# Patient Record
Sex: MALE | Race: ASIAN | NOT HISPANIC OR LATINO | Employment: FULL TIME | URBAN - METROPOLITAN AREA
[De-identification: names, ages, dates, MRNs, and addresses within clinical notes are randomized per-mention and may not be internally consistent; named-entity substitution may affect disease eponyms.]

---

## 2017-06-29 ENCOUNTER — OFFICE VISIT (OUTPATIENT)
Dept: INTERNAL MEDICINE | Facility: CLINIC | Age: 57
End: 2017-06-29
Payer: COMMERCIAL

## 2017-06-29 VITALS
HEART RATE: 89 BPM | OXYGEN SATURATION: 96 % | TEMPERATURE: 98.3 F | WEIGHT: 196 LBS | SYSTOLIC BLOOD PRESSURE: 116 MMHG | BODY MASS INDEX: 29.7 KG/M2 | HEIGHT: 68 IN | DIASTOLIC BLOOD PRESSURE: 68 MMHG

## 2017-06-29 DIAGNOSIS — Z12.5 SPECIAL SCREENING FOR MALIGNANT NEOPLASM OF PROSTATE: ICD-10-CM

## 2017-06-29 DIAGNOSIS — E11.9 TYPE 2 DIABETES MELLITUS WITHOUT COMPLICATION, WITHOUT LONG-TERM CURRENT USE OF INSULIN (H): ICD-10-CM

## 2017-06-29 DIAGNOSIS — I10 ESSENTIAL HYPERTENSION, BENIGN: ICD-10-CM

## 2017-06-29 DIAGNOSIS — Z11.59 NEED FOR HEPATITIS C SCREENING TEST: ICD-10-CM

## 2017-06-29 DIAGNOSIS — Z12.11 COLON CANCER SCREENING: ICD-10-CM

## 2017-06-29 DIAGNOSIS — E78.5 HYPERLIPIDEMIA LDL GOAL <100: ICD-10-CM

## 2017-06-29 DIAGNOSIS — Z00.00 ENCOUNTER FOR ROUTINE ADULT HEALTH EXAMINATION WITHOUT ABNORMAL FINDINGS: Primary | ICD-10-CM

## 2017-06-29 LAB
HBA1C MFR BLD: 8.7 % (ref 4.3–6)
HGB BLD-MCNC: 13.5 G/DL (ref 13.3–17.7)

## 2017-06-29 PROCEDURE — 83036 HEMOGLOBIN GLYCOSYLATED A1C: CPT | Performed by: INTERNAL MEDICINE

## 2017-06-29 PROCEDURE — 80053 COMPREHEN METABOLIC PANEL: CPT | Performed by: INTERNAL MEDICINE

## 2017-06-29 PROCEDURE — 84439 ASSAY OF FREE THYROXINE: CPT | Performed by: INTERNAL MEDICINE

## 2017-06-29 PROCEDURE — 36415 COLL VENOUS BLD VENIPUNCTURE: CPT | Performed by: INTERNAL MEDICINE

## 2017-06-29 PROCEDURE — 82043 UR ALBUMIN QUANTITATIVE: CPT | Performed by: INTERNAL MEDICINE

## 2017-06-29 PROCEDURE — 80061 LIPID PANEL: CPT | Performed by: INTERNAL MEDICINE

## 2017-06-29 PROCEDURE — 99396 PREV VISIT EST AGE 40-64: CPT | Performed by: INTERNAL MEDICINE

## 2017-06-29 PROCEDURE — 84443 ASSAY THYROID STIM HORMONE: CPT | Performed by: INTERNAL MEDICINE

## 2017-06-29 PROCEDURE — 85018 HEMOGLOBIN: CPT | Performed by: INTERNAL MEDICINE

## 2017-06-29 PROCEDURE — G0103 PSA SCREENING: HCPCS | Performed by: INTERNAL MEDICINE

## 2017-06-29 PROCEDURE — 86803 HEPATITIS C AB TEST: CPT | Performed by: INTERNAL MEDICINE

## 2017-06-29 RX ORDER — LISINOPRIL 20 MG/1
20 TABLET ORAL DAILY
Qty: 90 TABLET | Refills: 3 | Status: SHIPPED | OUTPATIENT
Start: 2017-06-29 | End: 2018-10-30

## 2017-06-29 RX ORDER — GLIMEPIRIDE 2 MG/1
2 TABLET ORAL
Qty: 90 TABLET | Refills: 3 | Status: SHIPPED | OUTPATIENT
Start: 2017-06-29 | End: 2018-10-30

## 2017-06-29 RX ORDER — ATORVASTATIN CALCIUM 20 MG/1
20 TABLET, FILM COATED ORAL DAILY
Qty: 90 TABLET | Refills: 3 | Status: SHIPPED | OUTPATIENT
Start: 2017-06-29 | End: 2018-10-30

## 2017-06-29 NOTE — LETTER
[facility]  [address]  [address]      June 30, 2017      Jessica Gomez  [address]  [facility] [address]          Dear Jessica,      I have enclosed a copy of your most recent labs done here at the clinic and if available some of your prior labs for comparison.     I am pleased to inform you that your routine blood work including your hemoglobin, sodium, potassium, calcium, kidney and liver function tests are all normal.    Your Hemoglobin A1C is abnormal and indicates your blood sugars could be better controlled.  Please follow-up in the clinic to discuss some changes that need to be done.    Your thyroid function tests indicate you need some medication adjustment so I would call the clinic and make a follow-up appointment to discuss these changes.    Your cholesterol looks good and I would not change anything at this point but would repeat your labs in 12 months.    In addition, your PSA or prostate screening antigen is normal and should be repeated annually.    Please call me if you have further questions.        Michael Méndez MD

## 2017-06-29 NOTE — PROGRESS NOTES
SUBJECTIVE:   CC: Jessica Gomez is an 57 year old male who presents for preventative health visit.     Healthy Habits:    Do you get at least three servings of calcium containing foods daily (dairy, green leafy vegetables, etc.)? yes    Amount of exercise or daily activities, outside of work: none    Problems taking medications regularly No    Medication side effects: No    Have you had an eye exam in the past two years? yes    Do you see a dentist twice per year? yes  Do you have sleep apnea, excessive snoring or daytime drowsiness?no      Today's PHQ-2 Score:   PHQ-2 ( 1999 [employer]) 5/18/2016 4/8/2015   Q1: Little interest or pleasure in doing things 0 0   Q2: Feeling down, depressed or hopeless 0 0   PHQ-2 Score 0 0       Abuse: Current or Past(Physical, Sexual or Emotional)- No  Do you feel safe in your environment - Yes    Social History   Substance Use Topics     Smoking status: Never Smoker     Smokeless tobacco: Never Used     Alcohol use No     The patient does not drink >3 drinks per day nor >7 drinks per week.    Last PSA:   PSA   Date Value Ref Range Status   05/18/2016 0.69 0 - 4 ug/L Final       Reviewed orders with patient. Reviewed health maintenance and updated orders accordingly - Yes  Labs reviewed in EPIC  BP Readings from Last 3 Encounters:   05/18/16 118/80   04/08/15 100/62   03/16/15 100/62    Wt Readings from Last 3 Encounters:   05/18/16 194 lb 9.6 oz (88.3 kg)   04/08/15 182 lb 9.6 oz (82.8 kg)   03/16/15 184 lb 8 oz (83.7 kg)                 Current Outpatient Prescriptions   Medication Sig Dispense Refill     glimepiride (AMARYL) 2 MG tablet Take 1 tablet (2 mg) by mouth every morning (before breakfast) 90 tablet 3     atorvastatin (LIPITOR) 20 MG tablet Take 1 tablet (20 mg) by mouth daily 90 tablet 3     metFORMIN (GLUCOPHAGE) 1000 MG tablet Take 1 tablet (1,000 mg) by mouth 2 times daily (with meals) 180 tablet 3     lisinopril (PRINIVIL,ZESTRIL) 20 MG tablet Take 1 tablet  "(20 mg) by mouth daily 90 tablet 3     ibuprofen (ADVIL) 200 MG capsule Take 200 mg by mouth every 4 hours as needed for fever       aspirin 81 MG EC tablet Take 1 tablet (81 mg) by mouth daily 90 tablet 3     No Known Allergies  Recent Labs   Lab Test  05/18/16   0904  03/16/15   0919   A1C  9.1*  10.2*   LDL  34  101   HDL  34*  37*   TRIG  124  167*   ALT  24  26   CR  0.99  0.88   GFRESTIMATED  78  >90  Non  GFR Calc     GFRESTBLACK  >90   GFR Calc    >90   GFR Calc     POTASSIUM  4.1  4.2   TSH  6.12*  3.84        Reviewed and updated as needed this visit by clinical staff         Reviewed and updated as needed this visit by Provider          ROS:  C: NEGATIVE for fever, chills, change in weight  ENT: NEGATIVE for ear, mouth and throat problems  R: NEGATIVE for significant cough or SOB  CV: NEGATIVE for chest pain, palpitations or peripheral edema  GI: NEGATIVE for nausea, abdominal pain, heartburn, or change in bowel habits   male: negative for dysuria, hematuria, decreased urinary stream, erectile dysfunction, urethral discharge  M: NEGATIVE for significant arthralgias or myalgia  N: NEGATIVE for weakness, dizziness or paresthesias  P: NEGATIVE for changes in mood or affect    OBJECTIVE:   /68  Pulse 89  Temp 98.3  F (36.8  C) (Oral)  Ht 5' 8\" (1.727 m)  Wt 196 lb (88.9 kg)  SpO2 96%  BMI 29.8 kg/m2  EXAM:  GENERAL: healthy, alert and no distress  EYES: Eyes grossly normal to inspection, PERRL and conjunctivae and sclerae normal  HENT: ear canals and TM's normal, nose and mouth without ulcers or lesions  NECK: no adenopathy, no asymmetry, masses, or scars and thyroid normal to palpation  RESP: lungs clear to auscultation - no rales, rhonchi or wheezes  CV: regular rate and rhythm, normal S1 S2, no S3 or S4, no murmur, click or rub, no peripheral edema and peripheral pulses strong  ABDOMEN: soft, nontender, no hepatosplenomegaly, no masses and bowel " sounds normal  RECTAL: normal sphincter tone, no rectal masses, prostate normal size, smooth, nontender without nodules or masses  MS: no gross musculoskeletal defects noted, no edema  NEURO: Normal strength and tone, mentation intact and speech normal  PSYCH: mentation appears normal, affect normal/bright    ASSESSMENT/PLAN:   1. Encounter for routine adult health examination without abnormal findings  As ordered  - Hemoglobin  - Comprehensive metabolic pane-Discussed immunization updated, he will check with insurance    2. Type 2 diabetes mellitus without complication, without long-term current use of insulin (H)  Stable, labs pending.  - Comprehensive metabolic panel  - Hemoglobin A1c  - Albumin Random Urine Quantitative  - TSH with free T4 reflex  - glimepiride (AMARYL) 2 MG tablet; Take 1 tablet (2 mg) by mouth every morning (before breakfast)  Dispense: 90 tablet; Refill: 3  - metFORMIN (GLUCOPHAGE) 1000 MG tablet; Take 1 tablet (1,000 mg) by mouth 2 times daily (with meals)  Dispense: 180 tablet; Refill: 3  - lisinopril (PRINIVIL/ZESTRIL) 20 MG tablet; Take 1 tablet (20 mg) by mouth daily  Dispense: 90 tablet; Refill: 3  Lab Results   Component Value Date    A1C 9.1 05/18/2016    A1C 10.2 03/16/2015       3. Hyperlipidemia LDL goal <100  Labs advised fasting  - Comprehensive metabolic panel  - Lipid Profile  - atorvastatin (LIPITOR) 20 MG tablet; Take 1 tablet (20 mg) by mouth daily  Dispense: 90 tablet; Refill: 3    4. Essential hypertension, benign  Stable at goal on therapy  - Comprehensive metabolic panel  - lisinopril (PRINIVIL/ZESTRIL) 20 MG tablet; Take 1 tablet (20 mg) by mouth daily  Dispense: 90 tablet; Refill: 3    5. Special screening for malignant neoplasm of prostate  As screening  - PSA, screen    6. Colon cancer screening  Colonoscopy UTD  - Fecal colorectal cancer screen (FIT); Future    7. Need for hepatitis C screening test  As screening recommendations   - Hepatitis C  "antibody    COUNSELING:  Reviewed preventive health counseling, as reflected in patient instructions       Regular exercise       Healthy diet/nutrition     reports that he has never smoked. He has never used smokeless tobacco.    Estimated body mass index is 29.59 kg/(m^2) as calculated from the following:    Height as of 5/18/16: 5' 8\" (1.727 m).    Weight as of 5/18/16: 194 lb 9.6 oz (88.3 kg).       Counseling Resources:  ATP IV Guidelines  Pooled Cohorts Equation Calculator  FRAX Risk Assessment  ICSI Preventive Guidelines  Dietary Guidelines for Americans, 2010  USDA's MyPlate  ASA Prophylaxis  Lung CA Screening    Michael Méndez MD  [facility]    THE MEDICATION LIST HAS BEEN FULLY RECONCILED BY THE M.KO. AND THE NURSING STAFF.    "

## 2017-06-29 NOTE — MR AVS SNAPSHOT
After Visit Summary   6/29/2017    Jessica Gomez    MRN: [id number]           Patient Information     Date Of Birth          1960        Visit Information        Provider Department      6/29/2017 6:00 PM Michael Méndez MD [facility]        Today's Diagnoses     Encounter for routine adult health examination without abnormal findings    -  1    Type 2 diabetes mellitus without complication, without long-term current use of insulin (H)        Hyperlipidemia LDL goal <100        Essential hypertension, benign        Special screening for malignant neoplasm of prostate        Colon cancer screening        Need for hepatitis C screening test          Care Instructions      Preventive Health Recommendations  Male Ages 50 - 64    Yearly exam:             See your health care provider every year in order to  o   Review health changes.   o   Discuss preventive care.    o   Review your medicines if your doctor has prescribed any.     Have a cholesterol test every 5 years, or more frequently if you are at risk for high cholesterol/heart disease.     Have a diabetes test (fasting glucose) every three years. If you are at risk for diabetes, you should have this test more often.     Have a colonoscopy at age 50, or have a yearly FIT test (stool test). These exams will check for colon cancer.      Talk with your health care provider about whether or not a prostate cancer screening test (PSA) is right for you.    You should be tested each year for STDs (sexually transmitted diseases), if you re at risk.     Shots: Get a flu shot each year. Get a tetanus shot every 10 years.     Nutrition:    Eat at least 5 servings of fruits and vegetables daily.     Eat whole-grain bread, whole-wheat pasta and brown rice instead of white grains and rice.     Talk to your provider about Calcium and Vitamin D.     Lifestyle    Exercise for at least 150 minutes a week (30 minutes a day, 5 days a  "week). This will help you control your weight and prevent disease.     Limit alcohol to one drink per day.     No smoking.     Wear sunscreen to prevent skin cancer.     See your dentist every six months for an exam and cleaning.     See your eye doctor every 1 to 2 years.            Follow-ups after your visit        Follow-up notes from your care team     Return if symptoms worsen or fail to improve.      Future tests that were ordered for you today     Open Future Orders        Priority Expected Expires Ordered    Fecal colorectal cancer screen (FIT) Routine 7/20/2017 9/21/2017 6/29/2017            Who to contact     If you have questions or need follow up information about today's clinic visit or your schedule please contact [facility] directly at [phone].  Normal or non-critical lab and imaging results will be communicated to you by [employer], letter or phone within 4 business days after the clinic has received the results. If you do not hear from us within 7 days, please contact the clinic through [employer] or phone. If you have a critical or abnormal lab result, we will notify you by phone as soon as possible.  Submit refill requests through [employer] or call your pharmacy and they will forward the refill request to us. Please allow 3 business days for your refill to be completed.          Additional Information About Your Visit        [employer] Information     [employer] lets you send messages to your doctor, view your test results, renew your prescriptions, schedule appointments and more. To sign up, go to [email or web address] . Click on \"Log in\" on the left side of the screen, which will take you to the Welcome page. Then click on \"Sign up Now\" on the right side of the page.     You will be asked to enter the access code listed below, as well as some personal information. Please follow the directions to create your username and password.     Your access code is: [email or web address]  Expires: " "2017  6:19 PM     Your access code will  in 90 days. If you need help or a new code, please call your [place] clinic or [phone].        Care EveryWhere ID     This is your Care EveryWhere ID. This could be used by other organizations to access your [place] medical records  [id number]        Your Vitals Were     Pulse Temperature Height Pulse Oximetry BMI (Body Mass Index)       89 98.3  F (36.8  C) (Oral) 5' 8\" (1.727 m) 96% 29.8 kg/m2        Blood Pressure from Last 3 Encounters:   17 116/68   16 118/80   04/08/15 100/62    Weight from Last 3 Encounters:   17 196 lb (88.9 kg)   16 194 lb 9.6 oz (88.3 kg)   04/08/15 182 lb 9.6 oz (82.8 kg)              We Performed the Following     Albumin Random Urine Quantitative     Comprehensive metabolic panel     Hemoglobin A1c     Hemoglobin     Hepatitis C antibody     Lipid Profile     PSA, screen     TSH with free T4 reflex          Where to get your medicines      These medications were sent to [facility][id number] - [facility] [address]   [address] [facility] [address]     Phone:  [phone]     atorvastatin 20 MG tablet    glimepiride 2 MG tablet    lisinopril 20 MG tablet    metFORMIN 1000 MG tablet          Primary Care Provider    None Specified       No primary provider on file.        Equal Access to Services     VERA SANTORO : Hadzonia Alejandro, waaxda luraphael, qaybta kaalmada rich obrien . So [facility] [phone].    ATENCIÓN: Si habla español, tiene a gomez disposición servicios gratuitos de asistencia lingüística. John al [phone].    We comply with applicable federal civil rights laws and [place] laws. We do not discriminate on the basis of race, color, national origin, age, disability sex, sexual orientation or gender identity.            Thank you!     Thank you for choosing [facility]  for your care. Our goal is always " to provide you with excellent care. Hearing back from our patients is one way we can continue to improve our services. Please take a few minutes to complete the written survey that you may receive in the mail after your visit with us. Thank you!             Your Updated Medication List - Protect others around you: Learn how to safely use, store and throw away your medicines at www.disposemymeds.org.          This list is accurate as of: 6/29/17  6:19 PM.  Always use your most recent med list.                   Brand Name Dispense Instructions for use Diagnosis    ADVIL 200 MG capsule   Generic drug:  ibuprofen      Take 200 mg by mouth every 4 hours as needed for fever    HTN (hypertension), Hyperlipidemia LDL goal <100, Type 2 diabetes, HbA1C goal < 8% (H), Colon cancer screening, Need for Tdap vaccination       aspirin 81 MG EC tablet     90 tablet    Take 1 tablet (81 mg) by mouth daily    Type 2 diabetes, HbA1C goal < 8% (H), HTN (hypertension), Hyperlipidemia LDL goal <100, Colon cancer screening, Need for Tdap vaccination       atorvastatin 20 MG tablet    LIPITOR    90 tablet    Take 1 tablet (20 mg) by mouth daily    Hyperlipidemia LDL goal <100       glimepiride 2 MG tablet    AMARYL    90 tablet    Take 1 tablet (2 mg) by mouth every morning (before breakfast)    Type 2 diabetes mellitus without complication, without long-term current use of insulin (H)       lisinopril 20 MG tablet    PRINIVIL/ZESTRIL    90 tablet    Take 1 tablet (20 mg) by mouth daily    Type 2 diabetes mellitus without complication, without long-term current use of insulin (H), Essential hypertension, benign       metFORMIN 1000 MG tablet    GLUCOPHAGE    180 tablet    Take 1 tablet (1,000 mg) by mouth 2 times daily (with meals)    Type 2 diabetes mellitus without complication, without long-term current use of insulin (H)

## 2017-06-29 NOTE — NURSING NOTE
"Chief Complaint   Patient presents with     Physical       Initial /68  Pulse 89  Temp 98.3  F (36.8  C) (Oral)  Ht 5' 8\" (1.727 m)  Wt 196 lb (88.9 kg)  SpO2 96%  BMI 29.8 kg/m2 Estimated body mass index is 29.8 kg/(m^2) as calculated from the following:    Height as of this encounter: 5' 8\" (1.727 m).    Weight as of this encounter: 196 lb (88.9 kg).  Medication Reconciliation: complete  "

## 2017-06-30 LAB
ALBUMIN SERPL-MCNC: 3.9 G/DL (ref 3.4–5)
ALP SERPL-CCNC: 64 U/L (ref 40–150)
ALT SERPL W P-5'-P-CCNC: 30 U/L (ref 0–70)
ANION GAP SERPL CALCULATED.3IONS-SCNC: 7 MMOL/L (ref 3–14)
AST SERPL W P-5'-P-CCNC: 24 U/L (ref 0–45)
BILIRUB SERPL-MCNC: 0.5 MG/DL (ref 0.2–1.3)
BUN SERPL-MCNC: 15 MG/DL (ref 7–30)
CALCIUM SERPL-MCNC: 9.1 MG/DL (ref 8.5–10.1)
CHLORIDE SERPL-SCNC: 106 MMOL/L (ref 94–109)
CHOLEST SERPL-MCNC: 115 MG/DL
CO2 SERPL-SCNC: 29 MMOL/L (ref 20–32)
CREAT SERPL-MCNC: 1.08 MG/DL (ref 0.66–1.25)
CREAT UR-MCNC: 126 MG/DL
GFR SERPL CREATININE-BSD FRML MDRD: 70 ML/MIN/1.7M2
GLUCOSE SERPL-MCNC: 93 MG/DL (ref 70–99)
HCV AB SERPL QL IA: ABNORMAL
HDLC SERPL-MCNC: 32 MG/DL
LDLC SERPL CALC-MCNC: 57 MG/DL
MICROALBUMIN UR-MCNC: 32 MG/L
MICROALBUMIN/CREAT UR: 25.56 MG/G CR (ref 0–17)
NONHDLC SERPL-MCNC: 83 MG/DL
POTASSIUM SERPL-SCNC: 4.5 MMOL/L (ref 3.4–5.3)
PROT SERPL-MCNC: 7.6 G/DL (ref 6.8–8.8)
PSA SERPL-ACNC: 0.47 UG/L (ref 0–4)
SODIUM SERPL-SCNC: 142 MMOL/L (ref 133–144)
T4 FREE SERPL-MCNC: 1.3 NG/DL (ref 0.76–1.46)
TRIGL SERPL-MCNC: 130 MG/DL
TSH SERPL DL<=0.005 MIU/L-ACNC: 21.02 MU/L (ref 0.4–4)

## 2017-07-06 ENCOUNTER — OFFICE VISIT (OUTPATIENT)
Dept: INTERNAL MEDICINE | Facility: CLINIC | Age: 57
End: 2017-07-06
Payer: COMMERCIAL

## 2017-07-06 VITALS
BODY MASS INDEX: 30.71 KG/M2 | WEIGHT: 202 LBS | DIASTOLIC BLOOD PRESSURE: 70 MMHG | OXYGEN SATURATION: 96 % | HEART RATE: 95 BPM | TEMPERATURE: 98.1 F | SYSTOLIC BLOOD PRESSURE: 118 MMHG

## 2017-07-06 DIAGNOSIS — E11.9 TYPE 2 DIABETES MELLITUS WITHOUT COMPLICATION, WITHOUT LONG-TERM CURRENT USE OF INSULIN (H): ICD-10-CM

## 2017-07-06 DIAGNOSIS — B19.20 HEPATITIS C VIRUS INFECTION WITHOUT HEPATIC COMA, UNSPECIFIED CHRONICITY: Primary | ICD-10-CM

## 2017-07-06 DIAGNOSIS — I10 ESSENTIAL HYPERTENSION, BENIGN: ICD-10-CM

## 2017-07-06 PROCEDURE — 99214 OFFICE O/P EST MOD 30 MIN: CPT | Performed by: INTERNAL MEDICINE

## 2017-07-06 PROCEDURE — 36415 COLL VENOUS BLD VENIPUNCTURE: CPT | Performed by: INTERNAL MEDICINE

## 2017-07-06 PROCEDURE — 86803 HEPATITIS C AB TEST: CPT | Performed by: INTERNAL MEDICINE

## 2017-07-06 NOTE — NURSING NOTE
"Chief Complaint   Patient presents with     Results     Hep C screening        Initial /70  Pulse 95  Temp 98.1  F (36.7  C) (Oral)  Wt 202 lb (91.6 kg)  SpO2 96%  BMI 30.71 kg/m2 Estimated body mass index is 30.71 kg/(m^2) as calculated from the following:    Height as of 6/29/17: 5' 8\" (1.727 m).    Weight as of this encounter: 202 lb (91.6 kg).  Medication Reconciliation: complete   Clara Perkins CMA      "

## 2017-07-06 NOTE — PROGRESS NOTES
SUBJECTIVE:                                                    Jessica Gomez is a 57 year old male who presents to clinic today for the following health issues:    Follow up on 6/29/17 lab result for positive Hep C antibody.  Gil denies prior exposures or risk factors.        Problem list and histories reviewed & adjusted, as indicated.  Additional history: as documented    Patient Active Problem List   Diagnosis     Hyperlipidemia LDL goal <100     Essential hypertension, benign     History reviewed. No pertinent surgical history.    Social History   Substance Use Topics     Smoking status: Never Smoker     Smokeless tobacco: Never Used     Alcohol use No     Family History   Problem Relation Age of Onset     DIABETES Paternal Grandmother      DIABETES Nephew          Current Outpatient Prescriptions   Medication Sig Dispense Refill     glimepiride (AMARYL) 2 MG tablet Take 1 tablet (2 mg) by mouth every morning (before breakfast) 90 tablet 3     atorvastatin (LIPITOR) 20 MG tablet Take 1 tablet (20 mg) by mouth daily 90 tablet 3     metFORMIN (GLUCOPHAGE) 1000 MG tablet Take 1 tablet (1,000 mg) by mouth 2 times daily (with meals) 180 tablet 3     lisinopril (PRINIVIL/ZESTRIL) 20 MG tablet Take 1 tablet (20 mg) by mouth daily 90 tablet 3     ibuprofen (ADVIL) 200 MG capsule Take 200 mg by mouth every 4 hours as needed for fever       aspirin 81 MG EC tablet Take 1 tablet (81 mg) by mouth daily 90 tablet 3     No Known Allergies  BP Readings from Last 3 Encounters:   07/06/17 118/70   06/29/17 116/68   05/18/16 118/80    Wt Readings from Last 3 Encounters:   07/06/17 202 lb (91.6 kg)   06/29/17 196 lb (88.9 kg)   05/18/16 194 lb 9.6 oz (88.3 kg)                    Reviewed and updated as needed this visit by clinical staff       Reviewed and updated as needed this visit by Provider         ROS:  C: NEGATIVE for fever, chills, change in weight  E/M: NEGATIVE for ear, mouth and throat problems  R:  NEGATIVE for significant cough or SOB  CV: NEGATIVE for chest pain, palpitations or peripheral edema  GI: NEGATIVE for nausea, abdominal pain, heartburn, or change in bowel habits  : NEGATIVE for frequency, dysuria, or hematuria  M: NEGATIVE for significant arthralgias or myalgia  H: NEGATIVE for bleeding problems  P: NEGATIVE for changes in mood or affect    OBJECTIVE:                                                    /70  Pulse 95  Temp 98.1  F (36.7  C) (Oral)  Wt 202 lb (91.6 kg)  SpO2 96%  BMI 30.71 kg/m2  Body mass index is 30.71 kg/(m^2).  GENERAL: alert and no distress  EYES: Eyes grossly normal to inspection, extraocular movements - intact, and PERRL  HENT: ear canals- normal; TMs- normal; Nose- normal; Mouth- no ulcers, no lesions  NECK: no tenderness, no adenopathy, no asymmetry, no masses, no stiffness; thyroid- normal to palpation  RESP: lungs clear to auscultation - no rales, no rhonchi, no wheezes  CV: regular rates and rhythm, normal S1 S2, no S3 or S4 and no murmur, no click or rub -  MS: extremities- no gross deformities noted  PSYCH: Alert and oriented times 3; speech- coherent , normal rate and volume; able to articulate logical thoughts, able to abstract reason, no tangential thoughts, no hallucinations or delusions, affect- normal       Component      Latest Ref Rng & Units 6/29/2017   Sodium      133 - 144 mmol/L 142   Potassium      3.4 - 5.3 mmol/L 4.5   Chloride      94 - 109 mmol/L 106   Carbon Dioxide      20 - 32 mmol/L 29   Anion Gap      3 - 14 mmol/L 7   Glucose      70 - 99 mg/dL 93   Urea Nitrogen      7 - 30 mg/dL 15   Creatinine      0.66 - 1.25 mg/dL 1.08   GFR Estimate      >60 mL/min/1.7m2 70   GFR Estimate If Black      >60 mL/min/1.7m2 85   Calcium      8.5 - 10.1 mg/dL 9.1   Bilirubin Total      0.2 - 1.3 mg/dL 0.5   Albumin      3.4 - 5.0 g/dL 3.9   Protein Total      6.8 - 8.8 g/dL 7.6   Alkaline Phosphatase      40 - 150 U/L 64   ALT      0 - 70 U/L 30    AST      0 - 45 U/L 24   Cholesterol      <200 mg/dL 115   Triglycerides      <150 mg/dL 130   HDL Cholesterol      >39 mg/dL 32 (L)   LDL Cholesterol Calculated      <100 mg/dL 57   Non HDL Cholesterol      <130 mg/dL 83   Hemoglobin      13.3 - 17.7 g/dL 13.5   Hemoglobin A1C      4.3 - 6.0 % 8.7 (H)   TSH      0.40 - 4.00 mU/L 21.02 (H)   Hepatitis C Antibody      NR Reactive (A) . . .   PSA      0 - 4 ug/L 0.47   T4 Free      0.76 - 1.46 ng/dL 1.30     ASSESSMENT/PLAN:                                                    (B19.20) Hepatitis C virus infection without hepatic coma, unspecified chronicity  (primary encounter diagnosis)  Comment: will repeat and if + the check RNA  Plan: GASTROENTEROLOGY ADULT REF CONSULT ONLY,         **Hepatitis C Screen Reflex to RNA FUTURE         anytime        Referral pending results may be needed although ALT normal.    Lab Results   Component Value Date    ALT 30 06/29/2017       (E11.9) Type 2 diabetes mellitus without complication, without long-term current use of insulin (H)  Comment: discussed with patient A1c and he reports he has been non-compliant with therapy due to his work schedule  Plan: advised importance of therapy needed wit repeat A1c needed again 3 months, orders placed    (I10) Essential hypertension, benign  Comment: stable as noted on therapy  Plan: no changes      See Patient Instructions    Michael Méndez MD  [facility]    25 minutes spent with this patient, face to face, discussing treatment options for listed problems above as well as side effects of appropriate medications.  Counseling time extended beyond 50% of the clinic visit.  Medication dosing, treatment plan and follow-up were discussed. Also reviewed need for primary care testing for patient.

## 2017-07-06 NOTE — LETTER
[facility]  [address] [address]  [phone]        November 6, 2017    Jessica Gomez  [address]  [facility] [address]              Dear Jessica Gomez    This is to remind you that your labs are  due.    You may call our office at [phone] to schedule an appointment.    Please disregard this notice if you have already had your labs drawn or made an appointment.        Sincerely,        Michael Méndez MD

## 2017-07-06 NOTE — LETTER
[address]  [address]      July 31, 2017      Jessica Gomez  [address]  [facility] [address]          Dear Jessica,      We have tried to reach you several times and would ask that you call to make a follow-up appointment to discuss your Hepatitis studies.    Please call me if you have further questions.        Michael Méndez MD

## 2017-07-06 NOTE — MR AVS SNAPSHOT
After Visit Summary   7/6/2017    Jessica Gomez    MRN: [id number]           Patient Information     Date Of Birth          1960        Visit Information        Provider Department      7/6/2017 11:20 AM Michael Méndez MD [facility]        Today's Diagnoses     Hepatitis C virus infection without hepatic coma, unspecified chronicity    -  1       Follow-ups after your visit        Additional Services     GASTROENTEROLOGY ADULT REF CONSULT ONLY       Preferred Location: [facility] [phone]      Please be aware that coverage of these services is subject to the terms and limitations of your health insurance plan.  Call member services at your health plan with any benefit or coverage questions.  Any procedures must be performed at a [place] facility OR coordinated by your clinic's referral office.    Please bring the following with you to your appointment:    (1) Any X-Rays, CTs or MRIs which have been performed.  Contact the facility where they were done to arrange for  prior to your scheduled appointment.    (2) List of current medications   (3) This referral request   (4) Any documents/labs given to you for this referral                  Your next 10 appointments already scheduled     Jul 06, 2017 11:20 AM CDT   [facility] with [facility]dez [facility]   [facility]    [address] [address] [phone]           Bring a current list of meds and any records pertaining to this visit.  For Physicals, please bring immunization records and any forms needing to be filled out.  Please arrive 10 minutes early to complete paperwork.              Future tests that were ordered for you today     Open Future Orders        Priority Expected Expires Ordered    **Hepatitis C Screen Reflex to RNA FUTURE anytime Routine 7/6/2017 7/5/2018 7/6/2017            Who to contact     If you  "have questions or need follow up information about today's clinic visit or your schedule please contact [facility] directly at [phone].  Normal or non-critical lab and imaging results will be communicated to you by MyChart, letter or phone within 4 business days after the clinic has received the results. If you do not hear from us within 7 days, please contact the clinic through Smashrunhart or phone. If you have a critical or abnormal lab result, we will notify you by phone as soon as possible.  Submit refill requests through [employer] or call your pharmacy and they will forward the refill request to us. Please allow 3 business days for your refill to be completed.          Additional Information About Your Visit        Smashrunhar3dplusme Information     Quantason lets you send messages to your doctor, view your test results, renew your prescriptions, schedule appointments and more. To sign up, go to [email or web address] . Click on \"Log in\" on the left side of the screen, which will take you to the Welcome page. Then click on \"Sign up Now\" on the right side of the page.     You will be asked to enter the access code listed below, as well as some personal information. Please follow the directions to create your username and password.     Your access code is: [email or web address]  Expires: 2017  6:19 PM     Your access code will  in 90 days. If you need help or a new code, please call your [facility] clinic or [phone].        Care EveryWhere ID     This is your Care EveryWhere ID. This could be used by other organizations to access your [facility] medical records  [id number]        Your Vitals Were     Pulse Temperature Pulse Oximetry BMI (Body Mass Index)          95 98.1  F (36.7  C) (Oral) 96% 30.71 kg/m2         Blood Pressure from Last 3 Encounters:   17 118/70   17 116/68   16 118/80    Weight from Last 3 Encounters:   17 202 lb (91.6 kg)   17 196 lb (88.9 kg) "   05/18/16 194 lb 9.6 oz (88.3 kg)              We Performed the Following     GASTROENTEROLOGY ADULT REF CONSULT ONLY        Primary Care Provider    None Specified       No primary provider on file.        Equal Access to Services     VERA SANTORO : Hadii aad ku hadmichela Pruittali, kevinda amitapatricia, qastevanta kabriandada micah, rich harvey luigijono iyer lacarriekirk rolle. So [facility] [phone].    ATENCIÓN: Si habla español, tiene a gomez disposición servicios gratuitos de asistencia lingüística. Llame al [phone].    We comply with applicable federal civil rights laws and [place] laws. We do not discriminate on the basis of race, color, national origin, age, disability sex, sexual orientation or gender identity.            Thank you!     Thank you for choosing [facility]  for your care. Our goal is always to provide you with excellent care. Hearing back from our patients is one way we can continue to improve our services. Please take a few minutes to complete the written survey that you may receive in the mail after your visit with us. Thank you!             Your Updated Medication List - Protect others around you: Learn how to safely use, store and throw away your medicines at www.disposemymeds.org.          This list is accurate as of: 7/6/17 11:17 AM.  Always use your most recent med list.                   Brand Name Dispense Instructions for use Diagnosis    ADVIL 200 MG capsule   Generic drug:  ibuprofen      Take 200 mg by mouth every 4 hours as needed for fever    HTN (hypertension), Hyperlipidemia LDL goal <100, Type 2 diabetes, HbA1C goal < 8% (H), Colon cancer screening, Need for Tdap vaccination       aspirin 81 MG EC tablet     90 tablet    Take 1 tablet (81 mg) by mouth daily    Type 2 diabetes, HbA1C goal < 8% (H), HTN (hypertension), Hyperlipidemia LDL goal <100, Colon cancer screening, Need for Tdap vaccination       atorvastatin 20 MG tablet    LIPITOR    90 tablet    Take 1  tablet (20 mg) by mouth daily    Hyperlipidemia LDL goal <100       glimepiride 2 MG tablet    AMARYL    90 tablet    Take 1 tablet (2 mg) by mouth every morning (before breakfast)    Type 2 diabetes mellitus without complication, without long-term current use of insulin (H)       lisinopril 20 MG tablet    PRINIVIL/ZESTRIL    90 tablet    Take 1 tablet (20 mg) by mouth daily    Type 2 diabetes mellitus without complication, without long-term current use of insulin (H), Essential hypertension, benign       metFORMIN 1000 MG tablet    GLUCOPHAGE    180 tablet    Take 1 tablet (1,000 mg) by mouth 2 times daily (with meals)    Type 2 diabetes mellitus without complication, without long-term current use of insulin (H)

## 2017-07-07 LAB — HCV AB SERPL QL IA: ABNORMAL

## 2017-07-29 DIAGNOSIS — B19.20 HEPATITIS C VIRUS INFECTION WITHOUT HEPATIC COMA, UNSPECIFIED CHRONICITY: ICD-10-CM

## 2017-07-29 PROCEDURE — 36415 COLL VENOUS BLD VENIPUNCTURE: CPT | Performed by: INTERNAL MEDICINE

## 2017-07-29 PROCEDURE — 87522 HEPATITIS C REVRS TRNSCRPJ: CPT | Performed by: INTERNAL MEDICINE

## 2017-07-31 ENCOUNTER — TELEPHONE (OUTPATIENT)
Dept: INTERNAL MEDICINE | Facility: CLINIC | Age: 57
End: 2017-07-31

## 2017-07-31 LAB
HCV RNA SERPL NAA+PROBE-ACNC: NORMAL [IU]/ML
HCV RNA SERPL NAA+PROBE-LOG IU: NORMAL LOG IU/ML

## 2017-12-01 ENCOUNTER — TRANSFERRED RECORDS (OUTPATIENT)
Dept: HEALTH INFORMATION MANAGEMENT | Facility: CLINIC | Age: 57
End: 2017-12-01

## 2017-12-12 ENCOUNTER — TELEPHONE (OUTPATIENT)
Dept: LAB | Facility: CLINIC | Age: 57
End: 2017-12-12

## 2018-10-30 NOTE — PROGRESS NOTES
SUBJECTIVE:   CC: Jessica Gomez is an 58 year old male who presents for preventative health visit.     Healthy Habits:    Do you get at least three servings of calcium containing foods daily (dairy, green leafy vegetables, etc.)? yes    Amount of exercise or daily activities, outside of work: none    Problems taking medications regularly No    Medication side effects: No    Have you had an eye exam in the past two years? yes    Do you see a dentist twice per year? yes    Do you have sleep apnea, excessive snoring or daytime drowsiness?no     Today's PHQ-2 Score:   PHQ-2 ( 1999 Pfizer) 7/6/2017 7/6/2017   Q1: Little interest or pleasure in doing things 0 0   Q2: Feeling down, depressed or hopeless 0 0   PHQ-2 Score 0 0       Abuse: Current or Past(Physical, Sexual or Emotional)- No  Do you feel safe in your environment - Yes    Social History   Substance Use Topics     Smoking status: Never Smoker     Smokeless tobacco: Never Used     Alcohol use No      If you drink alcohol do you typically have >3 drinks per day or >7 drinks per week? No                      Last PSA:   PSA   Date Value Ref Range Status   06/29/2017 0.47 0 - 4 ug/L Final     Comment:     Assay Method:  Chemiluminescence using Siemens Vista analyzer     Reviewed orders with patient. Reviewed health maintenance and updated orders accordingly - Yes      Reviewed and updated as needed this visit by clinical staff  Tobacco  Allergies  Meds  Problems  Med Hx  Surg Hx  Fam Hx  Soc Hx        Reviewed and updated as needed this visit by Provider          ROS:  CONSTITUTIONAL: NEGATIVE for fever, chills, change in weight  INTEGUMENTARY/SKIN: NEGATIVE for worrisome rashes, moles or lesions  EYES: NEGATIVE for vision changes or irritation  ENT: NEGATIVE for ear, mouth and throat problems  RESP: NEGATIVE for significant cough or SOB  CV: NEGATIVE for chest pain, palpitations or peripheral edema  GI: NEGATIVE for nausea, abdominal pain,  "heartburn, or change in bowel habits   male: negative for dysuria, hematuria, decreased urinary stream, erectile dysfunction, urethral discharge  MUSCULOSKELETAL: NEGATIVE for significant arthralgias or myalgia  NEURO: NEGATIVE for weakness, dizziness or paresthesias  PSYCHIATRIC: NEGATIVE for changes in mood or affect    OBJECTIVE:   /68  Pulse 83  Temp 98.2  F (36.8  C) (Oral)  Resp 14  Ht 5' 8\" (1.727 m)  Wt 186 lb 4.8 oz (84.5 kg)  SpO2 96%  BMI 28.33 kg/m2  EXAM:  GENERAL: healthy, alert and no distress  EYES: Eyes grossly normal to inspection, PERRL and conjunctivae and sclerae normal  HENT: ear canals and TM's normal, nose and mouth without ulcers or lesions  NECK: no adenopathy, no asymmetry, masses, or scars and thyroid normal to palpation  RESP: lungs clear to auscultation - no rales, rhonchi or wheezes  CV: regular rate and rhythm, normal S1 S2, no S3 or S4, no murmur, click or rub, no peripheral edema and peripheral pulses strong  ABDOMEN: soft, nontender, no hepatosplenomegaly, no masses and bowel sounds normal  RECTAL: normal sphincter tone, no rectal masses, prostate normal size, smooth, nontender without nodules or masses  MS: no gross musculoskeletal defects noted, no edema  NEURO: Normal strength and tone, mentation intact and speech normal  PSYCH: mentation appears normal, affect normal/bright    ASSESSMENT/PLAN:   1. Encounter for routine adult health examination without abnormal findings  Advised vaccination update  - Hemoglobin  - Comprehensive metabolic panel  - TSH with free T4 reflex    2. Type 2 diabetes mellitus without complication, without long-term current use of insulin (H)  Labs as fasting  - HEMOGLOBIN A1C  - Lipid panel reflex to direct LDL Fasting  - Albumin Random Urine Quantitative with Creat Ratio  - metFORMIN (GLUCOPHAGE) 1000 MG tablet; Take 1 tablet (1,000 mg) by mouth 2 times daily (with meals)  Dispense: 180 tablet; Refill: 3  - lisinopril (PRINIVIL/ZESTRIL) " "20 MG tablet; Take 1 tablet (20 mg) by mouth daily  Dispense: 90 tablet; Refill: 3  - glimepiride (AMARYL) 2 MG tablet; Take 1 tablet (2 mg) by mouth every morning (before breakfast)  Dispense: 90 tablet; Refill: 3  - Comprehensive metabolic panel    3. Essential hypertension, benign  Stable on therapy  - lisinopril (PRINIVIL/ZESTRIL) 20 MG tablet; Take 1 tablet (20 mg) by mouth daily  Dispense: 90 tablet; Refill: 3  - Comprehensive metabolic panel    4. Hyperlipidemia LDL goal <100  Labs as fasting  - Lipid panel reflex to direct LDL Fasting  - atorvastatin (LIPITOR) 20 MG tablet; Take 1 tablet (20 mg) by mouth daily  Dispense: 90 tablet; Refill: 3    5. Screening for HIV (human immunodeficiency virus)  Declined per patient    6. Screening for diabetic peripheral neuropathy  No acute changes  - FOOT EXAM  NO CHARGE [62457.114]    7. Need for prophylactic vaccination and inoculation against influenza  updated    8. Screening PSA (prostate specific antigen)  Routine scrteening  - PSA, screen    9. Colon cancer screening  As ordered, screening  - Fecal colorectal cancer screen (FIT); Future    COUNSELING:  Reviewed preventive health counseling, as reflected in patient instructions       Regular exercise       Healthy diet/nutrition    BP Readings from Last 1 Encounters:   07/06/17 118/70     Estimated body mass index is 30.71 kg/(m^2) as calculated from the following:    Height as of 6/29/17: 5' 8\" (1.727 m).    Weight as of 7/6/17: 202 lb (91.6 kg).       reports that he has never smoked. He has never used smokeless tobacco.      Counseling Resources:  ATP IV Guidelines  Pooled Cohorts Equation Calculator  FRAX Risk Assessment  ICSI Preventive Guidelines  Dietary Guidelines for Americans, 2010  valuescope's MyPlate  ASA Prophylaxis  Lung CA Screening    Michael Méndez MD  [facility]  "

## 2018-10-31 ENCOUNTER — OFFICE VISIT (OUTPATIENT)
Dept: INTERNAL MEDICINE | Facility: CLINIC | Age: 58
End: 2018-10-31
Payer: COMMERCIAL

## 2018-10-31 VITALS
WEIGHT: 186.3 LBS | OXYGEN SATURATION: 96 % | RESPIRATION RATE: 14 BRPM | SYSTOLIC BLOOD PRESSURE: 112 MMHG | HEART RATE: 83 BPM | TEMPERATURE: 98.2 F | HEIGHT: 68 IN | DIASTOLIC BLOOD PRESSURE: 68 MMHG | BODY MASS INDEX: 28.23 KG/M2

## 2018-10-31 DIAGNOSIS — I10 ESSENTIAL HYPERTENSION, BENIGN: ICD-10-CM

## 2018-10-31 DIAGNOSIS — Z23 NEED FOR PROPHYLACTIC VACCINATION AND INOCULATION AGAINST INFLUENZA: ICD-10-CM

## 2018-10-31 DIAGNOSIS — E11.9 TYPE 2 DIABETES MELLITUS WITHOUT COMPLICATION, WITHOUT LONG-TERM CURRENT USE OF INSULIN (H): ICD-10-CM

## 2018-10-31 DIAGNOSIS — Z00.00 ENCOUNTER FOR ROUTINE ADULT HEALTH EXAMINATION WITHOUT ABNORMAL FINDINGS: Primary | ICD-10-CM

## 2018-10-31 DIAGNOSIS — Z12.11 COLON CANCER SCREENING: ICD-10-CM

## 2018-10-31 DIAGNOSIS — Z11.4 SCREENING FOR HIV (HUMAN IMMUNODEFICIENCY VIRUS): ICD-10-CM

## 2018-10-31 DIAGNOSIS — E78.5 HYPERLIPIDEMIA LDL GOAL <100: ICD-10-CM

## 2018-10-31 DIAGNOSIS — Z12.5 SCREENING PSA (PROSTATE SPECIFIC ANTIGEN): ICD-10-CM

## 2018-10-31 DIAGNOSIS — Z13.89 SCREENING FOR DIABETIC PERIPHERAL NEUROPATHY: ICD-10-CM

## 2018-10-31 LAB
ALBUMIN SERPL-MCNC: 3.9 G/DL (ref 3.4–5)
ALP SERPL-CCNC: 63 U/L (ref 40–150)
ALT SERPL W P-5'-P-CCNC: 29 U/L (ref 0–70)
ANION GAP SERPL CALCULATED.3IONS-SCNC: 11 MMOL/L (ref 3–14)
AST SERPL W P-5'-P-CCNC: 18 U/L (ref 0–45)
BILIRUB SERPL-MCNC: 0.7 MG/DL (ref 0.2–1.3)
BUN SERPL-MCNC: 15 MG/DL (ref 7–30)
CALCIUM SERPL-MCNC: 9.2 MG/DL (ref 8.5–10.1)
CHLORIDE SERPL-SCNC: 104 MMOL/L (ref 94–109)
CHOLEST SERPL-MCNC: 95 MG/DL
CO2 SERPL-SCNC: 26 MMOL/L (ref 20–32)
CREAT SERPL-MCNC: 1.15 MG/DL (ref 0.66–1.25)
CREAT UR-MCNC: 320 MG/DL
GFR SERPL CREATININE-BSD FRML MDRD: 65 ML/MIN/1.7M2
GLUCOSE SERPL-MCNC: 132 MG/DL (ref 70–99)
HBA1C MFR BLD: 7.4 % (ref 0–5.6)
HDLC SERPL-MCNC: 37 MG/DL
HGB BLD-MCNC: 14 G/DL (ref 13.3–17.7)
LDLC SERPL CALC-MCNC: 38 MG/DL
MICROALBUMIN UR-MCNC: 321 MG/L
MICROALBUMIN/CREAT UR: 100.31 MG/G CR (ref 0–17)
NONHDLC SERPL-MCNC: 58 MG/DL
POTASSIUM SERPL-SCNC: 4.6 MMOL/L (ref 3.4–5.3)
PROT SERPL-MCNC: 8.1 G/DL (ref 6.8–8.8)
PSA SERPL-ACNC: 0.66 UG/L (ref 0–4)
SODIUM SERPL-SCNC: 141 MMOL/L (ref 133–144)
T4 FREE SERPL-MCNC: 1.3 NG/DL (ref 0.76–1.46)
TRIGL SERPL-MCNC: 101 MG/DL
TSH SERPL DL<=0.005 MIU/L-ACNC: 4.83 MU/L (ref 0.4–4)

## 2018-10-31 PROCEDURE — 90682 RIV4 VACC RECOMBINANT DNA IM: CPT | Performed by: INTERNAL MEDICINE

## 2018-10-31 PROCEDURE — 85018 HEMOGLOBIN: CPT | Performed by: INTERNAL MEDICINE

## 2018-10-31 PROCEDURE — 82043 UR ALBUMIN QUANTITATIVE: CPT | Performed by: INTERNAL MEDICINE

## 2018-10-31 PROCEDURE — 99396 PREV VISIT EST AGE 40-64: CPT | Mod: 25 | Performed by: INTERNAL MEDICINE

## 2018-10-31 PROCEDURE — 80061 LIPID PANEL: CPT | Performed by: INTERNAL MEDICINE

## 2018-10-31 PROCEDURE — 84439 ASSAY OF FREE THYROXINE: CPT | Performed by: INTERNAL MEDICINE

## 2018-10-31 PROCEDURE — 90471 IMMUNIZATION ADMIN: CPT | Performed by: INTERNAL MEDICINE

## 2018-10-31 PROCEDURE — 80053 COMPREHEN METABOLIC PANEL: CPT | Performed by: INTERNAL MEDICINE

## 2018-10-31 PROCEDURE — 84443 ASSAY THYROID STIM HORMONE: CPT | Performed by: INTERNAL MEDICINE

## 2018-10-31 PROCEDURE — 99207 C FOOT EXAM  NO CHARGE: CPT | Mod: 25 | Performed by: INTERNAL MEDICINE

## 2018-10-31 PROCEDURE — 36415 COLL VENOUS BLD VENIPUNCTURE: CPT | Performed by: INTERNAL MEDICINE

## 2018-10-31 PROCEDURE — G0103 PSA SCREENING: HCPCS | Performed by: INTERNAL MEDICINE

## 2018-10-31 PROCEDURE — 83036 HEMOGLOBIN GLYCOSYLATED A1C: CPT | Performed by: INTERNAL MEDICINE

## 2018-10-31 RX ORDER — GLIMEPIRIDE 2 MG/1
2 TABLET ORAL
Qty: 90 TABLET | Refills: 3 | Status: SHIPPED | OUTPATIENT
Start: 2018-10-31 | End: 2019-12-24

## 2018-10-31 RX ORDER — LISINOPRIL 20 MG/1
20 TABLET ORAL DAILY
Qty: 90 TABLET | Refills: 3 | Status: SHIPPED | OUTPATIENT
Start: 2018-10-31 | End: 2019-12-24

## 2018-10-31 RX ORDER — ATORVASTATIN CALCIUM 20 MG/1
20 TABLET, FILM COATED ORAL DAILY
Qty: 90 TABLET | Refills: 3 | Status: SHIPPED | OUTPATIENT
Start: 2018-10-31 | End: 2019-05-22

## 2018-10-31 NOTE — PROGRESS NOTES

## 2018-10-31 NOTE — MR AVS SNAPSHOT
After Visit Summary   10/31/2018    Jessica Gomez    MRN: [id number]           Patient Information     Date Of Birth          1960        Visit Information        Provider Department      10/31/2018 8:20 AM Michael Méndez MD [facility]        Today's Diagnoses     Encounter for routine adult health examination without abnormal findings    -  1    Type 2 diabetes mellitus without complication, without long-term current use of insulin (H)        Essential hypertension, benign        Hyperlipidemia LDL goal <100        Screening for HIV (human immunodeficiency virus)        Screening for diabetic peripheral neuropathy        Need for prophylactic vaccination and inoculation against influenza        Screening PSA (prostate specific antigen)        Colon cancer screening          Care Instructions      Preventive Health Recommendations  Male Ages 50 - 64    Yearly exam:             See your health care provider every year in order to  o   Review health changes.   o   Discuss preventive care.    o   Review your medicines if your doctor has prescribed any.     Have a cholesterol test every 5 years, or more frequently if you are at risk for high cholesterol/heart disease.     Have a diabetes test (fasting glucose) every three years. If you are at risk for diabetes, you should have this test more often.     Have a colonoscopy at age 50, or have a yearly FIT test (stool test). These exams will check for colon cancer.      Talk with your health care provider about whether or not a prostate cancer screening test (PSA) is right for you.    You should be tested each year for STDs (sexually transmitted diseases), if you re at risk.     Shots: Get a flu shot each year. Get a tetanus shot every 10 years.     Nutrition:    Eat at least 5 servings of fruits and vegetables daily.     Eat whole-grain bread, whole-wheat pasta and brown rice instead of white grains and rice.     Get  "adequate Calcium and Vitamin D.     Lifestyle    Exercise for at least 150 minutes a week (30 minutes a day, 5 days a week). This will help you control your weight and prevent disease.     Limit alcohol to one drink per day.     No smoking.     Wear sunscreen to prevent skin cancer.     See your dentist every six months for an exam and cleaning.     See your eye doctor every 1 to 2 years.            Follow-ups after your visit        Follow-up notes from your care team     Return in about 1 year (around 10/31/2019), or if symptoms worsen or fail to improve, for diabetes check 6 months, Physical Exam.      Future tests that were ordered for you today     Open Future Orders        Priority Expected Expires Ordered    Fecal colorectal cancer screen (FIT) Routine 11/21/2018 1/23/2019 10/31/2018            Who to contact     If you have questions or need follow up information about today's clinic visit or your schedule please contact [facility] directly at [phone].  Normal or non-critical lab and imaging results will be communicated to you by Minushart, letter or phone within 4 business days after the clinic has received the results. If you do not hear from us within 7 days, please contact the clinic through Duelt or phone. If you have a critical or abnormal lab result, we will notify you by phone as soon as possible.  Submit refill requests through [employer] or call your pharmacy and they will forward the refill request to us. Please allow 3 business days for your refill to be completed.          Additional Information About Your Visit        [employer] Information     [employer] lets you send messages to your doctor, view your test results, renew your prescriptions, schedule appointments and more. To sign up, go to [email or web address] . Click on \"Log in\" on the left side of the screen, which will take you to the Welcome page. Then click on \"Sign up Now\" on the right side of the page.     You " "will be asked to enter the access code listed below, as well as some personal information. Please follow the directions to create your username and password.     Your access code is: [email or web address]  Expires: 2019  8:37 AM     Your access code will  in 90 days. If you need help or a new code, please call your [place] clinic or [phone].        Care EveryWhere ID     This is your Care EveryWhere ID. This could be used by other organizations to access your [place] medical records  [id number]        Your Vitals Were     Pulse Temperature Respirations Height Pulse Oximetry BMI (Body Mass Index)    83 98.2  F (36.8  C) (Oral) 14 5' 8\" (1.727 m) 96% 28.33 kg/m2       Blood Pressure from Last 3 Encounters:   10/31/18 112/68   17 118/70   17 116/68    Weight from Last 3 Encounters:   10/31/18 186 lb 4.8 oz (84.5 kg)   17 202 lb (91.6 kg)   17 196 lb (88.9 kg)              We Performed the Following     Albumin Random Urine Quantitative with Creat Ratio     Comprehensive metabolic panel     FLU VACCINE, (RIV4) RECOMBINANT HA  , IM (FluBlok, egg free) [39370]- >18 YRS ([facility] recommended  50-64 YRS)     FOOT EXAM  NO CHARGE [07872.114]     HEMOGLOBIN A1C     Hemoglobin     Lipid panel reflex to direct LDL Fasting     PSA, screen     TSH with free T4 reflex     Vaccine Administration, Initial [89465]          Where to get your medicines      These medications were sent to [id number] - [place], [place] - [address]  [address] [address]     Phone:  [phone]     atorvastatin 20 MG tablet    glimepiride 2 MG tablet    lisinopril 20 MG tablet    metFORMIN 1000 MG tablet          Primary Care Provider Office Phone # Fax #    Michael Méndez [phone]       [address] [facility] [address]        Equal Access to Services     [facility] YVAN AH: Gregory Alejandro, waaxda luqadaangelika welsh waxay idiin " candice luigijono doroteokarishma manrique ah. So [facility] [phone].    ATENCIÓN: Si habla aviva, tiene a gomez disposición servicios gratuitos de asistencia lingüística. John morales [phone].    We comply with applicable federal civil rights laws and [place] laws. We do not discriminate on the basis of race, color, national origin, age, disability, sex, sexual orientation, or gender identity.            Thank you!     Thank you for choosing [facility]  for your care. Our goal is always to provide you with excellent care. Hearing back from our patients is one way we can continue to improve our services. Please take a few minutes to complete the written survey that you may receive in the mail after your visit with us. Thank you!             Your Updated Medication List - Protect others around you: Learn how to safely use, store and throw away your medicines at www.disposemymeds.org.          This list is accurate as of 10/31/18  8:37 AM.  Always use your most recent med list.                   Brand Name Dispense Instructions for use Diagnosis    ADVIL 200 MG capsule   Generic drug:  ibuprofen      Take 200 mg by mouth every 4 hours as needed for fever    HTN (hypertension), Hyperlipidemia LDL goal <100, Type 2 diabetes, HbA1C goal < 8% (H), Colon cancer screening, Need for Tdap vaccination       aspirin 81 MG EC tablet     90 tablet    Take 1 tablet (81 mg) by mouth daily    Type 2 diabetes, HbA1C goal < 8% (H), HTN (hypertension), Hyperlipidemia LDL goal <100, Colon cancer screening, Need for Tdap vaccination       atorvastatin 20 MG tablet    LIPITOR    90 tablet    Take 1 tablet (20 mg) by mouth daily    Hyperlipidemia LDL goal <100       glimepiride 2 MG tablet    AMARYL    90 tablet    Take 1 tablet (2 mg) by mouth every morning (before breakfast)    Type 2 diabetes mellitus without complication, without long-term current use of insulin (H)       lisinopril 20 MG tablet    PRINIVIL/ZESTRIL    90  tablet    Take 1 tablet (20 mg) by mouth daily    Type 2 diabetes mellitus without complication, without long-term current use of insulin (H), Essential hypertension, benign       metFORMIN 1000 MG tablet    GLUCOPHAGE    180 tablet    Take 1 tablet (1,000 mg) by mouth 2 times daily (with meals)    Type 2 diabetes mellitus without complication, without long-term current use of insulin (H)

## 2018-10-31 NOTE — LETTER
[facility]  [address] [address]  [phone]      10/31/2018       Jessica Gomez  [address]  [facility] [address]        Dear Jessica,    I am pleased to inform you that your routine blood work including your hemoglobin, sodium, potassium, calcium, kidney and liver function tests are all normal.    Your cholesterol looks good and I would not change anything at this point but would repeat your labs in 12 months.    Your Hemoglobin A1C and blood sugar tests look good and I would continue with your medication without change.  These tests should be repeated in 6 months.    Your thyroid function tests is just slightly abnormal but stable and should be rechecked here in the clinic in 3 months with a follow-up visit with me.  I will look forward to seeing you at that time and please call to make an appointment.    In addition, your PSA or prostate screening antigen is normal and should be repeated annually.    Sincerely,      Michael Méndez MD  Internal Medicine

## 2018-11-01 NOTE — PROGRESS NOTES
Order(s) created erroneously. Erroneous order ID: [id number]   Order canceled by: SEGUNDO CHINO   Order cancel date/time: 11/01/2018 7:29 AM

## 2019-04-30 ENCOUNTER — OFFICE VISIT (OUTPATIENT)
Dept: INTERNAL MEDICINE | Facility: CLINIC | Age: 59
End: 2019-04-30
Payer: COMMERCIAL

## 2019-04-30 VITALS
TEMPERATURE: 97.9 F | OXYGEN SATURATION: 97 % | BODY MASS INDEX: 29.38 KG/M2 | RESPIRATION RATE: 16 BRPM | SYSTOLIC BLOOD PRESSURE: 120 MMHG | HEART RATE: 92 BPM | DIASTOLIC BLOOD PRESSURE: 60 MMHG | WEIGHT: 193.2 LBS

## 2019-04-30 DIAGNOSIS — E11.9 TYPE 2 DIABETES MELLITUS WITHOUT COMPLICATION, WITHOUT LONG-TERM CURRENT USE OF INSULIN (H): ICD-10-CM

## 2019-04-30 DIAGNOSIS — I10 ESSENTIAL HYPERTENSION, BENIGN: ICD-10-CM

## 2019-04-30 DIAGNOSIS — R07.89 ATYPICAL CHEST PAIN: Primary | ICD-10-CM

## 2019-04-30 LAB
ALBUMIN SERPL-MCNC: 3.6 G/DL (ref 3.4–5)
ALP SERPL-CCNC: 58 U/L (ref 40–150)
ALT SERPL W P-5'-P-CCNC: 25 U/L (ref 0–70)
ANION GAP SERPL CALCULATED.3IONS-SCNC: 5 MMOL/L (ref 3–14)
AST SERPL W P-5'-P-CCNC: 22 U/L (ref 0–45)
BASOPHILS # BLD AUTO: 0 10E9/L (ref 0–0.2)
BASOPHILS NFR BLD AUTO: 0.2 %
BILIRUB SERPL-MCNC: 0.4 MG/DL (ref 0.2–1.3)
BUN SERPL-MCNC: 14 MG/DL (ref 7–30)
CALCIUM SERPL-MCNC: 9.1 MG/DL (ref 8.5–10.1)
CHLORIDE SERPL-SCNC: 107 MMOL/L (ref 94–109)
CO2 SERPL-SCNC: 29 MMOL/L (ref 20–32)
CREAT SERPL-MCNC: 1.08 MG/DL (ref 0.66–1.25)
DIFFERENTIAL METHOD BLD: ABNORMAL
EOSINOPHIL # BLD AUTO: 0.3 10E9/L (ref 0–0.7)
EOSINOPHIL NFR BLD AUTO: 2.9 %
ERYTHROCYTE [DISTWIDTH] IN BLOOD BY AUTOMATED COUNT: 12.9 % (ref 10–15)
GFR SERPL CREATININE-BSD FRML MDRD: 75 ML/MIN/{1.73_M2}
GLUCOSE SERPL-MCNC: 230 MG/DL (ref 70–99)
HCT VFR BLD AUTO: 37.8 % (ref 40–53)
HGB BLD-MCNC: 12.9 G/DL (ref 13.3–17.7)
LYMPHOCYTES # BLD AUTO: 2.7 10E9/L (ref 0.8–5.3)
LYMPHOCYTES NFR BLD AUTO: 31.6 %
MCH RBC QN AUTO: 27 PG (ref 26.5–33)
MCHC RBC AUTO-ENTMCNC: 34.1 G/DL (ref 31.5–36.5)
MCV RBC AUTO: 79 FL (ref 78–100)
MONOCYTES # BLD AUTO: 0.8 10E9/L (ref 0–1.3)
MONOCYTES NFR BLD AUTO: 8.9 %
NEUTROPHILS # BLD AUTO: 4.8 10E9/L (ref 1.6–8.3)
NEUTROPHILS NFR BLD AUTO: 56.4 %
PLATELET # BLD AUTO: 284 10E9/L (ref 150–450)
POTASSIUM SERPL-SCNC: 4.6 MMOL/L (ref 3.4–5.3)
PROT SERPL-MCNC: 7.5 G/DL (ref 6.8–8.8)
RBC # BLD AUTO: 4.78 10E12/L (ref 4.4–5.9)
SODIUM SERPL-SCNC: 141 MMOL/L (ref 133–144)
WBC # BLD AUTO: 8.5 10E9/L (ref 4–11)

## 2019-04-30 PROCEDURE — 36415 COLL VENOUS BLD VENIPUNCTURE: CPT | Performed by: PHYSICIAN ASSISTANT

## 2019-04-30 PROCEDURE — 99214 OFFICE O/P EST MOD 30 MIN: CPT | Performed by: PHYSICIAN ASSISTANT

## 2019-04-30 PROCEDURE — 85025 COMPLETE CBC W/AUTO DIFF WBC: CPT | Performed by: PHYSICIAN ASSISTANT

## 2019-04-30 PROCEDURE — 80053 COMPREHEN METABOLIC PANEL: CPT | Performed by: PHYSICIAN ASSISTANT

## 2019-04-30 NOTE — PROGRESS NOTES
SUBJECTIVE:   Jessica Gomez is a 59 year old male who presents to clinic today for the following   health issues:      GERD/Heartburn      Duration: x3 weeks    Description (location/character/radiation): feels pain in chest for about 2 minutes    Last night- working overnight at an episode of mid chest pain    No N/V with it no radiation of the pain.   At times with going up stairs too- possible  SOB     Feeling that heart is working more too then    Pain more burning after eating- deep breathing will improve the pain.    Not every but several times a week.     Intermittent pain, only happening for a few minutes.     Intensity:  No pain right now     Accompanying signs and symptoms:  food getting stuck: no   nausea/vomiting/blood: no   abdominal pain: no   black/tarry or bloody stools: no :    History (similar episodes/previous evaluation): None    Precipitating or alleviating factors:  worse with regular foods does not matter what he eats.  current NSAID/Aspirin use: YES- aspirin    Therapies tried and outcome: none    PMH significant for DM type 2 and hypertension/hyperlipidemia         -------------------------------------    Additional history: as documented    Reviewed  and updated as needed this visit by clinical staff         Reviewed and updated as needed this visit by Provider  Allergies  Meds         Labs reviewed in EPIC    ROS:  Constitutional, HEENT, cardiovascular, pulmonary, gi and gu systems are negative, except as otherwise noted.    OBJECTIVE:     /60 (BP Location: Left arm, Patient Position: Chair, Cuff Size: Adult Regular)   Pulse 92   Temp 97.9  F (36.6  C) (Oral)   Resp 16   Wt 87.6 kg (193 lb 3.2 oz)   SpO2 97%   BMI 29.38 kg/m    Body mass index is 29.38 kg/m .  GENERAL: healthy, alert and no distress  NECK: no adenopathy, no asymmetry, masses, or scars and thyroid normal to palpation  RESP: lungs clear to auscultation - no rales, rhonchi or wheezes  CV: regular  rates and rhythm and normal S1 S2, no S3 or S4  ABDOMEN: soft, nontender, no hepatosplenomegaly, no masses and bowel sounds normal  MS: no gross musculoskeletal defects noted, no edema  SKIN: no suspicious lesions or rashes    Diagnostic Test Results:  Pending     ASSESSMENT/PLAN:             1. Atypical chest pain    - CBC with platelets differential  - Comprehensive metabolic panel  - Echocardiogram Exercise Stress; Future    2. Essential hypertension, benign    - CBC with platelets differential  - Comprehensive metabolic panel  - Echocardiogram Exercise Stress; Future    3. Type 2 diabetes mellitus without complication, without long-term current use of insulin (H)    - CBC with platelets differential  - Comprehensive metabolic panel  - Echocardiogram Exercise Stress; Future    Patient Instructions   If pain is worsening, not improving in a few minutes then to ER for evaluation.    Labs today-   [facility] in [place] will call you to schedule exercise stress test of the heart.        Sally Dee PA-C  [facility]

## 2019-04-30 NOTE — PATIENT INSTRUCTIONS
If pain is worsening, not improving in a few minutes then to ER for evaluation.    Labs today-   [facility] in [place] will call you to schedule exercise stress test of the heart.

## 2019-05-09 ENCOUNTER — OFFICE VISIT (OUTPATIENT)
Dept: CARDIOLOGY | Facility: CLINIC | Age: 59
End: 2019-05-09
Payer: COMMERCIAL

## 2019-05-09 ENCOUNTER — HOSPITAL ENCOUNTER (OUTPATIENT)
Facility: CLINIC | Age: 59
Discharge: HOME | End: 2019-05-09
Admitting: INTERNAL MEDICINE
Payer: COMMERCIAL

## 2019-05-09 ENCOUNTER — HOSPITAL ENCOUNTER (OUTPATIENT)
Dept: CARDIOLOGY | Facility: CLINIC | Age: 59
End: 2019-05-09
Attending: PHYSICIAN ASSISTANT
Payer: COMMERCIAL

## 2019-05-09 VITALS
BODY MASS INDEX: 29.35 KG/M2 | HEART RATE: 86 BPM | SYSTOLIC BLOOD PRESSURE: 128 MMHG | DIASTOLIC BLOOD PRESSURE: 74 MMHG | WEIGHT: 193 LBS

## 2019-05-09 VITALS
HEART RATE: 59 BPM | TEMPERATURE: 98.4 F | OXYGEN SATURATION: 100 % | SYSTOLIC BLOOD PRESSURE: 145 MMHG | WEIGHT: 191.7 LBS | HEIGHT: 71 IN | BODY MASS INDEX: 26.84 KG/M2 | DIASTOLIC BLOOD PRESSURE: 77 MMHG | RESPIRATION RATE: 16 BRPM

## 2019-05-09 DIAGNOSIS — I20.0 UNSTABLE ANGINA (H): ICD-10-CM

## 2019-05-09 DIAGNOSIS — E11.9 TYPE 2 DIABETES MELLITUS WITHOUT COMPLICATION, WITHOUT LONG-TERM CURRENT USE OF INSULIN (H): ICD-10-CM

## 2019-05-09 DIAGNOSIS — I20.0 UNSTABLE ANGINA (H): Primary | ICD-10-CM

## 2019-05-09 DIAGNOSIS — I10 ESSENTIAL HYPERTENSION, BENIGN: ICD-10-CM

## 2019-05-09 DIAGNOSIS — E78.5 HYPERLIPIDEMIA LDL GOAL <100: ICD-10-CM

## 2019-05-09 DIAGNOSIS — R07.89 ATYPICAL CHEST PAIN: ICD-10-CM

## 2019-05-09 DIAGNOSIS — R94.39 ABNORMAL CARDIOVASCULAR STRESS TEST: ICD-10-CM

## 2019-05-09 DIAGNOSIS — I21.4 NSTEMI (NON-ST ELEVATED MYOCARDIAL INFARCTION) (H): ICD-10-CM

## 2019-05-09 LAB
ANION GAP SERPL CALCULATED.3IONS-SCNC: 3 MMOL/L (ref 3–14)
APTT PPP: 33 SEC (ref 22–37)
BUN SERPL-MCNC: 11 MG/DL (ref 7–30)
CALCIUM SERPL-MCNC: 8.6 MG/DL (ref 8.5–10.1)
CHLORIDE SERPL-SCNC: 109 MMOL/L (ref 94–109)
CHOLEST SERPL-MCNC: 117 MG/DL
CO2 SERPL-SCNC: 31 MMOL/L (ref 20–32)
CREAT SERPL-MCNC: 1 MG/DL (ref 0.66–1.25)
ERYTHROCYTE [DISTWIDTH] IN BLOOD BY AUTOMATED COUNT: 12.8 % (ref 10–15)
GFR SERPL CREATININE-BSD FRML MDRD: 82 ML/MIN/{1.73_M2}
GLUCOSE SERPL-MCNC: 129 MG/DL (ref 70–99)
HCT VFR BLD AUTO: 40 % (ref 40–53)
HDLC SERPL-MCNC: 34 MG/DL
HGB BLD-MCNC: 13.6 G/DL (ref 13.3–17.7)
INR PPP: 1.01 (ref 0.86–1.14)
KCT BLD-ACNC: 318 SEC (ref 75–150)
LDLC SERPL CALC-MCNC: 57 MG/DL
MCH RBC QN AUTO: 26.9 PG (ref 26.5–33)
MCHC RBC AUTO-ENTMCNC: 34 G/DL (ref 31.5–36.5)
MCV RBC AUTO: 79 FL (ref 78–100)
NONHDLC SERPL-MCNC: 83 MG/DL
PLATELET # BLD AUTO: 257 10E9/L (ref 150–450)
POTASSIUM SERPL-SCNC: 4.1 MMOL/L (ref 3.4–5.3)
RBC # BLD AUTO: 5.06 10E12/L (ref 4.4–5.9)
SODIUM SERPL-SCNC: 143 MMOL/L (ref 133–144)
TRIGL SERPL-MCNC: 129 MG/DL
WBC # BLD AUTO: 7.9 10E9/L (ref 4–11)

## 2019-05-09 PROCEDURE — 92921 ZZHC PRQ TRLUML CORONARY ANGIOPLASTY ADDL BRANCH: CPT | Mod: LD

## 2019-05-09 PROCEDURE — 99153 MOD SED SAME PHYS/QHP EA: CPT | Performed by: INTERNAL MEDICINE

## 2019-05-09 PROCEDURE — 25800030 ZZH RX IP 258 OP 636: Performed by: INTERNAL MEDICINE

## 2019-05-09 PROCEDURE — 27210794 ZZH OR GENERAL SUPPLY STERILE: Performed by: INTERNAL MEDICINE

## 2019-05-09 PROCEDURE — C1769 GUIDE WIRE: HCPCS | Performed by: INTERNAL MEDICINE

## 2019-05-09 PROCEDURE — 80061 LIPID PANEL: CPT | Performed by: INTERNAL MEDICINE

## 2019-05-09 PROCEDURE — 25000128 H RX IP 250 OP 636: Performed by: INTERNAL MEDICINE

## 2019-05-09 PROCEDURE — 93454 CORONARY ARTERY ANGIO S&I: CPT | Mod: XU | Performed by: INTERNAL MEDICINE

## 2019-05-09 PROCEDURE — 40000852 ZZH STATISTIC HEART CATH LAB OR EP LAB

## 2019-05-09 PROCEDURE — 027034Z DILATION OF CORONARY ARTERY, ONE ARTERY WITH DRUG-ELUTING INTRALUMINAL DEVICE, PERCUTANEOUS APPROACH: ICD-10-PCS | Performed by: INTERNAL MEDICINE

## 2019-05-09 PROCEDURE — 93350 STRESS TTE ONLY: CPT | Mod: TC

## 2019-05-09 PROCEDURE — 80061 LIPID PANEL: CPT

## 2019-05-09 PROCEDURE — 85730 THROMBOPLASTIN TIME PARTIAL: CPT

## 2019-05-09 PROCEDURE — 25000125 ZZHC RX 250: Performed by: INTERNAL MEDICINE

## 2019-05-09 PROCEDURE — 93321 DOPPLER ECHO F-UP/LMTD STD: CPT | Mod: 26 | Performed by: INTERNAL MEDICINE

## 2019-05-09 PROCEDURE — B2111ZZ FLUOROSCOPY OF MULTIPLE CORONARY ARTERIES USING LOW OSMOLAR CONTRAST: ICD-10-PCS | Performed by: INTERNAL MEDICINE

## 2019-05-09 PROCEDURE — C1887 CATHETER, GUIDING: HCPCS | Performed by: INTERNAL MEDICINE

## 2019-05-09 PROCEDURE — 93350 STRESS TTE ONLY: CPT | Mod: 26 | Performed by: INTERNAL MEDICINE

## 2019-05-09 PROCEDURE — 93005 ELECTROCARDIOGRAM TRACING: CPT

## 2019-05-09 PROCEDURE — 93010 ELECTROCARDIOGRAM REPORT: CPT | Performed by: INTERNAL MEDICINE

## 2019-05-09 PROCEDURE — 02703ZZ DILATION OF CORONARY ARTERY, ONE ARTERY, PERCUTANEOUS APPROACH: ICD-10-PCS | Performed by: INTERNAL MEDICINE

## 2019-05-09 PROCEDURE — C9600 PERC DRUG-EL COR STENT SING: HCPCS | Performed by: INTERNAL MEDICINE

## 2019-05-09 PROCEDURE — 40000065 ZZH STATISTIC EKG NON-CHARGEABLE

## 2019-05-09 PROCEDURE — C1874 STENT, COATED/COV W/DEL SYS: HCPCS | Performed by: INTERNAL MEDICINE

## 2019-05-09 PROCEDURE — 40000235 ZZH STATISTIC TELEMETRY

## 2019-05-09 PROCEDURE — C1760 CLOSURE DEV, VASC: HCPCS | Performed by: INTERNAL MEDICINE

## 2019-05-09 PROCEDURE — 85347 COAGULATION TIME ACTIVATED: CPT

## 2019-05-09 PROCEDURE — 36591 DRAW BLOOD OFF VENOUS DEVICE: CPT

## 2019-05-09 PROCEDURE — 25000132 ZZH RX MED GY IP 250 OP 250 PS 637: Performed by: INTERNAL MEDICINE

## 2019-05-09 PROCEDURE — 85027 COMPLETE CBC AUTOMATED: CPT

## 2019-05-09 PROCEDURE — 93018 CV STRESS TEST I&R ONLY: CPT | Performed by: INTERNAL MEDICINE

## 2019-05-09 PROCEDURE — 99152 MOD SED SAME PHYS/QHP 5/>YRS: CPT | Performed by: INTERNAL MEDICINE

## 2019-05-09 PROCEDURE — 80048 BASIC METABOLIC PNL TOTAL CA: CPT

## 2019-05-09 PROCEDURE — C1725 CATH, TRANSLUMIN NON-LASER: HCPCS | Performed by: INTERNAL MEDICINE

## 2019-05-09 PROCEDURE — 85610 PROTHROMBIN TIME: CPT

## 2019-05-09 PROCEDURE — 99204 OFFICE O/P NEW MOD 45 MIN: CPT | Mod: 25 | Performed by: INTERNAL MEDICINE

## 2019-05-09 PROCEDURE — 93325 DOPPLER ECHO COLOR FLOW MAPG: CPT | Mod: 26 | Performed by: INTERNAL MEDICINE

## 2019-05-09 PROCEDURE — 93016 CV STRESS TEST SUPVJ ONLY: CPT | Performed by: INTERNAL MEDICINE

## 2019-05-09 DEVICE — STENT RESOLUTE ONYX DE 2.7FR 2.50X18MM RONYX DE25018UX: Type: IMPLANTABLE DEVICE | Status: FUNCTIONAL

## 2019-05-09 RX ORDER — NALOXONE HYDROCHLORIDE 0.4 MG/ML
.2-.4 INJECTION, SOLUTION INTRAMUSCULAR; INTRAVENOUS; SUBCUTANEOUS
Status: CANCELLED | OUTPATIENT
Start: 2019-05-09 | End: 2019-05-10

## 2019-05-09 RX ORDER — IOPAMIDOL 755 MG/ML
INJECTION, SOLUTION INTRAVASCULAR
Status: DISCONTINUED | OUTPATIENT
Start: 2019-05-09 | End: 2019-05-09 | Stop reason: HOSPADM

## 2019-05-09 RX ORDER — NALOXONE HYDROCHLORIDE 0.4 MG/ML
.1-.4 INJECTION, SOLUTION INTRAMUSCULAR; INTRAVENOUS; SUBCUTANEOUS
Status: DISCONTINUED | OUTPATIENT
Start: 2019-05-09 | End: 2019-05-09

## 2019-05-09 RX ORDER — ATROPINE SULFATE 0.1 MG/ML
0.5 INJECTION INTRAVENOUS EVERY 5 MIN PRN
Status: DISCONTINUED | OUTPATIENT
Start: 2019-05-09 | End: 2019-05-09

## 2019-05-09 RX ORDER — SODIUM CHLORIDE 9 MG/ML
INJECTION, SOLUTION INTRAVENOUS CONTINUOUS
Status: CANCELLED | OUTPATIENT
Start: 2019-05-09 | End: 2019-05-09

## 2019-05-09 RX ORDER — NITROGLYCERIN 0.4 MG/1
0.4 TABLET SUBLINGUAL EVERY 5 MIN PRN
Status: CANCELLED | OUTPATIENT
Start: 2019-05-09

## 2019-05-09 RX ORDER — ACETAMINOPHEN 325 MG/1
650 TABLET ORAL EVERY 4 HOURS PRN
Status: DISCONTINUED | OUTPATIENT
Start: 2019-05-09 | End: 2019-05-09 | Stop reason: HOSPADM

## 2019-05-09 RX ORDER — SODIUM CHLORIDE 9 MG/ML
INJECTION, SOLUTION INTRAVENOUS CONTINUOUS
Status: DISCONTINUED | OUTPATIENT
Start: 2019-05-09 | End: 2019-05-09 | Stop reason: HOSPADM

## 2019-05-09 RX ORDER — HYDROCODONE BITARTRATE AND ACETAMINOPHEN 5; 325 MG/1; MG/1
1-2 TABLET ORAL EVERY 4 HOURS PRN
Status: CANCELLED | OUTPATIENT
Start: 2019-05-09

## 2019-05-09 RX ORDER — ASPIRIN 81 MG/1
81 TABLET ORAL DAILY
Status: DISCONTINUED | OUTPATIENT
Start: 2019-05-09 | End: 2019-05-09 | Stop reason: HOSPADM

## 2019-05-09 RX ORDER — LIDOCAINE 40 MG/G
CREAM TOPICAL
Status: CANCELLED | OUTPATIENT
Start: 2019-05-09

## 2019-05-09 RX ORDER — NALOXONE HYDROCHLORIDE 0.4 MG/ML
.2-.4 INJECTION, SOLUTION INTRAMUSCULAR; INTRAVENOUS; SUBCUTANEOUS
Status: DISCONTINUED | OUTPATIENT
Start: 2019-05-09 | End: 2019-05-09

## 2019-05-09 RX ORDER — FENTANYL CITRATE 50 UG/ML
25-50 INJECTION, SOLUTION INTRAMUSCULAR; INTRAVENOUS
Status: CANCELLED | OUTPATIENT
Start: 2019-05-09 | End: 2019-05-10

## 2019-05-09 RX ORDER — NITROGLYCERIN 0.4 MG/1
0.4 TABLET SUBLINGUAL EVERY 5 MIN PRN
Status: DISCONTINUED | OUTPATIENT
Start: 2019-05-09 | End: 2019-05-09 | Stop reason: HOSPADM

## 2019-05-09 RX ORDER — HEPARIN SODIUM 1000 [USP'U]/ML
INJECTION, SOLUTION INTRAVENOUS; SUBCUTANEOUS
Status: DISCONTINUED | OUTPATIENT
Start: 2019-05-09 | End: 2019-05-09 | Stop reason: HOSPADM

## 2019-05-09 RX ORDER — ASPIRIN 81 MG/1
81 TABLET ORAL DAILY
Status: CANCELLED | OUTPATIENT
Start: 2019-05-09

## 2019-05-09 RX ORDER — ATROPINE SULFATE 0.1 MG/ML
0.5 INJECTION INTRAVENOUS EVERY 5 MIN PRN
Status: CANCELLED | OUTPATIENT
Start: 2019-05-09 | End: 2019-05-10

## 2019-05-09 RX ORDER — NITROGLYCERIN 5 MG/ML
VIAL (ML) INTRAVENOUS
Status: DISCONTINUED | OUTPATIENT
Start: 2019-05-09 | End: 2019-05-09 | Stop reason: HOSPADM

## 2019-05-09 RX ORDER — FENTANYL CITRATE 50 UG/ML
INJECTION, SOLUTION INTRAMUSCULAR; INTRAVENOUS
Status: DISCONTINUED | OUTPATIENT
Start: 2019-05-09 | End: 2019-05-09 | Stop reason: HOSPADM

## 2019-05-09 RX ORDER — ACETAMINOPHEN 325 MG/1
650 TABLET ORAL EVERY 4 HOURS PRN
Status: CANCELLED | OUTPATIENT
Start: 2019-05-09

## 2019-05-09 RX ORDER — FENTANYL CITRATE 50 UG/ML
25-50 INJECTION, SOLUTION INTRAMUSCULAR; INTRAVENOUS
Status: DISCONTINUED | OUTPATIENT
Start: 2019-05-09 | End: 2019-05-09

## 2019-05-09 RX ORDER — SODIUM CHLORIDE 9 MG/ML
INJECTION, SOLUTION INTRAVENOUS CONTINUOUS
Status: CANCELLED | OUTPATIENT
Start: 2019-05-09

## 2019-05-09 RX ORDER — LIDOCAINE 40 MG/G
CREAM TOPICAL
Status: DISCONTINUED | OUTPATIENT
Start: 2019-05-09 | End: 2019-05-09 | Stop reason: HOSPADM

## 2019-05-09 RX ORDER — FLUMAZENIL 0.1 MG/ML
0.2 INJECTION, SOLUTION INTRAVENOUS
Status: DISCONTINUED | OUTPATIENT
Start: 2019-05-09 | End: 2019-05-09

## 2019-05-09 RX ORDER — FLUMAZENIL 0.1 MG/ML
0.2 INJECTION, SOLUTION INTRAVENOUS
Status: CANCELLED | OUTPATIENT
Start: 2019-05-09 | End: 2019-05-10

## 2019-05-09 RX ORDER — HYDROCODONE BITARTRATE AND ACETAMINOPHEN 5; 325 MG/1; MG/1
1-2 TABLET ORAL EVERY 4 HOURS PRN
Status: DISCONTINUED | OUTPATIENT
Start: 2019-05-09 | End: 2019-05-09 | Stop reason: HOSPADM

## 2019-05-09 RX ORDER — NALOXONE HYDROCHLORIDE 0.4 MG/ML
.1-.4 INJECTION, SOLUTION INTRAMUSCULAR; INTRAVENOUS; SUBCUTANEOUS
Status: CANCELLED | OUTPATIENT
Start: 2019-05-09

## 2019-05-09 RX ADMIN — SODIUM CHLORIDE: 9 INJECTION, SOLUTION INTRAVENOUS at 12:48

## 2019-05-09 RX ADMIN — ASPIRIN 325 MG: 325 TABLET, DELAYED RELEASE ORAL at 13:30

## 2019-05-09 ASSESSMENT — MIFFLIN-ST. JEOR: SCORE: 1706.68

## 2019-05-09 ASSESSMENT — ACTIVITIES OF DAILY LIVING (ADL): ADLS_ACUITY_SCORE: 17

## 2019-05-09 NOTE — Clinical Note
The first balloon was inserted into the left anterior descending and left anterior descending diagonal.Max pressure = 7 su. Total duration = 30 seconds.

## 2019-05-09 NOTE — PROGRESS NOTES
HPI and Plan:     I had the opportunity to see Mr. Jessica Gomez at [facility] in [place] for urgent consultation following an abnormal stress echocardiogram.  He was referred for stress testing after seeing his primary care doctor and reporting symptoms of chest discomfort.    He has had recurring chest discomfort on a predictable basis for the last 3 weeks.  He describes it as a nonradiating central chest burning discomfort without associated symptoms of lightheadedness, diaphoresis, shortness of breath, nausea or vomiting.  He has had daily episodes of this discomfort occur at rest as well, particularly after meals and while lying down.  Most commonly, these episodes have occurred when he has been walking briskly or moving boxes at work.  The episodes of lasted a few minutes and was resolved with rest each time.  He has not had prolonged episodes of discomfort.    He completed 4 minutes and 45 seconds of exercise according to the Arthur protocol on the treadmill and stopped due to severe chest burning discomfort and shortness of breath, rated 9/10 in severity.  During exercise, he had multiple brief episodes of 3 beats of nonsustained ventricular tachycardia and 1 mm of ST segment elevation isolated in V1. inferior and lateral ST segment depression did not meet criteria for ischemia.  The resting cardiac echo images were normal showing ejection fraction of 60% with no regional wall motion abnormalities.  Following exercise, the anteroseptal wall and apex became severely hypokinetic, suggesting ischemia within this territory.  The chest discomfort resolved entirely in less than 5 minutes after stopping to rest and he has no residual chest discomfort at the time of my discussion with him.    His cardiac risk factors include type 2 diabetes treated with metformin and glimepiride.  He has dyslipidemia treated with atorvastatin.  He denies a history of hypertension but takes lisinopril,  possibly for prevention of diabetic complications.    Orders Placed This Encounter   Procedures     Comprehensive metabolic panel     CBC with platelets     INR     Partial thromboplastin time       No orders of the defined types were placed in this encounter.      There are no discontinued medications.      Encounter Diagnoses   Name Primary?     Essential hypertension, benign      Type 2 diabetes mellitus without complication, without long-term current use of insulin (H)      Hyperlipidemia LDL goal <100      Unstable angina (H) Yes     Abnormal cardiovascular stress test        CURRENT MEDICATIONS:  Current Outpatient Medications   Medication Sig Dispense Refill     aspirin 81 MG EC tablet Take 1 tablet (81 mg) by mouth daily 90 tablet 3     atorvastatin (LIPITOR) 20 MG tablet Take 1 tablet (20 mg) by mouth daily 90 tablet 3     glimepiride (AMARYL) 2 MG tablet Take 1 tablet (2 mg) by mouth every morning (before breakfast) 90 tablet 3     ibuprofen (ADVIL) 200 MG capsule Take 200 mg by mouth every 4 hours as needed for fever       lisinopril (PRINIVIL/ZESTRIL) 20 MG tablet Take 1 tablet (20 mg) by mouth daily 90 tablet 3     metFORMIN (GLUCOPHAGE) 1000 MG tablet Take 1 tablet (1,000 mg) by mouth 2 times daily (with meals) 180 tablet 3       ALLERGIES   No Known Allergies    PAST MEDICAL HISTORY:  Past Medical History:   Diagnosis Date     Diabetes (H)        PAST SURGICAL HISTORY:  No past surgical history on file.    FAMILY HISTORY:  Family History   Problem Relation Age of Onset     Diabetes Paternal Grandmother      Diabetes Nephew        SOCIAL HISTORY:  Social History     Socioeconomic History     Marital status:      Spouse name: Not on file     Number of children: Not on file     Years of education: Not on file     Highest education level: Not on file   Occupational History     Not on file   Social Needs     Financial resource strain: Not on file     Food insecurity:     Worry: Not on file      Inability: Not on file     Transportation needs:     Medical: Not on file     Non-medical: Not on file   Tobacco Use     Smoking status: Never Smoker     Smokeless tobacco: Never Used   Substance and Sexual Activity     Alcohol use: No     Drug use: No     Sexual activity: Yes     Partners: Female   Lifestyle     Physical activity:     Days per week: Not on file     Minutes per session: Not on file     Stress: Not on file   Relationships     Social connections:     Talks on phone: Not on file     Gets together: Not on file     Attends Protestant service: Not on file     Active member of club or organization: Not on file     Attends meetings of clubs or organizations: Not on file     Relationship status: Not on file     Intimate partner violence:     Fear of current or ex partner: Not on file     Emotionally abused: Not on file     Physically abused: Not on file     Forced sexual activity: Not on file   Other Topics Concern     Parent/sibling w/ CABG, MI or angioplasty before 65F 55M? Not Asked   Social History Narrative     Not on file       Review of Systems: Complete review of systems was performed including all the systems described below and was negative except what is noted above in history of present illness  Skin:          Eyes:         ENT:         Respiratory:          Cardiovascular:         Gastroenterology:        Genitourinary:         Musculoskeletal:         Neurologic:         Psychiatric:         Heme/Lymph/Imm:         Endocrine:           Physical Exam:  Vitals: /74   Pulse 86   Wt 87.5 kg (193 lb)   BMI 29.35 kg/m      Constitutional:  cooperative, alert and oriented, well developed, well nourished, in no acute distress        Skin:  warm and dry to the touch, no apparent skin lesions or masses noted          Head:  normocephalic, no masses or lesions        Eyes:  pupils equal and round, conjunctivae and lids unremarkable, sclera white, no xanthalasma, EOMS intact, no nystagmus         Lymph:No Cervical lymphadenopathy present     ENT:  no pallor or cyanosis, dentition good        Neck:  carotid pulses are full and equal bilaterally, JVP normal, no carotid bruit        Respiratory:  normal breath sounds, clear to auscultation, normal A-P diameter, normal symmetry, normal respiratory excursion, no use of accessory muscles         Cardiac: regular rhythm, normal S1/S2, no S3 or S4, apical impulse not displaced, no murmurs, gallops or rubs                                                         GI:  abdomen soft;BS normoactive        Extremities and Muscular Skeletal:  no deformities, clubbing, cyanosis, erythema observed;no edema              Neurological:  no gross motor deficits;affect appropriate        Psych:  oriented to time, person and place        Impression:     Mr. Jessica Gomez is a 59-year-old gentleman from [facility] who has had 3 weeks of predominantly exertional central chest burning discomfort consistent with angina, but also has had near daily episodes of chest discomfort at rest, particularly after meals.  These findings are most consistent with unstable angina.    His stress echocardiogram was abnormal today showing evidence of anteroseptal and apical ischemia with severe, exercise-limiting chest discomfort and worrisome ECG changes.    He has significant cardiac risk factors including type 2 diabetes, dyslipidemia, and hypertension.  He is already on medical treatment for those.    I have recommended.he be evaluated with coronary angiography today at [facility] on an outpatient, but urgent basis.  I described the procedure to him in detail. Risks and benefits of left heart catheterization and coronary angiogram were discussed with the patient in detail. Risk estimated at 0.1-0.3% for diagnostic angio and 1-2% for PCI, including risk of stroke, MI, death, emergent bypass, contrast induced allergic reaction, renal dysfunction, and vascular  complications (including bleeding and transfusion) were discussed. Patient understands and wishes to proceed.    I have arranged transfer from the stress echo unit to the care suites where the patient will undergo preparation for coronary angiography and have the procedure done today.  All questions were answered.    His son has a graduation tomorrow in [place] and he is hoping to be able to be able to make it to [place] for that event.    Mark Oneil MD [facility] heart care      Michael Méndez MD

## 2019-05-09 NOTE — PROGRESS NOTES
Care Suites Arrival    Reason for Visit: Heart Cath    A/O. Pt denies need for . Denies pain at this time. IV flds infusing. Contrast D/C instructions reviewed with pt with verbal understanding received. Copy given to pt. Procedure explained. All questions & concerns addressed. No family present at this time. Pt's friend & daughter will come to hospital asap. Daughter will take pt home - wife at home this evening. Pt resting in bed, denies additional needs at this time, call light within reach.     1317 Report given to Cari Tripathi RN.

## 2019-05-09 NOTE — Clinical Note
The first balloon was inserted into the left anterior descending and proximal left anterior descending.Max pressure = 8 su. Total duration = 28 seconds.      Balloon reinflated a second time:

## 2019-05-09 NOTE — Clinical Note
Stent deployed in the proximal left anterior descending. Max pressure = 14 su. Total duration = 30 seconds.

## 2019-05-09 NOTE — DISCHARGE INSTRUCTIONS
Cardiac Angioplasty/Stent Discharge Instructions - Femoral    After you go home:      Have an adult stay with you until tomorrow.    Drink extra fluids for 2 days.    You may resume your normal diet.    No smoking       For 24 hours - due to the sedation you received:    Relax and take it easy.    Do NOT make any important or legal decisions.    Do NOT drive or operate machines at home or at work.    Do NOT drink alcohol.    Care of Groin Puncture Site:      For the first 24 hrs - check the puncture site every 1-2 hours while awake.    For 2 days, when you cough, sneeze, laugh or move your bowels, hold your hand over the puncture site and press firmly.    Remove the bandaid after 24 hours. If there is minor oozing, apply another bandaid and remove it after 12 hours.    It is normal to have a small bruise or pea size lump at the site.    You may shower tomorrow. Do NOT take a bath, or use a hot tub or pool for at least 3 days. Do NOT scrub the site. Do not use lotion or powder near the puncture site.     Activity:            For 2 days:    No stooping or squatting    Do NOT do any heavy activity such as exercise, lifting, or straining.     No housework, yard work or any activity that make you sweat    Do NOT lift more than 10 pounds    Bleeding:      If you start bleeding from the site in your groin, lie down flat and press firmly on/above the site for 10 minutes.     Once bleeding stops, lay flat for 2 hours.     Call [facility] as soon as you can.       Call 911 right away if you have heavy bleeding or bleeding that does not stop.      Medicines:      You are now taking an antiplatelet medication - Brilinta - do not stop taking it until you talk to your cardiologist.        If you are on Metformin (Glucophage), do not restart it until you have blood tests (within 2 to 3 days after discharge).  After you have your blood drawn, you may restart the Metformin.     Take your medications, including blood thinners, unless  your provider tells you not to.     If you have stopped any medicines, check with your provider about when to restart them.    Follow Up Appointments:      Follow up with [facility] Nurse Practitioner at [facility] of patient preference in 7-10 days.    Cardiac Rehab will contact you for follow up care.    Call the clinic if:      You have increased pain or a large or growing hard lump around the site.    The site is red, swollen, hot or tender.    Blood or fluid is draining from the site.    You have chills or a fever greater than 101 F (38 C).    Your leg feels numb, cool or changes color.    You have hives, a rash or unusual itching.    New pain in the back or belly that you cannot control with Tylenol.    Any questions or concerns.      Other Instructions:      If you received a stent - carry your stent card with you at all times.      [facility] [place]:    [phone] [facility] (7 days a week)

## 2019-05-09 NOTE — PROGRESS NOTES
1525 Report received from Cari Tripathi, LILLIAM.  1530 Pt A/O. Gauze drsg CDI to right groin puncture site. No oozing or hematoma noted. Area soft & flat. Pt denies pain. Pt instructed on activity restrictions while on bedrest. Verbal understanding received from pt. No family present at this time. Pt refuses food at this time.  1600 Dr Jaden devine for Brilinta RX.  1630 Pt's daughter at bedside. Detailed update given.   1710 OOB - steady on feet. Ambulated in halls to bathroom with good williams. No change in puncture site assessment with activity.  1725 Discharge teaching & instructions given to both pt & daughter w/ verbal understanding received. All questions & concerns addressed.  1745 Brilinta RX given to pt.   1755 Pt discharged per w/c to private vehicle. All personal belongings taken w/ pt.

## 2019-05-09 NOTE — LETTER
5/9/2019    Michael Méndez MD  [address] [facility] [id number]    RE: Jessica Gomez       Dear Colleague,    I had the pleasure of seeing Jessica oGmez in the [facility].    HPI and Plan:     I had the opportunity to see Mr. Jessica Gomez at [facility] in [place] for urgent consultation following an abnormal stress echocardiogram.  He was referred for stress testing after seeing his primary care doctor and reporting symptoms of chest discomfort.    He has had recurring chest discomfort on a predictable basis for the last 3 weeks.  He describes it as a nonradiating central chest burning discomfort without associated symptoms of lightheadedness, diaphoresis, shortness of breath, nausea or vomiting.  He has had daily episodes of this discomfort occur at rest as well, particularly after meals and while lying down.  Most commonly, these episodes have occurred when he has been walking briskly or moving boxes at work.  The episodes of lasted a few minutes and was resolved with rest each time.  He has not had prolonged episodes of discomfort.    He completed 4 minutes and 45 seconds of exercise according to the Arthur protocol on the treadmill and stopped due to severe chest burning discomfort and shortness of breath, rated 9/10 in severity.  During exercise, he had multiple brief episodes of 3 beats of nonsustained ventricular tachycardia and 1 mm of ST segment elevation isolated in V1. inferior and lateral ST segment depression did not meet criteria for ischemia.  The resting cardiac echo images were normal showing ejection fraction of 60% with no regional wall motion abnormalities.  Following exercise, the anteroseptal wall and apex became severely hypokinetic, suggesting ischemia within this territory.  The chest discomfort resolved entirely in less than 5 minutes after stopping to rest and he has no residual chest discomfort at  the time of my discussion with him.    His cardiac risk factors include type 2 diabetes treated with metformin and glimepiride.  He has dyslipidemia treated with atorvastatin.  He denies a history of hypertension but takes lisinopril, possibly for prevention of diabetic complications.    Orders Placed This Encounter   Procedures     Comprehensive metabolic panel     CBC with platelets     INR     Partial thromboplastin time       No orders of the defined types were placed in this encounter.      There are no discontinued medications.      Encounter Diagnoses   Name Primary?     Essential hypertension, benign      Type 2 diabetes mellitus without complication, without long-term current use of insulin (H)      Hyperlipidemia LDL goal <100      Unstable angina (H) Yes     Abnormal cardiovascular stress test        CURRENT MEDICATIONS:  Current Outpatient Medications   Medication Sig Dispense Refill     aspirin 81 MG EC tablet Take 1 tablet (81 mg) by mouth daily 90 tablet 3     atorvastatin (LIPITOR) 20 MG tablet Take 1 tablet (20 mg) by mouth daily 90 tablet 3     glimepiride (AMARYL) 2 MG tablet Take 1 tablet (2 mg) by mouth every morning (before breakfast) 90 tablet 3     ibuprofen (ADVIL) 200 MG capsule Take 200 mg by mouth every 4 hours as needed for fever       lisinopril (PRINIVIL/ZESTRIL) 20 MG tablet Take 1 tablet (20 mg) by mouth daily 90 tablet 3     metFORMIN (GLUCOPHAGE) 1000 MG tablet Take 1 tablet (1,000 mg) by mouth 2 times daily (with meals) 180 tablet 3       ALLERGIES   No Known Allergies    PAST MEDICAL HISTORY:  Past Medical History:   Diagnosis Date     Diabetes (H)        PAST SURGICAL HISTORY:  No past surgical history on file.    FAMILY HISTORY:  Family History   Problem Relation Age of Onset     Diabetes Paternal Grandmother      Diabetes Nephew        SOCIAL HISTORY:  Social History     Socioeconomic History     Marital status:      Spouse name: Not on file     Number of children: Not  on file     Years of education: Not on file     Highest education level: Not on file   Occupational History     Not on file   Social Needs     Financial resource strain: Not on file     Food insecurity:     Worry: Not on file     Inability: Not on file     Transportation needs:     Medical: Not on file     Non-medical: Not on file   Tobacco Use     Smoking status: Never Smoker     Smokeless tobacco: Never Used   Substance and Sexual Activity     Alcohol use: No     Drug use: No     Sexual activity: Yes     Partners: Female   Lifestyle     Physical activity:     Days per week: Not on file     Minutes per session: Not on file     Stress: Not on file   Relationships     Social connections:     Talks on phone: Not on file     Gets together: Not on file     Attends Hoahaoism service: Not on file     Active member of club or organization: Not on file     Attends meetings of clubs or organizations: Not on file     Relationship status: Not on file     Intimate partner violence:     Fear of current or ex partner: Not on file     Emotionally abused: Not on file     Physically abused: Not on file     Forced sexual activity: Not on file   Other Topics Concern     Parent/sibling w/ CABG, MI or angioplasty before 65F 55M? Not Asked   Social History Narrative     Not on file       Review of Systems: Complete review of systems was performed including all the systems described below and was negative except what is noted above in history of present illness  Skin:          Eyes:         ENT:         Respiratory:          Cardiovascular:         Gastroenterology:        Genitourinary:         Musculoskeletal:         Neurologic:         Psychiatric:         Heme/Lymph/Imm:         Endocrine:           Physical Exam:  Vitals: /74   Pulse 86   Wt 87.5 kg (193 lb)   BMI 29.35 kg/m       Constitutional:  cooperative, alert and oriented, well developed, well nourished, in no acute distress        Skin:  warm and dry to the touch,  no apparent skin lesions or masses noted          Head:  normocephalic, no masses or lesions        Eyes:  pupils equal and round, conjunctivae and lids unremarkable, sclera white, no xanthalasma, EOMS intact, no nystagmus        Lymph:No Cervical lymphadenopathy present     ENT:  no pallor or cyanosis, dentition good        Neck:  carotid pulses are full and equal bilaterally, JVP normal, no carotid bruit        Respiratory:  normal breath sounds, clear to auscultation, normal A-P diameter, normal symmetry, normal respiratory excursion, no use of accessory muscles         Cardiac: regular rhythm, normal S1/S2, no S3 or S4, apical impulse not displaced, no murmurs, gallops or rubs                                                         GI:  abdomen soft;BS normoactive        Extremities and Muscular Skeletal:  no deformities, clubbing, cyanosis, erythema observed;no edema              Neurological:  no gross motor deficits;affect appropriate        Psych:  oriented to time, person and place        Impression:     Mr. Jessica Gomez is a 59-year-old gentleman from [facility] who has had 3 weeks of predominantly exertional central chest burning discomfort consistent with angina, but also has had near daily episodes of chest discomfort at rest, particularly after meals.  These findings are most consistent with unstable angina.    His stress echocardiogram was abnormal today showing evidence of anteroseptal and apical ischemia with severe, exercise-limiting chest discomfort and worrisome ECG changes.    He has significant cardiac risk factors including type 2 diabetes, dyslipidemia, and hypertension.  He is already on medical treatment for those.    I have recommended.he be evaluated with coronary angiography today at [facility] on an outpatient, but urgent basis.  I described the procedure to him in detail. Risks and benefits of left heart catheterization and coronary angiogram were discussed  with the patient in detail. Risk estimated at 0.1-0.3% for diagnostic angio and 1-2% for PCI, including risk of stroke, MI, death, emergent bypass, contrast induced allergic reaction, renal dysfunction, and vascular complications (including bleeding and transfusion) were discussed. Patient understands and wishes to proceed.    I have arranged transfer from the stress echo unit to the care suites where the patient will undergo preparation for coronary angiography and have the procedure done today.  All questions were answered.    His son has a graduation tomorrow in [place] and he is hoping to be able to be able to make it to [place] for that event.    Mark Oneil MD [facility]  Michael Méndez MD                Thank you for allowing me to participate in the care of your patient.      Sincerely,     MARK ONEIL MD     [facility]

## 2019-05-10 ENCOUNTER — TELEPHONE (OUTPATIENT)
Dept: CARDIOLOGY | Facility: CLINIC | Age: 59
End: 2019-05-10

## 2019-05-10 ENCOUNTER — TELEPHONE (OUTPATIENT)
Dept: INTERNAL MEDICINE | Facility: CLINIC | Age: 59
End: 2019-05-10

## 2019-05-10 ENCOUNTER — TELEPHONE (OUTPATIENT)
Dept: CARDIOLOGY | Facility: CLINIC | Age: 59
End: 2019-05-10
Payer: COMMERCIAL

## 2019-05-10 ENCOUNTER — CARE COORDINATION (OUTPATIENT)
Dept: CARDIOLOGY | Facility: CLINIC | Age: 59
End: 2019-05-10

## 2019-05-10 DIAGNOSIS — Z53.9 ERRONEOUS ENCOUNTER--DISREGARD: Primary | ICD-10-CM

## 2019-05-10 LAB — INTERPRETATION ECG - MUSE: NORMAL

## 2019-05-10 NOTE — TELEPHONE ENCOUNTER
Called patient post-discharge from care suites after an angioplasty and stenting to the prox LAD as well as angioplasty to the ostial D1 via the R femoral artery on 5/9. He currently has no f/u arranged in clinic. Patient did not answer the phone and there is no VM box set up. Will route message to discharge RN.

## 2019-05-10 NOTE — TELEPHONE ENCOUNTER
Will post pone as Cardiology Care Coordination attempted outreach today. No answer.    Lynsey SPEAR RN, BSN, PHN

## 2019-05-10 NOTE — PROGRESS NOTES
Arranged post cath MORGAN OV for  5/16. Also, pt on Metformin. CMP arranged for Mon 5/13.     I called pt to review that he needs to continue to hold his Metformin until we call him with his lab results on Monday. Explained why he needs to hold Metformin and to monitor his CHO count more closely while off Metformin. Pt verbalized understanding. Also, pt already spoke with our PA today to f/u on his cath, pt denies questions or concerns. Kaitlin Cortez RN on 5/10/2019 at 10:24 AM

## 2019-05-13 DIAGNOSIS — I20.0 UNSTABLE ANGINA (H): ICD-10-CM

## 2019-05-13 LAB
ALBUMIN SERPL-MCNC: 3.4 G/DL (ref 3.4–5)
ALP SERPL-CCNC: 56 U/L (ref 40–150)
ALT SERPL W P-5'-P-CCNC: 40 U/L (ref 0–70)
ANION GAP SERPL CALCULATED.3IONS-SCNC: 5 MMOL/L (ref 3–14)
AST SERPL W P-5'-P-CCNC: 25 U/L (ref 0–45)
BILIRUB SERPL-MCNC: 0.4 MG/DL (ref 0.2–1.3)
BUN SERPL-MCNC: 10 MG/DL (ref 7–30)
CALCIUM SERPL-MCNC: 8.8 MG/DL (ref 8.5–10.1)
CHLORIDE SERPL-SCNC: 105 MMOL/L (ref 94–109)
CO2 SERPL-SCNC: 29 MMOL/L (ref 20–32)
CREAT SERPL-MCNC: 1.05 MG/DL (ref 0.66–1.25)
ERYTHROCYTE [DISTWIDTH] IN BLOOD BY AUTOMATED COUNT: 12.7 % (ref 10–15)
GFR SERPL CREATININE-BSD FRML MDRD: 77 ML/MIN/{1.73_M2}
GLUCOSE SERPL-MCNC: 340 MG/DL (ref 70–99)
HCT VFR BLD AUTO: 38.6 % (ref 40–53)
HGB BLD-MCNC: 13.4 G/DL (ref 13.3–17.7)
MCH RBC QN AUTO: 27.3 PG (ref 26.5–33)
MCHC RBC AUTO-ENTMCNC: 34.7 G/DL (ref 31.5–36.5)
MCV RBC AUTO: 79 FL (ref 78–100)
PLATELET # BLD AUTO: 245 10E9/L (ref 150–450)
POTASSIUM SERPL-SCNC: 4.6 MMOL/L (ref 3.4–5.3)
PROT SERPL-MCNC: 7.5 G/DL (ref 6.8–8.8)
RBC # BLD AUTO: 4.91 10E12/L (ref 4.4–5.9)
SODIUM SERPL-SCNC: 139 MMOL/L (ref 133–144)
WBC # BLD AUTO: 7.2 10E9/L (ref 4–11)

## 2019-05-13 PROCEDURE — 85027 COMPLETE CBC AUTOMATED: CPT | Performed by: INTERNAL MEDICINE

## 2019-05-13 PROCEDURE — 36415 COLL VENOUS BLD VENIPUNCTURE: CPT | Performed by: INTERNAL MEDICINE

## 2019-05-13 PROCEDURE — 80053 COMPREHEN METABOLIC PANEL: CPT | Performed by: INTERNAL MEDICINE

## 2019-05-13 NOTE — PROGRESS NOTES
Call placed to pt to review his post cath BMP that was done today. Pt holding his Metformin. Results show creatinine/GFR at pt's baseline. No answer, left VM requesting call back. Kaitlin Cortez RN on 5/13/2019 at 11:52 AM

## 2019-05-13 NOTE — PROGRESS NOTES
Pt and pt's son returned the call. Reviewed BMP lab results show is renal function is WNL, and that pt can resume his Metformin. Advised pt that his BS is 340, so will need to really watch his sugar/carbs/CHO intake. Pt/son verbalized understanding. Also reviewed that he has picked up his Brilinta Rx and has been taking ASA and Brilinta as prescribed, uninterrupted. Pt verbalized understanding. He inquired if he could return to work. Pt stocks groceries. I advised pt can work but that he needs to follow the restrictions on his discharge paperwork. Pt reports his discharge paperwork states < 10 lb is his lifting restriction. I advised pt does not lift anything until he is seen at return OV this week with MORGAN. Pt denies any pain, tenderness or abnormal bruising to access site or any symptoms of chest pain or SOB.  Kaitlin Cortez RN on 5/13/2019 at 2:31 PM

## 2019-05-14 LAB — INTERPRETATION ECG - MUSE: NORMAL

## 2019-05-16 NOTE — UTILIZATION REVIEW
Admission Status; Secondary Review Determination       As part of the [place] Utilization review plan, a self-audit is done on Medicare inpatient admission with less than 2 midnights stay. The 2014 IPPS Final Rule allows outpatient billing in the event that a hospital determines that an inpatient admission was not medically necessary under utilization review process.      (x) Outpatient status would be Appropriate- Short Stay- Post discharge review.     RATIONALE FOR DETERMINATION 59-year-old male with history of diabetes presents with accelerated angina requiring urgent coronary angiography after having significantly abnormal stress test.  Patient was admitted and discharge with < 1 night stay. Record was sent by  for a PA review. Based on the  severity of illness, intensity of service provided, expected LOS and risk for adverse outcome make the care appropriate for further outpatient/observation; however, doesn't meet criteria for hospital inpatient admission.         The information on this document is developed by the utilization review team in order for the business office to ensure compliance.  This only denotes the appropriateness of proper admission status and does not reflect the quality of care rendered.         The definitions of Inpatient Status and Observation Status used in making the determination above are those provided in the CMS Coverage Manual, Chapter 1 and Chapter 6, section 70.4.      Sincerely,   Walter Gillette MD  Physician Advisor  [facility]

## 2019-05-22 ENCOUNTER — DOCUMENTATION ONLY (OUTPATIENT)
Dept: CARDIOLOGY | Facility: CLINIC | Age: 59
End: 2019-05-22

## 2019-05-22 ENCOUNTER — OFFICE VISIT (OUTPATIENT)
Dept: CARDIOLOGY | Facility: CLINIC | Age: 59
End: 2019-05-22
Payer: COMMERCIAL

## 2019-05-22 VITALS
BODY MASS INDEX: 28.95 KG/M2 | SYSTOLIC BLOOD PRESSURE: 114 MMHG | HEART RATE: 76 BPM | DIASTOLIC BLOOD PRESSURE: 76 MMHG | WEIGHT: 191 LBS | HEIGHT: 68 IN

## 2019-05-22 DIAGNOSIS — I21.4 NSTEMI (NON-ST ELEVATED MYOCARDIAL INFARCTION) (H): ICD-10-CM

## 2019-05-22 DIAGNOSIS — I25.10 CORONARY ARTERY DISEASE INVOLVING NATIVE CORONARY ARTERY OF NATIVE HEART WITHOUT ANGINA PECTORIS: Primary | ICD-10-CM

## 2019-05-22 DIAGNOSIS — E78.5 HYPERLIPIDEMIA LDL GOAL <100: ICD-10-CM

## 2019-05-22 PROCEDURE — 99214 OFFICE O/P EST MOD 30 MIN: CPT | Performed by: PHYSICIAN ASSISTANT

## 2019-05-22 RX ORDER — NITROGLYCERIN 0.4 MG/1
TABLET SUBLINGUAL
Qty: 30 TABLET | Refills: 0 | Status: SHIPPED | OUTPATIENT
Start: 2019-05-22 | End: 2021-07-15

## 2019-05-22 RX ORDER — METOPROLOL TARTRATE 25 MG/1
25 TABLET, FILM COATED ORAL 2 TIMES DAILY
Qty: 60 TABLET | Refills: 5 | Status: SHIPPED | OUTPATIENT
Start: 2019-05-22 | End: 2019-10-18

## 2019-05-22 RX ORDER — ATORVASTATIN CALCIUM 20 MG/1
20 TABLET, FILM COATED ORAL DAILY
Qty: 90 TABLET | Refills: 3 | COMMUNITY
Start: 2019-05-22 | End: 2020-03-03

## 2019-05-22 ASSESSMENT — MIFFLIN-ST. JEOR: SCORE: 1655.87

## 2019-05-22 NOTE — PROGRESS NOTES
Cardiology Clinic Progress Note    Jessica Gomez MRN# [id number]   YOB: 1960 Age: 59 year old   Primary cardiologist: Dr. Oneil         Assessment and Plan:     In summary, Jessica Gomez presents today for a post-angiogram follow-up visit. He's on appropriate medical therapy including Brilinta, aspirin, statin, ACEi. He feels well and has no complaints at present. We discussed the results of his procedure, what his new diagnosis means and the importance of adherence to medications and lifestyle modification going forward.     Plan:  - Discussed the importance of absolute compliance with DAPT for > 1 year  - Will increase Lipitor from 20 to 40 mg daily (high intensity)  - Start Lopressor 25 mg BID (may be able to stop this in the future)  - Cardiac rehab starts tomorrow - will continue to monitor him closely this way  - Will send RX for SL NTG to have on hand - educated on proper use.   - Advised mediterranean-style diet and routine cardiovascular exercise regimen for heart health and weight loss  - Follow up with primary cardiologist in 2 months; repeat lipids and will also obtain a baseline complete ECHO prior  - Encouraged to f/u with PCP in the near future for uncontrolled diabetes management - he has an appointment scheduled  - May RTW without restriction. Will provide letter.         History of Presenting Illness:      Jessica Gomez is a pleasant 59 year old patient who presents today for a post-angiogram follow-up visit.    The patient has a history of the following -   # CAD, unstable angina s/p PCI to proximal LAD and balloon angioplasty to D1 lesion, with residual 70% ramus lesion on 5/9/19  # DMII, on metformin - a1c 7.4% in October 2018, with subsequent glucose levels in the 200 - 300's  # HLP, on Lipitor (LDL 57)  # HTN, on lisinopril    Presleya was sent to the cath lab after a very abnormal stress ECHO, ordered by his PCP for symptoms c/w unstable angina. He  "completed 4 minutes and 45 seconds of exercise according to the Arthur protocol on the treadmill and stopped due to severe chest burning discomfort and shortness of breath, rated 9/10 in severity.  During exercise, he had multiple brief episodes of 3 beats of nonsustained ventricular tachycardia and 1 mm of ST segment elevation isolated in V1. inferior and lateral ST segment depression did not meet criteria for ischemia.  The resting cardiac echo images were normal showing ejection fraction of 60% with no regional wall motion abnormalities.  Following exercise, the anteroseptal wall and apex became severely hypokinetic, suggesting ischemia within this territory. He was seen urgently by Dr. Oneil who recommended urgent [facility]/coronary angiogram that same day. This revealed a 99% prox LAD culprit lesion, with a 70% ostial D1. A 70% ramus was also noted. The LAD was stented successfully with a [id number] wyatt MICHELLE, and balloon angioplasty was performed on the prox D1. Post-procedure BMP was stable. I note that his fasting glucose was elevated at 340 prior to metformin and glimepiride re-start.     Today, he presents with his son to clinic. He tells me he's feeling well overall. He denies chest pain, shortness of breath, PND, orthopnea, edema, claudication, palpitations, near syncope or syncope. He's had no problems with the R femoral artery catheter insertion site. He has many appropriate questions about his new diagnosis and what this means for him going forward. He works as a  and is requesting a return to work letter. He starts cardiac rehab tomorrow. He has no FH of CAD and does not use tobacco.          Review of Systems:     12-pt ROS is negative except for as noted in the HPI.          Physical Exam:     Vitals: /76   Pulse 76   Ht 1.727 m (5' 8\")   Wt 86.6 kg (191 lb)   BMI 29.04 kg/m    Wt Readings from Last 10 Encounters:   05/22/19 86.6 kg (191 lb)   05/09/19 87 kg (191 lb 11.2 oz) "   05/09/19 87.5 kg (193 lb)   04/30/19 87.6 kg (193 lb 3.2 oz)   10/31/18 84.5 kg (186 lb 4.8 oz)   07/06/17 91.6 kg (202 lb)   06/29/17 88.9 kg (196 lb)   05/18/16 88.3 kg (194 lb 9.6 oz)   04/08/15 82.8 kg (182 lb 9.6 oz)   03/16/15 83.7 kg (184 lb 8 oz)       Constitutional:  Patient is pleasant, alert, cooperative, and in NAD.  HEENT:  NCAT. PERRLA. EOM's intact.   Neck:  CVP appears normal. No carotid bruits.   Pulmonary: Normal respiratory effort. CTAB.   Cardiac: RRR, normal S1/S2, no S3/S4, no murmur or rub.   Abdomen:  Non-tender abdomen, no hepatosplenomegaly appreciated.   Vascular: Pulses in the upper and lower extremities are 2+ and equal bilaterally. R femoral artery catheter insertion site is C/D/I, without evidence of hematoma or pseudoaneurysm.   Extremities: No edema, erythema, cyanosis or tenderness appreciated.  Skin:  No rashes or lesions appreciated.   Neurological:  No gross motor or sensory deficits.   Psych: Appropriate affect.        Data:   Labs reviewed:  Recent Labs   Lab Test 05/09/19  1240 10/31/18  0825 06/29/17  1817 05/18/16  0904   LDL 57 38 57 34   HDL 34* 37* 32* 34*   NHDL 83 58 83 59   CHOL 117 95 115 93   TRIG 129 101 130 124   TSH  --  4.83* 21.02* 6.12*       Lab Results   Component Value Date    WBC 7.2 05/13/2019    RBC 4.91 05/13/2019    HGB 13.4 05/13/2019    HCT 38.6 (L) 05/13/2019    MCV 79 05/13/2019    MCH 27.3 05/13/2019    MCHC 34.7 05/13/2019    RDW 12.7 05/13/2019     05/13/2019       Lab Results   Component Value Date     05/13/2019    POTASSIUM 4.6 05/13/2019    CHLORIDE 105 05/13/2019    CO2 29 05/13/2019    ANIONGAP 5 05/13/2019     (H) 05/13/2019    BUN 10 05/13/2019    CR 1.05 05/13/2019    GFRESTIMATED 77 05/13/2019    GFRESTBLACK 90 05/13/2019    KRIS 8.8 05/13/2019      Lab Results   Component Value Date    AST 25 05/13/2019    ALT 40 05/13/2019       Lab Results   Component Value Date    A1C 7.4 (H) 10/31/2018       Lab Results    Component Value Date    INR 1.01 05/09/2019           Problem List:     Patient Active Problem List   Diagnosis     Hyperlipidemia LDL goal <100     Essential hypertension, benign     Type 2 diabetes mellitus without complication, without long-term current use of insulin (H)     Unstable angina (H)     NSTEMI (non-ST elevated myocardial infarction) (H)           Medications:     Current Outpatient Medications   Medication Sig Dispense Refill     aspirin 81 MG EC tablet Take 1 tablet (81 mg) by mouth daily 90 tablet 3     atorvastatin (LIPITOR) 20 MG tablet Take 1 tablet (20 mg) by mouth daily 90 tablet 3     glimepiride (AMARYL) 2 MG tablet Take 1 tablet (2 mg) by mouth every morning (before breakfast) 90 tablet 3     ibuprofen (ADVIL) 200 MG capsule Take 200 mg by mouth every 4 hours as needed for fever       lisinopril (PRINIVIL/ZESTRIL) 20 MG tablet Take 1 tablet (20 mg) by mouth daily 90 tablet 3     metFORMIN (GLUCOPHAGE) 1000 MG tablet Take 1 tablet (1,000 mg) by mouth 2 times daily (with meals) 180 tablet 3     ticagrelor (BRILINTA) 90 MG tablet Take 1 tablet (90 mg) by mouth 2 times daily Start tomorrow morning. 180 tablet 3     ticagrelor (BRILINTA) 90 MG tablet Take 1 tablet (90 mg) by mouth every 12 hours 90 tablet 3           Past Medical History:     Past Medical History:   Diagnosis Date     Diabetes (H)      Past Surgical History:   Procedure Laterality Date     CV HEART CATHETERIZATION WITH POSSIBLE INTERVENTION N/A 5/9/2019    Procedure: Coronary Angiogram;  Surgeon: Jose Enrique Stanley MD;  Location:  HEART CARDIAC CATH LAB     CV PCI ANGIOPLASTY N/A 5/9/2019    Procedure: PCI Angioplasty;  Surgeon: Jose Enrique Stanley MD;  Location:  HEART CARDIAC CATH LAB     Family History   Problem Relation Age of Onset     Diabetes Paternal Grandmother      Diabetes Nephew      Social History     Socioeconomic History     Marital status:      Spouse name: Not on file     Number of children:  Not on file     Years of education: Not on file     Highest education level: Not on file   Occupational History     Not on file   Social Needs     Financial resource strain: Not on file     Food insecurity:     Worry: Not on file     Inability: Not on file     Transportation needs:     Medical: Not on file     Non-medical: Not on file   Tobacco Use     Smoking status: Never Smoker     Smokeless tobacco: Never Used   Substance and Sexual Activity     Alcohol use: No     Drug use: No     Sexual activity: Yes     Partners: Female   Lifestyle     Physical activity:     Days per week: Not on file     Minutes per session: Not on file     Stress: Not on file   Relationships     Social connections:     Talks on phone: Not on file     Gets together: Not on file     Attends Latter day service: Not on file     Active member of club or organization: Not on file     Attends meetings of clubs or organizations: Not on file     Relationship status: Not on file     Intimate partner violence:     Fear of current or ex partner: Not on file     Emotionally abused: Not on file     Physically abused: Not on file     Forced sexual activity: Not on file   Other Topics Concern     Parent/sibling w/ CABG, MI or angioplasty before 65F 55M? Not Asked   Social History Narrative     Not on file           Allergies:   Patient has no known allergies.      Leila Curran PA-C  [facility]  Pager: [phone]

## 2019-05-22 NOTE — PROGRESS NOTES
Per ARMOND Bradley's request, wrote 'return to work' letter for pt that states he can return to work now without restrictions.  ARMOND Bradley signed letter.  Put letter at  for pt to , as requested by ARMOND Bradley.    LILLIAM Ramirez 10:01 AM 5/22/2019

## 2019-05-22 NOTE — PATIENT INSTRUCTIONS
Today's Plan:   - Please increase the Lipitor from 20 to 40 mg daily  - Please start a new medication called metoprolol, which you will take twice daily, to reduce the heart rate and work of the heart  - Start cardiac rehab, as scheduled - we'll continue to monitor you closely that way  - Remember that the most important medications for keeping your stent open is Brilinta and aspirin - do not miss a dose of these!  - Mediterranean diet is best for heart health.   - We'll plan to see you back in 2 - 3 months with Dr. Oneil. We'll check your cholesterol and a complete echocardiogram prior to that visit.   - Finally, please see your PCP as scheduled for ongoing management of diabetes, as your blood sugar levels have been elevated.   - I will have a letter for you to return to work at the  tomorrow.     If you have questions or concerns please call my nurse team at [phone].  Scheduling phone number: [phone]  Reminder: Please bring in all current medications, over the counter supplements and vitamin bottles to your next appointment.    It was a pleasure seeing you today!     Leila Curran PA-C

## 2019-05-22 NOTE — LETTER
5/22/2019    Michael Méndez MD  [address] [facility] [address]    RE: Jessica Gomez       Dear Colleague,    I had the pleasure of seeing Jessica Gomez in the [facility].    Cardiology Clinic Progress Note    Jessica Gomez MRN# [id number]   YOB: 1960 Age: 59 year old   Primary cardiologist: Dr. Oneil         Assessment and Plan:     In summary, Jessica Gomez presents today for a post-angiogram follow-up visit. He's on appropriate medical therapy including Brilinta, aspirin, statin, ACEi. He feels well and has no complaints at present. We discussed the results of his procedure, what his new diagnosis means and the importance of adherence to medications and lifestyle modification going forward.     Plan:  - Discussed the importance of absolute compliance with DAPT for > 1 year  - Will increase Lipitor from 20 to 40 mg daily (high intensity)  - Start Lopressor 25 mg BID (may be able to stop this in the future)  - Cardiac rehab starts tomorrow - will continue to monitor him closely this way  - Will send RX for SL NTG to have on hand - educated on proper use.   - Advised mediterranean-style diet and routine cardiovascular exercise regimen for heart health and weight loss  - Follow up with primary cardiologist in 2 months; repeat lipids and will also obtain a baseline complete ECHO prior  - Encouraged to f/u with PCP in the near future for uncontrolled diabetes management - he has an appointment scheduled  - May RTW without restriction. Will provide letter.         History of Presenting Illness:      Jessica Gomez is a pleasant 59 year old patient who presents today for a post-angiogram follow-up visit.    The patient has a history of the following -   # CAD, unstable angina s/p PCI to proximal LAD and balloon angioplasty to D1 lesion, with residual 70% ramus lesion on 5/9/19  # DMII, on metformin - a1c 7.4% in October  2018, with subsequent glucose levels in the 200 - 300's  # HLP, on Lipitor (LDL 57)  # HTN, on lisinopril    Ellie was sent to the cath lab after a very abnormal stress ECHO, ordered by his PCP for symptoms c/w unstable angina. He completed 4 minutes and 45 seconds of exercise according to the Arthur protocol on the treadmill and stopped due to severe chest burning discomfort and shortness of breath, rated 9/10 in severity.  During exercise, he had multiple brief episodes of 3 beats of nonsustained ventricular tachycardia and 1 mm of ST segment elevation isolated in V1. inferior and lateral ST segment depression did not meet criteria for ischemia.  The resting cardiac echo images were normal showing ejection fraction of 60% with no regional wall motion abnormalities.  Following exercise, the anteroseptal wall and apex became severely hypokinetic, suggesting ischemia within this territory. He was seen urgently by Dr. Oneil who recommended urgent [facility]/coronary angiogram that same day. This revealed a 99% prox LAD culprit lesion, with a 70% ostial D1. A 70% ramus was also noted. The LAD was stented successfully with a [id number] wyatt MICHELLE, and balloon angioplasty was performed on the prox D1. Post-procedure BMP was stable. I note that his fasting glucose was elevated at 340 prior to metformin and glimepiride re-start.     Today, he presents with his son to clinic. He tells me he's feeling well overall. He denies chest pain, shortness of breath, PND, orthopnea, edema, claudication, palpitations, near syncope or syncope. He's had no problems with the R femoral artery catheter insertion site. He has many appropriate questions about his new diagnosis and what this means for him going forward. He works as a  and is requesting a return to work letter. He starts cardiac rehab tomorrow. He has no FH of CAD and does not use tobacco.          Review of Systems:     12-pt ROS is negative except for as noted in the  "HPI.          Physical Exam:     Vitals: /76   Pulse 76   Ht 1.727 m (5' 8\")   Wt 86.6 kg (191 lb)   BMI 29.04 kg/m     Wt Readings from Last 10 Encounters:   05/22/19 86.6 kg (191 lb)   05/09/19 87 kg (191 lb 11.2 oz)   05/09/19 87.5 kg (193 lb)   04/30/19 87.6 kg (193 lb 3.2 oz)   10/31/18 84.5 kg (186 lb 4.8 oz)   07/06/17 91.6 kg (202 lb)   06/29/17 88.9 kg (196 lb)   05/18/16 88.3 kg (194 lb 9.6 oz)   04/08/15 82.8 kg (182 lb 9.6 oz)   03/16/15 83.7 kg (184 lb 8 oz)       Constitutional:  Patient is pleasant, alert, cooperative, and in NAD.  HEENT:  NCAT. PERRLA. EOM's intact.   Neck:  CVP appears normal. No carotid bruits.   Pulmonary: Normal respiratory effort. CTAB.   Cardiac: RRR, normal S1/S2, no S3/S4, no murmur or rub.   Abdomen:  Non-tender abdomen, no hepatosplenomegaly appreciated.   Vascular: Pulses in the upper and lower extremities are 2+ and equal bilaterally. R femoral artery catheter insertion site is C/D/I, without evidence of hematoma or pseudoaneurysm.   Extremities: No edema, erythema, cyanosis or tenderness appreciated.  Skin:  No rashes or lesions appreciated.   Neurological:  No gross motor or sensory deficits.   Psych: Appropriate affect.        Data:   Labs reviewed:  Recent Labs   Lab Test 05/09/19  1240 10/31/18  0825 06/29/17  1817 05/18/16  0904   LDL 57 38 57 34   HDL 34* 37* 32* 34*   NHDL 83 58 83 59   CHOL 117 95 115 93   TRIG 129 101 130 124   TSH  --  4.83* 21.02* 6.12*       Lab Results   Component Value Date    WBC 7.2 05/13/2019    RBC 4.91 05/13/2019    HGB 13.4 05/13/2019    HCT 38.6 (L) 05/13/2019    MCV 79 05/13/2019    MCH 27.3 05/13/2019    MCHC 34.7 05/13/2019    RDW 12.7 05/13/2019     05/13/2019       Lab Results   Component Value Date     05/13/2019    POTASSIUM 4.6 05/13/2019    CHLORIDE 105 05/13/2019    CO2 29 05/13/2019    ANIONGAP 5 05/13/2019     (H) 05/13/2019    BUN 10 05/13/2019    CR 1.05 05/13/2019    GFRESTIMATED 77 " 05/13/2019    GFRESTBLACK 90 05/13/2019    KRIS 8.8 05/13/2019      Lab Results   Component Value Date    AST 25 05/13/2019    ALT 40 05/13/2019       Lab Results   Component Value Date    A1C 7.4 (H) 10/31/2018       Lab Results   Component Value Date    INR 1.01 05/09/2019           Problem List:     Patient Active Problem List   Diagnosis     Hyperlipidemia LDL goal <100     Essential hypertension, benign     Type 2 diabetes mellitus without complication, without long-term current use of insulin (H)     Unstable angina (H)     NSTEMI (non-ST elevated myocardial infarction) (H)           Medications:     Current Outpatient Medications   Medication Sig Dispense Refill     aspirin 81 MG EC tablet Take 1 tablet (81 mg) by mouth daily 90 tablet 3     atorvastatin (LIPITOR) 20 MG tablet Take 1 tablet (20 mg) by mouth daily 90 tablet 3     glimepiride (AMARYL) 2 MG tablet Take 1 tablet (2 mg) by mouth every morning (before breakfast) 90 tablet 3     ibuprofen (ADVIL) 200 MG capsule Take 200 mg by mouth every 4 hours as needed for fever       lisinopril (PRINIVIL/ZESTRIL) 20 MG tablet Take 1 tablet (20 mg) by mouth daily 90 tablet 3     metFORMIN (GLUCOPHAGE) 1000 MG tablet Take 1 tablet (1,000 mg) by mouth 2 times daily (with meals) 180 tablet 3     ticagrelor (BRILINTA) 90 MG tablet Take 1 tablet (90 mg) by mouth 2 times daily Start tomorrow morning. 180 tablet 3     ticagrelor (BRILINTA) 90 MG tablet Take 1 tablet (90 mg) by mouth every 12 hours 90 tablet 3           Past Medical History:     Past Medical History:   Diagnosis Date     Diabetes (H)      Past Surgical History:   Procedure Laterality Date     CV HEART CATHETERIZATION WITH POSSIBLE INTERVENTION N/A 5/9/2019    Procedure: Coronary Angiogram;  Surgeon: Jose Enrique Stanley MD;  Location:  HEART CARDIAC CATH LAB     CV PCI ANGIOPLASTY N/A 5/9/2019    Procedure: PCI Angioplasty;  Surgeon: Jose Enrique Stanley MD;  Location: [facility] CARDIAC CATH LAB      Family History   Problem Relation Age of Onset     Diabetes Paternal Grandmother      Diabetes Nephew      Social History     Socioeconomic History     Marital status:      Spouse name: Not on file     Number of children: Not on file     Years of education: Not on file     Highest education level: Not on file   Occupational History     Not on file   Social Needs     Financial resource strain: Not on file     Food insecurity:     Worry: Not on file     Inability: Not on file     Transportation needs:     Medical: Not on file     Non-medical: Not on file   Tobacco Use     Smoking status: Never Smoker     Smokeless tobacco: Never Used   Substance and Sexual Activity     Alcohol use: No     Drug use: No     Sexual activity: Yes     Partners: Female   Lifestyle     Physical activity:     Days per week: Not on file     Minutes per session: Not on file     Stress: Not on file   Relationships     Social connections:     Talks on phone: Not on file     Gets together: Not on file     Attends Taoism service: Not on file     Active member of club or organization: Not on file     Attends meetings of clubs or organizations: Not on file     Relationship status: Not on file     Intimate partner violence:     Fear of current or ex partner: Not on file     Emotionally abused: Not on file     Physically abused: Not on file     Forced sexual activity: Not on file   Other Topics Concern     Parent/sibling w/ CABG, MI or angioplasty before 65F 55M? Not Asked   Social History Narrative     Not on file           Allergies:   Patient has no known allergies.      Leila Curran PA-C  [facility]  Pager: [phone]      Thank you for allowing me to participate in the care of your patient.      Sincerely,     Leila Curran PA-C     [facility]

## 2019-05-23 ENCOUNTER — HOSPITAL ENCOUNTER (OUTPATIENT)
Dept: CARDIAC REHAB | Facility: CLINIC | Age: 59
End: 2019-05-23
Attending: INTERNAL MEDICINE
Payer: COMMERCIAL

## 2019-05-23 DIAGNOSIS — I20.0 UNSTABLE ANGINA (H): ICD-10-CM

## 2019-05-23 DIAGNOSIS — I21.4 NSTEMI (NON-ST ELEVATED MYOCARDIAL INFARCTION) (H): ICD-10-CM

## 2019-05-23 LAB — GLUCOSE BLDC GLUCOMTR-MCNC: 209 MG/DL (ref 70–99)

## 2019-05-23 PROCEDURE — 93798 PHYS/QHP OP CAR RHAB W/ECG: CPT | Performed by: OCCUPATIONAL THERAPIST

## 2019-05-23 PROCEDURE — 93797 PHYS/QHP OP CAR RHAB WO ECG: CPT | Performed by: OCCUPATIONAL THERAPIST

## 2019-05-23 PROCEDURE — 00000146 ZZHCL STATISTIC GLUCOSE BY METER IP

## 2019-05-23 PROCEDURE — 40000575 ZZH STATISTIC OP CARDIAC VISIT #2: Performed by: OCCUPATIONAL THERAPIST

## 2019-05-23 PROCEDURE — 40000116 ZZH STATISTIC OP CR VISIT: Performed by: OCCUPATIONAL THERAPIST

## 2019-05-29 ENCOUNTER — HOSPITAL ENCOUNTER (OUTPATIENT)
Dept: CARDIAC REHAB | Facility: CLINIC | Age: 59
End: 2019-05-29
Attending: INTERNAL MEDICINE
Payer: COMMERCIAL

## 2019-05-29 LAB
GLUCOSE BLDC GLUCOMTR-MCNC: 154 MG/DL (ref 70–99)
GLUCOSE BLDC GLUCOMTR-MCNC: 225 MG/DL (ref 70–99)

## 2019-05-29 PROCEDURE — 40000116 ZZH STATISTIC OP CR VISIT: Performed by: OCCUPATIONAL THERAPIST

## 2019-05-29 PROCEDURE — 00000146 ZZHCL STATISTIC GLUCOSE BY METER IP

## 2019-05-29 PROCEDURE — 93798 PHYS/QHP OP CAR RHAB W/ECG: CPT | Performed by: OCCUPATIONAL THERAPIST

## 2019-06-03 ENCOUNTER — HOSPITAL ENCOUNTER (OUTPATIENT)
Dept: CARDIAC REHAB | Facility: CLINIC | Age: 59
End: 2019-06-03
Attending: INTERNAL MEDICINE
Payer: COMMERCIAL

## 2019-06-03 LAB
GLUCOSE BLDC GLUCOMTR-MCNC: 105 MG/DL (ref 70–99)
GLUCOSE BLDC GLUCOMTR-MCNC: 83 MG/DL (ref 70–99)

## 2019-06-03 PROCEDURE — 00000146 ZZHCL STATISTIC GLUCOSE BY METER IP

## 2019-06-03 PROCEDURE — 93798 PHYS/QHP OP CAR RHAB W/ECG: CPT | Performed by: OCCUPATIONAL THERAPIST

## 2019-06-03 PROCEDURE — 40000116 ZZH STATISTIC OP CR VISIT: Performed by: OCCUPATIONAL THERAPIST

## 2019-06-05 ENCOUNTER — HOSPITAL ENCOUNTER (OUTPATIENT)
Dept: CARDIAC REHAB | Facility: CLINIC | Age: 59
End: 2019-06-05
Attending: INTERNAL MEDICINE
Payer: COMMERCIAL

## 2019-06-05 LAB
GLUCOSE BLDC GLUCOMTR-MCNC: 166 MG/DL (ref 70–99)
GLUCOSE BLDC GLUCOMTR-MCNC: 175 MG/DL (ref 70–99)

## 2019-06-05 PROCEDURE — 93798 PHYS/QHP OP CAR RHAB W/ECG: CPT | Performed by: REHABILITATION PRACTITIONER

## 2019-06-05 PROCEDURE — 93797 PHYS/QHP OP CAR RHAB WO ECG: CPT | Performed by: REHABILITATION PRACTITIONER

## 2019-06-05 PROCEDURE — 40000116 ZZH STATISTIC OP CR VISIT: Performed by: REHABILITATION PRACTITIONER

## 2019-06-05 PROCEDURE — 40000575 ZZH STATISTIC OP CARDIAC VISIT #2: Performed by: REHABILITATION PRACTITIONER

## 2019-06-05 PROCEDURE — 00000146 ZZHCL STATISTIC GLUCOSE BY METER IP

## 2019-06-10 ENCOUNTER — HOSPITAL ENCOUNTER (OUTPATIENT)
Dept: CARDIAC REHAB | Facility: CLINIC | Age: 59
End: 2019-06-10
Attending: INTERNAL MEDICINE
Payer: COMMERCIAL

## 2019-06-10 LAB
GLUCOSE BLDC GLUCOMTR-MCNC: 107 MG/DL (ref 70–99)
GLUCOSE BLDC GLUCOMTR-MCNC: 150 MG/DL (ref 70–99)
GLUCOSE BLDC GLUCOMTR-MCNC: 77 MG/DL (ref 70–99)

## 2019-06-10 PROCEDURE — 93798 PHYS/QHP OP CAR RHAB W/ECG: CPT

## 2019-06-10 PROCEDURE — 40000116 ZZH STATISTIC OP CR VISIT

## 2019-06-10 PROCEDURE — 00000146 ZZHCL STATISTIC GLUCOSE BY METER IP

## 2019-06-17 ENCOUNTER — HOSPITAL ENCOUNTER (OUTPATIENT)
Dept: CARDIAC REHAB | Facility: CLINIC | Age: 59
End: 2019-06-17
Attending: INTERNAL MEDICINE
Payer: COMMERCIAL

## 2019-06-17 LAB
GLUCOSE BLDC GLUCOMTR-MCNC: 138 MG/DL (ref 70–99)
GLUCOSE BLDC GLUCOMTR-MCNC: 87 MG/DL (ref 70–99)

## 2019-06-17 PROCEDURE — 93798 PHYS/QHP OP CAR RHAB W/ECG: CPT

## 2019-06-17 PROCEDURE — 00000146 ZZHCL STATISTIC GLUCOSE BY METER IP

## 2019-06-17 PROCEDURE — 40000116 ZZH STATISTIC OP CR VISIT

## 2019-06-19 ENCOUNTER — HOSPITAL ENCOUNTER (OUTPATIENT)
Dept: CARDIAC REHAB | Facility: CLINIC | Age: 59
End: 2019-06-19
Attending: INTERNAL MEDICINE
Payer: COMMERCIAL

## 2019-06-19 LAB
GLUCOSE BLDC GLUCOMTR-MCNC: 262 MG/DL (ref 70–99)
GLUCOSE BLDC GLUCOMTR-MCNC: 303 MG/DL (ref 70–99)

## 2019-06-19 PROCEDURE — 40000116 ZZH STATISTIC OP CR VISIT: Performed by: CLINICAL EXERCISE PHYSIOLOGIST

## 2019-06-19 PROCEDURE — 00000146 ZZHCL STATISTIC GLUCOSE BY METER IP

## 2019-06-19 PROCEDURE — 93798 PHYS/QHP OP CAR RHAB W/ECG: CPT | Performed by: CLINICAL EXERCISE PHYSIOLOGIST

## 2019-06-24 ENCOUNTER — HOSPITAL ENCOUNTER (OUTPATIENT)
Dept: CARDIAC REHAB | Facility: CLINIC | Age: 59
End: 2019-06-24
Attending: INTERNAL MEDICINE
Payer: COMMERCIAL

## 2019-06-24 LAB
GLUCOSE BLDC GLUCOMTR-MCNC: 125 MG/DL (ref 70–99)
GLUCOSE BLDC GLUCOMTR-MCNC: 89 MG/DL (ref 70–99)

## 2019-06-24 PROCEDURE — 40000116 ZZH STATISTIC OP CR VISIT: Performed by: OCCUPATIONAL THERAPIST

## 2019-06-24 PROCEDURE — 93798 PHYS/QHP OP CAR RHAB W/ECG: CPT | Performed by: OCCUPATIONAL THERAPIST

## 2019-06-24 PROCEDURE — 00000146 ZZHCL STATISTIC GLUCOSE BY METER IP

## 2019-06-26 ENCOUNTER — HOSPITAL ENCOUNTER (OUTPATIENT)
Dept: CARDIAC REHAB | Facility: CLINIC | Age: 59
End: 2019-06-26
Attending: INTERNAL MEDICINE
Payer: COMMERCIAL

## 2019-06-26 LAB
GLUCOSE BLDC GLUCOMTR-MCNC: 160 MG/DL (ref 70–99)
GLUCOSE BLDC GLUCOMTR-MCNC: 269 MG/DL (ref 70–99)

## 2019-06-26 PROCEDURE — 93798 PHYS/QHP OP CAR RHAB W/ECG: CPT | Performed by: OCCUPATIONAL THERAPIST

## 2019-06-26 PROCEDURE — 00000146 ZZHCL STATISTIC GLUCOSE BY METER IP

## 2019-06-26 PROCEDURE — 40000116 ZZH STATISTIC OP CR VISIT: Performed by: OCCUPATIONAL THERAPIST

## 2019-07-01 ENCOUNTER — HOSPITAL ENCOUNTER (OUTPATIENT)
Dept: CARDIAC REHAB | Facility: CLINIC | Age: 59
End: 2019-07-01
Attending: INTERNAL MEDICINE
Payer: COMMERCIAL

## 2019-07-01 LAB
GLUCOSE BLDC GLUCOMTR-MCNC: 155 MG/DL (ref 70–99)
GLUCOSE BLDC GLUCOMTR-MCNC: 77 MG/DL (ref 70–99)

## 2019-07-01 PROCEDURE — 40000116 ZZH STATISTIC OP CR VISIT: Performed by: OCCUPATIONAL THERAPIST

## 2019-07-01 PROCEDURE — 00000146 ZZHCL STATISTIC GLUCOSE BY METER IP

## 2019-07-01 PROCEDURE — 93798 PHYS/QHP OP CAR RHAB W/ECG: CPT | Performed by: OCCUPATIONAL THERAPIST

## 2019-07-03 ENCOUNTER — HOSPITAL ENCOUNTER (OUTPATIENT)
Dept: CARDIAC REHAB | Facility: CLINIC | Age: 59
End: 2019-07-03
Attending: INTERNAL MEDICINE
Payer: COMMERCIAL

## 2019-07-03 LAB
GLUCOSE BLDC GLUCOMTR-MCNC: 131 MG/DL (ref 70–99)
GLUCOSE BLDC GLUCOMTR-MCNC: 145 MG/DL (ref 70–99)

## 2019-07-03 PROCEDURE — 00000146 ZZHCL STATISTIC GLUCOSE BY METER IP

## 2019-07-03 PROCEDURE — 40000116 ZZH STATISTIC OP CR VISIT: Performed by: REHABILITATION PRACTITIONER

## 2019-07-03 PROCEDURE — 93798 PHYS/QHP OP CAR RHAB W/ECG: CPT | Performed by: REHABILITATION PRACTITIONER

## 2019-07-10 ENCOUNTER — HOSPITAL ENCOUNTER (OUTPATIENT)
Dept: CARDIAC REHAB | Facility: CLINIC | Age: 59
End: 2019-07-10
Attending: INTERNAL MEDICINE
Payer: COMMERCIAL

## 2019-07-10 LAB
GLUCOSE BLDC GLUCOMTR-MCNC: 160 MG/DL (ref 70–99)
GLUCOSE BLDC GLUCOMTR-MCNC: 175 MG/DL (ref 70–99)

## 2019-07-10 PROCEDURE — 40000116 ZZH STATISTIC OP CR VISIT: Performed by: REHABILITATION PRACTITIONER

## 2019-07-10 PROCEDURE — 93798 PHYS/QHP OP CAR RHAB W/ECG: CPT | Performed by: REHABILITATION PRACTITIONER

## 2019-07-10 PROCEDURE — 00000146 ZZHCL STATISTIC GLUCOSE BY METER IP

## 2019-07-15 ENCOUNTER — HOSPITAL ENCOUNTER (OUTPATIENT)
Dept: CARDIAC REHAB | Facility: CLINIC | Age: 59
End: 2019-07-15
Attending: INTERNAL MEDICINE
Payer: COMMERCIAL

## 2019-07-15 LAB
GLUCOSE BLDC GLUCOMTR-MCNC: 170 MG/DL (ref 70–99)
GLUCOSE BLDC GLUCOMTR-MCNC: 98 MG/DL (ref 70–99)

## 2019-07-15 PROCEDURE — 93798 PHYS/QHP OP CAR RHAB W/ECG: CPT | Performed by: OCCUPATIONAL THERAPIST

## 2019-07-15 PROCEDURE — 40000116 ZZH STATISTIC OP CR VISIT: Performed by: OCCUPATIONAL THERAPIST

## 2019-07-15 PROCEDURE — 00000146 ZZHCL STATISTIC GLUCOSE BY METER IP

## 2019-07-17 ENCOUNTER — HOSPITAL ENCOUNTER (OUTPATIENT)
Dept: CARDIAC REHAB | Facility: CLINIC | Age: 59
End: 2019-07-17
Attending: INTERNAL MEDICINE
Payer: COMMERCIAL

## 2019-07-17 LAB
GLUCOSE BLDC GLUCOMTR-MCNC: 153 MG/DL (ref 70–99)
GLUCOSE BLDC GLUCOMTR-MCNC: 214 MG/DL (ref 70–99)

## 2019-07-17 PROCEDURE — 93798 PHYS/QHP OP CAR RHAB W/ECG: CPT | Performed by: OCCUPATIONAL THERAPIST

## 2019-07-17 PROCEDURE — 00000146 ZZHCL STATISTIC GLUCOSE BY METER IP

## 2019-07-17 PROCEDURE — 40000116 ZZH STATISTIC OP CR VISIT: Performed by: OCCUPATIONAL THERAPIST

## 2019-07-22 ENCOUNTER — HOSPITAL ENCOUNTER (OUTPATIENT)
Dept: CARDIAC REHAB | Facility: CLINIC | Age: 59
End: 2019-07-22
Attending: INTERNAL MEDICINE
Payer: COMMERCIAL

## 2019-07-22 LAB
GLUCOSE BLDC GLUCOMTR-MCNC: 101 MG/DL (ref 70–99)
GLUCOSE BLDC GLUCOMTR-MCNC: 98 MG/DL (ref 70–99)

## 2019-07-22 PROCEDURE — 00000146 ZZHCL STATISTIC GLUCOSE BY METER IP

## 2019-07-22 PROCEDURE — 40000116 ZZH STATISTIC OP CR VISIT

## 2019-07-22 PROCEDURE — 93798 PHYS/QHP OP CAR RHAB W/ECG: CPT

## 2019-07-24 ENCOUNTER — HOSPITAL ENCOUNTER (OUTPATIENT)
Dept: CARDIAC REHAB | Facility: CLINIC | Age: 59
End: 2019-07-24
Attending: INTERNAL MEDICINE
Payer: COMMERCIAL

## 2019-07-24 LAB
GLUCOSE BLDC GLUCOMTR-MCNC: 141 MG/DL (ref 70–99)
GLUCOSE BLDC GLUCOMTR-MCNC: 146 MG/DL (ref 70–99)

## 2019-07-24 PROCEDURE — 00000146 ZZHCL STATISTIC GLUCOSE BY METER IP

## 2019-07-24 PROCEDURE — 40000116 ZZH STATISTIC OP CR VISIT: Performed by: REHABILITATION PRACTITIONER

## 2019-07-24 PROCEDURE — 93798 PHYS/QHP OP CAR RHAB W/ECG: CPT | Performed by: REHABILITATION PRACTITIONER

## 2019-07-26 ENCOUNTER — OFFICE VISIT (OUTPATIENT)
Dept: CARDIOLOGY | Facility: CLINIC | Age: 59
End: 2019-07-26
Payer: COMMERCIAL

## 2019-07-26 VITALS
WEIGHT: 193 LBS | HEART RATE: 64 BPM | DIASTOLIC BLOOD PRESSURE: 74 MMHG | HEIGHT: 68 IN | SYSTOLIC BLOOD PRESSURE: 124 MMHG | BODY MASS INDEX: 29.25 KG/M2

## 2019-07-26 DIAGNOSIS — I25.10 CORONARY ARTERY DISEASE INVOLVING NATIVE CORONARY ARTERY OF NATIVE HEART WITHOUT ANGINA PECTORIS: Primary | ICD-10-CM

## 2019-07-26 DIAGNOSIS — I25.10 CORONARY ARTERY DISEASE INVOLVING NATIVE CORONARY ARTERY OF NATIVE HEART WITHOUT ANGINA PECTORIS: ICD-10-CM

## 2019-07-26 LAB
ALT SERPL W P-5'-P-CCNC: 17 U/L (ref 5–30)
CHOLEST SERPL-MCNC: 117 MG/DL
HDLC SERPL-MCNC: 31 MG/DL
LDLC SERPL CALC-MCNC: 55 MG/DL
NONHDLC SERPL-MCNC: 86 MG/DL
TRIGL SERPL-MCNC: 153 MG/DL

## 2019-07-26 PROCEDURE — 80061 LIPID PANEL: CPT | Performed by: PHYSICIAN ASSISTANT

## 2019-07-26 PROCEDURE — 84460 ALANINE AMINO (ALT) (SGPT): CPT | Performed by: PHYSICIAN ASSISTANT

## 2019-07-26 PROCEDURE — 36415 COLL VENOUS BLD VENIPUNCTURE: CPT | Performed by: PHYSICIAN ASSISTANT

## 2019-07-26 PROCEDURE — 99214 OFFICE O/P EST MOD 30 MIN: CPT | Performed by: PHYSICIAN ASSISTANT

## 2019-07-26 ASSESSMENT — MIFFLIN-ST. JEOR: SCORE: 1664.81

## 2019-07-26 NOTE — PATIENT INSTRUCTIONS
Today's Plan:   - No changes today. Keep up the good work. I congratulate you for planning on purchasing a treadmill once rehab is complete. Continue to focus on a mediterranean-style diet for heart health.  - Come back to see Dr. Oneil in October, please have an echocardiogram of your heart done the week prior to that visit.     If you have questions or concerns please call my nurse team at [phone].  Scheduling phone number: [phone]  Reminder: Please bring in all current medications, over the counter supplements and vitamin bottles to your next appointment.    It was a pleasure seeing you today!     Leila Curran PA-C

## 2019-07-26 NOTE — LETTER
7/26/2019    Michael Méndez MD  [address] [facility] [address]    RE: Jessica Gomez       Dear Colleague,    I had the pleasure of seeing Jessica Gomez in the [facility].    Cardiology Clinic Progress Note    Jessica Gomez MRN# [id number]   YOB: 1960 Age: 59 year old   Primary cardiologist: Dr. Oneil         Assessment and Plan:     In summary, Jessica Gomez presents today for f/u CAD. He's on appropriate medical therapy including Brilinta, aspirin, statin, ACEi. He is doing well with cardiac rehab and has no symptoms consistent with angina. He is making appropriate lifestyle modifications. Fasting lipid panel today is adequate with an LDL of 55. BP is well-controlled. He has follow up arranged with his PCP next month for diabetes management.     Plan:  - No changes today. Continue good medical management for CAD.   - Advised mediterranean-style diet and routine cardiovascular exercise regimen for heart health and weight loss.  - Is scheduled to see his primary cardiologist Dr. Oneil in October, will obtain ECHO prior.         History of Presenting Illness:      Jessica Gomez is a pleasant 59 year old patient who presents today for a post-angiogram follow-up visit.    The patient has a history of the following -   # CAD, unstable angina s/p PCI to proximal LAD and balloon angioplasty to D1 lesion on 5/9/19, with residual 70% ramus lesion - on DAPT, statin, BB, ACEi.   # DMII, on metformin - a1c 7.4% in October 2018  # HLP, on Lipitor  # HTN  # Social - no hx of tobacco use. Works as . Son and daughter are in the area.     Ellie was sent to the cath lab after a very abnormal stress ECHO, ordered by his PCP for symptoms c/w unstable angina. He completed 4 minutes and 45 seconds of exercise according to the Arthur protocol on the treadmill and stopped due to severe chest burning discomfort and  "shortness of breath, rated 9/10 in severity.  During exercise, he had multiple brief episodes of 3 beats of nonsustained ventricular tachycardia and 1 mm of ST segment elevation isolated in V1. inferior and lateral ST segment depression did not meet criteria for ischemia.  The resting cardiac echo images were normal showing ejection fraction of 60% with no regional wall motion abnormalities.  Following exercise, the anteroseptal wall and apex became severely hypokinetic, suggesting ischemia within this territory. He was seen urgently by Dr. Oneil who recommended urgent [facility]/coronary angiogram that same day. This revealed a 99% prox LAD culprit lesion, with a 70% ostial D1. A 70% ramus was also noted. The LAD was stented successfully with a [id number] wyatt MICHELLE, and balloon angioplasty was performed on the prox D1. Post-procedure BMP was stable. I note that his fasting glucose was elevated at 340 prior to metformin and glimepiride re-start.     Today, he presents with his daughter to clinic. He tells me he's feeling very well overall. 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 He denies chest pain, shortness of breath, PND, orthopnea, edema, claudication, palpitations, near syncope or syncope. He has completed 16 sessions of cardiac rehab and is up to 4.4 METs with appropriate HR and BP response. He plans to purchase a treadmill once he is done with rehab. He tells me he can get a little \"shaky\" if his blood glucose is low, but he will eat something and that will resolve. I asked him to see his PCP for glucose management last visit, but he hasn't yet done so. He tells me he has an appt scheduled next month, though.  He's eating a low fat diet and rarely eats meat - when he does, it is typically fish. He is compliant on his medications including DAPT. His lipid profile today looks OK with an LDL of 55, HDL 31, TG's 153, .          Review of Systems: " "    12-pt ROS is negative except for as noted in the HPI.          Physical Exam:     Vitals: /74   Pulse 64   Ht 1.727 m (5' 7.99\")   Wt 87.5 kg (193 lb)   BMI 29.35 kg/m     Wt Readings from Last 10 Encounters:   07/26/19 87.5 kg (193 lb)   05/22/19 86.6 kg (191 lb)   05/09/19 87 kg (191 lb 11.2 oz)   05/09/19 87.5 kg (193 lb)   04/30/19 87.6 kg (193 lb 3.2 oz)   10/31/18 84.5 kg (186 lb 4.8 oz)   07/06/17 91.6 kg (202 lb)   06/29/17 88.9 kg (196 lb)   05/18/16 88.3 kg (194 lb 9.6 oz)   04/08/15 82.8 kg (182 lb 9.6 oz)       Constitutional:  Patient is pleasant, alert, cooperative, and in NAD.  HEENT:  NCAT. PERRLA. EOM's intact.   Neck:  CVP appears normal. No carotid bruits.   Pulmonary: Normal respiratory effort. CTAB.   Cardiac: RRR, normal S1/S2, no S3/S4, no murmur or rub.   Abdomen:  Non-tender abdomen, no hepatosplenomegaly appreciated.   Vascular: Pulses in the upper and lower extremities are 2+ and equal bilaterally.   Extremities: No edema, erythema, cyanosis or tenderness appreciated.  Skin:  No rashes or lesions appreciated.   Neurological:  No gross motor or sensory deficits.   Psych: Appropriate affect.        Data:   Labs reviewed:  Recent Labs   Lab Test 07/26/19  0851 05/09/19  1240 10/31/18  0825 06/29/17  1817 05/18/16  0904   LDL 55 57 38 57 34   HDL 31* 34* 37* 32* 34*   NHDL 86 83 58 83 59   CHOL 117 117 95 115 93   TRIG 153* 129 101 130 124   TSH  --   --  4.83* 21.02* 6.12*       Lab Results   Component Value Date    WBC 7.2 05/13/2019    RBC 4.91 05/13/2019    HGB 13.4 05/13/2019    HCT 38.6 (L) 05/13/2019    MCV 79 05/13/2019    MCH 27.3 05/13/2019    MCHC 34.7 05/13/2019    RDW 12.7 05/13/2019     05/13/2019       Lab Results   Component Value Date     05/13/2019    POTASSIUM 4.6 05/13/2019    CHLORIDE 105 05/13/2019    CO2 29 05/13/2019    ANIONGAP 5 05/13/2019     (H) 05/13/2019    BUN 10 05/13/2019    CR 1.05 05/13/2019    GFRESTIMATED 77 05/13/2019    " GFRESTBLACK 90 05/13/2019    KRIS 8.8 05/13/2019      Lab Results   Component Value Date    AST 25 05/13/2019    ALT 17 07/26/2019       Lab Results   Component Value Date    A1C 7.4 (H) 10/31/2018       Lab Results   Component Value Date    INR 1.01 05/09/2019           Problem List:     Patient Active Problem List   Diagnosis     Hyperlipidemia LDL goal <100     Essential hypertension, benign     Type 2 diabetes mellitus without complication, without long-term current use of insulin (H)     Unstable angina (H)     NSTEMI (non-ST elevated myocardial infarction) (H)           Medications:     Current Outpatient Medications   Medication Sig Dispense Refill     aspirin 81 MG EC tablet Take 1 tablet (81 mg) by mouth daily 90 tablet 3     atorvastatin (LIPITOR) 20 MG tablet Take 20 mg by mouth daily  90 tablet 3     glimepiride (AMARYL) 2 MG tablet Take 1 tablet (2 mg) by mouth every morning (before breakfast) 90 tablet 3     lisinopril (PRINIVIL/ZESTRIL) 20 MG tablet Take 1 tablet (20 mg) by mouth daily 90 tablet 3     metFORMIN (GLUCOPHAGE) 1000 MG tablet Take 1 tablet (1,000 mg) by mouth 2 times daily (with meals) 180 tablet 3     metoprolol tartrate (LOPRESSOR) 25 MG tablet Take 1 tablet (25 mg) by mouth 2 times daily 60 tablet 5     ticagrelor (BRILINTA) 90 MG tablet Take 1 tablet (90 mg) by mouth 2 times daily 180 tablet 3     ibuprofen (ADVIL) 200 MG capsule Take 200 mg by mouth every 4 hours as needed for fever       nitroGLYcerin (NITROSTAT) 0.4 MG sublingual tablet For chest pain place 1 tablet under the tongue every 5 minutes for up to 3 doses. If symptoms persist 5 minutes after 2nd dose call 911. 30 tablet 0           Past Medical History:     Past Medical History:   Diagnosis Date     Diabetes (H)      Past Surgical History:   Procedure Laterality Date     CV HEART CATHETERIZATION WITH POSSIBLE INTERVENTION N/A 5/9/2019    Procedure: Coronary Angiogram;  Surgeon: Jose Enrique Stanley MD;  Location:   HEART CARDIAC CATH LAB     CV PCI ANGIOPLASTY N/A 5/9/2019    Procedure: PCI Angioplasty;  Surgeon: Jose Enrique Stanley MD;  Location:  HEART CARDIAC CATH LAB     Family History   Problem Relation Age of Onset     Diabetes Paternal Grandmother      Diabetes Nephew      Social History     Socioeconomic History     Marital status:      Spouse name: Not on file     Number of children: Not on file     Years of education: Not on file     Highest education level: Not on file   Occupational History     Not on file   Social Needs     Financial resource strain: Not on file     Food insecurity:     Worry: Not on file     Inability: Not on file     Transportation needs:     Medical: Not on file     Non-medical: Not on file   Tobacco Use     Smoking status: Never Smoker     Smokeless tobacco: Never Used   Substance and Sexual Activity     Alcohol use: No     Drug use: No     Sexual activity: Yes     Partners: Female   Lifestyle     Physical activity:     Days per week: Not on file     Minutes per session: Not on file     Stress: Not on file   Relationships     Social connections:     Talks on phone: Not on file     Gets together: Not on file     Attends Anglican service: Not on file     Active member of club or organization: Not on file     Attends meetings of clubs or organizations: Not on file     Relationship status: Not on file     Intimate partner violence:     Fear of current or ex partner: Not on file     Emotionally abused: Not on file     Physically abused: Not on file     Forced sexual activity: Not on file   Other Topics Concern     Parent/sibling w/ CABG, MI or angioplasty before 65F 55M? Not Asked   Social History Narrative     Not on file           Allergies:   Patient has no known allergies.      Leila Curran PA-C  [facility]  Pager: [phone]      Thank you for allowing me to participate in the care of your patient.      Sincerely,     Leila Curran PA-C     [facility]  [facility]

## 2019-07-26 NOTE — PROGRESS NOTES
Cardiology Clinic Progress Note    Jessica Gomez MRN# [id number]   YOB: 1960 Age: 59 year old   Primary cardiologist: Dr. Oneil         Assessment and Plan:     In summary, Jessica Gomez presents today for f/u CAD. He's on appropriate medical therapy including Brilinta, aspirin, statin, ACEi. He is doing well with cardiac rehab and has no symptoms consistent with angina. He is making appropriate lifestyle modifications. Fasting lipid panel today is adequate with an LDL of 55. BP is well-controlled. He has follow up arranged with his PCP next month for diabetes management.     Plan:  - No changes today. Continue good medical management for CAD.   - Advised mediterranean-style diet and routine cardiovascular exercise regimen for heart health and weight loss.  - Is scheduled to see his primary cardiologist Dr. Oneil in October, will obtain ECHO prior.         History of Presenting Illness:      Jessica Gomez is a pleasant 59 year old patient who presents today for a post-angiogram follow-up visit.    The patient has a history of the following -   # CAD, unstable angina s/p PCI to proximal LAD and balloon angioplasty to D1 lesion on 5/9/19, with residual 70% ramus lesion - on DAPT, statin, BB, ACEi.   # DMII, on metformin - a1c 7.4% in October 2018  # HLP, on Lipitor  # HTN  # Social - no hx of tobacco use. Works as . Son and daughter are in the area.     Ellie was sent to the cath lab after a very abnormal stress ECHO, ordered by his PCP for symptoms c/w unstable angina. He completed 4 minutes and 45 seconds of exercise according to the Arthur protocol on the treadmill and stopped due to severe chest burning discomfort and shortness of breath, rated 9/10 in severity.  During exercise, he had multiple brief episodes of 3 beats of nonsustained ventricular tachycardia and 1 mm of ST segment elevation isolated in V1. inferior and lateral ST segment depression did  "not meet criteria for ischemia.  The resting cardiac echo images were normal showing ejection fraction of 60% with no regional wall motion abnormalities.  Following exercise, the anteroseptal wall and apex became severely hypokinetic, suggesting ischemia within this territory. He was seen urgently by Dr. Oneil who recommended urgent [facility]/coronary angiogram that same day. This revealed a 99% prox LAD culprit lesion, with a 70% ostial D1. A 70% ramus was also noted. The LAD was stented successfully with a [id number] wyatt MICHELLE, and balloon angioplasty was performed on the prox D1. Post-procedure BMP was stable. I note that his fasting glucose was elevated at 340 prior to metformin and glimepiride re-start.     Today, he presents with his daughter to clinic. He tells me he's feeling very well overall. 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 He denies chest pain, shortness of breath, PND, orthopnea, edema, claudication, palpitations, near syncope or syncope. He has completed 16 sessions of cardiac rehab and is up to 4.4 METs with appropriate HR and BP response. He plans to purchase a treadmill once he is done with rehab. He tells me he can get a little \"shaky\" if his blood glucose is low, but he will eat something and that will resolve. I asked him to see his PCP for glucose management last visit, but he hasn't yet done so. He tells me he has an appt scheduled next month, though.  He's eating a low fat diet and rarely eats meat - when he does, it is typically fish. He is compliant on his medications including DAPT. His lipid profile today looks OK with an LDL of 55, HDL 31, TG's 153, .          Review of Systems:     12-pt ROS is negative except for as noted in the HPI.          Physical Exam:     Vitals: /74   Pulse 64   Ht 1.727 m (5' 7.99\")   Wt 87.5 kg (193 lb)   BMI 29.35 kg/m    Wt Readings from Last 10 Encounters:   07/26/19 87.5 kg (193 " lb)   05/22/19 86.6 kg (191 lb)   05/09/19 87 kg (191 lb 11.2 oz)   05/09/19 87.5 kg (193 lb)   04/30/19 87.6 kg (193 lb 3.2 oz)   10/31/18 84.5 kg (186 lb 4.8 oz)   07/06/17 91.6 kg (202 lb)   06/29/17 88.9 kg (196 lb)   05/18/16 88.3 kg (194 lb 9.6 oz)   04/08/15 82.8 kg (182 lb 9.6 oz)       Constitutional:  Patient is pleasant, alert, cooperative, and in NAD.  HEENT:  NCAT. PERRLA. EOM's intact.   Neck:  CVP appears normal. No carotid bruits.   Pulmonary: Normal respiratory effort. CTAB.   Cardiac: RRR, normal S1/S2, no S3/S4, no murmur or rub.   Abdomen:  Non-tender abdomen, no hepatosplenomegaly appreciated.   Vascular: Pulses in the upper and lower extremities are 2+ and equal bilaterally.   Extremities: No edema, erythema, cyanosis or tenderness appreciated.  Skin:  No rashes or lesions appreciated.   Neurological:  No gross motor or sensory deficits.   Psych: Appropriate affect.        Data:   Labs reviewed:  Recent Labs   Lab Test 07/26/19  0851 05/09/19  1240 10/31/18  0825 06/29/17  1817 05/18/16  0904   LDL 55 57 38 57 34   HDL 31* 34* 37* 32* 34*   NHDL 86 83 58 83 59   CHOL 117 117 95 115 93   TRIG 153* 129 101 130 124   TSH  --   --  4.83* 21.02* 6.12*       Lab Results   Component Value Date    WBC 7.2 05/13/2019    RBC 4.91 05/13/2019    HGB 13.4 05/13/2019    HCT 38.6 (L) 05/13/2019    MCV 79 05/13/2019    MCH 27.3 05/13/2019    MCHC 34.7 05/13/2019    RDW 12.7 05/13/2019     05/13/2019       Lab Results   Component Value Date     05/13/2019    POTASSIUM 4.6 05/13/2019    CHLORIDE 105 05/13/2019    CO2 29 05/13/2019    ANIONGAP 5 05/13/2019     (H) 05/13/2019    BUN 10 05/13/2019    CR 1.05 05/13/2019    GFRESTIMATED 77 05/13/2019    GFRESTBLACK 90 05/13/2019    KRIS 8.8 05/13/2019      Lab Results   Component Value Date    AST 25 05/13/2019    ALT 17 07/26/2019       Lab Results   Component Value Date    A1C 7.4 (H) 10/31/2018       Lab Results   Component Value Date    INR  1.01 05/09/2019           Problem List:     Patient Active Problem List   Diagnosis     Hyperlipidemia LDL goal <100     Essential hypertension, benign     Type 2 diabetes mellitus without complication, without long-term current use of insulin (H)     Unstable angina (H)     NSTEMI (non-ST elevated myocardial infarction) (H)           Medications:     Current Outpatient Medications   Medication Sig Dispense Refill     aspirin 81 MG EC tablet Take 1 tablet (81 mg) by mouth daily 90 tablet 3     atorvastatin (LIPITOR) 20 MG tablet Take 20 mg by mouth daily  90 tablet 3     glimepiride (AMARYL) 2 MG tablet Take 1 tablet (2 mg) by mouth every morning (before breakfast) 90 tablet 3     lisinopril (PRINIVIL/ZESTRIL) 20 MG tablet Take 1 tablet (20 mg) by mouth daily 90 tablet 3     metFORMIN (GLUCOPHAGE) 1000 MG tablet Take 1 tablet (1,000 mg) by mouth 2 times daily (with meals) 180 tablet 3     metoprolol tartrate (LOPRESSOR) 25 MG tablet Take 1 tablet (25 mg) by mouth 2 times daily 60 tablet 5     ticagrelor (BRILINTA) 90 MG tablet Take 1 tablet (90 mg) by mouth 2 times daily 180 tablet 3     ibuprofen (ADVIL) 200 MG capsule Take 200 mg by mouth every 4 hours as needed for fever       nitroGLYcerin (NITROSTAT) 0.4 MG sublingual tablet For chest pain place 1 tablet under the tongue every 5 minutes for up to 3 doses. If symptoms persist 5 minutes after 2nd dose call 911. 30 tablet 0           Past Medical History:     Past Medical History:   Diagnosis Date     Diabetes (H)      Past Surgical History:   Procedure Laterality Date     CV HEART CATHETERIZATION WITH POSSIBLE INTERVENTION N/A 5/9/2019    Procedure: Coronary Angiogram;  Surgeon: Jose Enrique Stanley MD;  Location: [facility] CARDIAC CATH LAB     CV PCI ANGIOPLASTY N/A 5/9/2019    Procedure: PCI Angioplasty;  Surgeon: Jose Enrique Stanley MD;  Location:  HEART CARDIAC CATH LAB     Family History   Problem Relation Age of Onset     Diabetes Paternal Grandmother       Diabetes Nephew      Social History     Socioeconomic History     Marital status:      Spouse name: Not on file     Number of children: Not on file     Years of education: Not on file     Highest education level: Not on file   Occupational History     Not on file   Social Needs     Financial resource strain: Not on file     Food insecurity:     Worry: Not on file     Inability: Not on file     Transportation needs:     Medical: Not on file     Non-medical: Not on file   Tobacco Use     Smoking status: Never Smoker     Smokeless tobacco: Never Used   Substance and Sexual Activity     Alcohol use: No     Drug use: No     Sexual activity: Yes     Partners: Female   Lifestyle     Physical activity:     Days per week: Not on file     Minutes per session: Not on file     Stress: Not on file   Relationships     Social connections:     Talks on phone: Not on file     Gets together: Not on file     Attends [employer] service: Not on file     Active member of club or organization: Not on file     Attends meetings of clubs or organizations: Not on file     Relationship status: Not on file     Intimate partner violence:     Fear of current or ex partner: Not on file     Emotionally abused: Not on file     Physically abused: Not on file     Forced sexual activity: Not on file   Other Topics Concern     Parent/sibling w/ CABG, MI or angioplasty before 65F 55M? Not Asked   Social History Narrative     Not on file           Allergies:   Patient has no known allergies.      Leila Curran PA-C  [facility]  Pager: [phone]

## 2019-07-31 ENCOUNTER — HOSPITAL ENCOUNTER (OUTPATIENT)
Dept: CARDIAC REHAB | Facility: CLINIC | Age: 59
End: 2019-07-31
Attending: INTERNAL MEDICINE
Payer: COMMERCIAL

## 2019-07-31 LAB
GLUCOSE BLDC GLUCOMTR-MCNC: 94 MG/DL (ref 70–99)
GLUCOSE BLDC GLUCOMTR-MCNC: 98 MG/DL (ref 70–99)

## 2019-07-31 PROCEDURE — 40000116 ZZH STATISTIC OP CR VISIT: Performed by: REHABILITATION PRACTITIONER

## 2019-07-31 PROCEDURE — 93798 PHYS/QHP OP CAR RHAB W/ECG: CPT | Performed by: REHABILITATION PRACTITIONER

## 2019-07-31 PROCEDURE — 00000146 ZZHCL STATISTIC GLUCOSE BY METER IP

## 2019-08-05 ENCOUNTER — HOSPITAL ENCOUNTER (OUTPATIENT)
Dept: CARDIAC REHAB | Facility: CLINIC | Age: 59
End: 2019-08-05
Attending: INTERNAL MEDICINE
Payer: COMMERCIAL

## 2019-08-05 LAB
GLUCOSE BLDC GLUCOMTR-MCNC: 114 MG/DL (ref 70–99)
GLUCOSE BLDC GLUCOMTR-MCNC: 92 MG/DL (ref 70–99)

## 2019-08-05 PROCEDURE — 00000146 ZZHCL STATISTIC GLUCOSE BY METER IP

## 2019-08-05 PROCEDURE — 40000116 ZZH STATISTIC OP CR VISIT: Performed by: OCCUPATIONAL THERAPIST

## 2019-08-05 PROCEDURE — 93798 PHYS/QHP OP CAR RHAB W/ECG: CPT | Performed by: OCCUPATIONAL THERAPIST

## 2019-08-06 ENCOUNTER — HOSPITAL ENCOUNTER (OUTPATIENT)
Dept: CARDIOLOGY | Facility: CLINIC | Age: 59
Discharge: HOME | End: 2019-08-06
Attending: PHYSICIAN ASSISTANT | Admitting: PHYSICIAN ASSISTANT
Payer: COMMERCIAL

## 2019-08-06 DIAGNOSIS — I25.10 CORONARY ARTERY DISEASE INVOLVING NATIVE CORONARY ARTERY OF NATIVE HEART WITHOUT ANGINA PECTORIS: ICD-10-CM

## 2019-08-06 PROCEDURE — 93306 TTE W/DOPPLER COMPLETE: CPT | Mod: 26 | Performed by: INTERNAL MEDICINE

## 2019-08-06 PROCEDURE — 93306 TTE W/DOPPLER COMPLETE: CPT

## 2019-08-12 ENCOUNTER — HOSPITAL ENCOUNTER (OUTPATIENT)
Dept: CARDIAC REHAB | Facility: CLINIC | Age: 59
End: 2019-08-12
Attending: INTERNAL MEDICINE
Payer: COMMERCIAL

## 2019-08-12 LAB
GLUCOSE BLDC GLUCOMTR-MCNC: 108 MG/DL (ref 70–99)
GLUCOSE BLDC GLUCOMTR-MCNC: 115 MG/DL (ref 70–99)

## 2019-08-12 PROCEDURE — 00000146 ZZHCL STATISTIC GLUCOSE BY METER IP

## 2019-08-12 PROCEDURE — 40000116 ZZH STATISTIC OP CR VISIT: Performed by: OCCUPATIONAL THERAPIST

## 2019-08-12 PROCEDURE — 93798 PHYS/QHP OP CAR RHAB W/ECG: CPT | Performed by: OCCUPATIONAL THERAPIST

## 2019-08-14 ENCOUNTER — HOSPITAL ENCOUNTER (OUTPATIENT)
Dept: CARDIAC REHAB | Facility: CLINIC | Age: 59
End: 2019-08-14
Attending: INTERNAL MEDICINE
Payer: COMMERCIAL

## 2019-08-14 LAB
GLUCOSE BLDC GLUCOMTR-MCNC: 174 MG/DL (ref 70–99)
GLUCOSE BLDC GLUCOMTR-MCNC: 184 MG/DL (ref 70–99)

## 2019-08-14 PROCEDURE — 00000146 ZZHCL STATISTIC GLUCOSE BY METER IP

## 2019-08-14 PROCEDURE — 40000116 ZZH STATISTIC OP CR VISIT: Performed by: OCCUPATIONAL THERAPIST

## 2019-08-14 PROCEDURE — 93798 PHYS/QHP OP CAR RHAB W/ECG: CPT | Performed by: OCCUPATIONAL THERAPIST

## 2019-08-19 ENCOUNTER — HOSPITAL ENCOUNTER (OUTPATIENT)
Dept: CARDIAC REHAB | Facility: CLINIC | Age: 59
End: 2019-08-19
Attending: INTERNAL MEDICINE
Payer: COMMERCIAL

## 2019-08-19 LAB
GLUCOSE BLDC GLUCOMTR-MCNC: 111 MG/DL (ref 70–99)
GLUCOSE BLDC GLUCOMTR-MCNC: 111 MG/DL (ref 70–99)

## 2019-08-19 PROCEDURE — 93798 PHYS/QHP OP CAR RHAB W/ECG: CPT

## 2019-08-19 PROCEDURE — 00000146 ZZHCL STATISTIC GLUCOSE BY METER IP

## 2019-08-19 PROCEDURE — 40000116 ZZH STATISTIC OP CR VISIT

## 2019-08-21 ENCOUNTER — HOSPITAL ENCOUNTER (OUTPATIENT)
Dept: CARDIAC REHAB | Facility: CLINIC | Age: 59
End: 2019-08-21
Attending: INTERNAL MEDICINE
Payer: COMMERCIAL

## 2019-08-21 LAB
GLUCOSE BLDC GLUCOMTR-MCNC: 137 MG/DL (ref 70–99)
GLUCOSE BLDC GLUCOMTR-MCNC: 169 MG/DL (ref 70–99)

## 2019-08-21 PROCEDURE — 00000146 ZZHCL STATISTIC GLUCOSE BY METER IP

## 2019-08-21 PROCEDURE — 40000116 ZZH STATISTIC OP CR VISIT: Performed by: CLINICAL EXERCISE PHYSIOLOGIST

## 2019-08-21 PROCEDURE — 93798 PHYS/QHP OP CAR RHAB W/ECG: CPT | Performed by: CLINICAL EXERCISE PHYSIOLOGIST

## 2019-08-26 ENCOUNTER — HOSPITAL ENCOUNTER (OUTPATIENT)
Dept: CARDIAC REHAB | Facility: CLINIC | Age: 59
End: 2019-08-26
Attending: INTERNAL MEDICINE
Payer: COMMERCIAL

## 2019-08-26 LAB
GLUCOSE BLDC GLUCOMTR-MCNC: 114 MG/DL (ref 70–99)
GLUCOSE BLDC GLUCOMTR-MCNC: 153 MG/DL (ref 70–99)

## 2019-08-26 PROCEDURE — 40000116 ZZH STATISTIC OP CR VISIT

## 2019-08-26 PROCEDURE — 00000146 ZZHCL STATISTIC GLUCOSE BY METER IP

## 2019-08-26 PROCEDURE — 93798 PHYS/QHP OP CAR RHAB W/ECG: CPT

## 2019-08-28 ENCOUNTER — HOSPITAL ENCOUNTER (OUTPATIENT)
Dept: CARDIAC REHAB | Facility: CLINIC | Age: 59
End: 2019-08-28
Attending: INTERNAL MEDICINE
Payer: COMMERCIAL

## 2019-08-28 LAB
GLUCOSE BLDC GLUCOMTR-MCNC: 142 MG/DL (ref 70–99)
GLUCOSE BLDC GLUCOMTR-MCNC: 175 MG/DL (ref 70–99)

## 2019-08-28 PROCEDURE — 00000146 ZZHCL STATISTIC GLUCOSE BY METER IP

## 2019-08-28 PROCEDURE — 93798 PHYS/QHP OP CAR RHAB W/ECG: CPT | Performed by: OCCUPATIONAL THERAPIST

## 2019-08-28 PROCEDURE — 93798 PHYS/QHP OP CAR RHAB W/ECG: CPT

## 2019-08-28 PROCEDURE — 40000116 ZZH STATISTIC OP CR VISIT

## 2019-08-28 PROCEDURE — 40000116 ZZH STATISTIC OP CR VISIT: Performed by: OCCUPATIONAL THERAPIST

## 2019-08-29 ENCOUNTER — HOSPITAL ENCOUNTER (OUTPATIENT)
Dept: CARDIAC REHAB | Facility: CLINIC | Age: 59
End: 2019-08-29
Attending: INTERNAL MEDICINE
Payer: COMMERCIAL

## 2019-08-29 LAB — GLUCOSE BLDC GLUCOMTR-MCNC: 176 MG/DL (ref 70–99)

## 2019-08-29 PROCEDURE — 40000575 ZZH STATISTIC OP CARDIAC VISIT #2: Performed by: OCCUPATIONAL THERAPIST

## 2019-08-29 PROCEDURE — 93797 PHYS/QHP OP CAR RHAB WO ECG: CPT | Performed by: OCCUPATIONAL THERAPIST

## 2019-08-29 PROCEDURE — 93798 PHYS/QHP OP CAR RHAB W/ECG: CPT | Performed by: OCCUPATIONAL THERAPIST

## 2019-08-29 PROCEDURE — 40000116 ZZH STATISTIC OP CR VISIT: Performed by: OCCUPATIONAL THERAPIST

## 2019-08-29 PROCEDURE — 00000146 ZZHCL STATISTIC GLUCOSE BY METER IP

## 2019-10-18 ENCOUNTER — OFFICE VISIT (OUTPATIENT)
Dept: CARDIOLOGY | Facility: CLINIC | Age: 59
End: 2019-10-18
Attending: PHYSICIAN ASSISTANT
Payer: COMMERCIAL

## 2019-10-18 VITALS
DIASTOLIC BLOOD PRESSURE: 74 MMHG | HEART RATE: 74 BPM | SYSTOLIC BLOOD PRESSURE: 129 MMHG | HEIGHT: 68 IN | WEIGHT: 195.7 LBS | BODY MASS INDEX: 29.66 KG/M2

## 2019-10-18 DIAGNOSIS — E11.9 TYPE 2 DIABETES MELLITUS WITHOUT COMPLICATION, WITHOUT LONG-TERM CURRENT USE OF INSULIN (H): ICD-10-CM

## 2019-10-18 DIAGNOSIS — I10 ESSENTIAL HYPERTENSION, BENIGN: ICD-10-CM

## 2019-10-18 DIAGNOSIS — E78.5 HYPERLIPIDEMIA LDL GOAL <100: ICD-10-CM

## 2019-10-18 DIAGNOSIS — I20.89 STABLE ANGINA PECTORIS (H): ICD-10-CM

## 2019-10-18 DIAGNOSIS — I25.10 CORONARY ARTERY DISEASE INVOLVING NATIVE CORONARY ARTERY OF NATIVE HEART WITHOUT ANGINA PECTORIS: Primary | ICD-10-CM

## 2019-10-18 PROCEDURE — 99214 OFFICE O/P EST MOD 30 MIN: CPT | Performed by: INTERNAL MEDICINE

## 2019-10-18 RX ORDER — METOPROLOL TARTRATE 25 MG/1
25 TABLET, FILM COATED ORAL 2 TIMES DAILY
Qty: 180 TABLET | Refills: 3 | Status: SHIPPED | OUTPATIENT
Start: 2019-10-18 | End: 2020-10-26

## 2019-10-18 ASSESSMENT — MIFFLIN-ST. JEOR: SCORE: 1677.19

## 2019-10-18 NOTE — PROGRESS NOTES
HPI and Plan:   See dictation    Orders Placed This Encounter   Procedures     NM Exercise stress test (nuc card)     Basic metabolic panel     Lipid Profile     ALT     Follow-Up with Cardiologist     Follow-Up with Cardiac Advanced Practice Provider       Orders Placed This Encounter   Medications     metoprolol tartrate (LOPRESSOR) 25 MG tablet     Sig: Take 1 tablet (25 mg) by mouth 2 times daily     Dispense:  180 tablet     Refill:  3       Medications Discontinued During This Encounter   Medication Reason     metoprolol tartrate (LOPRESSOR) 25 MG tablet Reorder         Encounter Diagnoses   Name Primary?     Coronary artery disease involving native coronary artery of native heart without angina pectoris Yes     Essential hypertension, benign      Stable angina pectoris (H)      Hyperlipidemia LDL goal <100      Type 2 diabetes mellitus without complication, without long-term current use of insulin (H)        CURRENT MEDICATIONS:  Current Outpatient Medications   Medication Sig Dispense Refill     aspirin 81 MG EC tablet Take 1 tablet (81 mg) by mouth daily 90 tablet 3     atorvastatin (LIPITOR) 20 MG tablet Take 20 mg by mouth daily  90 tablet 3     glimepiride (AMARYL) 2 MG tablet Take 1 tablet (2 mg) by mouth every morning (before breakfast) 90 tablet 3     ibuprofen (ADVIL) 200 MG capsule Take 200 mg by mouth every 4 hours as needed for fever       lisinopril (PRINIVIL/ZESTRIL) 20 MG tablet Take 1 tablet (20 mg) by mouth daily 90 tablet 3     metFORMIN (GLUCOPHAGE) 1000 MG tablet Take 1 tablet (1,000 mg) by mouth 2 times daily (with meals) 180 tablet 3     metoprolol tartrate (LOPRESSOR) 25 MG tablet Take 1 tablet (25 mg) by mouth 2 times daily 180 tablet 3     nitroGLYcerin (NITROSTAT) 0.4 MG sublingual tablet For chest pain place 1 tablet under the tongue every 5 minutes for up to 3 doses. If symptoms persist 5 minutes after 2nd dose call 911. 30 tablet 0     ticagrelor (BRILINTA) 90 MG tablet Take 1  tablet (90 mg) by mouth 2 times daily 180 tablet 3       ALLERGIES   No Known Allergies    PAST MEDICAL HISTORY:  Past Medical History:   Diagnosis Date     Diabetes (H)        PAST SURGICAL HISTORY:  Past Surgical History:   Procedure Laterality Date     CV HEART CATHETERIZATION WITH POSSIBLE INTERVENTION N/A 5/9/2019    Procedure: Coronary Angiogram;  Surgeon: Jose Enrique Stanley MD;  Location:  HEART CARDIAC CATH LAB     CV PCI ANGIOPLASTY N/A 5/9/2019    Procedure: PCI Angioplasty;  Surgeon: Jose Enrique Stanley MD;  Location:  HEART CARDIAC CATH LAB       FAMILY HISTORY:  Family History   Problem Relation Age of Onset     Diabetes Paternal Grandmother      Diabetes Nephew        SOCIAL HISTORY:  Social History     Socioeconomic History     Marital status:      Spouse name: None     Number of children: None     Years of education: None     Highest education level: None   Occupational History     None   Social Needs     Financial resource strain: None     Food insecurity:     Worry: None     Inability: None     Transportation needs:     Medical: None     Non-medical: None   Tobacco Use     Smoking status: Never Smoker     Smokeless tobacco: Never Used   Substance and Sexual Activity     Alcohol use: No     Drug use: No     Sexual activity: Yes     Partners: Female   Lifestyle     Physical activity:     Days per week: None     Minutes per session: None     Stress: None   Relationships     Social connections:     Talks on phone: None     Gets together: None     Attends Buddhist service: None     Active member of club or organization: None     Attends meetings of clubs or organizations: None     Relationship status: None     Intimate partner violence:     Fear of current or ex partner: None     Emotionally abused: None     Physically abused: None     Forced sexual activity: None   Other Topics Concern     Parent/sibling w/ CABG, MI or angioplasty before 65F 55M? Not Asked   Social History Narrative  "    None       Review of Systems:  Skin:  Negative       Eyes:  Positive for glasses    ENT:  Negative      Respiratory:  Positive for dyspnea on exertion     Cardiovascular:    Positive for;chest pain    Gastroenterology: Negative      Genitourinary:  Negative      Musculoskeletal:  Negative      Neurologic:  Negative      Psychiatric:  Negative      Heme/Lymph/Imm:  Negative      Endocrine:  Positive for diabetes      Physical Exam:  Vitals: /74   Pulse 74   Ht 1.727 m (5' 8\")   Wt 88.8 kg (195 lb 11.2 oz)   BMI 29.76 kg/m      Constitutional:  cooperative, alert and oriented, well developed, well nourished, in no acute distress        Skin:  warm and dry to the touch, no apparent skin lesions or masses noted          Head:  normocephalic, no masses or lesions        Eyes:  pupils equal and round, conjunctivae and lids unremarkable, sclera white, no xanthalasma, EOMS intact, no nystagmus        Lymph:No Cervical lymphadenopathy present     ENT:  no pallor or cyanosis, dentition good        Neck:  carotid pulses are full and equal bilaterally, JVP normal, no carotid bruit        Respiratory:  normal breath sounds, clear to auscultation, normal A-P diameter, normal symmetry, normal respiratory excursion, no use of accessory muscles         Cardiac: regular rhythm, normal S1/S2, no S3 or S4, apical impulse not displaced, no murmurs, gallops or rubs                                                         GI:  abdomen soft;BS normoactive        Extremities and Muscular Skeletal:  no deformities, clubbing, cyanosis, erythema observed;no edema              Neurological:  no gross motor deficits;affect appropriate        Psych:  oriented to time, person and place        CC  Michael Méndez MD  [address] [place] [phone]              "

## 2019-10-18 NOTE — PROGRESS NOTES
Service Date: 10/18/2019      HISTORY OF PRESENT ILLNESS:  I had the opportunity to see Mr. Jessica Gomez  in Cardiology Clinic today at the [facility] in [place] for reevaluation of coronary artery disease.        When I saw him initially on 05/09/2019, he had completed a stress test which was significantly abnormal showing anterior ischemia.  He describes progressive symptoms of typical angina, sometimes occurring at rest, especially after meals and at night.  I referred him for coronary angiography and he was found to have a long 99% proximal LAD stenosis with CHARLETTE 2 flow down that vessel.  He also had 70% stenosis within the proximal first diagonal and ramus vessel.  The right coronary artery and circumflex looked good.  I reviewed the coronary angiogram films with him today.  The LAD was treated well with stenting and had no residual disease.  Angioplasty was also performed of the first diagonal stenosis and the result was difficult to evaluate, but looked good initially prior to stenting of the LAD.  On subsequent images, the proximal portions of the diagonal and ramus vessels were difficult to see because of overlapping vessels.  There did appear to be at least moderate residual stenosis of the ramus vessel.      Today, he reports continued symptoms of chest discomfort.  Because of a language barrier, I have difficulty understanding whether the chest discomfort is similar in quality to what he had originally, but he indicates that the pain is different now.  However, it occurs with exertion and resolves with rest but lasts only a couple of minutes.  He indicates that he develops this discomfort when he is lifting heavy things and he has episodes once or twice a week.  He has no postprandial or nocturnal symptoms.  He has no shortness of breath, lightheadedness, palpitations or syncope.      His cholesterol numbers are excellent with an LDL of 55, although his HDL remains low at  31.  His basic metabolic panel is normal and his diabetes has been well controlled.      PHYSICAL EXAMINATION:  His blood pressure is 129/74, heart rate 74 and weight 195 pounds.  His lungs are clear.  His heart rhythm is regular.  He has no cardiac murmurs or carotid bruits.      IMPRESSIONS:  Mr. Jessica Gomez is a 59-year-old gentleman who had unstable angina symptoms before he underwent stenting of his proximal LAD and angioplasty of the first diagonal arteries in 2019.  He had a residual moderate stenosis, perhaps 70% of the ramus vessel that was difficult to visualize at the end of that study.  At this point, his risk factors are very well controlled and he is on excellent medical therapy.  He continues to take his aspirin and Brilinta as directed.  However, he is having recurrent episodes of chest discomfort, which sound anginal in nature, occurring during times of heavy exertion, and resolving with rest.        Because he is diabetic, I am concerned about the possibility of restenosis, especially of the LAD, but also of the diagonal vessel.  However, I am not sure whether we are dealing with anginal pain at this point.  I suggested that we start with an exercise nuclear imaging stress test to identify whether we are dealing with angina and ischemia and to determine the size and extent of that issue.  We can then make some decisions about considering repeat angiography.        I will have him follow up with us after that testing is completed to review those results.        cc:      Michael Méndez MD     [facility]   [address] [place] [address]         GREER SKINNER MD, FACC             D: 10/18/2019   T: 10/18/2019   MT: MAGGIE      Name:     JESSICA GOMEZ   MRN:      [id number]        Account:      [id number]   :      1960           Service Date: 10/18/2019      Document: [id number]

## 2019-10-18 NOTE — LETTER
10/18/2019    Michael Méndez MD  [address] [facility] [address]    RE: Jessica Gomez       Dear Colleague,    I had the pleasure of seeing Jessica Gomez in the [facility].    HPI and Plan:   See dictation    Orders Placed This Encounter   Procedures     NM Exercise stress test (nuc card)     Basic metabolic panel     Lipid Profile     ALT     Follow-Up with Cardiologist     Follow-Up with Cardiac Advanced Practice Provider       Orders Placed This Encounter   Medications     metoprolol tartrate (LOPRESSOR) 25 MG tablet     Sig: Take 1 tablet (25 mg) by mouth 2 times daily     Dispense:  180 tablet     Refill:  3       Medications Discontinued During This Encounter   Medication Reason     metoprolol tartrate (LOPRESSOR) 25 MG tablet Reorder         Encounter Diagnoses   Name Primary?     Coronary artery disease involving native coronary artery of native heart without angina pectoris Yes     Essential hypertension, benign      Stable angina pectoris (H)      Hyperlipidemia LDL goal <100      Type 2 diabetes mellitus without complication, without long-term current use of insulin (H)        CURRENT MEDICATIONS:  Current Outpatient Medications   Medication Sig Dispense Refill     aspirin 81 MG EC tablet Take 1 tablet (81 mg) by mouth daily 90 tablet 3     atorvastatin (LIPITOR) 20 MG tablet Take 20 mg by mouth daily  90 tablet 3     glimepiride (AMARYL) 2 MG tablet Take 1 tablet (2 mg) by mouth every morning (before breakfast) 90 tablet 3     ibuprofen (ADVIL) 200 MG capsule Take 200 mg by mouth every 4 hours as needed for fever       lisinopril (PRINIVIL/ZESTRIL) 20 MG tablet Take 1 tablet (20 mg) by mouth daily 90 tablet 3     metFORMIN (GLUCOPHAGE) 1000 MG tablet Take 1 tablet (1,000 mg) by mouth 2 times daily (with meals) 180 tablet 3     metoprolol tartrate (LOPRESSOR) 25 MG tablet Take 1 tablet (25 mg) by mouth 2 times daily 180 tablet 3     nitroGLYcerin  (NITROSTAT) 0.4 MG sublingual tablet For chest pain place 1 tablet under the tongue every 5 minutes for up to 3 doses. If symptoms persist 5 minutes after 2nd dose call 911. 30 tablet 0     ticagrelor (BRILINTA) 90 MG tablet Take 1 tablet (90 mg) by mouth 2 times daily 180 tablet 3       ALLERGIES   No Known Allergies    PAST MEDICAL HISTORY:  Past Medical History:   Diagnosis Date     Diabetes (H)        PAST SURGICAL HISTORY:  Past Surgical History:   Procedure Laterality Date     CV HEART CATHETERIZATION WITH POSSIBLE INTERVENTION N/A 5/9/2019    Procedure: Coronary Angiogram;  Surgeon: Jose Enrique Stanley MD;  Location:  HEART CARDIAC CATH LAB     CV PCI ANGIOPLASTY N/A 5/9/2019    Procedure: PCI Angioplasty;  Surgeon: Jose Enrique Stanley MD;  Location:  HEART CARDIAC CATH LAB       FAMILY HISTORY:  Family History   Problem Relation Age of Onset     Diabetes Paternal Grandmother      Diabetes Nephew        SOCIAL HISTORY:  Social History     Socioeconomic History     Marital status:      Spouse name: None     Number of children: None     Years of education: None     Highest education level: None   Occupational History     None   Social Needs     Financial resource strain: None     Food insecurity:     Worry: None     Inability: None     Transportation needs:     Medical: None     Non-medical: None   Tobacco Use     Smoking status: Never Smoker     Smokeless tobacco: Never Used   Substance and Sexual Activity     Alcohol use: No     Drug use: No     Sexual activity: Yes     Partners: Female   Lifestyle     Physical activity:     Days per week: None     Minutes per session: None     Stress: None   Relationships     Social connections:     Talks on phone: None     Gets together: None     Attends [employer] service: None     Active member of club or organization: None     Attends meetings of clubs or organizations: None     Relationship status: None     Intimate partner violence:     Fear of current  "or ex partner: None     Emotionally abused: None     Physically abused: None     Forced sexual activity: None   Other Topics Concern     Parent/sibling w/ CABG, MI or angioplasty before 65F 55M? Not Asked   Social History Narrative     None       Review of Systems:  Skin:  Negative       Eyes:  Positive for glasses    ENT:  Negative      Respiratory:  Positive for dyspnea on exertion     Cardiovascular:    Positive for;chest pain    Gastroenterology: Negative      Genitourinary:  Negative      Musculoskeletal:  Negative      Neurologic:  Negative      Psychiatric:  Negative      Heme/Lymph/Imm:  Negative      Endocrine:  Positive for diabetes      Physical Exam:  Vitals: /74   Pulse 74   Ht 1.727 m (5' 8\")   Wt 88.8 kg (195 lb 11.2 oz)   BMI 29.76 kg/m       Constitutional:  cooperative, alert and oriented, well developed, well nourished, in no acute distress        Skin:  warm and dry to the touch, no apparent skin lesions or masses noted          Head:  normocephalic, no masses or lesions        Eyes:  pupils equal and round, conjunctivae and lids unremarkable, sclera white, no xanthalasma, EOMS intact, no nystagmus        Lymph:No Cervical lymphadenopathy present     ENT:  no pallor or cyanosis, dentition good        Neck:  carotid pulses are full and equal bilaterally, JVP normal, no carotid bruit        Respiratory:  normal breath sounds, clear to auscultation, normal A-P diameter, normal symmetry, normal respiratory excursion, no use of accessory muscles         Cardiac: regular rhythm, normal S1/S2, no S3 or S4, apical impulse not displaced, no murmurs, gallops or rubs                                                         GI:  abdomen soft;BS normoactive        Extremities and Muscular Skeletal:  no deformities, clubbing, cyanosis, erythema observed;no edema              Neurological:  no gross motor deficits;affect appropriate        Psych:  oriented to time, person and place        CC  Michael R " MD Dev  [address] [place] [id number]                Thank you for allowing me to participate in the care of your patient.      Sincerely,     GREER SKINNER MD     [facility]    cc:   Michael Méndez MD  [address] [place] [id number]

## 2019-10-18 NOTE — LETTER
10/18/2019      Michael Méndez MD  [address] [facility] [address]      RE: Jessica Gomez       Dear Colleague,    I had the pleasure of seeing Jessica Gomez in the [facility].    Service Date: 10/18/2019      HISTORY OF PRESENT ILLNESS:  I had the opportunity to see Mr. Jessica Gomez  in Cardiology Clinic today at the [facility] in [place] for reevaluation of coronary artery disease.        When I saw him initially on 05/09/2019, he had completed a stress test which was significantly abnormal showing anterior ischemia.  He describes progressive symptoms of typical angina, sometimes occurring at rest, especially after meals and at night.  I referred him for coronary angiography and he was found to have a long 99% proximal LAD stenosis with CHARLETTE 2 flow down that vessel.  He also had 70% stenosis within the proximal first diagonal and ramus vessel.  The right coronary artery and circumflex looked good.  I reviewed the coronary angiogram films with him today.  The LAD was treated well with stenting and had no residual disease.  Angioplasty was also performed of the first diagonal stenosis and the result was difficult to evaluate, but looked good initially prior to stenting of the LAD.  On subsequent images, the proximal portions of the diagonal and ramus vessels were difficult to see because of overlapping vessels.  There did appear to be at least moderate residual stenosis of the ramus vessel.      Today, he reports continued symptoms of chest discomfort.  Because of a language barrier, I have difficulty understanding whether the chest discomfort is similar in quality to what he had originally, but he indicates that the pain is different now.  However, it occurs with exertion and resolves with rest but lasts only a couple of minutes.  He indicates that he develops this discomfort when he is lifting heavy things and he has  episodes once or twice a week.  He has no postprandial or nocturnal symptoms.  He has no shortness of breath, lightheadedness, palpitations or syncope.      His cholesterol numbers are excellent with an LDL of 55, although his HDL remains low at 31.  His basic metabolic panel is normal and his diabetes has been well controlled.      PHYSICAL EXAMINATION:  His blood pressure is 129/74, heart rate 74 and weight 195 pounds.  His lungs are clear.  His heart rhythm is regular.  He has no cardiac murmurs or carotid bruits.      IMPRESSIONS:  Mr. Jessica Gomez is a 59-year-old gentleman who had unstable angina symptoms before he underwent stenting of his proximal LAD and angioplasty of the first diagonal arteries in 05/2019.  He had a residual moderate stenosis, perhaps 70% of the ramus vessel that was difficult to visualize at the end of that study.  At this point, his risk factors are very well controlled and he is on excellent medical therapy.  He continues to take his aspirin and Brilinta as directed.  However, he is having recurrent episodes of chest discomfort, which sound anginal in nature, occurring during times of heavy exertion, and resolving with rest.        Because he is diabetic, I am concerned about the possibility of restenosis, especially of the LAD, but also of the diagonal vessel.  However, I am not sure whether we are dealing with anginal pain at this point.  I suggested that we start with an exercise nuclear imaging stress test to identify whether we are dealing with angina and ischemia and to determine the size and extent of that issue.  We can then make some decisions about considering repeat angiography.        I will have him follow up with us after that testing is completed to review those results.        cc:      Michael Méndez MD     [facility]   [address] [place] [address]         GREER SKINNER MD, FACC             D: 10/18/2019   T: 10/18/2019   MT: MAGGIE      Name:      EDWARD VIDAL   MRN:      [id number]        Account:      [id number]   :      1960           Service Date: 10/18/2019      Document: [id number]         Outpatient Encounter Medications as of 10/18/2019   Medication Sig Dispense Refill     aspirin 81 MG EC tablet Take 1 tablet (81 mg) by mouth daily 90 tablet 3     atorvastatin (LIPITOR) 20 MG tablet Take 20 mg by mouth daily  90 tablet 3     glimepiride (AMARYL) 2 MG tablet Take 1 tablet (2 mg) by mouth every morning (before breakfast) 90 tablet 3     ibuprofen (ADVIL) 200 MG capsule Take 200 mg by mouth every 4 hours as needed for fever       lisinopril (PRINIVIL/ZESTRIL) 20 MG tablet Take 1 tablet (20 mg) by mouth daily 90 tablet 3     metoprolol tartrate (LOPRESSOR) 25 MG tablet Take 1 tablet (25 mg) by mouth 2 times daily 180 tablet 3     nitroGLYcerin (NITROSTAT) 0.4 MG sublingual tablet For chest pain place 1 tablet under the tongue every 5 minutes for up to 3 doses. If symptoms persist 5 minutes after 2nd dose call 911. 30 tablet 0     ticagrelor (BRILINTA) 90 MG tablet Take 1 tablet (90 mg) by mouth 2 times daily 180 tablet 3     [DISCONTINUED] metFORMIN (GLUCOPHAGE) 1000 MG tablet Take 1 tablet (1,000 mg) by mouth 2 times daily (with meals) 180 tablet 3     [DISCONTINUED] metoprolol tartrate (LOPRESSOR) 25 MG tablet Take 1 tablet (25 mg) by mouth 2 times daily 60 tablet 5     No facility-administered encounter medications on file as of 10/18/2019.        Again, thank you for allowing me to participate in the care of your patient.      Sincerely,    GREER SKINNER MD     [facility]

## 2019-10-20 DIAGNOSIS — E11.9 TYPE 2 DIABETES MELLITUS WITHOUT COMPLICATION, WITHOUT LONG-TERM CURRENT USE OF INSULIN (H): ICD-10-CM

## 2019-10-20 NOTE — TELEPHONE ENCOUNTER
Requested Prescriptions   Pending Prescriptions Disp Refills     metFORMIN (GLUCOPHAGE) 1000 MG tablet [Pharmacy Med Name: metFORMIN HCl Oral Tablet 1000 MG] 180 tablet 2     Sig: Take 1 tablet (1,000 mg) by mouth 2 times daily (with meals)   Last Written Prescription Date:  10/31/2018  Last Fill Quantity: 180,  # refills: 3   Last Office Visit: 4/30/2019   Future Office Visit:    Next 5 appointments (look out 90 days)    Oct 29, 2019  8:10 AM CDT  Return Visit with Leila Curran PA-C  [facility] [place] ([facility]) [address] [place], [place] [id number]  [facility] [address]  [phone] OPT 2             Biguanide Agents Failed - 10/20/2019  7:02 AM        Failed - Patient has documented A1c within the specified period of time.     If HgbA1C is 8 or greater, it needs to be on file within the past 3 months.  If less than 8, must be on file within the past 6 months.     Recent Labs   Lab Test 10/31/18  0825   A1C 7.4*             Passed - Blood pressure less than 140/90 in past 6 months     BP Readings from Last 3 Encounters:   10/18/19 129/74   07/26/19 124/74   05/22/19 114/76                 Passed - Patient has documented LDL within the past 12 mos.     Recent Labs   Lab Test 07/26/19  0851   LDL 55             Passed - Patient has had a Microalbumin in the past 15 mos.     Recent Labs   Lab Test 10/31/18  0825   MICROL 321   UMALCR 100.31*             Passed - Patient is age 10 or older        Passed - Patient's CR is NOT>1.4 OR Patient's EGFR is NOT<45 within past 12 mos.     Recent Labs   Lab Test 05/13/19  0800   GFRESTIMATED 77   GFRESTBLACK 90       Recent Labs   Lab Test 05/13/19  0800   CR 1.05             Passed - Patient does NOT have a diagnosis of CHF.        Passed - Medication is active on med list        Passed - Recent (6 mo) or future (30 days) visit within the authorizing provider's specialty     Patient had office visit in the last 6 months or has a  "visit in the next 30 days with authorizing provider or within the authorizing provider's specialty.  See \"Patient Info\" tab in inbasket, or \"Choose Columns\" in Meds & Orders section of the refill encounter.              "

## 2019-10-20 NOTE — LETTER
[facility]  [address] [address]  [phone]            Jessica Gomez  [address]  [facility] [address]        October 22, 2019    Dear Jessica,    While refilling your prescription today, we noticed that you are due for an appointment with your provider.  We will refill your prescription for 30 days, but a follow-up appointment must be made before any additional refills can be approved.     Taking care of your health is important to us and we look forward to seeing you in the near future.  Please call us at [phone] or [phone] (or use [phone]) to schedule an appointment.     Please disregard this notice if you have already made an appointment.    Sincerely,        [facility]   CHRIS transesophageal echocardiogram

## 2019-10-22 ENCOUNTER — CARE COORDINATION (OUTPATIENT)
Dept: CARDIOLOGY | Facility: CLINIC | Age: 59
End: 2019-10-22

## 2019-10-22 ENCOUNTER — HOSPITAL ENCOUNTER (OUTPATIENT)
Dept: CARDIOLOGY | Facility: CLINIC | Age: 59
Discharge: HOME | End: 2019-10-22
Attending: INTERNAL MEDICINE | Admitting: INTERNAL MEDICINE
Payer: COMMERCIAL

## 2019-10-22 DIAGNOSIS — I25.10 CORONARY ARTERY DISEASE INVOLVING NATIVE CORONARY ARTERY OF NATIVE HEART WITHOUT ANGINA PECTORIS: ICD-10-CM

## 2019-10-22 DIAGNOSIS — I20.89 STABLE ANGINA PECTORIS (H): ICD-10-CM

## 2019-10-22 PROCEDURE — 34300033 ZZH RX 343: Performed by: INTERNAL MEDICINE

## 2019-10-22 PROCEDURE — 93018 CV STRESS TEST I&R ONLY: CPT | Performed by: INTERNAL MEDICINE

## 2019-10-22 PROCEDURE — A9502 TC99M TETROFOSMIN: HCPCS | Performed by: INTERNAL MEDICINE

## 2019-10-22 PROCEDURE — 93017 CV STRESS TEST TRACING ONLY: CPT

## 2019-10-22 PROCEDURE — 93016 CV STRESS TEST SUPVJ ONLY: CPT | Performed by: INTERNAL MEDICINE

## 2019-10-22 PROCEDURE — 78452 HT MUSCLE IMAGE SPECT MULT: CPT | Mod: 26 | Performed by: INTERNAL MEDICINE

## 2019-10-22 RX ADMIN — TETROFOSMIN 3.6 MCI.: 1.38 INJECTION, POWDER, LYOPHILIZED, FOR SOLUTION INTRAVENOUS at 08:23

## 2019-10-22 RX ADMIN — TETROFOSMIN 9.75 MCI.: 1.38 INJECTION, POWDER, LYOPHILIZED, FOR SOLUTION INTRAVENOUS at 09:43

## 2019-10-22 NOTE — PROGRESS NOTES
"Nuclear stress test results from 10/22/19 noted. Per 's note from 10/18/19, \" However, he is having recurrent episodes of chest discomfort, which sound anginal in nature, occurring during times of heavy exertion, and resolving with rest.        Because he is diabetic, I am concerned about the possibility of restenosis, especially of the LAD, but also of the diagonal vessel.  However, I am not sure whether we are dealing with anginal pain at this point.  I suggested that we start with an exercise nuclear imaging stress test to identify whether we are dealing with angina and ischemia and to determine the size and extent of that issue.  We can then make some decisions about considering repeat angiography. \"    Pt has 10/29 OV with Leila Curran. Will message  with results.     Impression  1.  Myocardial perfusion imaging using single isotope technique  demonstrated probably normal perfusion (technically difficult study  with visceral artifact affecting the inferior wall.).   2. Gated images demonstrated normal LV cavity size and systolic  function.  The left ventricular systolic function is 62%.  3. Compared to the prior study from no prior study .  "

## 2019-10-22 NOTE — TELEPHONE ENCOUNTER
Nuc scan looks good to me. I doubt ischemia. LAD and diagonal territories look fine. Follow up with SD and decide based on symptom pattern whether he needs to have another angiogram. EE

## 2019-10-23 NOTE — PROGRESS NOTES
I left message for pt to call back to review stress test results. Nataliia VYAS October 23, 2019, 9:23 AM

## 2019-10-24 NOTE — PROGRESS NOTES
Pt called back and I let him know that stress test looked stable per , but pt should still keep OV with Leila Curran next week to review and discuss symptoms further. Nataliia VYAS October 24, 2019, 11:55 AM

## 2019-10-29 ENCOUNTER — OFFICE VISIT (OUTPATIENT)
Dept: CARDIOLOGY | Facility: CLINIC | Age: 59
End: 2019-10-29
Attending: INTERNAL MEDICINE
Payer: COMMERCIAL

## 2019-10-29 VITALS
HEIGHT: 68 IN | WEIGHT: 195 LBS | DIASTOLIC BLOOD PRESSURE: 72 MMHG | BODY MASS INDEX: 29.55 KG/M2 | SYSTOLIC BLOOD PRESSURE: 134 MMHG | HEART RATE: 81 BPM

## 2019-10-29 DIAGNOSIS — I25.10 CORONARY ARTERY DISEASE INVOLVING NATIVE CORONARY ARTERY OF NATIVE HEART WITHOUT ANGINA PECTORIS: ICD-10-CM

## 2019-10-29 PROCEDURE — 99214 OFFICE O/P EST MOD 30 MIN: CPT | Performed by: PHYSICIAN ASSISTANT

## 2019-10-29 RX ORDER — ISOSORBIDE MONONITRATE 30 MG/1
30 TABLET, EXTENDED RELEASE ORAL DAILY
Qty: 30 TABLET | Refills: 5 | Status: SHIPPED | OUTPATIENT
Start: 2019-10-29 | End: 2020-04-24

## 2019-10-29 ASSESSMENT — MIFFLIN-ST. JEOR: SCORE: 1674.01

## 2019-10-29 NOTE — PATIENT INSTRUCTIONS
Today's Plan:   The stress test was reassuring, however given your history, I would like to start a medication to help dilate the coronary arteries, and want you to keep a close eye on your symptoms. If they get worse, we may need to proceed to invasive testing with another coronary angiogram.   For now, start a new medication called Imdur (isosorbide) once daily. Come back to see me in 1 month to see how you're feeling. Again, please call sooner if you develop worsening chest discomfort.     If you have questions or concerns please call my nurse team at [phone].  Scheduling phone number: [phone]  Reminder: Please bring in all current medications, over the counter supplements and vitamin bottles to your next appointment.    It was a pleasure seeing you today!     Leila Curran PA-C, [facility]

## 2019-10-29 NOTE — LETTER
10/29/2019    Michael Méndez MD  [address] [facility] [address]    RE: Jessica Gomez       Dear Colleague,    I had the pleasure of seeing Jessica Gomez in the [facility].    Cardiology Clinic Progress Note    Jessica Gomez MRN# [id number]   YOB: 1960 Age: 59 year old   Primary cardiologist: Dr. Oneil         Assessment and Plan:     In summary, Jessica Gomez presents today for reassessment after recent stress testing.     1. CAD s/p PCI 5/2019, with symptoms concerning for exertional angina.     - Nuclear stress test negative for ischemia, although difficult study.    - On appropriate medical therapy including Brilinta, aspirin, statin, BB, ACEi.      Plan:  - Will start Imdur 30 mg daily. He will return for reassessment in 1 month. He will monitor his symptoms closely and notify me in the meantime if they worsen. In that case, we would consider referral for invasive work-up with angiogram. Jessica is very comfortable with this plan.         History of Presenting Illness:      Jessica Gomez is a pleasant 59 year old patient who presents today for reassessment after recent testing.    The patient has a history of the following -   # CAD, unstable angina s/p PCI to proximal LAD and balloon angioplasty to D1 lesion on 5/9/19, with residual 70% ramus lesion - on DAPT, statin, BB, ACEi.   # DMII, on metformin - a1c 7.4% in October 2018  # HLP, on Lipitor  # HTN  # Social - no hx of tobacco use. Works as . Son and daughter are in the area.     Ellie underwent stress ECHO in May for symptoms of typical progressive angina. This was markedly abnormal with development of an anterior wall motion abnormality with stress. He was seen urgently by Dr. Oneil who recommended urgent [facility]/coronary angiogram that same day. This revealed a 99% prox LAD culprit lesion, with a 70% ostial D1. A 70% ramus was also  "noted. The LAD was stented successfully with a 2.44I70AE wyatt MICHELLE, and balloon angioplasty was performed on the prox D1. When Jessica saw Dr. Oneil on 10/18, he noted symptoms consistent with recurring exertional angina. A stress nuclear was ordered. This showed no evidence for ischemia, although was a difficult study with visceral artifact affecting the inferior wall. He exercised 9 minutes, to a workload of 10 METs. BP and HR increased appropriately.     Today, he presents alone to clinic. He reports unchanged symptoms - 2-3 minutes of chest discomfort which could occur but not always with heavy exertion at work. He tells me it does not remind him of his prior angina, although I believe that's only because it's less severe. He's had no resting discomfort. He otherwise feels very well. He's eating a low fat diet and rarely eats meat - when he does, it is typically fish. He is compliant on his medications including DAPT. His lipid profile in July showed an LDL of 55, HDL 31, TG's 153, . His BP is adequate.          Review of Systems:     12-pt ROS is negative except for as noted in the HPI.          Physical Exam:     Vitals: /72   Pulse 81   Ht 1.727 m (5' 8\")   Wt 88.5 kg (195 lb)   BMI 29.65 kg/m     Wt Readings from Last 10 Encounters:   10/29/19 88.5 kg (195 lb)   10/18/19 88.8 kg (195 lb 11.2 oz)   07/26/19 87.5 kg (193 lb)   05/22/19 86.6 kg (191 lb)   05/09/19 87 kg (191 lb 11.2 oz)   05/09/19 87.5 kg (193 lb)   04/30/19 87.6 kg (193 lb 3.2 oz)   10/31/18 84.5 kg (186 lb 4.8 oz)   07/06/17 91.6 kg (202 lb)   06/29/17 88.9 kg (196 lb)       Constitutional:  Patient is pleasant, alert, cooperative, and in NAD.  HEENT:  NCAT. PERRLA. EOM's intact.   Neck:  CVP appears normal. No carotid bruits.   Pulmonary: Normal respiratory effort. CTAB.   Cardiac: RRR, normal S1/S2, no S3/S4, no murmur or rub.   Abdomen:  Non-tender abdomen, no hepatosplenomegaly appreciated.   Vascular: Pulses in the " upper and lower extremities are 2+ and equal bilaterally.   Extremities: No edema, erythema, cyanosis or tenderness appreciated.  Skin:  No rashes or lesions appreciated.   Neurological:  No gross motor or sensory deficits.   Psych: Appropriate affect.        Data:   Labs reviewed:  Recent Labs   Lab Test 07/26/19  0851 05/09/19  1240 10/31/18  0825 06/29/17  1817 05/18/16  0904   LDL 55 57 38 57 34   HDL 31* 34* 37* 32* 34*   NHDL 86 83 58 83 59   CHOL 117 117 95 115 93   TRIG 153* 129 101 130 124   TSH  --   --  4.83* 21.02* 6.12*       Lab Results   Component Value Date    WBC 7.2 05/13/2019    RBC 4.91 05/13/2019    HGB 13.4 05/13/2019    HCT 38.6 (L) 05/13/2019    MCV 79 05/13/2019    MCH 27.3 05/13/2019    MCHC 34.7 05/13/2019    RDW 12.7 05/13/2019     05/13/2019       Lab Results   Component Value Date     05/13/2019    POTASSIUM 4.6 05/13/2019    CHLORIDE 105 05/13/2019    CO2 29 05/13/2019    ANIONGAP 5 05/13/2019     (H) 05/13/2019    BUN 10 05/13/2019    CR 1.05 05/13/2019    GFRESTIMATED 77 05/13/2019    GFRESTBLACK 90 05/13/2019    KRIS 8.8 05/13/2019      Lab Results   Component Value Date    AST 25 05/13/2019    ALT 17 07/26/2019       Lab Results   Component Value Date    A1C 7.4 (H) 10/31/2018       Lab Results   Component Value Date    INR 1.01 05/09/2019           Problem List:     Patient Active Problem List   Diagnosis     Hyperlipidemia LDL goal <70     Essential hypertension, benign     Type 2 diabetes mellitus without complication, without long-term current use of insulin (H)     Unstable angina (H)     NSTEMI (non-ST elevated myocardial infarction) (H)           Medications:     Current Outpatient Medications   Medication Sig Dispense Refill     aspirin 81 MG EC tablet Take 1 tablet (81 mg) by mouth daily 90 tablet 3     atorvastatin (LIPITOR) 20 MG tablet Take 20 mg by mouth daily  90 tablet 3     glimepiride (AMARYL) 2 MG tablet Take 1 tablet (2 mg) by mouth every  morning (before breakfast) 90 tablet 3     ibuprofen (ADVIL) 200 MG capsule Take 200 mg by mouth every 4 hours as needed for fever       lisinopril (PRINIVIL/ZESTRIL) 20 MG tablet Take 1 tablet (20 mg) by mouth daily 90 tablet 3     metFORMIN (GLUCOPHAGE) 1000 MG tablet Take 1 tablet (1,000 mg) by mouth 2 times daily (with meals) 60 tablet 0     metoprolol tartrate (LOPRESSOR) 25 MG tablet Take 1 tablet (25 mg) by mouth 2 times daily 180 tablet 3     nitroGLYcerin (NITROSTAT) 0.4 MG sublingual tablet For chest pain place 1 tablet under the tongue every 5 minutes for up to 3 doses. If symptoms persist 5 minutes after 2nd dose call 911. 30 tablet 0     ticagrelor (BRILINTA) 90 MG tablet Take 1 tablet (90 mg) by mouth 2 times daily 180 tablet 3           Past Medical History:     Past Medical History:   Diagnosis Date     Diabetes (H)      Past Surgical History:   Procedure Laterality Date     CV HEART CATHETERIZATION WITH POSSIBLE INTERVENTION N/A 5/9/2019    Procedure: Coronary Angiogram;  Surgeon: Jose Enrique Stanley MD;  Location: [facility] CARDIAC CATH LAB     CV PCI ANGIOPLASTY N/A 5/9/2019    Procedure: PCI Angioplasty;  Surgeon: Jose Enrique Stanley MD;  Location:  HEART CARDIAC CATH LAB     Family History   Problem Relation Age of Onset     Diabetes Paternal Grandmother      Diabetes Nephew      Social History     Socioeconomic History     Marital status:      Spouse name: Not on file     Number of children: Not on file     Years of education: Not on file     Highest education level: Not on file   Occupational History     Not on file   Social Needs     Financial resource strain: Not on file     Food insecurity:     Worry: Not on file     Inability: Not on file     Transportation needs:     Medical: Not on file     Non-medical: Not on file   Tobacco Use     Smoking status: Never Smoker     Smokeless tobacco: Never Used   Substance and Sexual Activity     Alcohol use: No     Drug use: No     Sexual  activity: Yes     Partners: Female   Lifestyle     Physical activity:     Days per week: Not on file     Minutes per session: Not on file     Stress: Not on file   Relationships     Social connections:     Talks on phone: Not on file     Gets together: Not on file     Attends Anabaptism service: Not on file     Active member of club or organization: Not on file     Attends meetings of clubs or organizations: Not on file     Relationship status: Not on file     Intimate partner violence:     Fear of current or ex partner: Not on file     Emotionally abused: Not on file     Physically abused: Not on file     Forced sexual activity: Not on file   Other Topics Concern     Parent/sibling w/ CABG, MI or angioplasty before 65F 55M? Not Asked   Social History Narrative     Not on file           Allergies:   Patient has no known allergies.      Leila Curran PA-C  [facility]  Pager: [phone]      Thank you for allowing me to participate in the care of your patient.    Sincerely,     Leila Curran PA-C     [facility]

## 2019-10-29 NOTE — PROGRESS NOTES
Cardiology Clinic Progress Note    Jessica Gomez MRN# [id number]   YOB: 1960 Age: 59 year old   Primary cardiologist: Dr. Oneil         Assessment and Plan:     In summary, Jessica Gomez presents today for reassessment after recent stress testing.     1. CAD s/p PCI 5/2019, with symptoms concerning for exertional angina.     - Nuclear stress test negative for ischemia, although difficult study.    - On appropriate medical therapy including Brilinta, aspirin, statin, BB, ACEi.      Plan:  - Will start Imdur 30 mg daily. He will return for reassessment in 1 month. He will monitor his symptoms closely and notify me in the meantime if they worsen. In that case, we would consider referral for invasive work-up with angiogram. Danosebastiánblaise is very comfortable with this plan.         History of Presenting Illness:      Jessica Gomez is a pleasant 59 year old patient who presents today for reassessment after recent testing.    The patient has a history of the following -   # CAD, unstable angina s/p PCI to proximal LAD and balloon angioplasty to D1 lesion on 5/9/19, with residual 70% ramus lesion - on DAPT, statin, BB, ACEi.   # DMII, on metformin - a1c 7.4% in October 2018  # HLP, on Lipitor  # HTN  # Social - no hx of tobacco use. Works as . Son and daughter are in the area.     Ellie underwent stress ECHO in May for symptoms of typical progressive angina. This was markedly abnormal with development of an anterior wall motion abnormality with stress. He was seen urgently by Dr. Oneil who recommended urgent [facility]/coronary angiogram that same day. This revealed a 99% prox LAD culprit lesion, with a 70% ostial D1. A 70% ramus was also noted. The LAD was stented successfully with a 2.12O38WL wyatt MICHELLE, and balloon angioplasty was performed on the prox D1. When Jessica saw Dr. Oneil on 10/18, he noted symptoms consistent with recurring exertional angina. A stress  "nuclear was ordered. This showed no evidence for ischemia, although was a difficult study with visceral artifact affecting the inferior wall. He exercised 9 minutes, to a workload of 10 METs. BP and HR increased appropriately.     Today, he presents alone to clinic. He reports unchanged symptoms - 2-3 minutes of chest discomfort which could occur but not always with heavy exertion at work. He tells me it does not remind him of his prior angina, although I believe that's only because it's less severe. He's had no resting discomfort. He otherwise feels very well. He's eating a low fat diet and rarely eats meat - when he does, it is typically fish. He is compliant on his medications including DAPT. His lipid profile in July showed an LDL of 55, HDL 31, TG's 153, . His BP is adequate.          Review of Systems:     12-pt ROS is negative except for as noted in the HPI.          Physical Exam:     Vitals: /72   Pulse 81   Ht 1.727 m (5' 8\")   Wt 88.5 kg (195 lb)   BMI 29.65 kg/m    Wt Readings from Last 10 Encounters:   10/29/19 88.5 kg (195 lb)   10/18/19 88.8 kg (195 lb 11.2 oz)   07/26/19 87.5 kg (193 lb)   05/22/19 86.6 kg (191 lb)   05/09/19 87 kg (191 lb 11.2 oz)   05/09/19 87.5 kg (193 lb)   04/30/19 87.6 kg (193 lb 3.2 oz)   10/31/18 84.5 kg (186 lb 4.8 oz)   07/06/17 91.6 kg (202 lb)   06/29/17 88.9 kg (196 lb)       Constitutional:  Patient is pleasant, alert, cooperative, and in NAD.  HEENT:  NCAT. PERRLA. EOM's intact.   Neck:  CVP appears normal. No carotid bruits.   Pulmonary: Normal respiratory effort. CTAB.   Cardiac: RRR, normal S1/S2, no S3/S4, no murmur or rub.   Abdomen:  Non-tender abdomen, no hepatosplenomegaly appreciated.   Vascular: Pulses in the upper and lower extremities are 2+ and equal bilaterally.   Extremities: No edema, erythema, cyanosis or tenderness appreciated.  Skin:  No rashes or lesions appreciated.   Neurological:  No gross motor or sensory deficits.   Psych: " Appropriate affect.        Data:   Labs reviewed:  Recent Labs   Lab Test 07/26/19  0851 05/09/19  1240 10/31/18  0825 06/29/17  1817 05/18/16  0904   LDL 55 57 38 57 34   HDL 31* 34* 37* 32* 34*   NHDL 86 83 58 83 59   CHOL 117 117 95 115 93   TRIG 153* 129 101 130 124   TSH  --   --  4.83* 21.02* 6.12*       Lab Results   Component Value Date    WBC 7.2 05/13/2019    RBC 4.91 05/13/2019    HGB 13.4 05/13/2019    HCT 38.6 (L) 05/13/2019    MCV 79 05/13/2019    MCH 27.3 05/13/2019    MCHC 34.7 05/13/2019    RDW 12.7 05/13/2019     05/13/2019       Lab Results   Component Value Date     05/13/2019    POTASSIUM 4.6 05/13/2019    CHLORIDE 105 05/13/2019    CO2 29 05/13/2019    ANIONGAP 5 05/13/2019     (H) 05/13/2019    BUN 10 05/13/2019    CR 1.05 05/13/2019    GFRESTIMATED 77 05/13/2019    GFRESTBLACK 90 05/13/2019    KRIS 8.8 05/13/2019      Lab Results   Component Value Date    AST 25 05/13/2019    ALT 17 07/26/2019       Lab Results   Component Value Date    A1C 7.4 (H) 10/31/2018       Lab Results   Component Value Date    INR 1.01 05/09/2019           Problem List:     Patient Active Problem List   Diagnosis     Hyperlipidemia LDL goal <70     Essential hypertension, benign     Type 2 diabetes mellitus without complication, without long-term current use of insulin (H)     Unstable angina (H)     NSTEMI (non-ST elevated myocardial infarction) (H)           Medications:     Current Outpatient Medications   Medication Sig Dispense Refill     aspirin 81 MG EC tablet Take 1 tablet (81 mg) by mouth daily 90 tablet 3     atorvastatin (LIPITOR) 20 MG tablet Take 20 mg by mouth daily  90 tablet 3     glimepiride (AMARYL) 2 MG tablet Take 1 tablet (2 mg) by mouth every morning (before breakfast) 90 tablet 3     ibuprofen (ADVIL) 200 MG capsule Take 200 mg by mouth every 4 hours as needed for fever       lisinopril (PRINIVIL/ZESTRIL) 20 MG tablet Take 1 tablet (20 mg) by mouth daily 90 tablet 3      metFORMIN (GLUCOPHAGE) 1000 MG tablet Take 1 tablet (1,000 mg) by mouth 2 times daily (with meals) 60 tablet 0     metoprolol tartrate (LOPRESSOR) 25 MG tablet Take 1 tablet (25 mg) by mouth 2 times daily 180 tablet 3     nitroGLYcerin (NITROSTAT) 0.4 MG sublingual tablet For chest pain place 1 tablet under the tongue every 5 minutes for up to 3 doses. If symptoms persist 5 minutes after 2nd dose call 911. 30 tablet 0     ticagrelor (BRILINTA) 90 MG tablet Take 1 tablet (90 mg) by mouth 2 times daily 180 tablet 3           Past Medical History:     Past Medical History:   Diagnosis Date     Diabetes (H)      Past Surgical History:   Procedure Laterality Date     CV HEART CATHETERIZATION WITH POSSIBLE INTERVENTION N/A 5/9/2019    Procedure: Coronary Angiogram;  Surgeon: Jose Enrique Stanley MD;  Location:  HEART CARDIAC CATH LAB     CV PCI ANGIOPLASTY N/A 5/9/2019    Procedure: PCI Angioplasty;  Surgeon: Jose Enrique Stanley MD;  Location:  HEART CARDIAC CATH LAB     Family History   Problem Relation Age of Onset     Diabetes Paternal Grandmother      Diabetes Nephew      Social History     Socioeconomic History     Marital status:      Spouse name: Not on file     Number of children: Not on file     Years of education: Not on file     Highest education level: Not on file   Occupational History     Not on file   Social Needs     Financial resource strain: Not on file     Food insecurity:     Worry: Not on file     Inability: Not on file     Transportation needs:     Medical: Not on file     Non-medical: Not on file   Tobacco Use     Smoking status: Never Smoker     Smokeless tobacco: Never Used   Substance and Sexual Activity     Alcohol use: No     Drug use: No     Sexual activity: Yes     Partners: Female   Lifestyle     Physical activity:     Days per week: Not on file     Minutes per session: Not on file     Stress: Not on file   Relationships     Social connections:     Talks on phone: Not on file      Gets together: Not on file     Attends Protestant service: Not on file     Active member of club or organization: Not on file     Attends meetings of clubs or organizations: Not on file     Relationship status: Not on file     Intimate partner violence:     Fear of current or ex partner: Not on file     Emotionally abused: Not on file     Physically abused: Not on file     Forced sexual activity: Not on file   Other Topics Concern     Parent/sibling w/ CABG, MI or angioplasty before 65F 55M? Not Asked   Social History Narrative     Not on file           Allergies:   Patient has no known allergies.      Leila Curran PA-C  [facility]  Pager: [phone]

## 2019-10-29 NOTE — LETTER
10/29/2019    Michael Méndez MD  [address] [facility] [address]    RE: Jessica Gomez       Dear Colleague,    I had the pleasure of seeing Jessica Gomez in the [facility].    Cardiology Clinic Progress Note    Jessica Gomez MRN# [id number]   YOB: 1960 Age: 59 year old   Primary cardiologist: Dr. Oneil         Assessment and Plan:     In summary, Jessica Gomez presents today for reassessment after recent stress testing.     1. CAD s/p PCI 5/2019, with symptoms concerning for exertional angina.     - Nuclear stress test negative for ischemia, although difficult study.    - On appropriate medical therapy including Brilinta, aspirin, statin, BB, ACEi.      Plan:  - Will start Imdur 30 mg daily. He will return for reassessment in 1 month. He will monitor his symptoms closely and notify me in the meantime if they worsen. In that case, we would consider referral for invasive work-up with angiogram. Jessica is very comfortable with this plan.         History of Presenting Illness:      Jessica Gomez is a pleasant 59 year old patient who presents today for reassessment after recent testing.    The patient has a history of the following -   # CAD, unstable angina s/p PCI to proximal LAD and balloon angioplasty to D1 lesion on 5/9/19, with residual 70% ramus lesion - on DAPT, statin, BB, ACEi.   # DMII, on metformin - a1c 7.4% in October 2018  # HLP, on Lipitor  # HTN  # Social - no hx of tobacco use. Works as . Son and daughter are in the area.     Ellie underwent stress ECHO in May for symptoms of typical progressive angina. This was markedly abnormal with development of an anterior wall motion abnormality with stress. He was seen urgently by Dr. Oneil who recommended urgent [facility]/coronary angiogram that same day. This revealed a 99% prox LAD culprit lesion, with a 70% ostial D1. A 70% ramus was also  "noted. The LAD was stented successfully with a [id number] wyatt MICHELLE, and balloon angioplasty was performed on the prox D1. When Jessica saw Dr. Oneil on 10/18, he noted symptoms consistent with recurring exertional angina. A stress nuclear was ordered. This showed no evidence for ischemia, although was a difficult study with visceral artifact affecting the inferior wall. He exercised 9 minutes, to a workload of 10 METs. BP and HR increased appropriately.     Today, he presents alone to clinic. He reports unchanged symptoms - 2-3 minutes of chest discomfort which could occur but not always with heavy exertion at work. He tells me it does not remind him of his prior angina, although I believe that's only because it's less severe. He's had no resting discomfort. He otherwise feels very well. He's eating a low fat diet and rarely eats meat - when he does, it is typically fish. He is compliant on his medications including DAPT. His lipid profile in July showed an LDL of 55, HDL 31, TG's 153, . His BP is adequate.          Review of Systems:     12-pt ROS is negative except for as noted in the HPI.          Physical Exam:     Vitals: /72   Pulse 81   Ht 1.727 m (5' 8\")   Wt 88.5 kg (195 lb)   BMI 29.65 kg/m     Wt Readings from Last 10 Encounters:   10/29/19 88.5 kg (195 lb)   10/18/19 88.8 kg (195 lb 11.2 oz)   07/26/19 87.5 kg (193 lb)   05/22/19 86.6 kg (191 lb)   05/09/19 87 kg (191 lb 11.2 oz)   05/09/19 87.5 kg (193 lb)   04/30/19 87.6 kg (193 lb 3.2 oz)   10/31/18 84.5 kg (186 lb 4.8 oz)   07/06/17 91.6 kg (202 lb)   06/29/17 88.9 kg (196 lb)       Constitutional:  Patient is pleasant, alert, cooperative, and in NAD.  HEENT:  NCAT. PERRLA. EOM's intact.   Neck:  CVP appears normal. No carotid bruits.   Pulmonary: Normal respiratory effort. CTAB.   Cardiac: RRR, normal S1/S2, no S3/S4, no murmur or rub.   Abdomen:  Non-tender abdomen, no hepatosplenomegaly appreciated.   Vascular: Pulses in the " upper and lower extremities are 2+ and equal bilaterally.   Extremities: No edema, erythema, cyanosis or tenderness appreciated.  Skin:  No rashes or lesions appreciated.   Neurological:  No gross motor or sensory deficits.   Psych: Appropriate affect.        Data:   Labs reviewed:  Recent Labs   Lab Test 07/26/19  0851 05/09/19  1240 10/31/18  0825 06/29/17  1817 05/18/16  0904   LDL 55 57 38 57 34   HDL 31* 34* 37* 32* 34*   NHDL 86 83 58 83 59   CHOL 117 117 95 115 93   TRIG 153* 129 101 130 124   TSH  --   --  4.83* 21.02* 6.12*       Lab Results   Component Value Date    WBC 7.2 05/13/2019    RBC 4.91 05/13/2019    HGB 13.4 05/13/2019    HCT 38.6 (L) 05/13/2019    MCV 79 05/13/2019    MCH 27.3 05/13/2019    MCHC 34.7 05/13/2019    RDW 12.7 05/13/2019     05/13/2019       Lab Results   Component Value Date     05/13/2019    POTASSIUM 4.6 05/13/2019    CHLORIDE 105 05/13/2019    CO2 29 05/13/2019    ANIONGAP 5 05/13/2019     (H) 05/13/2019    BUN 10 05/13/2019    CR 1.05 05/13/2019    GFRESTIMATED 77 05/13/2019    GFRESTBLACK 90 05/13/2019    KRIS 8.8 05/13/2019      Lab Results   Component Value Date    AST 25 05/13/2019    ALT 17 07/26/2019       Lab Results   Component Value Date    A1C 7.4 (H) 10/31/2018       Lab Results   Component Value Date    INR 1.01 05/09/2019           Problem List:     Patient Active Problem List   Diagnosis     Hyperlipidemia LDL goal <70     Essential hypertension, benign     Type 2 diabetes mellitus without complication, without long-term current use of insulin (H)     Unstable angina (H)     NSTEMI (non-ST elevated myocardial infarction) (H)           Medications:     Current Outpatient Medications   Medication Sig Dispense Refill     aspirin 81 MG EC tablet Take 1 tablet (81 mg) by mouth daily 90 tablet 3     atorvastatin (LIPITOR) 20 MG tablet Take 20 mg by mouth daily  90 tablet 3     glimepiride (AMARYL) 2 MG tablet Take 1 tablet (2 mg) by mouth every  morning (before breakfast) 90 tablet 3     ibuprofen (ADVIL) 200 MG capsule Take 200 mg by mouth every 4 hours as needed for fever       lisinopril (PRINIVIL/ZESTRIL) 20 MG tablet Take 1 tablet (20 mg) by mouth daily 90 tablet 3     metFORMIN (GLUCOPHAGE) 1000 MG tablet Take 1 tablet (1,000 mg) by mouth 2 times daily (with meals) 60 tablet 0     metoprolol tartrate (LOPRESSOR) 25 MG tablet Take 1 tablet (25 mg) by mouth 2 times daily 180 tablet 3     nitroGLYcerin (NITROSTAT) 0.4 MG sublingual tablet For chest pain place 1 tablet under the tongue every 5 minutes for up to 3 doses. If symptoms persist 5 minutes after 2nd dose call 911. 30 tablet 0     ticagrelor (BRILINTA) 90 MG tablet Take 1 tablet (90 mg) by mouth 2 times daily 180 tablet 3           Past Medical History:     Past Medical History:   Diagnosis Date     Diabetes (H)      Past Surgical History:   Procedure Laterality Date     CV HEART CATHETERIZATION WITH POSSIBLE INTERVENTION N/A 5/9/2019    Procedure: Coronary Angiogram;  Surgeon: Jose Enrique Stanley MD;  Location: [facility] CARDIAC CATH LAB     CV PCI ANGIOPLASTY N/A 5/9/2019    Procedure: PCI Angioplasty;  Surgeon: Jose Enrique Stanley MD;  Location:  HEART CARDIAC CATH LAB     Family History   Problem Relation Age of Onset     Diabetes Paternal Grandmother      Diabetes Nephew      Social History     Socioeconomic History     Marital status:      Spouse name: Not on file     Number of children: Not on file     Years of education: Not on file     Highest education level: Not on file   Occupational History     Not on file   Social Needs     Financial resource strain: Not on file     Food insecurity:     Worry: Not on file     Inability: Not on file     Transportation needs:     Medical: Not on file     Non-medical: Not on file   Tobacco Use     Smoking status: Never Smoker     Smokeless tobacco: Never Used   Substance and Sexual Activity     Alcohol use: No     Drug use: No     Sexual  activity: Yes     Partners: Female   Lifestyle     Physical activity:     Days per week: Not on file     Minutes per session: Not on file     Stress: Not on file   Relationships     Social connections:     Talks on phone: Not on file     Gets together: Not on file     Attends Caoda[employer] service: Not on file     Active member of club or organization: Not on file     Attends meetings of clubs or organizations: Not on file     Relationship status: Not on file     Intimate partner violence:     Fear of current or ex partner: Not on file     Emotionally abused: Not on file     Physically abused: Not on file     Forced sexual activity: Not on file   Other Topics Concern     Parent/sibling w/ CABG, MI or angioplasty before 65F 55M? Not Asked   Social History Narrative     Not on file           Allergies:   Patient has no known allergies.      Leila Curran PA-C  [facility]  Pager: [phone]      Thank you for allowing me to participate in the care of your patient.      Sincerely,     Leila Curran PA-C     [facility]    cc:   Mark Oneil MD  [address]  [place] [address]

## 2019-12-01 ENCOUNTER — TRANSFERRED RECORDS (OUTPATIENT)
Dept: HEALTH INFORMATION MANAGEMENT | Facility: CLINIC | Age: 59
End: 2019-12-01

## 2019-12-01 LAB — RETINOPATHY: NORMAL

## 2019-12-17 ENCOUNTER — OFFICE VISIT (OUTPATIENT)
Dept: CARDIOLOGY | Facility: CLINIC | Age: 59
End: 2019-12-17
Attending: PHYSICIAN ASSISTANT
Payer: COMMERCIAL

## 2019-12-17 VITALS
HEIGHT: 71 IN | BODY MASS INDEX: 26.74 KG/M2 | WEIGHT: 191 LBS | SYSTOLIC BLOOD PRESSURE: 112 MMHG | HEART RATE: 81 BPM | DIASTOLIC BLOOD PRESSURE: 60 MMHG

## 2019-12-17 DIAGNOSIS — I25.10 CORONARY ARTERY DISEASE INVOLVING NATIVE CORONARY ARTERY OF NATIVE HEART WITHOUT ANGINA PECTORIS: ICD-10-CM

## 2019-12-17 PROCEDURE — 99214 OFFICE O/P EST MOD 30 MIN: CPT | Performed by: PHYSICIAN ASSISTANT

## 2019-12-17 ASSESSMENT — MIFFLIN-ST. JEOR: SCORE: 1703.5

## 2019-12-17 NOTE — PATIENT INSTRUCTIONS
Today's Plan:   - Try to increase your exercise routine to include two days of 30 minutes of intense exercise, such as biking or swimming.   - Continue a mediterranean-style diet.   - Please come back to see Dr. Oneil in 6 months with a blood draw for the cholesterol beforehand.  - If you re-develop chest discomfort that occurs with exercise, please let me know.     If you have questions or concerns please call my nurse team at [phone].  Scheduling phone number: [phone]  Reminder: Please bring in all current medications, over the counter supplements and vitamin bottles to your next appointment.    It was a pleasure seeing you today!     Leila Curran PA-C, [facility]

## 2019-12-17 NOTE — LETTER
12/17/2019    Michael Méndez MD  [address] [facility] [address]    RE: Jessica Gomez       Dear Colleague,    I had the pleasure of seeing Jessica Gomez in the [facility].    Cardiology Clinic Progress Note    Jessica Gomez MRN# [id number]   YOB: 1960 Age: 59 year old   Primary cardiologist: Dr. Oneil         Assessment and Plan:     In summary, Jessica Gomez presents today for a routine f/u visit.     1. CAD s/p PCI 5/2019, residual 70% ramus  2. Hx of atypical chest pain, probable GERD    - Nuclear stress test 10/22/19 negative for ischemia.    - On appropriate medical therapy including Brilinta, aspirin, statin, BB, ACEi, Imdur.     - No exertional discomfort since addition of Imdur.   3. HTN, controlled  4. HLP, controlled  5. DMII - overdue for follow-up with PCP, plans to schedule this.     Plan:  - No changes today. Patient is doing well. I did recommend increasing activity level to include two days of more intense exercise such as treadmill or biking to supplement his current routine.     Follow-up:  - Dr. Oneil in 6 months with repeat lipid panel prior.        History of Presenting Illness:      Jessica Gomez is a pleasant 59 year old patient who presents today for a routine f/u visit.     The patient has a history of the following -   # CAD, unstable angina s/p PCI to proximal LAD and balloon angioplasty to D1 lesion on 5/9/19, with residual 70% ramus lesion - on DAPT, statin, BB, ACEi.   # DMII, on metformin - a1c 7.4% in October 2018  # HLP, on Lipitor  # HTN  # Social - no hx of tobacco use. Works as . Son and daughter are in the area.     Ellie underwent stress ECHO in May for symptoms of typical progressive angina. This was markedly abnormal with development of an anterior wall motion abnormality with stress. He was seen urgently by Dr. Oneil who recommended urgent [facility]/coronary  "angiogram that same day. This revealed a 99% prox LAD culprit lesion, with a 70% ostial D1. A 70% ramus was also noted. The LAD was stented successfully with a [id number] wyatt MICHELLE, and balloon angioplasty was performed on the prox D1. When Jessica saw Dr. Oneil on 10/18, he noted symptoms consistent with recurring exertional angina. A stress nuclear was ordered. This showed no evidence for ischemia, although was a difficult study with visceral artifact affecting the inferior wall. He exercised 9 minutes, to a workload of 10 METs. BP and HR increased appropriately. We added Imdur to his regimen.     Today, he presents to clinic with his son who has many appropriate questions. He feels well, overall. He's eating a low fat diet and rarely eats meat - when he does, it is typically fish. He is compliant on his medications including DAPT. His lipid profile in July showed an LDL of 55, HDL 31, TG's 153, . His BP is well-controlled. He feels he's tolerating all of his medications well.  He's walking 1 mile 3x weekly without problem. He can occasionally note some chest burning after eating, but never with exertion. He denies dyspnea, PND, orthopnea, leg swelling, claudication, near-syncope or syncope. He plans to schedule follow up with his PCP in the near future for diabetes management (last a1c was over one year ago).         Review of Systems:     12-pt ROS is negative except for as noted in the HPI.          Physical Exam:     Vitals: /60   Pulse 81   Ht 1.803 m (5' 11\")   Wt 86.6 kg (191 lb)   BMI 26.64 kg/m     Wt Readings from Last 10 Encounters:   12/17/19 86.6 kg (191 lb)   10/29/19 88.5 kg (195 lb)   10/18/19 88.8 kg (195 lb 11.2 oz)   07/26/19 87.5 kg (193 lb)   05/22/19 86.6 kg (191 lb)   05/09/19 87 kg (191 lb 11.2 oz)   05/09/19 87.5 kg (193 lb)   04/30/19 87.6 kg (193 lb 3.2 oz)   10/31/18 84.5 kg (186 lb 4.8 oz)   07/06/17 91.6 kg (202 lb)       Constitutional:  Patient is pleasant, alert, " cooperative, and in NAD.  HEENT:  NCAT. PERRLA. EOM's intact.   Neck:  CVP appears normal. No carotid bruits.   Pulmonary: Normal respiratory effort. CTAB.   Cardiac: RRR, normal S1/S2, no S3/S4, no murmur or rub.   Abdomen:  Non-tender abdomen, no hepatosplenomegaly appreciated.   Vascular: Pulses in the upper and lower extremities are 2+ and equal bilaterally.   Extremities: No edema, erythema, cyanosis or tenderness appreciated.  Skin:  No rashes or lesions appreciated.   Neurological:  No gross motor or sensory deficits.   Psych: Appropriate affect.        Data:   Labs reviewed:  Recent Labs   Lab Test 07/26/19  0851 05/09/19  1240 10/31/18  0825 06/29/17  1817 05/18/16  0904   LDL 55 57 38 57 34   HDL 31* 34* 37* 32* 34*   NHDL 86 83 58 83 59   CHOL 117 117 95 115 93   TRIG 153* 129 101 130 124   TSH  --   --  4.83* 21.02* 6.12*       Lab Results   Component Value Date    WBC 7.2 05/13/2019    RBC 4.91 05/13/2019    HGB 13.4 05/13/2019    HCT 38.6 (L) 05/13/2019    MCV 79 05/13/2019    MCH 27.3 05/13/2019    MCHC 34.7 05/13/2019    RDW 12.7 05/13/2019     05/13/2019       Lab Results   Component Value Date     05/13/2019    POTASSIUM 4.6 05/13/2019    CHLORIDE 105 05/13/2019    CO2 29 05/13/2019    ANIONGAP 5 05/13/2019     (H) 05/13/2019    BUN 10 05/13/2019    CR 1.05 05/13/2019    GFRESTIMATED 77 05/13/2019    GFRESTBLACK 90 05/13/2019    KRIS 8.8 05/13/2019      Lab Results   Component Value Date    AST 25 05/13/2019    ALT 17 07/26/2019       Lab Results   Component Value Date    A1C 7.4 (H) 10/31/2018       Lab Results   Component Value Date    INR 1.01 05/09/2019           Problem List:     Patient Active Problem List   Diagnosis     Hyperlipidemia LDL goal <70     Essential hypertension, benign     Type 2 diabetes mellitus without complication, without long-term current use of insulin (H)     Unstable angina (H)     NSTEMI (non-ST elevated myocardial infarction) (H)            Medications:     Current Outpatient Medications   Medication Sig Dispense Refill     aspirin 81 MG EC tablet Take 1 tablet (81 mg) by mouth daily 90 tablet 3     atorvastatin (LIPITOR) 20 MG tablet Take 20 mg by mouth daily  90 tablet 3     glimepiride (AMARYL) 2 MG tablet Take 1 tablet (2 mg) by mouth every morning (before breakfast) 90 tablet 3     ibuprofen (ADVIL) 200 MG capsule Take 200 mg by mouth every 4 hours as needed for fever       isosorbide mononitrate (IMDUR) 30 MG 24 hr tablet Take 1 tablet (30 mg) by mouth daily 30 tablet 5     lisinopril (PRINIVIL/ZESTRIL) 20 MG tablet Take 1 tablet (20 mg) by mouth daily 90 tablet 3     metFORMIN (GLUCOPHAGE) 1000 MG tablet Take 1 tablet (1,000 mg) by mouth 2 times daily (with meals) 60 tablet 0     metoprolol tartrate (LOPRESSOR) 25 MG tablet Take 1 tablet (25 mg) by mouth 2 times daily 180 tablet 3     nitroGLYcerin (NITROSTAT) 0.4 MG sublingual tablet For chest pain place 1 tablet under the tongue every 5 minutes for up to 3 doses. If symptoms persist 5 minutes after 2nd dose call 911. 30 tablet 0     ticagrelor (BRILINTA) 90 MG tablet Take 1 tablet (90 mg) by mouth 2 times daily 180 tablet 3           Past Medical History:     Past Medical History:   Diagnosis Date     Diabetes (H)      Past Surgical History:   Procedure Laterality Date     CV HEART CATHETERIZATION WITH POSSIBLE INTERVENTION N/A 5/9/2019    Procedure: Coronary Angiogram;  Surgeon: Jose Enrique Stanley MD;  Location:  HEART CARDIAC CATH LAB     CV PCI ANGIOPLASTY N/A 5/9/2019    Procedure: PCI Angioplasty;  Surgeon: Jose Enrique Stanley MD;  Location:  HEART CARDIAC CATH LAB     Family History   Problem Relation Age of Onset     Diabetes Paternal Grandmother      Diabetes Nephew      Social History     Socioeconomic History     Marital status:      Spouse name: Not on file     Number of children: Not on file     Years of education: Not on file     Highest education level: Not on  file   Occupational History     Not on file   Social Needs     Financial resource strain: Not on file     Food insecurity:     Worry: Not on file     Inability: Not on file     Transportation needs:     Medical: Not on file     Non-medical: Not on file   Tobacco Use     Smoking status: Never Smoker     Smokeless tobacco: Never Used   Substance and Sexual Activity     Alcohol use: No     Drug use: No     Sexual activity: Yes     Partners: Female   Lifestyle     Physical activity:     Days per week: Not on file     Minutes per session: Not on file     Stress: Not on file   Relationships     Social connections:     Talks on phone: Not on file     Gets together: Not on file     Attends [employer] service: Not on file     Active member of club or organization: Not on file     Attends meetings of clubs or organizations: Not on file     Relationship status: Not on file     Intimate partner violence:     Fear of current or ex partner: Not on file     Emotionally abused: Not on file     Physically abused: Not on file     Forced sexual activity: Not on file   Other Topics Concern     Parent/sibling w/ CABG, MI or angioplasty before 65F 55M? Not Asked   Social History Narrative     Not on file           Allergies:   Patient has no known allergies.      Leila Curran PA-C  [facility]  Pager: [phone]    Thank you for allowing me to participate in the care of your patient.    Sincerely,     Leila Curran PA-C     [facility]

## 2019-12-17 NOTE — PROGRESS NOTES
Cardiology Clinic Progress Note    Jessica Gomez MRN# [id number]   YOB: 1960 Age: 59 year old   Primary cardiologist: Dr. Oneil         Assessment and Plan:     In summary, Jessica Gomez presents today for a routine f/u visit.     1. CAD s/p PCI 5/2019, residual 70% ramus  2. Hx of atypical chest pain, probable GERD    - Nuclear stress test 10/22/19 negative for ischemia.    - On appropriate medical therapy including Brilinta, aspirin, statin, BB, ACEi, Imdur.     - No exertional discomfort since addition of Imdur.   3. HTN, controlled  4. HLP, controlled  5. DMII - overdue for follow-up with PCP, plans to schedule this.     Plan:  - No changes today. Patient is doing well. I did recommend increasing activity level to include two days of more intense exercise such as treadmill or biking to supplement his current routine.     Follow-up:  - Dr. Oneil in 6 months with repeat lipid panel prior.        History of Presenting Illness:      Jessica Gomez is a pleasant 59 year old patient who presents today for a routine f/u visit.     The patient has a history of the following -   # CAD, unstable angina s/p PCI to proximal LAD and balloon angioplasty to D1 lesion on 5/9/19, with residual 70% ramus lesion - on DAPT, statin, BB, ACEi.   # DMII, on metformin - a1c 7.4% in October 2018  # HLP, on Lipitor  # HTN  # Social - no hx of tobacco use. Works as . Son and daughter are in the area.     Ellie underwent stress ECHO in May for symptoms of typical progressive angina. This was markedly abnormal with development of an anterior wall motion abnormality with stress. He was seen urgently by Dr. Oneil who recommended urgent [facility]/coronary angiogram that same day. This revealed a 99% prox LAD culprit lesion, with a 70% ostial D1. A 70% ramus was also noted. The LAD was stented successfully with a [id number] wyatt MICHELLE, and balloon angioplasty was performed on the prox D1. When  "Jessica saw Dr. Oneil on 10/18, he noted symptoms consistent with recurring exertional angina. A stress nuclear was ordered. This showed no evidence for ischemia, although was a difficult study with visceral artifact affecting the inferior wall. He exercised 9 minutes, to a workload of 10 METs. BP and HR increased appropriately. We added Imdur to his regimen.     Today, he presents to clinic with his son who has many appropriate questions. He feels well, overall. He's eating a low fat diet and rarely eats meat - when he does, it is typically fish. He is compliant on his medications including DAPT. His lipid profile in July showed an LDL of 55, HDL 31, TG's 153, . His BP is well-controlled. He feels he's tolerating all of his medications well.  He's walking 1 mile 3x weekly without problem. He can occasionally note some chest burning after eating, but never with exertion. He denies dyspnea, PND, orthopnea, leg swelling, claudication, near-syncope or syncope. He plans to schedule follow up with his PCP in the near future for diabetes management (last a1c was over one year ago).         Review of Systems:     12-pt ROS is negative except for as noted in the HPI.          Physical Exam:     Vitals: /60   Pulse 81   Ht 1.803 m (5' 11\")   Wt 86.6 kg (191 lb)   BMI 26.64 kg/m    Wt Readings from Last 10 Encounters:   12/17/19 86.6 kg (191 lb)   10/29/19 88.5 kg (195 lb)   10/18/19 88.8 kg (195 lb 11.2 oz)   07/26/19 87.5 kg (193 lb)   05/22/19 86.6 kg (191 lb)   05/09/19 87 kg (191 lb 11.2 oz)   05/09/19 87.5 kg (193 lb)   04/30/19 87.6 kg (193 lb 3.2 oz)   10/31/18 84.5 kg (186 lb 4.8 oz)   07/06/17 91.6 kg (202 lb)       Constitutional:  Patient is pleasant, alert, cooperative, and in NAD.  HEENT:  NCAT. PERRLA. EOM's intact.   Neck:  CVP appears normal. No carotid bruits.   Pulmonary: Normal respiratory effort. CTAB.   Cardiac: RRR, normal S1/S2, no S3/S4, no murmur or rub.   Abdomen:  Non-tender " abdomen, no hepatosplenomegaly appreciated.   Vascular: Pulses in the upper and lower extremities are 2+ and equal bilaterally.   Extremities: No edema, erythema, cyanosis or tenderness appreciated.  Skin:  No rashes or lesions appreciated.   Neurological:  No gross motor or sensory deficits.   Psych: Appropriate affect.        Data:   Labs reviewed:  Recent Labs   Lab Test 07/26/19  0851 05/09/19  1240 10/31/18  0825 06/29/17  1817 05/18/16  0904   LDL 55 57 38 57 34   HDL 31* 34* 37* 32* 34*   NHDL 86 83 58 83 59   CHOL 117 117 95 115 93   TRIG 153* 129 101 130 124   TSH  --   --  4.83* 21.02* 6.12*       Lab Results   Component Value Date    WBC 7.2 05/13/2019    RBC 4.91 05/13/2019    HGB 13.4 05/13/2019    HCT 38.6 (L) 05/13/2019    MCV 79 05/13/2019    MCH 27.3 05/13/2019    MCHC 34.7 05/13/2019    RDW 12.7 05/13/2019     05/13/2019       Lab Results   Component Value Date     05/13/2019    POTASSIUM 4.6 05/13/2019    CHLORIDE 105 05/13/2019    CO2 29 05/13/2019    ANIONGAP 5 05/13/2019     (H) 05/13/2019    BUN 10 05/13/2019    CR 1.05 05/13/2019    GFRESTIMATED 77 05/13/2019    GFRESTBLACK 90 05/13/2019    KRIS 8.8 05/13/2019      Lab Results   Component Value Date    AST 25 05/13/2019    ALT 17 07/26/2019       Lab Results   Component Value Date    A1C 7.4 (H) 10/31/2018       Lab Results   Component Value Date    INR 1.01 05/09/2019           Problem List:     Patient Active Problem List   Diagnosis     Hyperlipidemia LDL goal <70     Essential hypertension, benign     Type 2 diabetes mellitus without complication, without long-term current use of insulin (H)     Unstable angina (H)     NSTEMI (non-ST elevated myocardial infarction) (H)           Medications:     Current Outpatient Medications   Medication Sig Dispense Refill     aspirin 81 MG EC tablet Take 1 tablet (81 mg) by mouth daily 90 tablet 3     atorvastatin (LIPITOR) 20 MG tablet Take 20 mg by mouth daily  90 tablet 3      glimepiride (AMARYL) 2 MG tablet Take 1 tablet (2 mg) by mouth every morning (before breakfast) 90 tablet 3     ibuprofen (ADVIL) 200 MG capsule Take 200 mg by mouth every 4 hours as needed for fever       isosorbide mononitrate (IMDUR) 30 MG 24 hr tablet Take 1 tablet (30 mg) by mouth daily 30 tablet 5     lisinopril (PRINIVIL/ZESTRIL) 20 MG tablet Take 1 tablet (20 mg) by mouth daily 90 tablet 3     metFORMIN (GLUCOPHAGE) 1000 MG tablet Take 1 tablet (1,000 mg) by mouth 2 times daily (with meals) 60 tablet 0     metoprolol tartrate (LOPRESSOR) 25 MG tablet Take 1 tablet (25 mg) by mouth 2 times daily 180 tablet 3     nitroGLYcerin (NITROSTAT) 0.4 MG sublingual tablet For chest pain place 1 tablet under the tongue every 5 minutes for up to 3 doses. If symptoms persist 5 minutes after 2nd dose call 911. 30 tablet 0     ticagrelor (BRILINTA) 90 MG tablet Take 1 tablet (90 mg) by mouth 2 times daily 180 tablet 3           Past Medical History:     Past Medical History:   Diagnosis Date     Diabetes (H)      Past Surgical History:   Procedure Laterality Date     CV HEART CATHETERIZATION WITH POSSIBLE INTERVENTION N/A 5/9/2019    Procedure: Coronary Angiogram;  Surgeon: Jose Enrique Stanley MD;  Location: [facility] CARDIAC CATH LAB     CV PCI ANGIOPLASTY N/A 5/9/2019    Procedure: PCI Angioplasty;  Surgeon: Jose Enrique Stanley MD;  Location:  HEART CARDIAC CATH LAB     Family History   Problem Relation Age of Onset     Diabetes Paternal Grandmother      Diabetes Nephew      Social History     Socioeconomic History     Marital status:      Spouse name: Not on file     Number of children: Not on file     Years of education: Not on file     Highest education level: Not on file   Occupational History     Not on file   Social Needs     Financial resource strain: Not on file     Food insecurity:     Worry: Not on file     Inability: Not on file     Transportation needs:     Medical: Not on file     Non-medical:  Not on file   Tobacco Use     Smoking status: Never Smoker     Smokeless tobacco: Never Used   Substance and Sexual Activity     Alcohol use: No     Drug use: No     Sexual activity: Yes     Partners: Female   Lifestyle     Physical activity:     Days per week: Not on file     Minutes per session: Not on file     Stress: Not on file   Relationships     Social connections:     Talks on phone: Not on file     Gets together: Not on file     Attends Buddhist service: Not on file     Active member of club or organization: Not on file     Attends meetings of clubs or organizations: Not on file     Relationship status: Not on file     Intimate partner violence:     Fear of current or ex partner: Not on file     Emotionally abused: Not on file     Physically abused: Not on file     Forced sexual activity: Not on file   Other Topics Concern     Parent/sibling w/ CABG, MI or angioplasty before 65F 55M? Not Asked   Social History Narrative     Not on file           Allergies:   Patient has no known allergies.      Leila Curran PA-C  [facility]  Pager: [phone]

## 2019-12-24 ENCOUNTER — OFFICE VISIT (OUTPATIENT)
Dept: INTERNAL MEDICINE | Facility: CLINIC | Age: 59
End: 2019-12-24
Payer: COMMERCIAL

## 2019-12-24 VITALS
RESPIRATION RATE: 15 BRPM | HEART RATE: 80 BPM | SYSTOLIC BLOOD PRESSURE: 120 MMHG | DIASTOLIC BLOOD PRESSURE: 70 MMHG | OXYGEN SATURATION: 98 % | BODY MASS INDEX: 26.92 KG/M2 | TEMPERATURE: 97.9 F | WEIGHT: 193 LBS

## 2019-12-24 DIAGNOSIS — Z12.5 SCREENING PSA (PROSTATE SPECIFIC ANTIGEN): ICD-10-CM

## 2019-12-24 DIAGNOSIS — I10 ESSENTIAL HYPERTENSION, BENIGN: ICD-10-CM

## 2019-12-24 DIAGNOSIS — E78.5 HYPERLIPIDEMIA LDL GOAL <70: ICD-10-CM

## 2019-12-24 DIAGNOSIS — Z23 NEED FOR PROPHYLACTIC VACCINATION AND INOCULATION AGAINST INFLUENZA: ICD-10-CM

## 2019-12-24 DIAGNOSIS — E11.9 TYPE 2 DIABETES MELLITUS WITHOUT COMPLICATION, WITHOUT LONG-TERM CURRENT USE OF INSULIN (H): Primary | ICD-10-CM

## 2019-12-24 PROCEDURE — 99214 OFFICE O/P EST MOD 30 MIN: CPT | Mod: 25 | Performed by: INTERNAL MEDICINE

## 2019-12-24 PROCEDURE — 90682 RIV4 VACC RECOMBINANT DNA IM: CPT | Performed by: INTERNAL MEDICINE

## 2019-12-24 PROCEDURE — 90471 IMMUNIZATION ADMIN: CPT | Performed by: INTERNAL MEDICINE

## 2019-12-24 RX ORDER — LISINOPRIL 20 MG/1
20 TABLET ORAL DAILY
Qty: 90 TABLET | Refills: 3 | Status: SHIPPED | OUTPATIENT
Start: 2019-12-24 | End: 2021-03-02

## 2019-12-24 RX ORDER — GLIMEPIRIDE 2 MG/1
2 TABLET ORAL
Qty: 90 TABLET | Refills: 3 | Status: SHIPPED | OUTPATIENT
Start: 2019-12-24 | End: 2021-03-02

## 2019-12-24 NOTE — Clinical Note
Please abstract the following data from this visit with this patient into the appropriate field in Epic:Eye exam with ophthalmology on this date: 12/1/19

## 2019-12-24 NOTE — Clinical Note
Please abstract the following data from this visit with this patient into the appropriate field in Epic:Tests that can be patient reported without a hard copy:Eye exam with ophthalmology on this date: Yes- Date of last eye exam: 12/3/19- [facility], normal Other Tests found in the patient's chart through Chart Review/Care Everywhere:{Abstract Quality List (Optional):894399}Note to Abstraction: If this section is blank, no results were found via Chart Review/Care Everywhere.

## 2019-12-24 NOTE — PROGRESS NOTES
Subjective     Jessica Gomez is a 59 year old male who presents to clinic today for the following health issues:    HPI   Diabetes Follow-up    How often are you checking your blood sugar? A few times a month  What time of day are you checking your blood sugars (select all that apply)?  Before meals  Have you had any blood sugars above 200?  No  Have you had any blood sugars below 70?  No    What symptoms do you notice when your blood sugar is low?  None    What concerns do you have today about your diabetes? None     Do you have any of these symptoms? (Select all that apply)  No numbness or tingling in feet.  No redness, sores or blisters on feet.  No complaints of excessive thirst.  No reports of blurry vision.  No significant changes to weight.     Have you had a diabetic eye exam in the last 12 months? Yes- Date of last eye exam: 12/3/19- [facility], normal     BP Readings from Last 2 Encounters:   12/17/19 112/60   10/29/19 134/72     Hemoglobin A1C (%)   Date Value   10/31/2018 7.4 (H)   06/29/2017 8.7 (H)     LDL Cholesterol Calculated (mg/dL)   Date Value   07/26/2019 55   05/09/2019 57       Patient Active Problem List   Diagnosis     Hyperlipidemia LDL goal <70     Essential hypertension, benign     Type 2 diabetes mellitus without complication, without long-term current use of insulin (H)     Unstable angina (H)     NSTEMI (non-ST elevated myocardial infarction) (H)     Past Surgical History:   Procedure Laterality Date     CV HEART CATHETERIZATION WITH POSSIBLE INTERVENTION N/A 5/9/2019    Procedure: Coronary Angiogram;  Surgeon: Jose Enrique Stanley MD;  [facility]     CV PCI ANGIOPLASTY N/A 5/9/2019    Procedure: PCI Angioplasty;  Surgeon: Jose Enrique Stanley MD;  [facility]       Social History     Tobacco Use     Smoking status: Never Smoker     Smokeless tobacco: Never Used   Substance Use Topics     Alcohol use: No      Family History   Problem Relation Age of Onset     Diabetes Paternal Grandmother      Diabetes Nephew          Current Outpatient Medications   Medication Sig Dispense Refill     aspirin 81 MG EC tablet Take 1 tablet (81 mg) by mouth daily 90 tablet 3     atorvastatin (LIPITOR) 20 MG tablet Take 20 mg by mouth daily  90 tablet 3     glimepiride (AMARYL) 2 MG tablet Take 1 tablet (2 mg) by mouth every morning (before breakfast) 90 tablet 3     isosorbide mononitrate (IMDUR) 30 MG 24 hr tablet Take 1 tablet (30 mg) by mouth daily 30 tablet 5     lisinopril (PRINIVIL/ZESTRIL) 20 MG tablet Take 1 tablet (20 mg) by mouth daily 90 tablet 3     metFORMIN (GLUCOPHAGE) 1000 MG tablet Take 1 tablet (1,000 mg) by mouth 2 times daily (with meals) 180 tablet 3     metoprolol tartrate (LOPRESSOR) 25 MG tablet Take 1 tablet (25 mg) by mouth 2 times daily 180 tablet 3     ticagrelor (BRILINTA) 90 MG tablet Take 1 tablet (90 mg) by mouth 2 times daily 180 tablet 3     ibuprofen (ADVIL) 200 MG capsule Take 200 mg by mouth every 4 hours as needed for fever       nitroGLYcerin (NITROSTAT) 0.4 MG sublingual tablet For chest pain place 1 tablet under the tongue every 5 minutes for up to 3 doses. If symptoms persist 5 minutes after 2nd dose call 911. 30 tablet 0     No Known Allergies  Recent Labs   Lab Test 07/26/19  0851 05/13/19  0800 05/09/19  1240 04/30/19  1352 10/31/18  0825 06/29/17  1817 05/18/16  0904   A1C  --   --   --   --  7.4* 8.7* 9.1*   LDL 55  --  57  --  38 57 34   HDL 31*  --  34*  --  37* 32* 34*   TRIG 153*  --  129  --  101 130 124   ALT 17 40  --  25 29 30 24   CR  --  1.05 1.00 1.08 1.15 1.08 0.99   GFRESTIMATED  --  77 82 75 65 70 78   GFRESTBLACK  --  90 >90 87 79 85 >90   GFR Calc     POTASSIUM  --  4.6 4.1 4.6 4.6 4.5 4.1   TSH  --   --   --   --  4.83* 21.02* 6.12*      BP Readings from Last 3 Encounters:   12/17/19 112/60   10/29/19 134/72   10/18/19 129/74    Wt Readings from Last 3  Encounters:   12/17/19 86.6 kg (191 lb)   10/29/19 88.5 kg (195 lb)   10/18/19 88.8 kg (195 lb 11.2 oz)              Reviewed and updated as needed this visit by Provider         Review of Systems   ROS COMP: CONSTITUTIONAL: NEGATIVE for fever, chills, change in weight  EYES: NEGATIVE for vision changes or irritation  ENT/MOUTH: NEGATIVE for ear, mouth and throat problems  RESP: NEGATIVE for significant cough or SOB  CV: NEGATIVE for chest pain, palpitations or peripheral edema  GI: NEGATIVE for nausea, abdominal pain, heartburn, or change in bowel habits  : NEGATIVE for frequency, dysuria, or hematuria  MUSCULOSKELETAL: NEGATIVE for significant arthralgias or myalgia  NEURO: NEGATIVE for weakness, dizziness or paresthesias  HEME: NEGATIVE for bleeding problems  PSYCHIATRIC: NEGATIVE for changes in mood or affect       Objective    /70   Pulse 80   Temp 97.9  F (36.6  C) (Oral)   Resp 15   Wt 87.5 kg (193 lb)   SpO2 98%   BMI 26.92 kg/m    Body mass index is 26.92 kg/m .  Physical Exam   GENERAL: healthy, alert and no distress  EYES: Eyes grossly normal to inspection, PERRL and conjunctivae and sclerae normal  HENT: ear canals and TM's normal, nose and mouth without ulcers or lesions  NECK: no adenopathy, no asymmetry, masses, or scars and thyroid normal to palpation  RESP: lungs clear to auscultation - no rales, rhonchi or wheezes  CV: regular rate and rhythm, normal S1 S2, no S3 or S4, no murmur, click or rub, no peripheral edema and peripheral pulses strong  MS: no gross musculoskeletal defects noted, no edema  NEURO: Normal strength and tone, mentation intact and speech normal  PSYCH: mentation appears normal, affect normal/bright        Lab Results   Component Value Date    A1C 7.4 10/31/2018    A1C 8.7 06/29/2017    A1C 9.1 05/18/2016    A1C 10.2 03/16/2015     LDL Cholesterol Calculated   Date Value Ref Range Status   07/26/2019 55 <100 mg/dL Final     Comment:     Desirable:       <100 mg/dl  "    Creatinine   Date Value Ref Range Status   05/13/2019 1.05 0.66 - 1.25 mg/dL Final       Lab Results   Component Value Date    ALT 17 07/26/2019         Assessment & Plan     1. Type 2 diabetes mellitus without complication, without long-term current use of insulin (H)  Check A1c level as well as urine protein and thyroid test, medication refill.  Patient was advised of need of every 6-month A1c level  - Hemoglobin A1c  - Albumin Random Urine Quantitative with Creat Ratio  - TSH with free T4 reflex  - glimepiride (AMARYL) 2 MG tablet; Take 1 tablet (2 mg) by mouth every morning (before breakfast)  Dispense: 90 tablet; Refill: 3  - lisinopril (PRINIVIL/ZESTRIL) 20 MG tablet; Take 1 tablet (20 mg) by mouth daily  Dispense: 90 tablet; Refill: 3  - metFORMIN (GLUCOPHAGE) 1000 MG tablet; Take 1 tablet (1,000 mg) by mouth 2 times daily (with meals)  Dispense: 180 tablet; Refill: 3    2. Hyperlipidemia LDL goal <70  LDL at goal on therapy continue with current medical management    3. Essential hypertension, benign  Stable at goal with no change in medication recommended.  - lisinopril (PRINIVIL/ZESTRIL) 20 MG tablet; Take 1 tablet (20 mg) by mouth daily  Dispense: 90 tablet; Refill: 3    4. Screening PSA (prostate specific antigen)  Ordered as routine screening based on age.  - PSA, screen    Patient advised that he will be due for colonoscopy in the middle of next year and he will contact me via my chart message to schedule.    Advised patient to check with insurance also about benefit of shingles vaccination    BMI:   Estimated body mass index is 26.64 kg/m  as calculated from the following:    Height as of 12/17/19: 1.803 m (5' 11\").    Weight as of 12/17/19: 86.6 kg (191 lb).   Work on weight loss  Regular exercise    Return in about 6 months (around 6/24/2020) for diabetes check 6 months.    Michael Méndez MD  [facility]    "

## 2019-12-26 DIAGNOSIS — Z12.5 SCREENING PSA (PROSTATE SPECIFIC ANTIGEN): ICD-10-CM

## 2019-12-26 DIAGNOSIS — E11.9 TYPE 2 DIABETES MELLITUS WITHOUT COMPLICATION, WITHOUT LONG-TERM CURRENT USE OF INSULIN (H): ICD-10-CM

## 2019-12-26 LAB
CREAT UR-MCNC: 203 MG/DL
HBA1C MFR BLD: 7.7 % (ref 0–5.6)
MICROALBUMIN UR-MCNC: 48 MG/L
MICROALBUMIN/CREAT UR: 23.6 MG/G CR (ref 0–17)
PSA SERPL-ACNC: 0.5 UG/L (ref 0–4)
T4 FREE SERPL-MCNC: 1.14 NG/DL (ref 0.76–1.46)
TSH SERPL DL<=0.005 MIU/L-ACNC: 6.39 MU/L (ref 0.4–4)

## 2019-12-26 PROCEDURE — G0103 PSA SCREENING: HCPCS | Performed by: INTERNAL MEDICINE

## 2019-12-26 PROCEDURE — 36415 COLL VENOUS BLD VENIPUNCTURE: CPT | Performed by: INTERNAL MEDICINE

## 2019-12-26 PROCEDURE — 82043 UR ALBUMIN QUANTITATIVE: CPT | Performed by: INTERNAL MEDICINE

## 2019-12-26 PROCEDURE — 84439 ASSAY OF FREE THYROXINE: CPT | Performed by: INTERNAL MEDICINE

## 2019-12-26 PROCEDURE — 84443 ASSAY THYROID STIM HORMONE: CPT | Performed by: INTERNAL MEDICINE

## 2019-12-26 PROCEDURE — 83036 HEMOGLOBIN GLYCOSYLATED A1C: CPT | Performed by: INTERNAL MEDICINE

## 2020-02-29 DIAGNOSIS — E78.5 HYPERLIPIDEMIA LDL GOAL <100: ICD-10-CM

## 2020-03-01 NOTE — TELEPHONE ENCOUNTER
"Requested Prescriptions   Pending Prescriptions Disp Refills     atorvastatin (LIPITOR) 20 MG tablet [Pharmacy Med Name: Atorvastatin Calcium Oral Tablet 20 MG] 90 tablet 2     Sig: Take 1 tablet (20 mg) by mouth daily   Last Written Prescription Date:  5/22/2019  Last Fill Quantity: 90,  # refills: 3   Last Office Visit: 12/24/2019   Future Office Visit:         Statins Protocol Passed - 2/29/2020  1:07 PM        Passed - LDL on file in past 12 months     Recent Labs   Lab Test 07/26/19  0851   LDL 55             Passed - No abnormal creatine kinase in past 12 months     No lab results found.             Passed - Recent (12 mo) or future (30 days) visit within the authorizing provider's specialty     Patient has had an office visit with the authorizing provider or a provider within the authorizing providers department within the previous 12 mos or has a future within next 30 days. See \"Patient Info\" tab in inbasket, or \"Choose Columns\" in Meds & Orders section of the refill encounter.              Passed - Medication is active on med list        Passed - Patient is age 18 or older          "

## 2020-03-03 RX ORDER — ATORVASTATIN CALCIUM 20 MG/1
TABLET, FILM COATED ORAL
Qty: 90 TABLET | Refills: 2 | Status: SHIPPED | OUTPATIENT
Start: 2020-03-03 | End: 2021-03-02

## 2020-03-10 ENCOUNTER — HEALTH MAINTENANCE LETTER (OUTPATIENT)
Age: 60
End: 2020-03-10

## 2020-04-24 DIAGNOSIS — I25.10 CORONARY ARTERY DISEASE INVOLVING NATIVE CORONARY ARTERY OF NATIVE HEART WITHOUT ANGINA PECTORIS: ICD-10-CM

## 2020-04-24 RX ORDER — ISOSORBIDE MONONITRATE 30 MG/1
30 TABLET, EXTENDED RELEASE ORAL DAILY
Qty: 90 TABLET | Refills: 0 | Status: SHIPPED | OUTPATIENT
Start: 2020-04-24 | End: 2020-07-24

## 2020-05-06 ENCOUNTER — DOCUMENTATION ONLY (OUTPATIENT)
Dept: CARDIOLOGY | Facility: CLINIC | Age: 60
End: 2020-05-06

## 2020-05-13 ENCOUNTER — VIRTUAL VISIT (OUTPATIENT)
Dept: CARDIOLOGY | Facility: CLINIC | Age: 60
End: 2020-05-13
Attending: INTERNAL MEDICINE
Payer: COMMERCIAL

## 2020-05-13 VITALS — WEIGHT: 193 LBS | BODY MASS INDEX: 27.02 KG/M2 | HEIGHT: 71 IN

## 2020-05-13 DIAGNOSIS — E78.5 HYPERLIPIDEMIA LDL GOAL <100: ICD-10-CM

## 2020-05-13 DIAGNOSIS — I10 ESSENTIAL HYPERTENSION, BENIGN: ICD-10-CM

## 2020-05-13 DIAGNOSIS — R06.09 DYSPNEA ON EXERTION: ICD-10-CM

## 2020-05-13 DIAGNOSIS — E11.9 TYPE 2 DIABETES MELLITUS WITHOUT COMPLICATION, WITHOUT LONG-TERM CURRENT USE OF INSULIN (H): ICD-10-CM

## 2020-05-13 DIAGNOSIS — I20.0 UNSTABLE ANGINA (H): ICD-10-CM

## 2020-05-13 DIAGNOSIS — I25.10 CORONARY ARTERY DISEASE INVOLVING NATIVE CORONARY ARTERY OF NATIVE HEART WITHOUT ANGINA PECTORIS: Primary | ICD-10-CM

## 2020-05-13 LAB
ALT SERPL W P-5'-P-CCNC: <5 U/L (ref 5–30)
ANION GAP SERPL CALCULATED.3IONS-SCNC: 15.4 MMOL/L (ref 6–17)
BUN SERPL-MCNC: 12 MG/DL (ref 7–30)
CALCIUM SERPL-MCNC: 9 MG/DL (ref 8.5–10.5)
CHLORIDE SERPL-SCNC: 105 MMOL/L (ref 98–107)
CHOLEST SERPL-MCNC: 127 MG/DL
CO2 SERPL-SCNC: 25 MMOL/L (ref 23–29)
CREAT SERPL-MCNC: 1.25 MG/DL (ref 0.7–1.3)
GFR SERPL CREATININE-BSD FRML MDRD: 59 ML/MIN/{1.73_M2}
GLUCOSE SERPL-MCNC: 115 MG/DL (ref 70–105)
HDLC SERPL-MCNC: 39 MG/DL
LDLC SERPL CALC-MCNC: 63 MG/DL
NONHDLC SERPL-MCNC: 88 MG/DL
POTASSIUM SERPL-SCNC: 4.4 MMOL/L (ref 3.5–5.1)
SODIUM SERPL-SCNC: 141 MMOL/L (ref 136–145)
TRIGL SERPL-MCNC: 126 MG/DL

## 2020-05-13 PROCEDURE — 80061 LIPID PANEL: CPT | Performed by: INTERNAL MEDICINE

## 2020-05-13 PROCEDURE — 80048 BASIC METABOLIC PNL TOTAL CA: CPT | Performed by: INTERNAL MEDICINE

## 2020-05-13 PROCEDURE — 84460 ALANINE AMINO (ALT) (SGPT): CPT | Performed by: INTERNAL MEDICINE

## 2020-05-13 PROCEDURE — 36415 COLL VENOUS BLD VENIPUNCTURE: CPT | Performed by: INTERNAL MEDICINE

## 2020-05-13 PROCEDURE — 99214 OFFICE O/P EST MOD 30 MIN: CPT | Mod: 95 | Performed by: INTERNAL MEDICINE

## 2020-05-13 ASSESSMENT — MIFFLIN-ST. JEOR: SCORE: 1707.57

## 2020-05-13 NOTE — PROGRESS NOTES
"Jessica Gomez is a 60 year old male who is being evaluated via a billable video visit.      The patient has been notified of following:     \"This video visit will be conducted via a call between you and your physician/provider. We have found that certain health care needs can be provided without the need for an in-person physical exam.  This service lets us provide the care you need with a video conversation.  If a prescription is necessary we can send it directly to your pharmacy.  If lab work is needed we can place an order for that and you can then stop by our lab to have the test done at a later time.    Video visits are billed at different rates depending on your insurance coverage.  Please reach out to your insurance provider with any questions.    If during the course of the call the physician/provider feels a video visit is not appropriate, you will not be charged for this service.\"    Patient has given verbal consent for Video visit? Yes    How would you like to obtain your AVS? Rodriguez    Patient would like the video invitation sent by: Text to cell phone: [phone]    Will anyone else be joining your video visit? No      Bp:N/A  Pulse:N/A  Wt:193.0lb    General:  no apparent distress, normal body habitus, sitting upright.  ENT/Mouth:  membranes moist, no nasal discharge.  Normal head shape, no apparent injury or laceration.  Eyes:  no scleral icterus, normal conjunctivae.  No observed jaundice.  Neck:  no apparent neck swelling.   Chest/Lungs:  No breathing difficulty while speaking.  No audible wheezing.  No cough during conversation.  Cardiovascular:  No obviously elevated jugular venous pressure.  No apparent edema bilaterally in LE.   Abdomen:  no obvious abdominal distention.   Extremities:  no apparent cyanosis.  Skin:  no xanthelasma.  No facial lacerations.  Neurologic:  Normal arm motion bilateral, no tremors.    Psychiatric:  Alert and oriented x3, calm demeanor    The rest of the " comprehensive physical examination is deferred due to public health emergency video visit restrictions.      Review Of Systems  Skin: NEGATIVE  Eyes:Ears/Nose/Throat: NEGATIVE  Respiratory: SOB w/exertion  Cardiovascular:Chest pressure  Gastrointestinal: NEGATIVE  Genitourinary:NEGATIVE   Musculoskeletal: NEGATIVE  Neurologic: NEGATIVE  Psychiatric: NEGATIVE  Hematologic/Lymphatic/Immunologic: NEGATIVE  Endocrine: Diabetic    Yumi Huffman [facility]    Video-Visit Details    Type of service:  Video Visit    Video Start Time: 10:44  Video End Time: 10:57    Originating Location (pt. Location): Home    Distant Location (provider location):  [facility]     Platform used for Video Visit: GREER Anderson MD    HPI and Plan:   See dictation    Orders Placed This Encounter   Procedures     Basic metabolic panel     Lipid Profile     ALT     Follow-Up with Cardiac Advanced Practice Provider     Follow-Up with Cardiologist     Exercise Stress Echocardiogram     No orders of the defined types were placed in this encounter.    Medications Discontinued During This Encounter   Medication Reason     ticagrelor (BRILINTA) 90 MG tablet          Encounter Diagnoses   Name Primary?     Coronary artery disease involving native coronary artery of native heart without angina pectoris Yes     Essential hypertension, benign      Hyperlipidemia LDL goal <100      Type 2 diabetes mellitus without complication, without long-term current use of insulin (H)      Unstable angina (H)      Dyspnea on exertion        CURRENT MEDICATIONS:  Current Outpatient Medications   Medication Sig Dispense Refill     aspirin 81 MG EC tablet Take 1 tablet (81 mg) by mouth daily 90 tablet 3     atorvastatin (LIPITOR) 20 MG tablet Take 1 tablet (20 mg) by mouth daily 90 tablet 2     glimepiride (AMARYL) 2 MG tablet Take 1 tablet (2 mg) by mouth every morning (before breakfast) 90 tablet 3     ibuprofen (ADVIL) 200  MG capsule Take 200 mg by mouth every 4 hours as needed for fever       isosorbide mononitrate (IMDUR) 30 MG 24 hr tablet Take 1 tablet (30 mg) by mouth daily 90 tablet 0     lisinopril (PRINIVIL/ZESTRIL) 20 MG tablet Take 1 tablet (20 mg) by mouth daily 90 tablet 3     metFORMIN (GLUCOPHAGE) 1000 MG tablet Take 1 tablet (1,000 mg) by mouth 2 times daily (with meals) 180 tablet 3     metoprolol tartrate (LOPRESSOR) 25 MG tablet Take 1 tablet (25 mg) by mouth 2 times daily 180 tablet 3     nitroGLYcerin (NITROSTAT) 0.4 MG sublingual tablet For chest pain place 1 tablet under the tongue every 5 minutes for up to 3 doses. If symptoms persist 5 minutes after 2nd dose call 911. 30 tablet 0       ALLERGIES   No Known Allergies    PAST MEDICAL HISTORY:  Past Medical History:   Diagnosis Date     Diabetes (H)        PAST SURGICAL HISTORY:  Past Surgical History:   Procedure Laterality Date     CV HEART CATHETERIZATION WITH POSSIBLE INTERVENTION N/A 5/9/2019    Procedure: Coronary Angiogram;  Surgeon: Jose Enrique Stanley MD;  Location: [facility] CARDIAC CATH LAB     CV PCI ANGIOPLASTY N/A 5/9/2019    Procedure: PCI Angioplasty;  Surgeon: Jose Enrique Stanley MD;  Location:  HEART CARDIAC CATH LAB       FAMILY HISTORY:  Family History   Problem Relation Age of Onset     Diabetes Paternal Grandmother      Diabetes Nephew        SOCIAL HISTORY:  Social History     Socioeconomic History     Marital status:      Spouse name: Not on file     Number of children: Not on file     Years of education: Not on file     Highest education level: Not on file   Occupational History     Not on file   Social Needs     Financial resource strain: Not on file     Food insecurity     Worry: Not on file     Inability: Not on file     Transportation needs     Medical: Not on file     Non-medical: Not on file   Tobacco Use     Smoking status: Never Smoker     Smokeless tobacco: Never Used   Substance and Sexual Activity     Alcohol use:  "No     Drug use: No     Sexual activity: Yes     Partners: Female   Lifestyle     Physical activity     Days per week: Not on file     Minutes per session: Not on file     Stress: Not on file   Relationships     Social connections     Talks on phone: Not on file     Gets together: Not on file     Attends Zoroastrianism service: Not on file     Active member of club or organization: Not on file     Attends meetings of clubs or organizations: Not on file     Relationship status: Not on file     Intimate partner violence     Fear of current or ex partner: Not on file     Emotionally abused: Not on file     Physically abused: Not on file     Forced sexual activity: Not on file   Other Topics Concern     Parent/sibling w/ CABG, MI or angioplasty before 65F 55M? Not Asked   Social History Narrative     Not on file       Physical Exam:  Vitals: Ht 1.803 m (5' 11\")   Wt 87.5 kg (193 lb)   BMI 26.92 kg/m      Constitutional:           Skin:             Head:           Eyes:           Lymph:      ENT:           Neck:           Respiratory:            Cardiac:                                                           GI:           Extremities and Muscular Skeletal:                 Neurological:           Psych:         Recent Lab Results:  LIPID RESULTS:  Lab Results   Component Value Date    CHOL 127 05/13/2020    HDL 39 (L) 05/13/2020    LDL 63 05/13/2020    TRIG 126 05/13/2020    CHOLHDLRATIO 4.6 03/16/2015       LIVER ENZYME RESULTS:  Lab Results   Component Value Date    AST 25 05/13/2019    ALT <5 (L) 05/13/2020       CBC RESULTS:  Lab Results   Component Value Date    WBC 7.2 05/13/2019    RBC 4.91 05/13/2019    HGB 13.4 05/13/2019    HCT 38.6 (L) 05/13/2019    MCV 79 05/13/2019    MCH 27.3 05/13/2019    MCHC 34.7 05/13/2019    RDW 12.7 05/13/2019     05/13/2019       BMP RESULTS:  Lab Results   Component Value Date     05/13/2020    POTASSIUM 4.4 05/13/2020    CHLORIDE 105 05/13/2020    CO2 25 05/13/2020    " ANIONGAP 15.4 05/13/2020     (H) 05/13/2020    BUN 12 05/13/2020    CR 1.25 05/13/2020    GFRESTIMATED 59 (L) 05/13/2020    GFRESTBLACK 71 05/13/2020    KRIS 9.0 05/13/2020        A1C RESULTS:  Lab Results   Component Value Date    A1C 7.7 (H) 12/26/2019       INR RESULTS:  Lab Results   Component Value Date    INR 1.01 05/09/2019           CC  Mark Oneil MD  [address]  [place] [id number]

## 2020-05-13 NOTE — LETTER
5/13/2020    Michael Méndez MD     [facility] ([facility]   [address] [place] [address]     RE: Jessica Gomez  [address]  [facility] [address]       Dear Colleague,    Thank you for the opportunity to participate in the care of your patient, Jessica Gomez, at the [facility]. Please see a copy of my visit note below.     REASON FOR VISIT:  I had the opportunity to see Mr. Jessica Gomez in Cardiology Clinic today for a video visit at the [facility].      HISTORY OF PRESENT ILLNESS:  Mr. Gomez has a history of coronary artery disease with chest discomfort symptoms leading to a stress test in 05/2019.  That stress test was significantly abnormal, prompting urgent cardiac catheterization due to ongoing unstable angina type symptoms.  He was found to have severe coronary artery disease with a long area of subtotal occlusion of the proximal LAD treated with angioplasty and stenting.  He had some moderate disease within the proximal first diagonal and ramus vessels which was not thought to be flow limiting.  However, when he continued to have some chest discomfort symptoms, we referred him for nuclear stress testing on 10/22/2019.  Fortunately, that study looked normal.  His chest pain symptoms were much more atypical and felt to be more likely due to GI sources.  Since then, his chest discomfort symptoms have resolved and he has not currently been having any chest discomfort symptoms.      His only complaint now is shortness of breath with exertion.  He gets short of breath easily when he climbs stairs and walks quickly or more prolonged distances.  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  He is able to do that.      He has never been a smoker and has no history of lung disease.  His echocardiogram in 08/2019 demonstrated  normal left ventricular size and function with no valvular disease.  His LV function was normal without evidence of infarction on his nuclear stress test in October as well.      He had laboratory studies done today including a basic metabolic panel which looks normal and fasting cholesterol panel, which is excellent, except for HDL, which is just slightly low at 39.  His LDL is excellent at 63.  I noticed that he had a slightly elevated TSH of 6.4 in 12/2019 and at the same time had a hemoglobin A1c of 7.7.  I suggested that he have his TSH rechecked sometime in the near future and consider Jardiance or other type of SGLT2 inhibitor for additional diabetes treatment.  He will discuss those issues with you to go forward with additional treatment or evaluation as needed.      There is no blood pressure or heart rate available today, but his heart rate and blood pressure have been under excellent control.  He is mildly overweight with a weight of about 195 pounds.      IMPRESSIONS:  Mr. Jessica Gomez is a 60-year-old gentleman with a history of hypertension, dyslipidemia and type 2 diabetes who developed unstable angina symptoms  a year ago and had abnormal stress test results leading to coronary angiography.  He had severe proximal LAD disease treated with angioplasty and stenting with excellent result.  He had some moderate residual disease within a diagonal and ramus arteries, but no other occlusive disease and he has done well since then.  He has some symptoms of exertional shortness of breath now without chest discomfort, which I suspect are probably due to overweight and deconditioning issues.  I urged him to start an exercise program by walking at least 30-45 minutes a day.      Since it has been a year since his stent was implanted, he can safely stop Brilinta at this time.  I suggested he use up his current supply and then stop that medication.  I will take it off of his list.  I also suggested he  talk to you about considering an SGLT2 inhibitor for his diabetes since he appears to need somewhat better control and to take advantage of the cardiovascular protective benefits.  I also suggested a recheck his thyroid function with you since he had mild hypothyroidism and December.      I am pleased he is doing  well from a cardiovascular standpoint and I will have him follow up with us again in 6 months for reevaluation and plan on seeing him in the future as well.     Please do not hesitate to contact me if you have any questions/concerns.     Sincerely,     GREER SKINNER MD

## 2020-05-13 NOTE — PROGRESS NOTES
Service Date: 05/13/2020      VIDEO VISIT      REASON FOR VISIT:  I had the opportunity to see Mr. Jessica Gomez in Cardiology Clinic today for a video visit at the [facility].      HISTORY OF PRESENT ILLNESS:  Mr. Gomez has a history of coronary artery disease with chest discomfort symptoms leading to a stress test in 05/2019.  That stress test was significantly abnormal, prompting urgent cardiac catheterization due to ongoing unstable angina type symptoms.  He was found to have severe coronary artery disease with a long area of subtotal occlusion of the proximal LAD treated with angioplasty and stenting.  He had some moderate disease within the proximal first diagonal and ramus vessels which was not thought to be flow limiting.  However, when he continued to have some chest discomfort symptoms, we referred him for nuclear stress testing on 10/22/2019.  Fortunately, that study looked normal.  His chest pain symptoms were much more atypical and felt to be more likely due to GI sources.  Since then, his chest discomfort symptoms have resolved and he has not currently been having any chest discomfort symptoms.      His only complaint now is shortness of breath with exertion.  He gets short of breath easily when he climbs stairs and walks quickly or more prolonged distances.  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  He is able to do that.      He has never been a smoker and has no history of lung disease.  His echocardiogram in 08/2019 demonstrated normal left ventricular size and function with no valvular disease.  His LV function was normal without evidence of infarction on his nuclear stress test in October as well.      He had laboratory studies done today including a basic metabolic panel which looks normal and fasting cholesterol panel, which is excellent, except for HDL, which is just slightly low at 39.  His LDL is excellent at 63.   I noticed that he had a slightly elevated TSH of 6.4 in 12/2019 and at the same time had a hemoglobin A1c of 7.7.  I suggested that he have his TSH rechecked sometime in the near future and consider Jardiance or other type of SGLT2 inhibitor for additional diabetes treatment.  He will discuss those issues with you to go forward with additional treatment or evaluation as needed.      There is no blood pressure or heart rate available today, but his heart rate and blood pressure have been under excellent control.  He is mildly overweight with a weight of about 195 pounds.      IMPRESSIONS:  Mr. Jessica Gomez is a 60-year-old gentleman with a history of hypertension, dyslipidemia and type 2 diabetes who developed unstable angina symptoms  a year ago and had abnormal stress test results leading to coronary angiography.  He had severe proximal LAD disease treated with angioplasty and stenting with excellent result.  He had some moderate residual disease within a diagonal and ramus arteries, but no other occlusive disease and he has done well since then.  He has some symptoms of exertional shortness of breath now without chest discomfort, which I suspect are probably due to overweight and deconditioning issues.  I urged him to start an exercise program by walking at least 30-45 minutes a day.      Since it has been a year since his stent was implanted, he can safely stop Brilinta at this time.  I suggested he use up his current supply and then stop that medication.  I will take it off of his list.  I also suggested he talk to you about considering an SGLT2 inhibitor for his diabetes since he appears to need somewhat better control and to take advantage of the cardiovascular protective benefits.  I also suggested a recheck his thyroid function with you since he had mild hypothyroidism and December.      I am pleased he is doing  well from a cardiovascular standpoint and I will have him follow up with us again in 6  months for reevaluation and plan on seeing him in the future as well.      cc:      Michael Méndez MD     [facility]   [address] [place] [id number]         GREER SKINNER MD, [facility]             D: 2020   T: 2020   MT: MAGGIE      Name:     EDWARD VIDAL   MRN:      [id number]        Account:      [id number]   :      1960           Service Date: 2020      Document: [id number]

## 2020-07-24 DIAGNOSIS — I25.10 CORONARY ARTERY DISEASE INVOLVING NATIVE CORONARY ARTERY OF NATIVE HEART WITHOUT ANGINA PECTORIS: ICD-10-CM

## 2020-07-24 RX ORDER — ISOSORBIDE MONONITRATE 30 MG/1
30 TABLET, EXTENDED RELEASE ORAL DAILY
Qty: 90 TABLET | Refills: 3 | Status: SHIPPED | OUTPATIENT
Start: 2020-07-24 | End: 2021-08-12

## 2020-10-26 DIAGNOSIS — I25.10 CORONARY ARTERY DISEASE INVOLVING NATIVE CORONARY ARTERY OF NATIVE HEART WITHOUT ANGINA PECTORIS: ICD-10-CM

## 2020-10-26 RX ORDER — METOPROLOL TARTRATE 25 MG/1
25 TABLET, FILM COATED ORAL 2 TIMES DAILY
Qty: 180 TABLET | Refills: 1 | Status: SHIPPED | OUTPATIENT
Start: 2020-10-26 | End: 2021-03-02

## 2020-12-20 ENCOUNTER — HEALTH MAINTENANCE LETTER (OUTPATIENT)
Age: 60
End: 2020-12-20

## 2021-02-03 DIAGNOSIS — I10 ESSENTIAL HYPERTENSION, BENIGN: ICD-10-CM

## 2021-02-03 DIAGNOSIS — E78.5 HYPERLIPIDEMIA LDL GOAL <100: ICD-10-CM

## 2021-02-03 DIAGNOSIS — E11.9 TYPE 2 DIABETES MELLITUS WITHOUT COMPLICATION, WITHOUT LONG-TERM CURRENT USE OF INSULIN (H): ICD-10-CM

## 2021-02-05 RX ORDER — LISINOPRIL 20 MG/1
TABLET ORAL
Qty: 90 TABLET | Refills: 0 | OUTPATIENT
Start: 2021-02-05

## 2021-02-05 RX ORDER — GLIMEPIRIDE 2 MG/1
2 TABLET ORAL
Qty: 90 TABLET | Refills: 0 | OUTPATIENT
Start: 2021-02-05

## 2021-02-05 RX ORDER — ATORVASTATIN CALCIUM 20 MG/1
TABLET, FILM COATED ORAL
Qty: 90 TABLET | Refills: 0 | OUTPATIENT
Start: 2021-02-05

## 2021-02-05 NOTE — TELEPHONE ENCOUNTER
Routing refill request to provider for review/approval because:  Labs not current:  A1c.  Patient needs to be seen because it has been more than 1 year since last office visit.

## 2021-03-02 ENCOUNTER — OFFICE VISIT (OUTPATIENT)
Dept: INTERNAL MEDICINE | Facility: CLINIC | Age: 61
End: 2021-03-02
Payer: COMMERCIAL

## 2021-03-02 VITALS
WEIGHT: 198.2 LBS | OXYGEN SATURATION: 99 % | RESPIRATION RATE: 14 BRPM | SYSTOLIC BLOOD PRESSURE: 132 MMHG | DIASTOLIC BLOOD PRESSURE: 74 MMHG | TEMPERATURE: 97.1 F | BODY MASS INDEX: 27.75 KG/M2 | HEART RATE: 73 BPM | HEIGHT: 71 IN

## 2021-03-02 DIAGNOSIS — E11.9 TYPE 2 DIABETES MELLITUS WITHOUT COMPLICATION, WITHOUT LONG-TERM CURRENT USE OF INSULIN (H): ICD-10-CM

## 2021-03-02 DIAGNOSIS — I25.10 CORONARY ARTERY DISEASE INVOLVING NATIVE CORONARY ARTERY OF NATIVE HEART WITHOUT ANGINA PECTORIS: ICD-10-CM

## 2021-03-02 DIAGNOSIS — Z12.5 SCREENING PSA (PROSTATE SPECIFIC ANTIGEN): Primary | ICD-10-CM

## 2021-03-02 DIAGNOSIS — E78.5 HYPERLIPIDEMIA LDL GOAL <100: ICD-10-CM

## 2021-03-02 DIAGNOSIS — I10 ESSENTIAL HYPERTENSION, BENIGN: ICD-10-CM

## 2021-03-02 DIAGNOSIS — Z12.11 COLON CANCER SCREENING: ICD-10-CM

## 2021-03-02 LAB
ALBUMIN SERPL-MCNC: 3.8 G/DL (ref 3.4–5)
ALP SERPL-CCNC: 80 U/L (ref 40–150)
ALT SERPL W P-5'-P-CCNC: 35 U/L (ref 0–70)
ANION GAP SERPL CALCULATED.3IONS-SCNC: 6 MMOL/L (ref 3–14)
AST SERPL W P-5'-P-CCNC: 26 U/L (ref 0–45)
BILIRUB SERPL-MCNC: 0.6 MG/DL (ref 0.2–1.3)
BUN SERPL-MCNC: 10 MG/DL (ref 7–30)
CALCIUM SERPL-MCNC: 9.3 MG/DL (ref 8.5–10.1)
CHLORIDE SERPL-SCNC: 107 MMOL/L (ref 94–109)
CHOLEST SERPL-MCNC: 108 MG/DL
CO2 SERPL-SCNC: 26 MMOL/L (ref 20–32)
CREAT SERPL-MCNC: 1.09 MG/DL (ref 0.66–1.25)
CREAT UR-MCNC: 212 MG/DL
GFR SERPL CREATININE-BSD FRML MDRD: 73 ML/MIN/{1.73_M2}
GLUCOSE SERPL-MCNC: 90 MG/DL (ref 70–99)
HBA1C MFR BLD: 7.6 % (ref 0–5.6)
HDLC SERPL-MCNC: 33 MG/DL
LDLC SERPL CALC-MCNC: 46 MG/DL
MICROALBUMIN UR-MCNC: 61 MG/L
MICROALBUMIN/CREAT UR: 28.96 MG/G CR (ref 0–17)
NONHDLC SERPL-MCNC: 75 MG/DL
POTASSIUM SERPL-SCNC: 4.1 MMOL/L (ref 3.4–5.3)
PROT SERPL-MCNC: 8 G/DL (ref 6.8–8.8)
PSA SERPL-ACNC: 0.54 UG/L (ref 0–4)
SODIUM SERPL-SCNC: 139 MMOL/L (ref 133–144)
TRIGL SERPL-MCNC: 145 MG/DL
TSH SERPL DL<=0.005 MIU/L-ACNC: 6.06 MU/L (ref 0.4–4)

## 2021-03-02 PROCEDURE — 84439 ASSAY OF FREE THYROXINE: CPT | Performed by: INTERNAL MEDICINE

## 2021-03-02 PROCEDURE — 83036 HEMOGLOBIN GLYCOSYLATED A1C: CPT | Performed by: INTERNAL MEDICINE

## 2021-03-02 PROCEDURE — G0103 PSA SCREENING: HCPCS | Performed by: INTERNAL MEDICINE

## 2021-03-02 PROCEDURE — 99214 OFFICE O/P EST MOD 30 MIN: CPT | Performed by: INTERNAL MEDICINE

## 2021-03-02 PROCEDURE — 82043 UR ALBUMIN QUANTITATIVE: CPT | Performed by: INTERNAL MEDICINE

## 2021-03-02 PROCEDURE — 84443 ASSAY THYROID STIM HORMONE: CPT | Performed by: INTERNAL MEDICINE

## 2021-03-02 PROCEDURE — 80061 LIPID PANEL: CPT | Performed by: INTERNAL MEDICINE

## 2021-03-02 PROCEDURE — 36415 COLL VENOUS BLD VENIPUNCTURE: CPT | Performed by: INTERNAL MEDICINE

## 2021-03-02 PROCEDURE — 80053 COMPREHEN METABOLIC PANEL: CPT | Performed by: INTERNAL MEDICINE

## 2021-03-02 RX ORDER — LISINOPRIL 20 MG/1
20 TABLET ORAL DAILY
Qty: 90 TABLET | Refills: 3 | Status: SHIPPED | OUTPATIENT
Start: 2021-03-02 | End: 2022-03-11

## 2021-03-02 RX ORDER — GLIMEPIRIDE 2 MG/1
2 TABLET ORAL
Qty: 90 TABLET | Refills: 3 | Status: SHIPPED | OUTPATIENT
Start: 2021-03-02 | End: 2022-03-11

## 2021-03-02 RX ORDER — ATORVASTATIN CALCIUM 20 MG/1
20 TABLET, FILM COATED ORAL DAILY
Qty: 90 TABLET | Refills: 3 | Status: SHIPPED | OUTPATIENT
Start: 2021-03-02 | End: 2022-03-11

## 2021-03-02 RX ORDER — METOPROLOL TARTRATE 25 MG/1
25 TABLET, FILM COATED ORAL 2 TIMES DAILY
Qty: 180 TABLET | Refills: 3 | Status: SHIPPED | OUTPATIENT
Start: 2021-03-02 | End: 2022-05-24

## 2021-03-02 ASSESSMENT — MIFFLIN-ST. JEOR: SCORE: 1731.16

## 2021-03-02 NOTE — PROGRESS NOTES
"    Assessment & Plan     Type 2 diabetes mellitus without complication, without long-term current use of insulin (H)  Recheck labs and continue with medications as ordered.  Recommended annual eye exam.  - lisinopril (ZESTRIL) 20 MG tablet; Take 1 tablet (20 mg) by mouth daily  - metFORMIN (GLUCOPHAGE) 1000 MG tablet; Take 1 tablet (1,000 mg) by mouth 2 times daily (with meals)  - glimepiride (AMARYL) 2 MG tablet; Take 1 tablet (2 mg) by mouth every morning (before breakfast)  - Comprehensive metabolic panel  - Hemoglobin A1c  - Albumin Random Urine Quantitative with Creat Ratio  - TSH with free T4 reflex    Essential hypertension, benign  Stable on therapy continue with current medical management.  Recheck blood pressure 6 months.  - lisinopril (ZESTRIL) 20 MG tablet; Take 1 tablet (20 mg) by mouth daily  - Comprehensive metabolic panel    Coronary artery disease involving native coronary artery of native heart without angina pectoris  Stable without active complaints.  Continue with medical management and risk reduction  - metoprolol tartrate (LOPRESSOR) 25 MG tablet; Take 1 tablet (25 mg) by mouth 2 times daily  - Lipid Profile    Hyperlipidemia LDL goal <100  Lab orders as fasting.  Continue with statin therapy  - atorvastatin (LIPITOR) 20 MG tablet; Take 1 tablet (20 mg) by mouth daily  - Lipid Profile    Screening PSA (prostate specific antigen)  Ordered as routine screening  - PSA, screen    Colon cancer screening  ADVISED COLONOSCOPY FOR ROUTINE PREVENTATIVE CARE.  - GASTROENTEROLOGY ADULT REF PROCEDURE ONLY; Future       BMI:   Estimated body mass index is 27.64 kg/m  as calculated from the following:    Height as of this encounter: 1.803 m (5' 11\").    Weight as of this encounter: 89.9 kg (198 lb 3.2 oz).   Weight management plan: Discussed healthy diet and exercise guidelines    Work on weight loss  Regular exercise    No follow-ups on file.    Michael Méndez MD  [facility] " YOLANDA Lopez is a 60 year old who presents for the following health issues     HPI       Diabetes Follow-up    How often are you checking your blood sugar? A few times a week  What time of day are you checking your blood sugars (select all that apply)?  Before meals  Have you had any blood sugars above 200?  No  Have you had any blood sugars below 70?  No    What symptoms do you notice when your blood sugar is low?  None    What concerns do you have today about your diabetes? None     Do you have any of these symptoms? (Select all that apply)  No numbness or tingling in feet.  No redness, sores or blisters on feet.  No complaints of excessive thirst.  No reports of blurry vision.  No significant changes to weight.    Have you had a diabetic eye exam in the last 12 months? No                Hyperlipidemia Follow-Up      Are you regularly taking any medication or supplement to lower your cholesterol?   Yes- atorvastatin    Are you having muscle aches or other side effects that you think could be caused by your cholesterol lowering medication?  No    Hypertension Follow-up      Do you check your blood pressure regularly outside of the clinic? No     Are you following a low salt diet? No    Are your blood pressures ever more than 140 on the top number (systolic) OR more   than 90 on the bottom number (diastolic), for example 140/90? N/a     BP Readings from Last 2 Encounters:   12/24/19 120/70   12/17/19 112/60     Hemoglobin A1C (%)   Date Value   12/26/2019 7.7 (H)   10/31/2018 7.4 (H)     LDL Cholesterol Calculated (mg/dL)   Date Value   05/13/2020 63   07/26/2019 55         How many servings of fruits and vegetables do you eat daily?  2-3    On average, how many sweetened beverages do you drink each day (Examples: soda, juice, sweet tea, etc.  Do NOT count diet or artificially sweetened beverages)?   0    How many days per week do you exercise enough to make your heart beat faster? None     How  "many minutes a day do you exercise enough to make your heart beat faster? N/a     How many days per week do you miss taking your medication? 0    Review of Systems   CONSTITUTIONAL: NEGATIVE for fever, chills, change in weight  INTEGUMENTARY/SKIN: NEGATIVE for worrisome rashes, moles or lesions  EYES: NEGATIVE for vision changes or irritation  ENT/MOUTH: NEGATIVE for ear, mouth and throat problems  RESP: NEGATIVE for significant cough or SOB  CV: NEGATIVE for chest pain, palpitations or peripheral edema  GI: NEGATIVE for nausea, abdominal pain, heartburn, or change in bowel habits  : NEGATIVE for frequency, dysuria, or hematuria  MUSCULOSKELETAL: NEGATIVE for significant arthralgias or myalgia  NEURO: NEGATIVE for weakness, dizziness or paresthesias  ENDOCRINE: NEGATIVE for temperature intolerance, skin/hair changes  HEME: NEGATIVE for bleeding problems  PSYCHIATRIC: NEGATIVE for changes in mood or affect      Objective    /74   Pulse 73   Temp 97.1  F (36.2  C) (Temporal)   Resp 14   Ht 1.803 m (5' 11\")   Wt 89.9 kg (198 lb 3.2 oz)   SpO2 99%   BMI 27.64 kg/m    Body mass index is 27.64 kg/m .  Physical Exam   GENERAL: healthy, alert and no distress  EYES: Eyes grossly normal to inspection, PERRL and conjunctivae and sclerae normal  HENT: ear canals and TM's normal, nose and mouth without ulcers or lesions  NECK: no adenopathy, no asymmetry, masses, or scars and thyroid normal to palpation  RESP: lungs clear to auscultation - no rales, rhonchi or wheezes  CV: regular rate and rhythm, normal S1 S2, no S3 or S4, no murmur, click or rub, no peripheral edema and peripheral pulses strong  MS: no gross musculoskeletal defects noted, no edema  NEURO: Normal strength and tone, mentation intact and speech normal  PSYCH: mentation appears normal, affect normal/bright    Lab Results   Component Value Date    A1C 7.7 12/26/2019    A1C 7.4 10/31/2018    A1C 8.7 06/29/2017    A1C 9.1 05/18/2016    A1C 10.2 " 03/16/2015     LDL Cholesterol Calculated   Date Value Ref Range Status   05/13/2020 63 <100 mg/dL Final     Comment:     Desirable:       <100 mg/dl     Creatinine   Date Value Ref Range Status   05/13/2020 1.25 0.70 - 1.30 mg/dL Final

## 2021-03-03 LAB — T4 FREE SERPL-MCNC: 1.21 NG/DL (ref 0.76–1.46)

## 2021-03-31 ENCOUNTER — MYC MEDICAL ADVICE (OUTPATIENT)
Dept: INTERNAL MEDICINE | Facility: CLINIC | Age: 61
End: 2021-03-31

## 2021-04-24 ENCOUNTER — HEALTH MAINTENANCE LETTER (OUTPATIENT)
Age: 61
End: 2021-04-24

## 2021-06-16 DIAGNOSIS — Z11.59 ENCOUNTER FOR SCREENING FOR OTHER VIRAL DISEASES: ICD-10-CM

## 2021-06-21 DIAGNOSIS — E78.5 HYPERLIPIDEMIA LDL GOAL <100: ICD-10-CM

## 2021-06-21 DIAGNOSIS — I25.10 CORONARY ARTERY DISEASE INVOLVING NATIVE CORONARY ARTERY OF NATIVE HEART WITHOUT ANGINA PECTORIS: Primary | ICD-10-CM

## 2021-07-14 ENCOUNTER — DOCUMENTATION ONLY (OUTPATIENT)
Dept: CARDIOLOGY | Facility: CLINIC | Age: 61
End: 2021-07-14

## 2021-07-14 ENCOUNTER — HOSPITAL ENCOUNTER (OUTPATIENT)
Dept: CARDIOLOGY | Facility: CLINIC | Age: 61
Discharge: HOME | End: 2021-07-14
Attending: INTERNAL MEDICINE | Admitting: INTERNAL MEDICINE
Payer: COMMERCIAL

## 2021-07-14 DIAGNOSIS — I25.10 CORONARY ARTERY DISEASE INVOLVING NATIVE CORONARY ARTERY OF NATIVE HEART WITHOUT ANGINA PECTORIS: Primary | ICD-10-CM

## 2021-07-14 DIAGNOSIS — R94.39 ABNORMAL STRESS TEST: ICD-10-CM

## 2021-07-14 DIAGNOSIS — I25.10 CORONARY ARTERY DISEASE INVOLVING NATIVE CORONARY ARTERY OF NATIVE HEART WITHOUT ANGINA PECTORIS: ICD-10-CM

## 2021-07-14 PROCEDURE — 93321 DOPPLER ECHO F-UP/LMTD STD: CPT | Mod: 26 | Performed by: INTERNAL MEDICINE

## 2021-07-14 PROCEDURE — 93350 STRESS TTE ONLY: CPT | Mod: 26 | Performed by: INTERNAL MEDICINE

## 2021-07-14 PROCEDURE — 93325 DOPPLER ECHO COLOR FLOW MAPG: CPT | Mod: 26 | Performed by: INTERNAL MEDICINE

## 2021-07-14 PROCEDURE — 93016 CV STRESS TEST SUPVJ ONLY: CPT | Performed by: INTERNAL MEDICINE

## 2021-07-14 PROCEDURE — 999N000208 ECHO STRESS ECHOCARDIOGRAM

## 2021-07-14 PROCEDURE — 93018 CV STRESS TEST I&R ONLY: CPT | Performed by: INTERNAL MEDICINE

## 2021-07-14 PROCEDURE — 255N000002 HC RX 255 OP 636: Performed by: INTERNAL MEDICINE

## 2021-07-14 RX ADMIN — HUMAN ALBUMIN MICROSPHERES AND PERFLUTREN 9 ML: 10; .22 INJECTION, SOLUTION INTRAVENOUS at 13:03

## 2021-07-14 NOTE — PROGRESS NOTES
Call received from Dr. Parikh regarding abnormal test results request for pt to be seen ASAP for review of results and make further recommendations regarding a cath.   Pt scheduled with Dr. Sarah to review results, and complete H&P if pt agreeable to procedure. MD aware. Primary MD Dr. Oneil.   AYAH Baez RN, BSN. 07/14/21 4:08 PM

## 2021-07-14 NOTE — PROGRESS NOTES
Exercise stress echocardiogram demonstrated stress induced hypokinesis of the anteroseptal an apical wall segments, similar to the prior stress echo 5/2019, following which Pt was found to have multivessel CAD including 99% prox LAD lesion that was stented. Fortunately Pt is currently asymptomatic. Discussed with Pt that we will have him seen for follow up ASAP, and that if he experiences any chest pain/discomfort, abnormal shortness of breath, or any other symptoms concerning for him, he should seek medical care/ED. Pt voiced understanding and was in agreement with the plan.  Will alert his outpatient cardiologist Dr. Oneil.    Joshua Parikh MD, [facility]  Cardiology  Text Page   July 14, 2021

## 2021-07-15 ENCOUNTER — OFFICE VISIT (OUTPATIENT)
Dept: CARDIOLOGY | Facility: CLINIC | Age: 61
End: 2021-07-15
Payer: COMMERCIAL

## 2021-07-15 VITALS
BODY MASS INDEX: 27.58 KG/M2 | DIASTOLIC BLOOD PRESSURE: 57 MMHG | HEIGHT: 71 IN | WEIGHT: 197 LBS | SYSTOLIC BLOOD PRESSURE: 101 MMHG | HEART RATE: 71 BPM

## 2021-07-15 DIAGNOSIS — I25.10 CORONARY ARTERY DISEASE INVOLVING NATIVE CORONARY ARTERY OF NATIVE HEART WITHOUT ANGINA PECTORIS: ICD-10-CM

## 2021-07-15 DIAGNOSIS — I25.10 CORONARY ARTERY DISEASE INVOLVING NATIVE CORONARY ARTERY OF NATIVE HEART WITHOUT ANGINA PECTORIS: Primary | ICD-10-CM

## 2021-07-15 DIAGNOSIS — Z11.59 ENCOUNTER FOR SCREENING FOR OTHER VIRAL DISEASES: ICD-10-CM

## 2021-07-15 DIAGNOSIS — R94.39 ABNORMAL STRESS TEST: Primary | ICD-10-CM

## 2021-07-15 PROCEDURE — 99204 OFFICE O/P NEW MOD 45 MIN: CPT | Performed by: INTERNAL MEDICINE

## 2021-07-15 RX ORDER — NITROGLYCERIN 0.4 MG/1
TABLET SUBLINGUAL
Qty: 30 TABLET | Refills: 0 | Status: SHIPPED | OUTPATIENT
Start: 2021-07-15 | End: 2022-03-15

## 2021-07-15 ASSESSMENT — MIFFLIN-ST. JEOR: SCORE: 1720.72

## 2021-07-15 NOTE — PROGRESS NOTES
HPI and Plan:   See dictation    No orders of the defined types were placed in this encounter.      Orders Placed This Encounter   Medications     nitroGLYcerin (NITROSTAT) 0.4 MG sublingual tablet     Sig: For chest pain place 1 tablet under the tongue every 5 minutes for up to 3 doses. If symptoms persist 5 minutes after 2nd dose call 911.     Dispense:  30 tablet     Refill:  0       Medications Discontinued During This Encounter   Medication Reason     nitroGLYcerin (NITROSTAT) 0.4 MG sublingual tablet Reorder         Encounter Diagnoses   Name Primary?     Coronary artery disease involving native coronary artery of native heart without angina pectoris      Abnormal stress test Yes       CURRENT MEDICATIONS:  Current Outpatient Medications   Medication Sig Dispense Refill     aspirin 81 MG EC tablet Take 1 tablet (81 mg) by mouth daily 90 tablet 3     atorvastatin (LIPITOR) 20 MG tablet Take 1 tablet (20 mg) by mouth daily 90 tablet 3     glimepiride (AMARYL) 2 MG tablet Take 1 tablet (2 mg) by mouth every morning (before breakfast) 90 tablet 3     isosorbide mononitrate (IMDUR) 30 MG 24 hr tablet Take 1 tablet (30 mg) by mouth daily 90 tablet 3     lisinopril (ZESTRIL) 20 MG tablet Take 1 tablet (20 mg) by mouth daily 90 tablet 3     metFORMIN (GLUCOPHAGE) 1000 MG tablet Take 1 tablet (1,000 mg) by mouth 2 times daily (with meals) 180 tablet 3     metoprolol tartrate (LOPRESSOR) 25 MG tablet Take 1 tablet (25 mg) by mouth 2 times daily 180 tablet 3     nitroGLYcerin (NITROSTAT) 0.4 MG sublingual tablet For chest pain place 1 tablet under the tongue every 5 minutes for up to 3 doses. If symptoms persist 5 minutes after 2nd dose call 911. 30 tablet 0     ibuprofen (ADVIL) 200 MG capsule Take 200 mg by mouth every 4 hours as needed for fever (Patient not taking: Reported on 7/15/2021)         ALLERGIES   No Known Allergies    PAST MEDICAL HISTORY:  Past Medical History:   Diagnosis Date     Diabetes (H)         PAST SURGICAL HISTORY:  Past Surgical History:   Procedure Laterality Date     CV HEART CATHETERIZATION WITH POSSIBLE INTERVENTION N/A 5/9/2019    Procedure: Coronary Angiogram;  Surgeon: Jose Enrique Stanley MD;  Location:  HEART CARDIAC CATH LAB     CV PCI ANGIOPLASTY N/A 5/9/2019    Procedure: PCI Angioplasty;  Surgeon: Jose Enrique Stanley MD;  Location:  HEART CARDIAC CATH LAB       FAMILY HISTORY:  Family History   Problem Relation Age of Onset     Diabetes Paternal Grandmother      Diabetes Nephew        SOCIAL HISTORY:  Social History     Socioeconomic History     Marital status:      Spouse name: None     Number of children: None     Years of education: None     Highest education level: None   Occupational History     None   Tobacco Use     Smoking status: Never Smoker     Smokeless tobacco: Never Used   Substance and Sexual Activity     Alcohol use: No     Drug use: No     Sexual activity: Yes     Partners: Female   Other Topics Concern     Parent/sibling w/ CABG, MI or angioplasty before 65F 55M? Not Asked   Social History Narrative     None     Social Determinants of Health     Financial Resource Strain:      Difficulty of Paying Living Expenses:    Food Insecurity:      Worried About Running Out of Food in the Last Year:      Ran Out of Food in the Last Year:    Transportation Needs:      Lack of Transportation (Medical):      Lack of Transportation (Non-Medical):    Physical Activity:      Days of Exercise per Week:      Minutes of Exercise per Session:    Stress:      Feeling of Stress :    Social Connections:      Frequency of Communication with Friends and Family:      Frequency of Social Gatherings with Friends and Family:      Attends Buddhism Services:      Active Member of Clubs or Organizations:      Attends Club or Organization Meetings:      Marital Status:    Intimate Partner Violence:      Fear of Current or Ex-Partner:      Emotionally Abused:      Physically Abused:   "    Sexually Abused:        Review of Systems:  Skin:  Negative       Eyes:  Negative      ENT:  Negative      Respiratory:  Positive for dyspnea on exertion     Cardiovascular:    Positive for;chest pain    Gastroenterology: Negative      Genitourinary:  Negative      Musculoskeletal:  Negative      Neurologic:  Negative      Psychiatric:  Negative      Heme/Lymph/Imm:  Negative      Endocrine:  Positive for        Physical Exam:  Vitals: /57   Pulse 71   Ht 1.803 m (5' 11\")   Wt 89.4 kg (197 lb)   BMI 27.48 kg/m      Constitutional:  cooperative;in no acute distress        Skin:  warm and dry to the touch          Head:  normocephalic        Eyes:  conjunctivae and lids unremarkable        Lymph:      ENT:  no pallor or cyanosis        Neck:  JVP normal        Respiratory:  clear to auscultation         Cardiac: regular rhythm;no murmurs, gallops or rubs detected                    1+         1+   1+         1+            GI:  abdomen soft;non-tender        Extremities and Muscular Skeletal:  no edema              Neurological:  no gross motor deficits        Psych:  Alert and Oriented x 3        CC  No referring provider defined for this encounter.              "

## 2021-07-15 NOTE — LETTER
7/15/2021    Michael Méndez MD  [address] [facility] [address]    RE: Jesscia Gomez       Dear Colleague,    I had the pleasure of seeing Jessica Gomez in the [facility].    HPI and Plan:   See dictation    No orders of the defined types were placed in this encounter.      Orders Placed This Encounter   Medications     nitroGLYcerin (NITROSTAT) 0.4 MG sublingual tablet     Sig: For chest pain place 1 tablet under the tongue every 5 minutes for up to 3 doses. If symptoms persist 5 minutes after 2nd dose call 911.     Dispense:  30 tablet     Refill:  0       Medications Discontinued During This Encounter   Medication Reason     nitroGLYcerin (NITROSTAT) 0.4 MG sublingual tablet Reorder         Encounter Diagnoses   Name Primary?     Coronary artery disease involving native coronary artery of native heart without angina pectoris      Abnormal stress test Yes       CURRENT MEDICATIONS:  Current Outpatient Medications   Medication Sig Dispense Refill     aspirin 81 MG EC tablet Take 1 tablet (81 mg) by mouth daily 90 tablet 3     atorvastatin (LIPITOR) 20 MG tablet Take 1 tablet (20 mg) by mouth daily 90 tablet 3     glimepiride (AMARYL) 2 MG tablet Take 1 tablet (2 mg) by mouth every morning (before breakfast) 90 tablet 3     isosorbide mononitrate (IMDUR) 30 MG 24 hr tablet Take 1 tablet (30 mg) by mouth daily 90 tablet 3     lisinopril (ZESTRIL) 20 MG tablet Take 1 tablet (20 mg) by mouth daily 90 tablet 3     metFORMIN (GLUCOPHAGE) 1000 MG tablet Take 1 tablet (1,000 mg) by mouth 2 times daily (with meals) 180 tablet 3     metoprolol tartrate (LOPRESSOR) 25 MG tablet Take 1 tablet (25 mg) by mouth 2 times daily 180 tablet 3     nitroGLYcerin (NITROSTAT) 0.4 MG sublingual tablet For chest pain place 1 tablet under the tongue every 5 minutes for up to 3 doses. If symptoms persist 5 minutes after 2nd dose call 911. 30 tablet 0      ibuprofen (ADVIL) 200 MG capsule Take 200 mg by mouth every 4 hours as needed for fever (Patient not taking: Reported on 7/15/2021)         ALLERGIES   No Known Allergies    PAST MEDICAL HISTORY:  Past Medical History:   Diagnosis Date     Diabetes (H)        PAST SURGICAL HISTORY:  Past Surgical History:   Procedure Laterality Date     CV HEART CATHETERIZATION WITH POSSIBLE INTERVENTION N/A 5/9/2019    Procedure: Coronary Angiogram;  Surgeon: Jose Enrique Stanley MD;  Location:  HEART CARDIAC CATH LAB     CV PCI ANGIOPLASTY N/A 5/9/2019    Procedure: PCI Angioplasty;  Surgeon: Jose Enrique Stanley MD;  Location:  HEART CARDIAC CATH LAB       FAMILY HISTORY:  Family History   Problem Relation Age of Onset     Diabetes Paternal Grandmother      Diabetes Nephew        SOCIAL HISTORY:  Social History     Socioeconomic History     Marital status:      Spouse name: None     Number of children: None     Years of education: None     Highest education level: None   Occupational History     None   Tobacco Use     Smoking status: Never Smoker     Smokeless tobacco: Never Used   Substance and Sexual Activity     Alcohol use: No     Drug use: No     Sexual activity: Yes     Partners: Female   Other Topics Concern     Parent/sibling w/ CABG, MI or angioplasty before 65F 55M? Not Asked   Social History Narrative     None     Social Determinants of Health     Financial Resource Strain:      Difficulty of Paying Living Expenses:    Food Insecurity:      Worried About Running Out of Food in the Last Year:      Ran Out of Food in the Last Year:    Transportation Needs:      Lack of Transportation (Medical):      Lack of Transportation (Non-Medical):    Physical Activity:      Days of Exercise per Week:      Minutes of Exercise per Session:    Stress:      Feeling of Stress :    Social Connections:      Frequency of Communication with Friends and Family:      Frequency of Social Gatherings with Friends and Family:   "    Attends Yarsanism Services:      Active Member of Clubs or Organizations:      Attends Club or Organization Meetings:      Marital Status:    Intimate Partner Violence:      Fear of Current or Ex-Partner:      Emotionally Abused:      Physically Abused:      Sexually Abused:        Review of Systems:  Skin:  Negative       Eyes:  Negative      ENT:  Negative      Respiratory:  Positive for dyspnea on exertion     Cardiovascular:    Positive for;chest pain    Gastroenterology: Negative      Genitourinary:  Negative      Musculoskeletal:  Negative      Neurologic:  Negative      Psychiatric:  Negative      Heme/Lymph/Imm:  Negative      Endocrine:  Positive for        Physical Exam:  Vitals: /57   Pulse 71   Ht 1.803 m (5' 11\")   Wt 89.4 kg (197 lb)   BMI 27.48 kg/m      Constitutional:  cooperative;in no acute distress        Skin:  warm and dry to the touch          Head:  normocephalic        Eyes:  conjunctivae and lids unremarkable        Lymph:      ENT:  no pallor or cyanosis        Neck:  JVP normal        Respiratory:  clear to auscultation         Cardiac: regular rhythm;no murmurs, gallops or rubs detected                    1+         1+   1+         1+            GI:  abdomen soft;non-tender        Extremities and Muscular Skeletal:  no edema              Neurological:  no gross motor deficits        Psych:  Alert and Oriented x 3        CC  No referring provider defined for this encounter.                  Thank you for allowing me to participate in the care of your patient.      Sincerely,     Sanchez Sarah MD, MD     [facility]  cc:   No referring provider defined for this encounter.        "

## 2021-07-15 NOTE — PROGRESS NOTES
Service Date: 07/15/2021    REASON FOR CONSULTATION:  Chest pain and abnormal stress test.    HISTORY OF PRESENT ILLNESS:  The patient is a 61-year-old gentleman with known coronary artery disease, diabetes, hypertension, hyperlipidemia.  He was evaluated here in 05/2019 with unstable angina.  He had a stress test prior to that, which was very abnormal.  Coronary angiogram was performed, which revealed high-grade proximal LAD stenosis.  This was successfully treated with a drug-eluting stent.    He developed atypical chest discomfort later in 2019 and a stress test performed in 10/2019 showed no inducible ischemia.    He was last seen by Dr. Oneil in 05/2020.  At that time, he was doing well and had no symptoms.    Over the last several months, he has been noticing typical exertional angina.  Symptoms come on with activity such as climbing stairs or walking a hill.  Symptoms go away with rest.  He has not had any resting symptoms.  For this reason, a stress test was recommended.  He underwent a stress echocardiogram yesterday.  The test was read as showing ischemia in the anteroseptal and apical wall segments.  His ECG was also positive for inducible ischemia.    He is here to discuss the results of the stress test and further recommendations.    ASSESSMENT AND PLAN:  A very pleasant 61-year-old gentleman with known coronary artery disease and prior proximal LAD stenting, who now presents with recurrent typical angina symptoms.  I reviewed his stress test.  Given the abnormal stress test findings and ongoing symptoms, I have recommended a repeat coronary angiogram with an eye towards revascularization.  We will plan for the procedure as soon as possible.    He is on an excellent medical program, which I recommended that he continue.    I appreciate the opportunity to participate in his care.    Sanchez Sarah MD        D: 07/15/2021   T: 07/15/2021   MT: MAGGIE    Name:     EDWARD VIDAL  MRN:       [id number]        Account:      [id number]   :      1960           Service Date: 07/15/2021       Document: [id number]

## 2021-07-21 ENCOUNTER — TELEPHONE (OUTPATIENT)
Dept: CARDIOLOGY | Facility: CLINIC | Age: 61
End: 2021-07-21

## 2021-07-21 DIAGNOSIS — R94.39 ABNORMAL STRESS TEST: ICD-10-CM

## 2021-07-21 DIAGNOSIS — E11.9 TYPE 2 DIABETES MELLITUS WITHOUT COMPLICATION, WITHOUT LONG-TERM CURRENT USE OF INSULIN (H): ICD-10-CM

## 2021-07-21 DIAGNOSIS — I25.10 CORONARY ARTERY DISEASE INVOLVING NATIVE CORONARY ARTERY OF NATIVE HEART WITHOUT ANGINA PECTORIS: Primary | ICD-10-CM

## 2021-07-21 RX ORDER — POTASSIUM CHLORIDE 750 MG/1
20 TABLET, EXTENDED RELEASE ORAL
Status: CANCELLED | OUTPATIENT
Start: 2021-07-21

## 2021-07-21 RX ORDER — SODIUM CHLORIDE 9 MG/ML
INJECTION, SOLUTION INTRAVENOUS CONTINUOUS
Status: CANCELLED | OUTPATIENT
Start: 2021-07-21

## 2021-07-21 RX ORDER — ASPIRIN 81 MG/1
325 TABLET ORAL DAILY
Status: CANCELLED | OUTPATIENT
Start: 2021-07-21 | End: 2021-07-23

## 2021-07-21 RX ORDER — LIDOCAINE 40 MG/G
CREAM TOPICAL
Status: CANCELLED | OUTPATIENT
Start: 2021-07-21

## 2021-07-21 NOTE — TELEPHONE ENCOUNTER
[facility] - ANGIOGRAM INSTRUCTIONS:    Jessica Gomez is scheduled for a coronary angiogram at [facility] on 21. Check in time is at 0900 and procedure time is at 1100.  Advised patient not eat or drink after midnight on 21.   Patient advised to take morning medications with a sip of water on the day of the procedure, noting the followin. Aspirin: Patient is currently taking 81mg of aspirin, patient advised to continue same dose.   2. Diabetic Medications: Patient to hold Metformin on the day of the procedure and should continue to hold until after we can review the results of BMP lab. This is scheduled on 21 at 0900. and Patient will hold other oral diabetic medications on the morning of the procedure.  3. Anticoagulants: Patient does not take an anticoagulant.  4. Diuretics: Patient does not take diuretics.  Verified patient does not have an allergy to contrast dye.  Verified patient has someone available to drive them home from the hospital and can stay with them for 24 hours after the procedure. They understand that one visitor may accompany them into the hospital on the day of angiogram.    COVID testing: Scheduled on 21  Follow-up is scheduled with Angela RYAN on 8/3/21.  Patient verbalized understanding regarding instructions. He has my direct contact information for additional questions or concerns.    Angiogram orders have been signed & held.    Veena Van RN  [facility]

## 2021-07-23 ENCOUNTER — LAB (OUTPATIENT)
Dept: URGENT CARE | Facility: URGENT CARE | Age: 61
End: 2021-07-23
Payer: COMMERCIAL

## 2021-07-23 DIAGNOSIS — I25.10 CORONARY ARTERY DISEASE INVOLVING NATIVE CORONARY ARTERY OF NATIVE HEART WITHOUT ANGINA PECTORIS: ICD-10-CM

## 2021-07-23 LAB — SARS-COV-2 RNA RESP QL NAA+PROBE: NEGATIVE

## 2021-07-23 PROCEDURE — U0005 INFEC AGEN DETEC AMPLI PROBE: HCPCS

## 2021-07-23 PROCEDURE — U0003 INFECTIOUS AGENT DETECTION BY NUCLEIC ACID (DNA OR RNA); SEVERE ACUTE RESPIRATORY SYNDROME CORONAVIRUS 2 (SARS-COV-2) (CORONAVIRUS DISEASE [COVID-19]), AMPLIFIED PROBE TECHNIQUE, MAKING USE OF HIGH THROUGHPUT TECHNOLOGIES AS DESCRIBED BY CMS-2020-01-R: HCPCS

## 2021-07-26 ENCOUNTER — APPOINTMENT (OUTPATIENT)
Dept: GENERAL RADIOLOGY | Facility: CLINIC | Age: 61
End: 2021-07-26
Attending: SURGERY
Payer: COMMERCIAL

## 2021-07-26 ENCOUNTER — APPOINTMENT (OUTPATIENT)
Dept: CT IMAGING | Facility: CLINIC | Age: 61
End: 2021-07-26
Attending: SURGERY
Payer: COMMERCIAL

## 2021-07-26 ENCOUNTER — HOSPITAL ENCOUNTER (OUTPATIENT)
Facility: CLINIC | Age: 61
Discharge: HOME | End: 2021-07-26
Payer: COMMERCIAL

## 2021-07-26 ENCOUNTER — APPOINTMENT (OUTPATIENT)
Dept: ULTRASOUND IMAGING | Facility: CLINIC | Age: 61
End: 2021-07-26
Attending: SURGERY
Payer: COMMERCIAL

## 2021-07-26 VITALS
HEIGHT: 71 IN | HEART RATE: 60 BPM | OXYGEN SATURATION: 99 % | TEMPERATURE: 97.9 F | RESPIRATION RATE: 18 BRPM | SYSTOLIC BLOOD PRESSURE: 123 MMHG | BODY MASS INDEX: 27.02 KG/M2 | WEIGHT: 193 LBS | DIASTOLIC BLOOD PRESSURE: 71 MMHG

## 2021-07-26 DIAGNOSIS — I20.0 UNSTABLE ANGINA (H): Primary | ICD-10-CM

## 2021-07-26 DIAGNOSIS — I25.10 CORONARY ARTERY DISEASE INVOLVING NATIVE CORONARY ARTERY OF NATIVE HEART WITHOUT ANGINA PECTORIS: ICD-10-CM

## 2021-07-26 DIAGNOSIS — R94.39 ABNORMAL STRESS TEST: ICD-10-CM

## 2021-07-26 PROBLEM — Z98.890 STATUS POST CORONARY ANGIOGRAM: Status: ACTIVE | Noted: 2021-07-26

## 2021-07-26 LAB
ABO/RH(D): NORMAL
ALBUMIN SERPL-MCNC: 3.1 G/DL (ref 3.4–5)
ALP SERPL-CCNC: 81 U/L (ref 40–150)
ALT SERPL W P-5'-P-CCNC: 28 U/L (ref 0–70)
ANION GAP SERPL CALCULATED.3IONS-SCNC: 1 MMOL/L (ref 3–14)
ANTIBODY SCREEN: NEGATIVE
APTT PPP: 36 SECONDS (ref 22–38)
AST SERPL W P-5'-P-CCNC: 15 U/L (ref 0–45)
BILIRUB DIRECT SERPL-MCNC: 0.2 MG/DL (ref 0–0.2)
BILIRUB SERPL-MCNC: 0.5 MG/DL (ref 0.2–1.3)
BUN SERPL-MCNC: 15 MG/DL (ref 7–30)
CALCIUM SERPL-MCNC: 8.8 MG/DL (ref 8.5–10.1)
CHLORIDE BLD-SCNC: 108 MMOL/L (ref 94–109)
CO2 SERPL-SCNC: 29 MMOL/L (ref 20–32)
CREAT SERPL-MCNC: 1.34 MG/DL (ref 0.66–1.25)
ERYTHROCYTE [DISTWIDTH] IN BLOOD BY AUTOMATED COUNT: 12.2 % (ref 10–15)
GFR SERPL CREATININE-BSD FRML MDRD: 57 ML/MIN/1.73M2
GLUCOSE BLD-MCNC: 104 MG/DL (ref 70–99)
HBA1C MFR BLD: 7.1 % (ref 0–5.6)
HCT VFR BLD AUTO: 42.1 % (ref 40–53)
HGB BLD-MCNC: 13.6 G/DL (ref 13.3–17.7)
INR PPP: 1.03 (ref 0.85–1.15)
MCH RBC QN AUTO: 26.1 PG (ref 26.5–33)
MCHC RBC AUTO-ENTMCNC: 32.3 G/DL (ref 31.5–36.5)
MCV RBC AUTO: 81 FL (ref 78–100)
PLATELET # BLD AUTO: 294 10E3/UL (ref 150–450)
POTASSIUM BLD-SCNC: 4.2 MMOL/L (ref 3.4–5.3)
PROT SERPL-MCNC: 7 G/DL (ref 6.8–8.8)
RBC # BLD AUTO: 5.22 10E6/UL (ref 4.4–5.9)
SODIUM SERPL-SCNC: 138 MMOL/L (ref 133–144)
SPECIMEN EXPIRATION DATE: NORMAL
WBC # BLD AUTO: 11.1 10E3/UL (ref 4–11)

## 2021-07-26 PROCEDURE — 250N000011 HC RX IP 250 OP 636: Performed by: INTERNAL MEDICINE

## 2021-07-26 PROCEDURE — C1887 CATHETER, GUIDING: HCPCS | Performed by: INTERNAL MEDICINE

## 2021-07-26 PROCEDURE — 999N000184 HC STATISTIC TELEMETRY

## 2021-07-26 PROCEDURE — 85730 THROMBOPLASTIN TIME PARTIAL: CPT | Performed by: INTERNAL MEDICINE

## 2021-07-26 PROCEDURE — 93005 ELECTROCARDIOGRAM TRACING: CPT

## 2021-07-26 PROCEDURE — 93880 EXTRACRANIAL BILAT STUDY: CPT

## 2021-07-26 PROCEDURE — 36415 COLL VENOUS BLD VENIPUNCTURE: CPT | Performed by: INTERNAL MEDICINE

## 2021-07-26 PROCEDURE — 71046 X-RAY EXAM CHEST 2 VIEWS: CPT

## 2021-07-26 PROCEDURE — 99152 MOD SED SAME PHYS/QHP 5/>YRS: CPT | Performed by: INTERNAL MEDICINE

## 2021-07-26 PROCEDURE — C1894 INTRO/SHEATH, NON-LASER: HCPCS | Performed by: INTERNAL MEDICINE

## 2021-07-26 PROCEDURE — 99203 OFFICE O/P NEW LOW 30 MIN: CPT | Performed by: SURGERY

## 2021-07-26 PROCEDURE — 999N000054 HC STATISTIC EKG NON-CHARGEABLE

## 2021-07-26 PROCEDURE — 85027 COMPLETE CBC AUTOMATED: CPT | Performed by: INTERNAL MEDICINE

## 2021-07-26 PROCEDURE — 999N000071 HC STATISTIC HEART CATH LAB OR EP LAB

## 2021-07-26 PROCEDURE — 93458 L HRT ARTERY/VENTRICLE ANGIO: CPT | Performed by: INTERNAL MEDICINE

## 2021-07-26 PROCEDURE — 250N000013 HC RX MED GY IP 250 OP 250 PS 637: Performed by: INTERNAL MEDICINE

## 2021-07-26 PROCEDURE — 250N000009 HC RX 250: Performed by: INTERNAL MEDICINE

## 2021-07-26 PROCEDURE — 80048 BASIC METABOLIC PNL TOTAL CA: CPT | Performed by: INTERNAL MEDICINE

## 2021-07-26 PROCEDURE — 36415 COLL VENOUS BLD VENIPUNCTURE: CPT | Performed by: SURGERY

## 2021-07-26 PROCEDURE — 258N000003 HC RX IP 258 OP 636: Performed by: INTERNAL MEDICINE

## 2021-07-26 PROCEDURE — C1769 GUIDE WIRE: HCPCS | Performed by: INTERNAL MEDICINE

## 2021-07-26 PROCEDURE — 71250 CT THORAX DX C-: CPT

## 2021-07-26 PROCEDURE — 93458 L HRT ARTERY/VENTRICLE ANGIO: CPT | Mod: 26 | Performed by: INTERNAL MEDICINE

## 2021-07-26 PROCEDURE — 86900 BLOOD TYPING SEROLOGIC ABO: CPT | Performed by: SURGERY

## 2021-07-26 PROCEDURE — 272N000001 HC OR GENERAL SUPPLY STERILE: Performed by: INTERNAL MEDICINE

## 2021-07-26 PROCEDURE — 93971 EXTREMITY STUDY: CPT | Mod: LT

## 2021-07-26 PROCEDURE — 85610 PROTHROMBIN TIME: CPT | Performed by: INTERNAL MEDICINE

## 2021-07-26 PROCEDURE — 82040 ASSAY OF SERUM ALBUMIN: CPT | Performed by: SURGERY

## 2021-07-26 PROCEDURE — 83036 HEMOGLOBIN GLYCOSYLATED A1C: CPT | Performed by: SURGERY

## 2021-07-26 PROCEDURE — 99153 MOD SED SAME PHYS/QHP EA: CPT | Performed by: INTERNAL MEDICINE

## 2021-07-26 RX ORDER — FENTANYL CITRATE 50 UG/ML
INJECTION, SOLUTION INTRAMUSCULAR; INTRAVENOUS
Status: DISCONTINUED | OUTPATIENT
Start: 2021-07-26 | End: 2021-07-26 | Stop reason: HOSPADM

## 2021-07-26 RX ORDER — POTASSIUM CHLORIDE 1500 MG/1
20 TABLET, EXTENDED RELEASE ORAL
Status: DISCONTINUED | OUTPATIENT
Start: 2021-07-26 | End: 2021-07-26 | Stop reason: HOSPADM

## 2021-07-26 RX ORDER — SODIUM CHLORIDE 9 MG/ML
INJECTION, SOLUTION INTRAVENOUS CONTINUOUS
Status: DISCONTINUED | OUTPATIENT
Start: 2021-07-26 | End: 2021-07-26 | Stop reason: HOSPADM

## 2021-07-26 RX ORDER — LIDOCAINE 40 MG/G
CREAM TOPICAL
Status: DISCONTINUED | OUTPATIENT
Start: 2021-07-26 | End: 2021-07-26 | Stop reason: HOSPADM

## 2021-07-26 RX ORDER — NALOXONE HYDROCHLORIDE 0.4 MG/ML
0.4 INJECTION, SOLUTION INTRAMUSCULAR; INTRAVENOUS; SUBCUTANEOUS
Status: DISCONTINUED | OUTPATIENT
Start: 2021-07-26 | End: 2021-07-26 | Stop reason: HOSPADM

## 2021-07-26 RX ORDER — NALOXONE HYDROCHLORIDE 0.4 MG/ML
0.2 INJECTION, SOLUTION INTRAMUSCULAR; INTRAVENOUS; SUBCUTANEOUS
Status: DISCONTINUED | OUTPATIENT
Start: 2021-07-26 | End: 2021-07-26 | Stop reason: HOSPADM

## 2021-07-26 RX ORDER — ACETAMINOPHEN 325 MG/1
650 TABLET ORAL EVERY 4 HOURS PRN
Status: DISCONTINUED | OUTPATIENT
Start: 2021-07-26 | End: 2021-07-26 | Stop reason: HOSPADM

## 2021-07-26 RX ORDER — OXYCODONE HYDROCHLORIDE 5 MG/1
10 TABLET ORAL EVERY 4 HOURS PRN
Status: DISCONTINUED | OUTPATIENT
Start: 2021-07-26 | End: 2021-07-26 | Stop reason: HOSPADM

## 2021-07-26 RX ORDER — FLUMAZENIL 0.1 MG/ML
0.2 INJECTION, SOLUTION INTRAVENOUS
Status: DISCONTINUED | OUTPATIENT
Start: 2021-07-26 | End: 2021-07-26 | Stop reason: HOSPADM

## 2021-07-26 RX ORDER — HEPARIN SODIUM 1000 [USP'U]/ML
INJECTION, SOLUTION INTRAVENOUS; SUBCUTANEOUS
Status: DISCONTINUED | OUTPATIENT
Start: 2021-07-26 | End: 2021-07-26 | Stop reason: HOSPADM

## 2021-07-26 RX ORDER — OXYCODONE HYDROCHLORIDE 5 MG/1
5 TABLET ORAL EVERY 4 HOURS PRN
Status: DISCONTINUED | OUTPATIENT
Start: 2021-07-26 | End: 2021-07-26 | Stop reason: HOSPADM

## 2021-07-26 RX ORDER — FENTANYL CITRATE 50 UG/ML
25 INJECTION, SOLUTION INTRAMUSCULAR; INTRAVENOUS
Status: DISCONTINUED | OUTPATIENT
Start: 2021-07-26 | End: 2021-07-26 | Stop reason: HOSPADM

## 2021-07-26 RX ORDER — ATROPINE SULFATE 0.1 MG/ML
0.5 INJECTION INTRAVENOUS
Status: DISCONTINUED | OUTPATIENT
Start: 2021-07-26 | End: 2021-07-26 | Stop reason: HOSPADM

## 2021-07-26 RX ORDER — IOPAMIDOL 755 MG/ML
INJECTION, SOLUTION INTRAVASCULAR
Status: DISCONTINUED | OUTPATIENT
Start: 2021-07-26 | End: 2021-07-26 | Stop reason: HOSPADM

## 2021-07-26 RX ADMIN — SODIUM CHLORIDE: 9 INJECTION, SOLUTION INTRAVENOUS at 09:18

## 2021-07-26 RX ADMIN — ASPIRIN 325 MG: 325 TABLET, COATED ORAL at 10:59

## 2021-07-26 ASSESSMENT — MIFFLIN-ST. JEOR: SCORE: 1702.57

## 2021-07-26 NOTE — PROGRESS NOTES
Met with patient and his daughter in consultation with DR. Lawson. Pre op labs and imaging ordered. Pre op education done. Plan CABG with in next few weeks. All questions addressed. .

## 2021-07-26 NOTE — PROGRESS NOTES
Care Suites Post Procedure Note    Patient Information  Name: Abhishek Gomez  Age: 61 year old    Post Procedure  Time patient returned to Care Suites: 1325  Concerns/abnormal assessment: None at this time.  Right radial artery incision site with TR band on with balloon inflated with 10 ml's of air.  CMS WNL. Radial pulse weak.  If abnormal assessment, provider notified: N/A  Plan/Other: Per orders.  Surgical  Consult.    Kenna Espinoza RN     1500  Hand off report given to Walter TRIPLETT RN

## 2021-07-26 NOTE — PROGRESS NOTES
PATIENT/VISITOR WELLNESS SCREENING    Step 1 Patient Screening    1. In the last month, have you been in contact with someone who was confirmed or suspected to have Coronavirus/COVID-19? No    2. Do you have the following symptoms?  Fever/Chills? No   Cough? No   Shortness of breath? No   New loss of taste or smell? No  Sore throat? No  Muscle or body aches? No  Headaches? No  Fatigue? No  Vomiting or diarrhea? No    Step 2 Visitor Screening    1. Name of Visitor (1 visitor per patient): Anamaria    2. In the last month, have you been in contact with someone who was confirmed or suspected to have Coronavirus/COVID-19? No    3. Do you have the following symptoms?  Fever/Chills? No   Cough? No   Shortness of breath? No   Skin rash? No   Loss of taste or smell? No  Sore throat? No  Runny or stuffy nose? No  Muscle or body aches? No  Headaches? No  Fatigue? No  Vomiting or diarrhea? No

## 2021-07-26 NOTE — Clinical Note
Hemodynamic equipment used: 5 lead ECG, manual BP cuff, LIKEPAK Hands Off Patches, LIFEPAK With 3 Leads, Calometric ETCO2 device, Machine BP Cuff and pulse oximeter probe.

## 2021-07-26 NOTE — PROGRESS NOTES
Care Suites Admission Nursing Note    Patient Information  Name: Abhishek Gomez  Age: 61 year old  Reason for admission: Left heart cath.  Care Suites arrival time: 0850    Visitor Information  Name: Larry  Informed of visitor restrictions: Yes  1 visitor allowed per patient   Visitor must screen negative for COVID symptoms   Visitor must wear a mask  Waiting rooms closed to visitors    Patient Admission/Assessment   Pre-procedure assessment complete: Yes  If abnormal assessment/labs, provider notified: N/A  NPO: Yes  Medications held per instructions/orders: Yes  Consent: deferred  If applicable, pregnancy test status: deferred  Patient oriented to room: Yes  Education/questions answered: Yes.  A/O. Denies pain. IV flds infusing. Contrast D/C instructions reviewed with pt with verbal understanding received. Copy given to Larry. Procedure explained. All questions & concerns addressed.  Pt resting in bed, denies additional needs at this time, call light within reach. Family at bedside.    Plan/other: Proceed as scheduled    Discharge Planning  Discharge name/phone number: Larry-dtr. [phone]  Overnight post sedation caregiver: Larry  Discharge location: home    Kenna Espinoza, RN

## 2021-07-26 NOTE — CONSULTS
Cardiovascular and Thoracic Surgery Consultation      Abhishek Gomez MRN# [id number]   YOB: 1960 Age: 61 year old   Date of Admission: 7/26/2021     Reason for consult: Multivessel CAD           Assessment and Plan:   Coronary Angiogram: pending official read, mod stenosis mid RCA, re-in stent stenosis of LAD, small coronaries  Stress Echo: nml EF 55-60%  Diabetic: Hgb A1C pending, last 7.6 in March             Chief Complaint:   Exertional angina    History is obtained from the patient & chart         History of Present Illness:   This patient is a 61 year old male with known CAD, prior LAD stenting with MICHELLE, HLD, DM, who presents with recurrent typical angina symptoms to cardiology clinic who referred him for coronary angiogram.               Past Medical History:          Birth History:   No birth history on file.            Past Surgical History:     Past Surgical History:   Procedure Laterality Date     CV HEART CATHETERIZATION WITH POSSIBLE INTERVENTION N/A 5/9/2019    Procedure: Coronary Angiogram;  Surgeon: Jose Enrique Stanley MD;  Location: [facility] CARDIAC CATH LAB     CV PCI ANGIOPLASTY N/A 5/9/2019    Procedure: PCI Angioplasty;  Surgeon: Jose Enrique Stanley MD;  Location: [facility] CARDIAC CATH LAB               Social History:     Social History     Tobacco Use     Smoking status: Never Smoker     Smokeless tobacco: Never Used   Substance Use Topics     Alcohol use: No             Family History:     Family History   Problem Relation Age of Onset     Diabetes Paternal Grandmother      Diabetes Nephew              Immunizations:     Immunization History   Administered Date(s) Administered     Influenza Quad, Recombinant, p-free (RIV4) 10/31/2018, 12/24/2019     Pneumo Conj 13-V (2010&after) 05/18/2016     TDAP Vaccine (Adacel) 03/16/2015             Allergies:   No Known Allergies          Medications:     Current Facility-Administered Medications Ordered in Epic    Medication Dose Route Frequency Last Rate Last Admin     0.9% sodium chloride BOLUS  250 mL Intravenous Once PRN         acetaminophen (TYLENOL) tablet 650 mg  650 mg Oral Q4H PRN         atropine injection 0.5 mg  0.5 mg Intravenous Once PRN         fentaNYL (PF) (SUBLIMAZE) injection 25 mcg  25 mcg Intravenous Q15 Min PRN         flumazenil (ROMAZICON) injection 0.2 mg  0.2 mg Intravenous q1 min prn         Hold: metformin and metformin containing medications on day of the procedure and for 48 hours after IV contrast given- Patients with acute kidney injury or severe chronic kidney disease (stage IV or stage V; i.e., eGFR less than 30)   Does not apply HOLD         lidocaine (LMX4) cream   Topical Q1H PRN         lidocaine 1 % 0.1-1 mL  0.1-1 mL Other Q1H PRN         midazolam (VERSED) injection 0.5 mg  0.5 mg Intravenous Q5 Min PRN         naloxone (NARCAN) injection 0.2 mg  0.2 mg Intravenous Q2 Min PRN        Or     naloxone (NARCAN) injection 0.4 mg  0.4 mg Intravenous Q2 Min PRN        Or     naloxone (NARCAN) injection 0.2 mg  0.2 mg Intramuscular Q2 Min PRN        Or     naloxone (NARCAN) injection 0.4 mg  0.4 mg Intramuscular Q2 Min PRN         oxyCODONE (ROXICODONE) tablet 5 mg  5 mg Oral Q4H PRN        Or     oxyCODONE (ROXICODONE) tablet 10 mg  10 mg Oral Q4H PRN         sodium chloride (PF) 0.9% PF flush 3 mL  3 mL Intracatheter Q8H         sodium chloride (PF) 0.9% PF flush 3 mL  3 mL Intracatheter q1 min prn         sodium chloride 0.9% infusion   Intravenous Continuous 75 mL/hr at 07/26/21 1350 Rate Change at 07/26/21 1350     No current Epic-ordered outpatient medications on file.             Review of Systems:   The 5 point Review of Systems is negative other than noted in the HPI            Physical Exam:   Vitals were reviewed  Temp: 97.9  F (36.6  C) Temp src: Oral BP: 129/67 Pulse: 67   Resp: 16 SpO2: 100 % O2 Device: None (Room air) Oxygen Delivery: 2 LPM  Constitutional:   awake, alert,  cooperative, no apparent distress, and appears stated age     Eyes:   Lids and lashes normal, pupils equal, round and reactive to light     Neck:   Supple, symmetrical, trachea midline     Back:   Symmetric, no curvature     Lungs:   No increased work of breathing, good air exchange     Cardiovascular:   Normal apical impulse, regular rate and rhythm, normal S1 and S2, no S3 or S4, and no murmur noted     Abdomen:   No scars, normal bowel sounds, soft, non-distended, non-tender          Data:     Lab Results   Component Value Date    WBC 11.1 (H) 07/26/2021    HGB 13.6 07/26/2021    HCT 42.1 07/26/2021     07/26/2021     07/26/2021    POTASSIUM 4.2 07/26/2021    CHLORIDE 108 07/26/2021    CO2 29 07/26/2021    BUN 15 07/26/2021    CR 1.34 (H) 07/26/2021     (H) 07/26/2021    AST 26 03/02/2021    ALT 35 03/02/2021    ALKPHOS 80 03/02/2021    BILITOTAL 0.6 03/02/2021    INR 1.03 07/26/2021

## 2021-07-26 NOTE — DISCHARGE INSTRUCTIONS
Cardiac Angiogram Discharge Instructions - Radial    After you go home:      Have an adult stay with you until tomorrow.    Drink extra fluids for 2 days.    You may resume your normal diet.    No smoking       For 24 hours - due to the sedation you received:    Relax and take it easy.    Do NOT make any important or legal decisions.    Do NOT drive or operate machines at home or at work.    Do NOT drink alcohol.    Care of Wrist Puncture Site:      For the first 24 hrs - check the puncture site every 1-2 hours while awake.    It is normal to have soreness at the puncture site and mild tingling in your hand for up to 3 days.    Remove the bandaid after 24 hours. If there is minor oozing, apply another bandaid and remove it after 12 hours.    You may shower tomorrow.  Do NOT take a bath, or use a hot tub or pool for at least 3 days. Do NOT scrub the site. Do not use lotion or powder near the puncture site.           Activity:        For 2 days:     do not use your hand or arm to support your weight (such as rising from a chair)     do not bend your wrist (such as lifting a garage door).    do not lift more than 5 pounds or exercise your arm (such as tennis, golf or bowling).    Do NOT do any heavy activity such as exercise, lifting, or straining.     Bleeding:      If you start bleeding from the site in your wrist, sit down and press firmly on/above the site for 10 minutes.     Once bleeding stops, keep arm still for 2 hours.     Call [facility] as soon as you can.       Call 911 right away if you have heavy bleeding or bleeding that does not stop.      Medicines:      If you are taking an antiplatelet medication such as Plavix, Brilinta or Effient, do not stop taking it until you talk to your cardiologist.        If you are on Metformin (Glucophage), do not restart it until you have blood tests (within 2 to 3 days after discharge).  After you have your blood drawn, you may restart the Metformin.     Take your  medications, including blood thinners, unless your provider tells you not to.      If you take Coumadin (Warfarin), have your INR checked by your provider in  3-5 days. Call your clinic to schedule this.    If you have stopped any medicines, check with your provider about when to restart them.    Follow Up Appointments:      Follow up with [facility] Nurse Practitioner at [facility] of patient preference in 7-10 days.    Call the clinic if:      You have a large or growing hard lump around the site.    The site is red, swollen, hot or tender.    Blood or fluid is draining from the site.    You have chills or a fever greater than 101 F (38 C).    Your arm feels numb, cool or changes color.    You have hives, a rash or unusual itching.    Any questions or concerns.          [facility] [place]:    [phone] [facility] (7 days a week)

## 2021-07-26 NOTE — PROGRESS NOTES
Labs done   Pt to US - then CT - then CXR  Pt holding opposite thumb on radial site as extra safety measure

## 2021-07-27 ENCOUNTER — HOSPITAL ENCOUNTER (INPATIENT)
Facility: CLINIC | Age: 61
Setting detail: SURGERY ADMIT
End: 2021-07-27
Attending: SURGERY | Admitting: SURGERY
Payer: COMMERCIAL

## 2021-07-27 ENCOUNTER — TELEPHONE (OUTPATIENT)
Dept: CARDIOLOGY | Facility: CLINIC | Age: 61
End: 2021-07-27

## 2021-07-27 DIAGNOSIS — I25.10 CORONARY ARTERY DISEASE INVOLVING NATIVE CORONARY ARTERY OF NATIVE HEART WITHOUT ANGINA PECTORIS: Primary | ICD-10-CM

## 2021-07-27 DIAGNOSIS — Z11.59 ENCOUNTER FOR SCREENING FOR OTHER VIRAL DISEASES: ICD-10-CM

## 2021-07-27 DIAGNOSIS — I25.10 CAD (CORONARY ARTERY DISEASE): Primary | ICD-10-CM

## 2021-07-27 RX ORDER — ASPIRIN 81 MG/1
81 TABLET, CHEWABLE ORAL
Status: CANCELLED | OUTPATIENT
Start: 2021-07-27

## 2021-07-27 RX ORDER — CHLORHEXIDINE GLUCONATE ORAL RINSE 1.2 MG/ML
10 SOLUTION DENTAL ONCE
Status: CANCELLED | OUTPATIENT
Start: 2021-07-27 | End: 2021-07-27

## 2021-07-27 RX ORDER — FAMOTIDINE 20 MG/1
20 TABLET, FILM COATED ORAL
Status: CANCELLED | OUTPATIENT
Start: 2021-07-27

## 2021-07-27 RX ORDER — LIDOCAINE 40 MG/G
CREAM TOPICAL
Status: CANCELLED | OUTPATIENT
Start: 2021-07-27

## 2021-07-27 RX ORDER — CEFAZOLIN SODIUM 2 G/100ML
2 INJECTION, SOLUTION INTRAVENOUS SEE ADMIN INSTRUCTIONS
Status: CANCELLED | OUTPATIENT
Start: 2021-07-27

## 2021-07-27 RX ORDER — CEFAZOLIN SODIUM 2 G/100ML
2 INJECTION, SOLUTION INTRAVENOUS
Status: CANCELLED | OUTPATIENT
Start: 2021-07-27

## 2021-07-27 RX ORDER — ASPIRIN 81 MG/1
162 TABLET, CHEWABLE ORAL
Status: CANCELLED | OUTPATIENT
Start: 2021-07-27

## 2021-07-27 NOTE — TELEPHONE ENCOUNTER
MOY on cell phone asking patient to call to discuss procedure date with Dr. Lawson. Asked patient to call my direct line at [phone].

## 2021-07-28 ENCOUNTER — LAB (OUTPATIENT)
Dept: LAB | Facility: CLINIC | Age: 61
End: 2021-07-28
Payer: COMMERCIAL

## 2021-07-28 DIAGNOSIS — E11.9 TYPE 2 DIABETES MELLITUS WITHOUT COMPLICATION, WITHOUT LONG-TERM CURRENT USE OF INSULIN (H): ICD-10-CM

## 2021-07-28 DIAGNOSIS — I25.10 CORONARY ARTERY DISEASE INVOLVING NATIVE CORONARY ARTERY OF NATIVE HEART WITHOUT ANGINA PECTORIS: ICD-10-CM

## 2021-07-28 LAB
ANION GAP SERPL CALCULATED.3IONS-SCNC: 2 MMOL/L (ref 3–14)
BUN SERPL-MCNC: 12 MG/DL (ref 7–30)
CALCIUM SERPL-MCNC: 8.7 MG/DL (ref 8.5–10.1)
CHLORIDE BLD-SCNC: 109 MMOL/L (ref 94–109)
CO2 SERPL-SCNC: 29 MMOL/L (ref 20–32)
CREAT SERPL-MCNC: 1.22 MG/DL (ref 0.66–1.25)
GFR SERPL CREATININE-BSD FRML MDRD: 64 ML/MIN/1.73M2
GLUCOSE BLD-MCNC: 136 MG/DL (ref 70–99)
POTASSIUM BLD-SCNC: 4.3 MMOL/L (ref 3.4–5.3)
SODIUM SERPL-SCNC: 140 MMOL/L (ref 133–144)

## 2021-07-28 PROCEDURE — 80048 BASIC METABOLIC PNL TOTAL CA: CPT

## 2021-07-28 PROCEDURE — 36415 COLL VENOUS BLD VENIPUNCTURE: CPT

## 2021-07-29 LAB
ATRIAL RATE - MUSE: 74 BPM
DIASTOLIC BLOOD PRESSURE - MUSE: NORMAL MMHG
INTERPRETATION ECG - MUSE: NORMAL
P AXIS - MUSE: 65 DEGREES
PR INTERVAL - MUSE: 158 MS
QRS DURATION - MUSE: 82 MS
QT - MUSE: 380 MS
QTC - MUSE: 421 MS
R AXIS - MUSE: 56 DEGREES
SYSTOLIC BLOOD PRESSURE - MUSE: NORMAL MMHG
T AXIS - MUSE: 46 DEGREES
VENTRICULAR RATE- MUSE: 74 BPM

## 2021-08-02 PROBLEM — Z98.890 STATUS POST CORONARY ANGIOGRAM: Status: RESOLVED | Noted: 2021-07-26 | Resolved: 2021-08-02

## 2021-08-03 ENCOUNTER — HOSPITAL ENCOUNTER (OUTPATIENT)
Dept: CARDIOLOGY | Facility: CLINIC | Age: 61
Discharge: HOME | End: 2021-08-03
Attending: SURGERY | Admitting: SURGERY
Payer: COMMERCIAL

## 2021-08-03 ENCOUNTER — OFFICE VISIT (OUTPATIENT)
Dept: CARDIOLOGY | Facility: CLINIC | Age: 61
End: 2021-08-03
Payer: COMMERCIAL

## 2021-08-03 ENCOUNTER — TELEPHONE (OUTPATIENT)
Dept: CARDIOLOGY | Facility: CLINIC | Age: 61
End: 2021-08-03

## 2021-08-03 VITALS
WEIGHT: 195 LBS | DIASTOLIC BLOOD PRESSURE: 62 MMHG | BODY MASS INDEX: 27.3 KG/M2 | SYSTOLIC BLOOD PRESSURE: 99 MMHG | HEART RATE: 71 BPM | HEIGHT: 71 IN

## 2021-08-03 DIAGNOSIS — I25.10 CORONARY ARTERY DISEASE INVOLVING NATIVE CORONARY ARTERY OF NATIVE HEART WITHOUT ANGINA PECTORIS: Primary | ICD-10-CM

## 2021-08-03 DIAGNOSIS — I25.10 CORONARY ARTERY DISEASE INVOLVING NATIVE CORONARY ARTERY OF NATIVE HEART WITHOUT ANGINA PECTORIS: ICD-10-CM

## 2021-08-03 LAB — LVEF ECHO: NORMAL

## 2021-08-03 PROCEDURE — 99215 OFFICE O/P EST HI 40 MIN: CPT | Mod: 25 | Performed by: PHYSICIAN ASSISTANT

## 2021-08-03 PROCEDURE — 93306 TTE W/DOPPLER COMPLETE: CPT

## 2021-08-03 PROCEDURE — 93306 TTE W/DOPPLER COMPLETE: CPT | Mod: 26 | Performed by: INTERNAL MEDICINE

## 2021-08-03 ASSESSMENT — MIFFLIN-ST. JEOR: SCORE: 1711.38

## 2021-08-03 NOTE — PATIENT INSTRUCTIONS
Thank you for visiting with me today.    We discussed: I'll get the team to talk- Thad Correia, Pietro, and Mayank about if they want to do stents or a surgery.  I'll have the team call you back with a decision.      Medication changes:  Continue current medications.      Follow up: will be based on the decision of surgery vs stents.       Please call my nurse, Jessica, at [phone] with any questions or concerns.    Scheduling phone number: [phone]  Reminder: Please bring in all current medications, over the counter supplements and vitamin bottles to your next appointment.

## 2021-08-03 NOTE — PROGRESS NOTES
[id number]      HPI and Plan:   See dictation    Orders this Visit:  No orders of the defined types were placed in this encounter.    No orders of the defined types were placed in this encounter.    There are no discontinued medications.      No diagnosis found.    CURRENT MEDICATIONS:  Current Outpatient Medications   Medication Sig Dispense Refill     aspirin 81 MG EC tablet Take 1 tablet (81 mg) by mouth daily 90 tablet 3     atorvastatin (LIPITOR) 20 MG tablet Take 1 tablet (20 mg) by mouth daily 90 tablet 3     glimepiride (AMARYL) 2 MG tablet Take 1 tablet (2 mg) by mouth every morning (before breakfast) 90 tablet 3     isosorbide mononitrate (IMDUR) 30 MG 24 hr tablet Take 1 tablet (30 mg) by mouth daily 90 tablet 3     lisinopril (ZESTRIL) 20 MG tablet Take 1 tablet (20 mg) by mouth daily 90 tablet 3     metFORMIN (GLUCOPHAGE) 1000 MG tablet Take 1 tablet (1,000 mg) by mouth 2 times daily (with meals) 180 tablet 3     metoprolol tartrate (LOPRESSOR) 25 MG tablet Take 1 tablet (25 mg) by mouth 2 times daily 180 tablet 3     ibuprofen (ADVIL) 200 MG capsule Take 200 mg by mouth every 4 hours as needed for fever (Patient not taking: Reported on 7/15/2021)       nitroGLYcerin (NITROSTAT) 0.4 MG sublingual tablet For chest pain place 1 tablet under the tongue every 5 minutes for up to 3 doses. If symptoms persist 5 minutes after 2nd dose call 911. 30 tablet 0       ALLERGIES   No Known Allergies    PAST MEDICAL HISTORY:  Past Medical History:   Diagnosis Date     Abnormal stress test 07/14/2021     Coronary artery disease involving native coronary artery of native heart without angina pectoris 07/14/2021     Essential hypertension, benign 05/18/2016     Hyperlipidemia LDL goal <70 03/16/2015     NSTEMI (non-ST elevated myocardial infarction) (H) 05/09/2019    s/p MICHELLE to pLAD     Status post coronary angiogram 07/26/2021    Complex Multivessel CAD. CABG cosult     Type 2 diabetes mellitus without complication,  without long-term current use of insulin (H) 07/06/2017     Unstable angina (H) 05/09/2019       PAST SURGICAL HISTORY:  Past Surgical History:   Procedure Laterality Date     CV CORONARY ANGIOGRAM N/A 7/26/2021    Procedure: Coronary Angiogram;  Surgeon: Sylvester Westbrook MD;  Location:  HEART CARDIAC CATH LAB     CV HEART CATHETERIZATION WITH POSSIBLE INTERVENTION N/A 5/9/2019    Procedure: Coronary Angiogram;  Surgeon: Jose Enrique Stanley MD;  Location:  HEART CARDIAC CATH LAB     CV LEFT HEART CATH N/A 7/26/2021    Procedure: Left Heart Cath;  Surgeon: Sylvester Westbrook MD;  Location:  HEART CARDIAC CATH LAB     CV PCI ANGIOPLASTY N/A 5/9/2019    Procedure: PCI Angioplasty;  Surgeon: Jose Enrique Stanley MD;  Location:  HEART CARDIAC CATH LAB       FAMILY HISTORY:  Family History   Problem Relation Age of Onset     Diabetes Paternal Grandmother      Diabetes Nephew        SOCIAL HISTORY:  Social History     Socioeconomic History     Marital status:      Spouse name: None     Number of children: None     Years of education: None     Highest education level: None   Occupational History     None   Tobacco Use     Smoking status: Never Smoker     Smokeless tobacco: Never Used   Substance and Sexual Activity     Alcohol use: No     Drug use: No     Sexual activity: Yes     Partners: Female   Other Topics Concern     Parent/sibling w/ CABG, MI or angioplasty before 65F 55M? Not Asked   Social History Narrative     None     Social Determinants of Health     Financial Resource Strain:      Difficulty of Paying Living Expenses:    Food Insecurity:      Worried About Running Out of Food in the Last Year:      Ran Out of Food in the Last Year:    Transportation Needs:      Lack of Transportation (Medical):      Lack of Transportation (Non-Medical):    Physical Activity:      Days of Exercise per Week:      Minutes of Exercise per Session:    Stress:      Feeling of Stress :    Social  "Connections:      Frequency of Communication with Friends and Family:      Frequency of Social Gatherings with Friends and Family:      Attends Confucianism Services:      Active Member of Clubs or Organizations:      Attends Club or Organization Meetings:      Marital Status:    Intimate Partner Violence:      Fear of Current or Ex-Partner:      Emotionally Abused:      Physically Abused:      Sexually Abused:        Review of Systems:  Skin:  Negative     Eyes:  Negative    ENT:  Negative    Respiratory:  Positive for dyspnea on exertion  Cardiovascular:    Positive for;chest pain  Gastroenterology: Negative    Genitourinary:  Negative    Musculoskeletal:  Negative    Neurologic:  Negative    Psychiatric:  Negative    Heme/Lymph/Imm:  Negative    Endocrine:  Positive for      Physical Exam:  Vitals: BP 99/62   Pulse 71   Ht 1.803 m (5' 10.98\")   Wt 88.5 kg (195 lb)   BMI 27.21 kg/m     Please refer to dictation for physical exam    Recent Lab Results: all reviewed today  CBC RESULTS:  Lab Results   Component Value Date    WBC 11.1 (H) 07/26/2021    WBC 7.2 05/13/2019    RBC 5.22 07/26/2021    RBC 4.91 05/13/2019    HGB 13.6 07/26/2021    HGB 13.4 05/13/2019    HCT 42.1 07/26/2021    HCT 38.6 (L) 05/13/2019    MCV 81 07/26/2021    MCV 79 05/13/2019    MCH 26.1 (L) 07/26/2021    MCH 27.3 05/13/2019    MCHC 32.3 07/26/2021    MCHC 34.7 05/13/2019    RDW 12.2 07/26/2021    RDW 12.7 05/13/2019     07/26/2021     05/13/2019       BMP RESULTS:  Lab Results   Component Value Date     07/28/2021     03/02/2021    POTASSIUM 4.3 07/28/2021    POTASSIUM 4.1 03/02/2021    CHLORIDE 109 07/28/2021    CHLORIDE 107 03/02/2021    CO2 29 07/28/2021    CO2 26 03/02/2021    ANIONGAP 2 (L) 07/28/2021    ANIONGAP 6 03/02/2021     (H) 07/28/2021    GLC 90 03/02/2021    BUN 12 07/28/2021    BUN 10 03/02/2021    CR 1.22 07/28/2021    CR 1.09 03/02/2021    GFRESTIMATED 64 07/28/2021    GFRESTIMATED 73 " 03/02/2021    GFRESTBLACK 85 03/02/2021    KRIS 8.7 07/28/2021    KRIS 9.3 03/02/2021        INR RESULTS:  Lab Results   Component Value Date    INR 1.03 07/26/2021    INR 1.01 05/09/2019           CC  No referring provider defined for this encounter.

## 2021-08-03 NOTE — TELEPHONE ENCOUNTER
I saw the pt in clinic today, he's doing well- minimal angina.  Pt underwent angiogram 7/26/21:  1.  Complex, multivessel CAD   LM: Minimal disease  LAD: Proximal LAD  beginning at the proximal edge of the previously placed LAD stent.  See below for further details  Ramus: 60-70% proximal disease within a 2.0 mm vessel  LCx: Very small LCx system with minimal disease  RCA: Large RCA with mild-moderate proximal disease and a 70% stenosis of the mid RCA.    Pt directed to CABG, seen by Dr. Lawson and scheduled for CABG 8/10.  Pt is family friends with Dr. Azar; pt states that Dr. Azar reviewed films and suggested that PCI may be a better alternative and was going to call Dr. Lawson to discuss.  Reviewed with Lizet Galindo, Dr. Lawson is on vacation until 8/9.     Request Pietro Morse Raveedran, Renny (if available) review and recommend best course for pt.  Pt is wiling to proceed with whatever team recommends.      Angela Handley PA-C 8/3/2021 12:38 PM

## 2021-08-03 NOTE — PROGRESS NOTES
Service Date: 2021    PRIMARY CARDIOLOGIST:  Mark Oneil MD    REASON FOR VISIT:  Angiogram followup.    HISTORY OF PRESENT ILLNESS:  Mr. Gomez is a delightful 61-year-old gentleman with past medical history significant for the followin.  Coronary artery disease with history of drug-eluting stent to the proximal LAD for a 99% lesion in 2019, with a balloon angioplasty of the D1 as it was at the bifurcation, with a 70% ramus lesion at that point and a 30% proximal RCA lesion.  He recently underwent an angiogram and was found to have a stenosed LAD proximal to the stent, with a small collateral, 60%-70% ramus and a 70% mid RCA.  2.  Dyslipidemia, well controlled.  3.  Hypertension, well controlled.  4.  Type 2 diabetes, well controlled.    I am seeing him in followup for his angiogram.  He presented with typical angina and had the above results.  He has been set up for a CABG.  He is personal friends with Dr. Hines and he states that he reviewed his images yesterday and thinks that this could potentially be stented and possible bypass can be avoided.  Dr. Hines talked to Dr. Lawson personally.    The patient states he overall feels well.  He has not had just too much chest discomfort.  He does have some after eating, but he was able to work last night without difficulty.  He does not lift more than 10 pounds.  He denies orthopnea or PND.  He does have some sharp pinching chest discomfort, but this lasts just seconds and resolves.  He has not had any syncope or presyncope.    SOCIAL HISTORY:  The patient comes in today with his daughter, Ximena.  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  He is a lifelong nonsmoker.  No alcohol use.    PHYSICAL EXAMINATION:    GENERAL:  Well-developed, well-nourished gentleman in no acute distress.  HEENT:  Normocephalic, atraumatic.  HEART:  Regular rate and rhythm.  No murmur, rub or gallop.  LUNGS:  Clear, without wheezes, rales or  rhonchi.  EXTREMITIES:  Right radial access site is clean, dry and intact with no bruit.    STUDIES REVIEWED:  Include angiogram as detailed above.  Bilateral carotid ultrasound with trace plaque.  Echocardiogram will be completed yet this afternoon.  His stress echocardiogram showed a normal EF with wall motion.    Labs reviewed include last A1c at 7.1, normal hepatic function, normal hemoglobin at 13.6, slightly elevated creatinine at 1.34.    ASSESSMENT AND PLAN:    1.  Severe 2-vessel coronary artery disease in this gentleman who has diabetes and has a history of a proximal LAD stent.  He was recommended for CABG by Dr. Westbrook.  Apparently, they are family friends with Dr. Hines, who has reviewed his imaging and is suggesting that a stent would be a better solution.  I will get the team together including Dr. Westbrook, Dr. Hines, Dr. Lawson and Dr. Oneil to make a decision on what would be best for the patient.  At this point, he is tentatively scheduled for next Tuesday.  Dr. Lawson is on vacation, so we may not get an answer until next Monday, but we will work on getting that taken care of and a final recommendation made.  At this point, he is having minimal angina.  I told him he can use nitro as needed up to 3 doses.  If he has extended chest pain or severe chest pain, I would like him in the hospital so we can facilitate intervention.  At this point, he is otherwise appropriately on aspirin and statin.  2.  Hypertension, well controlled.  3.  Dyslipidemia, excellent control.  Last LDL 46, HDL 33, total cholesterol 108.  4.  Type 2 diabetes with last hemoglobin A1c 7.1.  This may be a good candidate for SGLT2 inhibitor to hopefully prevent future cardiac events.  We will decide that after determining what procedure or surgery he will go ahead with.    Thank you for allowing me to participate in the care of this patient.  Followup will be based on the decision going forward for the  procedure.    Forty-five minutes was spent in counseling and coordination of care, including review of his imaging in part of the decision-making process.    Angela Handley PA-C        D: 2021   T: 2021   MT: [email or web address]    Name:     JENNIFER VIDAL  MRN:      [id number]        Account:      [id number]   :      1960           Service Date: 2021       Document: [id number]

## 2021-08-03 NOTE — LETTER
8/3/2021    Michael Méndez MD  [address] [facility] [address]    RE: Abhishek Gomez       Dear Colleague,    I had the pleasure of seeing Aubreyavi SWEETIE Gomez in the [facility].    [id number]  {2021 E&M time:[id number]}    HPI and Plan:   See dictation    Orders this Visit:  No orders of the defined types were placed in this encounter.    No orders of the defined types were placed in this encounter.    There are no discontinued medications.      No diagnosis found.    CURRENT MEDICATIONS:  Current Outpatient Medications   Medication Sig Dispense Refill     aspirin 81 MG EC tablet Take 1 tablet (81 mg) by mouth daily 90 tablet 3     atorvastatin (LIPITOR) 20 MG tablet Take 1 tablet (20 mg) by mouth daily 90 tablet 3     glimepiride (AMARYL) 2 MG tablet Take 1 tablet (2 mg) by mouth every morning (before breakfast) 90 tablet 3     isosorbide mononitrate (IMDUR) 30 MG 24 hr tablet Take 1 tablet (30 mg) by mouth daily 90 tablet 3     lisinopril (ZESTRIL) 20 MG tablet Take 1 tablet (20 mg) by mouth daily 90 tablet 3     metFORMIN (GLUCOPHAGE) 1000 MG tablet Take 1 tablet (1,000 mg) by mouth 2 times daily (with meals) 180 tablet 3     metoprolol tartrate (LOPRESSOR) 25 MG tablet Take 1 tablet (25 mg) by mouth 2 times daily 180 tablet 3     ibuprofen (ADVIL) 200 MG capsule Take 200 mg by mouth every 4 hours as needed for fever (Patient not taking: Reported on 7/15/2021)       nitroGLYcerin (NITROSTAT) 0.4 MG sublingual tablet For chest pain place 1 tablet under the tongue every 5 minutes for up to 3 doses. If symptoms persist 5 minutes after 2nd dose call 911. 30 tablet 0       ALLERGIES   No Known Allergies    PAST MEDICAL HISTORY:  Past Medical History:   Diagnosis Date     Abnormal stress test 07/14/2021     Coronary artery disease involving native coronary artery of native heart without angina pectoris 07/14/2021     Essential  hypertension, benign 05/18/2016     Hyperlipidemia LDL goal <70 03/16/2015     NSTEMI (non-ST elevated myocardial infarction) (H) 05/09/2019    s/p MICHELLE to pLAD     Status post coronary angiogram 07/26/2021    Complex Multivessel CAD. CABG cosult     Type 2 diabetes mellitus without complication, without long-term current use of insulin (H) 07/06/2017     Unstable angina (H) 05/09/2019       PAST SURGICAL HISTORY:  Past Surgical History:   Procedure Laterality Date     CV CORONARY ANGIOGRAM N/A 7/26/2021    Procedure: Coronary Angiogram;  Surgeon: Sylvester Westbrook MD;  Location:  HEART CARDIAC CATH LAB     CV HEART CATHETERIZATION WITH POSSIBLE INTERVENTION N/A 5/9/2019    Procedure: Coronary Angiogram;  Surgeon: Jose Enrique Stanley MD;  Location:  HEART CARDIAC CATH LAB     CV LEFT HEART CATH N/A 7/26/2021    Procedure: Left Heart Cath;  Surgeon: Sylvester Westbrook MD;  Location:  HEART CARDIAC CATH LAB     CV PCI ANGIOPLASTY N/A 5/9/2019    Procedure: PCI Angioplasty;  Surgeon: Jose Enrique Stanley MD;  Location:  HEART CARDIAC CATH LAB       FAMILY HISTORY:  Family History   Problem Relation Age of Onset     Diabetes Paternal Grandmother      Diabetes Nephew        SOCIAL HISTORY:  Social History     Socioeconomic History     Marital status:      Spouse name: None     Number of children: None     Years of education: None     Highest education level: None   Occupational History     None   Tobacco Use     Smoking status: Never Smoker     Smokeless tobacco: Never Used   Substance and Sexual Activity     Alcohol use: No     Drug use: No     Sexual activity: Yes     Partners: Female   Other Topics Concern     Parent/sibling w/ CABG, MI or angioplasty before 65F 55M? Not Asked   Social History Narrative     None     Social Determinants of Health     Financial Resource Strain:      Difficulty of Paying Living Expenses:    Food Insecurity:      Worried About Running Out of Food in the Last  "Year:      Ran Out of Food in the Last Year:    Transportation Needs:      Lack of Transportation (Medical):      Lack of Transportation (Non-Medical):    Physical Activity:      Days of Exercise per Week:      Minutes of Exercise per Session:    Stress:      Feeling of Stress :    Social Connections:      Frequency of Communication with Friends and Family:      Frequency of Social Gatherings with Friends and Family:      Attends Synagogue Services:      Active Member of Clubs or Organizations:      Attends Club or Organization Meetings:      Marital Status:    Intimate Partner Violence:      Fear of Current or Ex-Partner:      Emotionally Abused:      Physically Abused:      Sexually Abused:        Review of Systems:  Skin:  Negative     Eyes:  Negative    ENT:  Negative    Respiratory:  Positive for dyspnea on exertion  Cardiovascular:    Positive for;chest pain  Gastroenterology: Negative    Genitourinary:  Negative    Musculoskeletal:  Negative    Neurologic:  Negative    Psychiatric:  Negative    Heme/Lymph/Imm:  Negative    Endocrine:  Positive for      Physical Exam:  Vitals: BP 99/62   Pulse 71   Ht 1.803 m (5' 10.98\")   Wt 88.5 kg (195 lb)   BMI 27.21 kg/m     Please refer to dictation for physical exam    Recent Lab Results: all reviewed today  CBC RESULTS:  Lab Results   Component Value Date    WBC 11.1 (H) 07/26/2021    WBC 7.2 05/13/2019    RBC 5.22 07/26/2021    RBC 4.91 05/13/2019    HGB 13.6 07/26/2021    HGB 13.4 05/13/2019    HCT 42.1 07/26/2021    HCT 38.6 (L) 05/13/2019    MCV 81 07/26/2021    MCV 79 05/13/2019    MCH 26.1 (L) 07/26/2021    MCH 27.3 05/13/2019    MCHC 32.3 07/26/2021    MCHC 34.7 05/13/2019    RDW 12.2 07/26/2021    RDW 12.7 05/13/2019     07/26/2021     05/13/2019       BMP RESULTS:  Lab Results   Component Value Date     07/28/2021     03/02/2021    POTASSIUM 4.3 07/28/2021    POTASSIUM 4.1 03/02/2021    CHLORIDE 109 07/28/2021    CHLORIDE 107 " 2021    CO2 29 2021    CO2 26 2021    ANIONGAP 2 (L) 2021    ANIONGAP 6 2021     (H) 2021    GLC 90 2021    BUN 12 2021    BUN 10 2021    CR 1.22 2021    CR 1.09 2021    GFRESTIMATED 64 2021    GFRESTIMATED 73 2021    GFRESTBLACK 85 2021    KRIS 8.7 2021    KRIS 9.3 2021        INR RESULTS:  Lab Results   Component Value Date    INR 1.03 2021    INR 1.01 2019           CC  No referring provider defined for this encounter.        Service Date: 2021    PRIMARY CARDIOLOGIST:  Mark Oneil MD    REASON FOR VISIT:  Angiogram followup.    HISTORY OF PRESENT ILLNESS:  Mr. Gomez is a delightful 61-year-old gentleman with past medical history significant for the followin.  Coronary artery disease with history of drug-eluting stent to the proximal LAD for a 99% lesion in 2019, with a balloon angioplasty of the D1 as it was at the bifurcation, with a 70% ramus lesion at that point and a 30% proximal RCA lesion.  He recently underwent an angiogram and was found to have a stenosed LAD proximal to the stent, with a small collateral, 60%-70% ramus and a 70% mid RCA.  2.  Dyslipidemia, well controlled.  3.  Hypertension, well controlled.  4.  Type 2 diabetes, well controlled.    I am seeing him in followup for his angiogram.  He presented with typical angina and had the above results.  He has been set up for a CABG.  He is personal friends with Dr. Hines and he states that he reviewed his images yesterday and thinks that this could potentially be stented and possible bypass can be avoided.  Dr. Hines talked to Dr. Lawson personally.    The patient states he overall feels well.  He has not had just too much chest discomfort.  He does have some after eating, but he was able to work last night without difficulty.  He does not lift more than 10 pounds.  He denies orthopnea or PND.  He does have some  sharp pinching chest discomfort, but this lasts just seconds and resolves.  He has not had any syncope or presyncope.    SOCIAL HISTORY:  The patient comes in today with his daughter, Ximena.  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  He is a lifelong nonsmoker.  No alcohol use.    PHYSICAL EXAMINATION:    GENERAL:  Well-developed, well-nourished gentleman in no acute distress.  HEENT:  Normocephalic, atraumatic.  HEART:  Regular rate and rhythm.  No murmur, rub or gallop.  LUNGS:  Clear, without wheezes, rales or rhonchi.  EXTREMITIES:  Right radial access site is clean, dry and intact with no bruit.    STUDIES REVIEWED:  Include angiogram as detailed above.  Bilateral carotid ultrasound with trace plaque.  Echocardiogram will be completed yet this afternoon.  His stress echocardiogram showed a normal EF with wall motion.    Labs reviewed include last A1c at 7.1, normal hepatic function, normal hemoglobin at 13.6, slightly elevated creatinine at 1.34.    ASSESSMENT AND PLAN:    1.  Severe 2-vessel coronary artery disease in this gentleman who has diabetes and has a history of a proximal LAD stent.  He was recommended for CABG by Dr. Westbrook.  Apparently, they are family friends with Dr. Hines, who has reviewed his imaging and is suggesting that a stent would be a better solution.  I will get the team together including Dr. Westbrook, Dr. Hines, Dr. Lawson and Dr. Oneil to make a decision on what would be best for the patient.  At this point, he is tentatively scheduled for next Tuesday.  Dr. Lawson is on vacation, so we may not get an answer until next Monday, but we will work on getting that taken care of and a final recommendation made.  At this point, he is having minimal angina.  I told him he can use nitro as needed up to 3 doses.  If he has extended chest pain or severe chest pain, I would like him in the hospital so we can facilitate intervention.  At this point, he is  otherwise appropriately on aspirin and statin.  2.  Hypertension, well controlled.  3.  Dyslipidemia, excellent control.  Last LDL 46, HDL 33, total cholesterol 108.  4.  Type 2 diabetes with last hemoglobin A1c 7.1.  This may be a good candidate for SGLT2 inhibitor to hopefully prevent future cardiac events.  We will decide that after determining what procedure or surgery he will go ahead with.    Thank you for allowing me to participate in the care of this patient.  Followup will be based on the decision going forward for the procedure.    Forty-five minutes was spent in counseling and coordination of care, including review of his imaging in part of the decision-making process.    Angela Handley PA-C        D: 2021   T: 2021   MT: [email or web address]    Name:     JENNIFER VIDAL  MRN:      [id number]        Account:      [id number]   :      1960           Service Date: 2021       Document: [id number]      Thank you for allowing me to participate in the care of your patient.      Sincerely,     Angela Handley PA-C     [facility]  cc:   No referring provider defined for this encounter.

## 2021-08-07 ENCOUNTER — LAB (OUTPATIENT)
Dept: URGENT CARE | Facility: URGENT CARE | Age: 61
End: 2021-08-07
Attending: SURGERY
Payer: COMMERCIAL

## 2021-08-07 DIAGNOSIS — Z11.59 ENCOUNTER FOR SCREENING FOR OTHER VIRAL DISEASES: ICD-10-CM

## 2021-08-07 LAB — SARS-COV-2 RNA RESP QL NAA+PROBE: NEGATIVE

## 2021-08-07 PROCEDURE — U0003 INFECTIOUS AGENT DETECTION BY NUCLEIC ACID (DNA OR RNA); SEVERE ACUTE RESPIRATORY SYNDROME CORONAVIRUS 2 (SARS-COV-2) (CORONAVIRUS DISEASE [COVID-19]), AMPLIFIED PROBE TECHNIQUE, MAKING USE OF HIGH THROUGHPUT TECHNOLOGIES AS DESCRIBED BY CMS-2020-01-R: HCPCS

## 2021-08-07 PROCEDURE — U0005 INFEC AGEN DETEC AMPLI PROBE: HCPCS

## 2021-08-09 ENCOUNTER — ANESTHESIA EVENT (OUTPATIENT)
Dept: SURGERY | Facility: CLINIC | Age: 61
End: 2021-08-09

## 2021-08-09 RX ORDER — SODIUM CHLORIDE, SODIUM LACTATE, POTASSIUM CHLORIDE, CALCIUM CHLORIDE 600; 310; 30; 20 MG/100ML; MG/100ML; MG/100ML; MG/100ML
INJECTION, SOLUTION INTRAVENOUS CONTINUOUS
Status: CANCELLED | OUTPATIENT
Start: 2021-08-09

## 2021-08-09 ASSESSMENT — LIFESTYLE VARIABLES: TOBACCO_USE: 0

## 2021-08-09 NOTE — ANESTHESIA PREPROCEDURE EVALUATION
Anesthesia Pre-Procedure Evaluation    Patient: Abhishek Gomez   MRN: [id number] : 1960        Preoperative Diagnosis: CAD (coronary artery disease) [I25.10]   Procedure : Procedure(s):  CORONARY ARTERY BYPASS GRAFTING     Past Medical History:   Diagnosis Date     Abnormal stress test 2021     Coronary artery disease involving native coronary artery of native heart without angina pectoris 2021     Essential hypertension, benign 2016     Hyperlipidemia LDL goal <70 2015     NSTEMI (non-ST elevated myocardial infarction) (H) 2019    s/p MICHELLE to pLAD     Status post coronary angiogram 2021    Complex Multivessel CAD. CABG cosult     Type 2 diabetes mellitus without complication, without long-term current use of insulin (H) 2017     Unstable angina (H) 2019      Past Surgical History:   Procedure Laterality Date     CV CORONARY ANGIOGRAM N/A 2021    Procedure: Coronary Angiogram;  Surgeon: Sylvester Westbrook MD;  Location: [facility] CARDIAC CATH LAB     CV HEART CATHETERIZATION WITH POSSIBLE INTERVENTION N/A 2019    Procedure: Coronary Angiogram;  Surgeon: Jose Enrique Stanley MD;  Location: [facility] CARDIAC CATH LAB     CV LEFT HEART CATH N/A 2021    Procedure: Left Heart Cath;  Surgeon: Sylvester Westbrook MD;  Location: [facility] CARDIAC CATH LAB     CV PCI ANGIOPLASTY N/A 2019    Procedure: PCI Angioplasty;  Surgeon: Jose Enrique Stanley MD;  Location: [facility] CARDIAC CATH LAB      No Known Allergies   Social History     Tobacco Use     Smoking status: Never Smoker     Smokeless tobacco: Never Used   Substance Use Topics     Alcohol use: No      Wt Readings from Last 1 Encounters:   21 88.5 kg (195 lb)        Anesthesia Evaluation            ROS/MED HX  ENT/Pulmonary:    (-) tobacco use and sleep apnea   Neurologic:       Cardiovascular:     (+) Dyslipidemia hypertension--CAD angina-past MI -stent-. Drug  Eluting Stent. Previous cardiac testing   Echo: Date: 7/27/21 Results:  Interpretation Summary     1. The left ventricle is normal in structure, function and size. The visual  ejection fraction is estimated at 55%.  2. The right ventricle is normal in structure, function and size.  3. No valve disease.     No changes from echo 8/2019.  ______________________________________________________________________________  Left Ventricle  The left ventricle is normal in structure, function and size. There is normal  left ventricular wall thickness. The visual ejection fraction is estimated at  55%. Left ventricular diastolic function is normal. Normal left ventricular  wall motion.     Right Ventricle  The right ventricle is normal in structure, function and size.     Atria  Normal left atrial size. Right atrial size is normal. There is no atrial shunt  seen.     Mitral Valve  The mitral valve is normal in structure and function.     Tricuspid Valve  The tricuspid valve is normal in structure and function. Right ventricular  systolic pressure could not be approximated due to inadequate tricuspid  regurgitation.     Aortic Valve  The aortic valve is normal in structure and function.     Pulmonic Valve  The pulmonic valve is normal in structure and function.     Vessels  Normal ascending, transverse (arch), and descending aorta. The inferior vena  cava was normal in size with preserved respiratory variability.     Pericardium  There is no pericardial effusion.     Rhythm  Sinus rhythm was noted.  Stress Test: Date: Results:    ECG Reviewed: Date: Results:    Cath: Date: Results:      METS/Exercise Tolerance:     Hematologic:       Musculoskeletal:       GI/Hepatic:       Renal/Genitourinary:       Endo:     (+) type II DM,     Psychiatric/Substance Use:       Infectious Disease:       Malignancy:       Other:               OUTSIDE LABS:  CBC:   Lab Results   Component Value Date    WBC 11.1 (H) 07/26/2021    WBC 7.2 05/13/2019     HGB 13.6 07/26/2021    HGB 13.4 05/13/2019    HCT 42.1 07/26/2021    HCT 38.6 (L) 05/13/2019     07/26/2021     05/13/2019     BMP:   Lab Results   Component Value Date     07/28/2021     07/26/2021    POTASSIUM 4.3 07/28/2021    POTASSIUM 4.2 07/26/2021    CHLORIDE 109 07/28/2021    CHLORIDE 108 07/26/2021    CO2 29 07/28/2021    CO2 29 07/26/2021    BUN 12 07/28/2021    BUN 15 07/26/2021    CR 1.22 07/28/2021    CR 1.34 (H) 07/26/2021     (H) 07/28/2021     (H) 07/26/2021     COAGS:   Lab Results   Component Value Date    PTT 36 07/26/2021    INR 1.03 07/26/2021     POC:   Lab Results   Component Value Date     (H) 08/29/2019     HEPATIC:   Lab Results   Component Value Date    ALBUMIN 3.1 (L) 07/26/2021    PROTTOTAL 7.0 07/26/2021    ALT 28 07/26/2021    AST 15 07/26/2021    ALKPHOS 81 07/26/2021    BILITOTAL 0.5 07/26/2021     OTHER:   Lab Results   Component Value Date    A1C 7.1 (H) 07/26/2021    KRIS 8.7 07/28/2021    TSH 6.06 (H) 03/02/2021    T4 1.21 03/02/2021       Anesthesia Plan    ASA Status:  3      Anesthesia Type: General.     - Airway: ETT      Maintenance: Inhalation.        Consents            Postoperative Care    Pain management: IV analgesics, Oral pain medications.   PONV prophylaxis: Ondansetron (or other 5HT-3)     Comments:    Induction with Lidocaine, Fentanyl, Versed and Jean-Paul  Precedex gtt after induction  50mL bag of NS and micro-dripper for Protamine admin  No versed or Jean-Paul after separation from CPB, possible extubation in OR at end of case            Arthur Orlando MD

## 2021-08-09 NOTE — PROGRESS NOTES
PTA medications updated by Medication Scribe prior to surgery via phone call with patient (last doses completed by Nurse)     Medication history sources: Patient, Surescripts and H&P  In the past week, patient estimated taking medication this percent of the time: Greater than 90%  Adherence assessment: N/A Not Observed    Significant changes made to the medication list:  None      Additional medication history information:   None    Medication reconciliation completed by provider prior to medication history? No    Time spent in this activity: 20 minutes    The information provided in this note is only as accurate as the sources available at the time of update(s)      Prior to Admission medications    Medication Sig Last Dose Taking? Auth Provider   aspirin 81 MG EC tablet Take 1 tablet (81 mg) by mouth daily 8/9/2021 at AM Yes Michael Méndez MD   atorvastatin (LIPITOR) 20 MG tablet Take 1 tablet (20 mg) by mouth daily 8/9/2021 at AM Yes Michael Méndez MD   glimepiride (AMARYL) 2 MG tablet Take 1 tablet (2 mg) by mouth every morning (before breakfast) 8/9/2021 at AM Yes Michael Méndez MD   isosorbide mononitrate (IMDUR) 30 MG 24 hr tablet Take 1 tablet (30 mg) by mouth daily 8/9/2021 at AM Yes Mark Oneil MD   lisinopril (ZESTRIL) 20 MG tablet Take 1 tablet (20 mg) by mouth daily 8/9/2021 at AM Yes Michael Méndez MD   metFORMIN (GLUCOPHAGE) 1000 MG tablet Take 1 tablet (1,000 mg) by mouth 2 times daily (with meals)  Patient taking differently: Take 1,000-2,000 mg by mouth every morning  8/9/2021 at AM Yes Michael Méndez MD   metoprolol tartrate (LOPRESSOR) 25 MG tablet Take 1 tablet (25 mg) by mouth 2 times daily  Yes Michael Méndez MD   nitroGLYcerin (NITROSTAT) 0.4 MG sublingual tablet For chest pain place 1 tablet under the tongue every 5 minutes for up to 3 doses. If symptoms persist 5 minutes after 2nd dose call 911. NEVER TAKEN at PRN Yes Sanchez Sarah MD

## 2021-08-10 ENCOUNTER — ANESTHESIA (OUTPATIENT)
Dept: SURGERY | Facility: CLINIC | Age: 61
End: 2021-08-10

## 2021-08-11 NOTE — TELEPHONE ENCOUNTER
Discussed briefly with Lizet Galindo RN who notes that pt will have intervention with Dr. Villafana at [facility].  Noted that pt now has visit set up with Dr. Sarah as well?  No documentation noted in pts chart.  Request appropriate RN teams f/u on next steps.   Angela Handley PA-C 8/11/2021 12:57 PM

## 2021-08-12 DIAGNOSIS — I25.10 CORONARY ARTERY DISEASE INVOLVING NATIVE CORONARY ARTERY OF NATIVE HEART WITHOUT ANGINA PECTORIS: ICD-10-CM

## 2021-08-12 RX ORDER — ISOSORBIDE MONONITRATE 30 MG/1
30 TABLET, EXTENDED RELEASE ORAL DAILY
Qty: 90 TABLET | Refills: 0 | Status: ON HOLD | OUTPATIENT
Start: 2021-08-12 | End: 2021-08-18

## 2021-08-12 NOTE — TELEPHONE ENCOUNTER
"Appreciate Dr Brown note  \"Spoke with Paolo and the patient and patient isn't interested in pursuing CABG. I spoke with Sanchez and he plans to see him in clinic to discuss repeat PCI. Thanks Esther\"    Will close encounter.      "

## 2021-08-13 ENCOUNTER — OFFICE VISIT (OUTPATIENT)
Dept: CARDIOLOGY | Facility: CLINIC | Age: 61
End: 2021-08-13
Payer: COMMERCIAL

## 2021-08-13 VITALS
BODY MASS INDEX: 27.01 KG/M2 | DIASTOLIC BLOOD PRESSURE: 56 MMHG | HEIGHT: 71 IN | HEART RATE: 67 BPM | WEIGHT: 192.9 LBS | SYSTOLIC BLOOD PRESSURE: 93 MMHG | OXYGEN SATURATION: 98 %

## 2021-08-13 DIAGNOSIS — I20.0 UNSTABLE ANGINA (H): Primary | ICD-10-CM

## 2021-08-13 DIAGNOSIS — I20.89 STABLE ANGINA (H): Primary | ICD-10-CM

## 2021-08-13 PROCEDURE — 99214 OFFICE O/P EST MOD 30 MIN: CPT | Performed by: INTERNAL MEDICINE

## 2021-08-13 ASSESSMENT — MIFFLIN-ST. JEOR: SCORE: 1702.12

## 2021-08-13 NOTE — PROGRESS NOTES
HPI and Plan:   See dictation    No orders of the defined types were placed in this encounter.      No orders of the defined types were placed in this encounter.      There are no discontinued medications.      Encounter Diagnosis   Name Primary?     Stable angina (H) Yes       CURRENT MEDICATIONS:  Current Outpatient Medications   Medication Sig Dispense Refill     aspirin 81 MG EC tablet Take 1 tablet (81 mg) by mouth daily 90 tablet 3     atorvastatin (LIPITOR) 20 MG tablet Take 1 tablet (20 mg) by mouth daily 90 tablet 3     glimepiride (AMARYL) 2 MG tablet Take 1 tablet (2 mg) by mouth every morning (before breakfast) 90 tablet 3     isosorbide mononitrate (IMDUR) 30 MG 24 hr tablet Take 1 tablet (30 mg) by mouth daily 90 tablet 0     lisinopril (ZESTRIL) 20 MG tablet Take 1 tablet (20 mg) by mouth daily 90 tablet 3     metFORMIN (GLUCOPHAGE) 1000 MG tablet Take 1 tablet (1,000 mg) by mouth 2 times daily (with meals) (Patient taking differently: Take 1,000-2,000 mg by mouth every morning ) 180 tablet 3     metoprolol tartrate (LOPRESSOR) 25 MG tablet Take 1 tablet (25 mg) by mouth 2 times daily 180 tablet 3     nitroGLYcerin (NITROSTAT) 0.4 MG sublingual tablet For chest pain place 1 tablet under the tongue every 5 minutes for up to 3 doses. If symptoms persist 5 minutes after 2nd dose call 911. (Patient not taking: Reported on 8/13/2021) 30 tablet 0       ALLERGIES   No Known Allergies    PAST MEDICAL HISTORY:  Past Medical History:   Diagnosis Date     Abnormal stress test 07/14/2021     Coronary artery disease involving native coronary artery of native heart without angina pectoris 07/14/2021     Essential hypertension, benign 05/18/2016     Hyperlipidemia LDL goal <70 03/16/2015     NSTEMI (non-ST elevated myocardial infarction) (H) 05/09/2019    s/p MICHELLE to pLAD     Status post coronary angiogram 07/26/2021    Complex Multivessel CAD. CABG cosult     Type 2 diabetes mellitus without complication, without  long-term current use of insulin (H) 07/06/2017     Unstable angina (H) 05/09/2019       PAST SURGICAL HISTORY:  Past Surgical History:   Procedure Laterality Date     CV CORONARY ANGIOGRAM N/A 7/26/2021    Procedure: Coronary Angiogram;  Surgeon: Sylvester Westbrook MD;  Location:  [facility]     CV HEART CATHETERIZATION WITH POSSIBLE INTERVENTION N/A 5/9/2019    Procedure: Coronary Angiogram;  Surgeon: Jose Enrique Stanley MD;  Location:  [facility]     CV LEFT HEART CATH N/A 7/26/2021    Procedure: Left Heart Cath;  Surgeon: Sylvester Westbrook MD;  Location:  [facility]     CV PCI ANGIOPLASTY N/A 5/9/2019    Procedure: PCI Angioplasty;  Surgeon: Jose Enrique Stanley MD;  Location:  [facility]ATH [facility]       FAMILY HISTORY:  Family History   Problem Relation Age of Onset     Diabetes Paternal Grandmother      Diabetes Nephew        SOCIAL HISTORY:  Social History     Socioeconomic History     Marital status:      Spouse name: None     Number of children: None     Years of education: None     Highest education level: None   Occupational History     None   Tobacco Use     Smoking status: Never Smoker     Smokeless tobacco: Never Used   Substance and Sexual Activity     Alcohol use: No     Drug use: No     Sexual activity: Yes     Partners: Female   Other Topics Concern     Parent/sibling w/ CABG, MI or angioplasty before 65F 55M? Not Asked   Social History Narrative     None     Social Determinants of Health     Financial Resource Strain:      Difficulty of Paying Living Expenses:    Food Insecurity:      Worried About Running Out of Food in the Last Year:      Ran Out of Food in the Last Year:    Transportation Needs:      Lack of Transportation (Medical):      Lack of Transportation (Non-Medical):    Physical Activity:      Days of Exercise per Week:      Minutes of Exercise per Session:    Stress:      Feeling of Stress :    Social Connections:   "    Frequency of Communication with Friends and Family:      Frequency of Social Gatherings with Friends and Family:      Attends Yazidi Services:      Active Member of Clubs or Organizations:      Attends Club or Organization Meetings:      Marital Status:    Intimate Partner Violence:      Fear of Current or Ex-Partner:      Emotionally Abused:      Physically Abused:      Sexually Abused:        Review of Systems:  Skin:  Negative       Eyes:  Negative glasses    ENT:  Negative      Respiratory:  Negative dyspnea on exertion;shortness of breath     Cardiovascular:  Negative for;palpitations;chest pain;fatigue;dizziness;lightheadedness;edema palpitations    Gastroenterology: Positive for heartburn    Genitourinary:  Negative      Musculoskeletal:  not assessed      Neurologic:  Negative      Psychiatric:  Negative      Heme/Lymph/Imm:  Negative      Endocrine:  Positive for diabetes Type II    Physical Exam:  Vitals: BP 93/56   Pulse 67   Ht 1.803 m (5' 11\")   Wt 87.5 kg (192 lb 14.4 oz)   SpO2 98%   BMI 26.90 kg/m      Constitutional:  cooperative;in no acute distress        Skin:  warm and dry to the touch          Head:  normocephalic        Eyes:  conjunctivae and lids unremarkable        Lymph:      ENT:  no pallor or cyanosis        Neck:  JVP normal        Respiratory:  clear to auscultation         Cardiac: regular rhythm;no murmurs, gallops or rubs detected                    1+         1+   1+         1+            GI:  abdomen soft;non-tender        Extremities and Muscular Skeletal:  no edema              Neurological:  no gross motor deficits        Psych:  Alert and Oriented x 3        CC  No referring provider defined for this encounter.              "

## 2021-08-14 DIAGNOSIS — Z11.59 ENCOUNTER FOR SCREENING FOR OTHER VIRAL DISEASES: ICD-10-CM

## 2021-08-15 ENCOUNTER — LAB (OUTPATIENT)
Dept: URGENT CARE | Facility: URGENT CARE | Age: 61
End: 2021-08-15
Payer: COMMERCIAL

## 2021-08-15 DIAGNOSIS — I20.0 UNSTABLE ANGINA (H): ICD-10-CM

## 2021-08-15 DIAGNOSIS — Z11.59 ENCOUNTER FOR SCREENING FOR OTHER VIRAL DISEASES: ICD-10-CM

## 2021-08-15 LAB — SARS-COV-2 RNA RESP QL NAA+PROBE: NEGATIVE

## 2021-08-15 PROCEDURE — U0003 INFECTIOUS AGENT DETECTION BY NUCLEIC ACID (DNA OR RNA); SEVERE ACUTE RESPIRATORY SYNDROME CORONAVIRUS 2 (SARS-COV-2) (CORONAVIRUS DISEASE [COVID-19]), AMPLIFIED PROBE TECHNIQUE, MAKING USE OF HIGH THROUGHPUT TECHNOLOGIES AS DESCRIBED BY CMS-2020-01-R: HCPCS

## 2021-08-15 PROCEDURE — U0005 INFEC AGEN DETEC AMPLI PROBE: HCPCS

## 2021-08-15 NOTE — PROGRESS NOTES
Service Date: 08/13/2021    REASON FOR CONSULTATION:  Followup for coronary artery disease and stable angina.    HISTORY OF PRESENT ILLNESS:  I had the pleasure of seeing Abhishek Gomez at the [facility] in [place] this afternoon.  He is a very pleasant, 61-year-old gentleman who is a patient of my colleague Dr. Oneil.  I saw him last month for the first time for an evaluation of chest pain and abnormal stress test in the absence of Dr. Oneil.  He has known coronary artery disease, diabetes, hypertension and hyperlipidemia.  He was evaluated here in 2019 with unstable angina.  A stress test prior to that evaluation was very abnormal.  He then proceeded to have a coronary angiogram, which revealed high-grade proximal LAD stenosis.  The LAD was stented with a 2.5 mm stent.  The first diagonal was also ballooned at that time.  He developed atypical chest discomfort in late 2019, and a stress test performed at that time showed no inducible ischemia.    Over the last several months, he has been noticing typical exertional angina.  As a result, he was referred for a stress test.  The stress test was read as showing anteroseptal and apical wall ischemia.    When I saw him last month, I recommended a coronary angiography.  This was performed on 07/26/2021.  I personally reviewed the angiogram.  The angiogram showed an occluded proximal LAD at the site of the prior stent with left-to-left and right-to-left collaterals.  The circumflex and rami were small with moderate disease.  The mid RCA had a focal 50% stenosis.    After the angiogram, the patient was referred for surgery.  The patient was not interested in surgery and subsequently contacted Dr. Paolo Azar at the [facility] through a family friend.  Dr. Azar subsequently contacted me, and we reviewed the angiogram.  I have also discussed the angiogram with Dr. Lawson, who saw the patient for surgical consultation.  Given his reluctance  to surgery and the fact that the RCA does not appear to be significant, we felt it is reasonable to open up the LAD first before considering surgery.    ASSESSMENT AND PLAN:  A very pleasant, 61-year-old gentleman with known coronary artery disease and occluded LAD as described above.  I again reviewed the angiogram with him and his daughter.  I believe it is reasonable to offer him revascularization of the LAD.  I do not think the RCA lesion is significant, but we will assess its significance with an FFR at the time of his LAD revascularization.  We went over the risks, benefits, goals, alternatives and complications of the procedure.  He expressed understanding and would like to proceed.  He understands that if the LAD  revascularization is unsuccessful and he continues to have angina that a single-vessel bypass with LIMA to the LAD will be very reasonable.    I appreciate the opportunity to participate in his care.    Sanchez Sarah MD        D: 2021   T: 2021   MT: summer/JOSÉ MIGUEL    Name:     EFRAIN VIDALLIVALON PITTMarkos  MRN:      [id number]        Account:      [id number]   :      1960           Service Date: 2021       Document: [id number]

## 2021-08-16 DIAGNOSIS — I25.10 CORONARY ARTERY DISEASE INVOLVING NATIVE CORONARY ARTERY OF NATIVE HEART WITHOUT ANGINA PECTORIS: Primary | ICD-10-CM

## 2021-08-16 RX ORDER — ASPIRIN 81 MG/1
243 TABLET, CHEWABLE ORAL ONCE
Status: CANCELLED | OUTPATIENT
Start: 2021-08-16

## 2021-08-16 RX ORDER — ASPIRIN 325 MG
325 TABLET ORAL ONCE
Status: CANCELLED | OUTPATIENT
Start: 2021-08-16 | End: 2021-08-16

## 2021-08-16 RX ORDER — LIDOCAINE 40 MG/G
CREAM TOPICAL
Status: CANCELLED | OUTPATIENT
Start: 2021-08-16

## 2021-08-16 RX ORDER — SODIUM CHLORIDE 9 MG/ML
INJECTION, SOLUTION INTRAVENOUS CONTINUOUS
Status: CANCELLED | OUTPATIENT
Start: 2021-08-16

## 2021-08-16 RX ORDER — POTASSIUM CHLORIDE 1500 MG/1
20 TABLET, EXTENDED RELEASE ORAL
Status: CANCELLED | OUTPATIENT
Start: 2021-08-16

## 2021-08-17 ENCOUNTER — HOSPITAL ENCOUNTER (OUTPATIENT)
Facility: CLINIC | Age: 61
Setting detail: OBSERVATION
Discharge: HOME | End: 2021-08-18
Admitting: INTERNAL MEDICINE
Payer: COMMERCIAL

## 2021-08-17 DIAGNOSIS — R94.39 ABNORMAL STRESS TEST: Primary | ICD-10-CM

## 2021-08-17 DIAGNOSIS — I25.10 CORONARY ARTERY DISEASE INVOLVING NATIVE CORONARY ARTERY OF NATIVE HEART WITHOUT ANGINA PECTORIS: ICD-10-CM

## 2021-08-17 DIAGNOSIS — I20.89 STABLE ANGINA (H): ICD-10-CM

## 2021-08-17 LAB
ACT BLD: 231 SECONDS (ref 74–150)
ACT BLD: 255 SECONDS (ref 74–150)
ACT BLD: 288 SECONDS (ref 74–150)
ACT BLD: 324 SECONDS (ref 74–150)
ACT BLD: 328 SECONDS (ref 74–150)
ANION GAP SERPL CALCULATED.3IONS-SCNC: 3 MMOL/L (ref 3–14)
APTT PPP: 35 SECONDS (ref 22–38)
BUN SERPL-MCNC: 15 MG/DL (ref 7–30)
CALCIUM SERPL-MCNC: 8.1 MG/DL (ref 8.5–10.1)
CHLORIDE BLD-SCNC: 109 MMOL/L (ref 94–109)
CHOLEST SERPL-MCNC: 90 MG/DL
CO2 SERPL-SCNC: 28 MMOL/L (ref 20–32)
CREAT SERPL-MCNC: 1.31 MG/DL (ref 0.66–1.25)
ERYTHROCYTE [DISTWIDTH] IN BLOOD BY AUTOMATED COUNT: 12.2 % (ref 10–15)
GFR SERPL CREATININE-BSD FRML MDRD: 58 ML/MIN/1.73M2
GLUCOSE BLD-MCNC: 132 MG/DL (ref 70–99)
HCT VFR BLD AUTO: 35.5 % (ref 40–53)
HDLC SERPL-MCNC: 28 MG/DL
HGB BLD-MCNC: 11.7 G/DL (ref 13.3–17.7)
HOLD SPECIMEN: NORMAL
INR PPP: 1.13 (ref 0.85–1.15)
LDLC SERPL CALC-MCNC: 27 MG/DL
MCH RBC QN AUTO: 26.1 PG (ref 26.5–33)
MCHC RBC AUTO-ENTMCNC: 33 G/DL (ref 31.5–36.5)
MCV RBC AUTO: 79 FL (ref 78–100)
NONHDLC SERPL-MCNC: 62 MG/DL
PLATELET # BLD AUTO: 257 10E3/UL (ref 150–450)
POTASSIUM BLD-SCNC: 4.6 MMOL/L (ref 3.4–5.3)
RBC # BLD AUTO: 4.48 10E6/UL (ref 4.4–5.9)
SODIUM SERPL-SCNC: 140 MMOL/L (ref 133–144)
TRIGL SERPL-MCNC: 174 MG/DL
WBC # BLD AUTO: 8.3 10E3/UL (ref 4–11)

## 2021-08-17 PROCEDURE — 93005 ELECTROCARDIOGRAM TRACING: CPT

## 2021-08-17 PROCEDURE — 36415 COLL VENOUS BLD VENIPUNCTURE: CPT | Performed by: INTERNAL MEDICINE

## 2021-08-17 PROCEDURE — 250N000013 HC RX MED GY IP 250 OP 250 PS 637: Performed by: INTERNAL MEDICINE

## 2021-08-17 PROCEDURE — 80061 LIPID PANEL: CPT | Performed by: STUDENT IN AN ORGANIZED HEALTH CARE EDUCATION/TRAINING PROGRAM

## 2021-08-17 PROCEDURE — 85027 COMPLETE CBC AUTOMATED: CPT | Performed by: INTERNAL MEDICINE

## 2021-08-17 PROCEDURE — 999N000071 HC STATISTIC HEART CATH LAB OR EP LAB

## 2021-08-17 PROCEDURE — 258N000003 HC RX IP 258 OP 636: Performed by: STUDENT IN AN ORGANIZED HEALTH CARE EDUCATION/TRAINING PROGRAM

## 2021-08-17 PROCEDURE — 93454 CORONARY ARTERY ANGIO S&I: CPT | Mod: XU | Performed by: INTERNAL MEDICINE

## 2021-08-17 PROCEDURE — 258N000003 HC RX IP 258 OP 636: Performed by: INTERNAL MEDICINE

## 2021-08-17 PROCEDURE — 92943 PRQ TRLUML REVSC CH OCC ANT: CPT | Mod: LD | Performed by: INTERNAL MEDICINE

## 2021-08-17 PROCEDURE — C9601 PERC DRUG-EL COR STENT BRAN: HCPCS | Performed by: INTERNAL MEDICINE

## 2021-08-17 PROCEDURE — C9607 PERC D-E COR REVASC CHRO SIN: HCPCS | Mod: LD | Performed by: INTERNAL MEDICINE

## 2021-08-17 PROCEDURE — 99152 MOD SED SAME PHYS/QHP 5/>YRS: CPT | Performed by: INTERNAL MEDICINE

## 2021-08-17 PROCEDURE — 999N000054 HC STATISTIC EKG NON-CHARGEABLE

## 2021-08-17 PROCEDURE — C1769 GUIDE WIRE: HCPCS | Performed by: INTERNAL MEDICINE

## 2021-08-17 PROCEDURE — 250N000011 HC RX IP 250 OP 636: Performed by: INTERNAL MEDICINE

## 2021-08-17 PROCEDURE — C1753 CATH, INTRAVAS ULTRASOUND: HCPCS | Performed by: INTERNAL MEDICINE

## 2021-08-17 PROCEDURE — 92943 PRQ TRLUML REVSC CH OCC ANT: CPT | Performed by: INTERNAL MEDICINE

## 2021-08-17 PROCEDURE — 272N000001 HC OR GENERAL SUPPLY STERILE: Performed by: INTERNAL MEDICINE

## 2021-08-17 PROCEDURE — C1725 CATH, TRANSLUMIN NON-LASER: HCPCS | Performed by: INTERNAL MEDICINE

## 2021-08-17 PROCEDURE — 93454 CORONARY ARTERY ANGIO S&I: CPT | Mod: 26 | Performed by: INTERNAL MEDICINE

## 2021-08-17 PROCEDURE — C1874 STENT, COATED/COV W/DEL SYS: HCPCS | Performed by: INTERNAL MEDICINE

## 2021-08-17 PROCEDURE — C9608 PERC D-E COR REVASC CHRO ADD: HCPCS | Performed by: INTERNAL MEDICINE

## 2021-08-17 PROCEDURE — 99152 MOD SED SAME PHYS/QHP 5/>YRS: CPT | Mod: 59 | Performed by: INTERNAL MEDICINE

## 2021-08-17 PROCEDURE — C1760 CLOSURE DEV, VASC: HCPCS | Performed by: INTERNAL MEDICINE

## 2021-08-17 PROCEDURE — 93571 IV DOP VEL&/PRESS C FLO 1ST: CPT | Mod: 26 | Performed by: INTERNAL MEDICINE

## 2021-08-17 PROCEDURE — 99153 MOD SED SAME PHYS/QHP EA: CPT | Performed by: INTERNAL MEDICINE

## 2021-08-17 PROCEDURE — 92978 ENDOLUMINL IVUS OCT C 1ST: CPT | Mod: LD | Performed by: INTERNAL MEDICINE

## 2021-08-17 PROCEDURE — 93571 IV DOP VEL&/PRESS C FLO 1ST: CPT | Mod: 52,RC | Performed by: INTERNAL MEDICINE

## 2021-08-17 PROCEDURE — 250N000009 HC RX 250: Performed by: INTERNAL MEDICINE

## 2021-08-17 PROCEDURE — G0378 HOSPITAL OBSERVATION PER HR: HCPCS

## 2021-08-17 PROCEDURE — 85730 THROMBOPLASTIN TIME PARTIAL: CPT | Performed by: INTERNAL MEDICINE

## 2021-08-17 PROCEDURE — 250N000013 HC RX MED GY IP 250 OP 250 PS 637: Performed by: STUDENT IN AN ORGANIZED HEALTH CARE EDUCATION/TRAINING PROGRAM

## 2021-08-17 PROCEDURE — 80048 BASIC METABOLIC PNL TOTAL CA: CPT | Performed by: INTERNAL MEDICINE

## 2021-08-17 PROCEDURE — 85347 COAGULATION TIME ACTIVATED: CPT | Mod: 91

## 2021-08-17 PROCEDURE — C1887 CATHETER, GUIDING: HCPCS | Performed by: INTERNAL MEDICINE

## 2021-08-17 PROCEDURE — 85610 PROTHROMBIN TIME: CPT | Performed by: INTERNAL MEDICINE

## 2021-08-17 PROCEDURE — 999N000184 HC STATISTIC TELEMETRY

## 2021-08-17 DEVICE — STENT SYNERGY DRUG ELUTING 2.75X16MM  H7493926016270: Type: IMPLANTABLE DEVICE | Status: FUNCTIONAL

## 2021-08-17 DEVICE — STENT SYNERGY DRUG ELUTING 2.75X32MM  H7493926032270: Type: IMPLANTABLE DEVICE | Status: FUNCTIONAL

## 2021-08-17 DEVICE — STENT SYNERGY DRUG ELUTING 3.50X08MM  H7493926008350: Type: IMPLANTABLE DEVICE | Status: FUNCTIONAL

## 2021-08-17 DEVICE — STENT SYNERGY DRUG ELUTING 3.00X32MM  H7493926032300: Type: IMPLANTABLE DEVICE | Status: FUNCTIONAL

## 2021-08-17 RX ORDER — NALOXONE HYDROCHLORIDE 0.4 MG/ML
0.4 INJECTION, SOLUTION INTRAMUSCULAR; INTRAVENOUS; SUBCUTANEOUS
Status: ACTIVE | OUTPATIENT
Start: 2021-08-17 | End: 2021-08-18

## 2021-08-17 RX ORDER — NITROGLYCERIN 0.4 MG/1
0.4 TABLET SUBLINGUAL EVERY 5 MIN PRN
Status: DISCONTINUED | OUTPATIENT
Start: 2021-08-17 | End: 2021-08-18 | Stop reason: HOSPADM

## 2021-08-17 RX ORDER — ISOSORBIDE MONONITRATE 30 MG/1
30 TABLET, EXTENDED RELEASE ORAL DAILY
Status: DISCONTINUED | OUTPATIENT
Start: 2021-08-18 | End: 2021-08-18 | Stop reason: HOSPADM

## 2021-08-17 RX ORDER — NALOXONE HYDROCHLORIDE 0.4 MG/ML
0.2 INJECTION, SOLUTION INTRAMUSCULAR; INTRAVENOUS; SUBCUTANEOUS
Status: ACTIVE | OUTPATIENT
Start: 2021-08-17 | End: 2021-08-18

## 2021-08-17 RX ORDER — OXYCODONE HYDROCHLORIDE 5 MG/1
10 TABLET ORAL EVERY 4 HOURS PRN
Status: DISCONTINUED | OUTPATIENT
Start: 2021-08-17 | End: 2021-08-18 | Stop reason: HOSPADM

## 2021-08-17 RX ORDER — ASPIRIN 81 MG/1
81 TABLET, CHEWABLE ORAL ONCE
Status: DISCONTINUED | OUTPATIENT
Start: 2021-08-17 | End: 2021-08-17 | Stop reason: CLARIF

## 2021-08-17 RX ORDER — GLIMEPIRIDE 2 MG/1
2 TABLET ORAL
Status: DISCONTINUED | OUTPATIENT
Start: 2021-08-18 | End: 2021-08-18 | Stop reason: HOSPADM

## 2021-08-17 RX ORDER — ATROPINE SULFATE 0.1 MG/ML
0.5 INJECTION INTRAVENOUS
Status: ACTIVE | OUTPATIENT
Start: 2021-08-17 | End: 2021-08-18

## 2021-08-17 RX ORDER — ONDANSETRON 4 MG/1
4 TABLET, ORALLY DISINTEGRATING ORAL EVERY 6 HOURS PRN
Status: DISCONTINUED | OUTPATIENT
Start: 2021-08-17 | End: 2021-08-18 | Stop reason: HOSPADM

## 2021-08-17 RX ORDER — HYDRALAZINE HYDROCHLORIDE 20 MG/ML
10 INJECTION INTRAMUSCULAR; INTRAVENOUS EVERY 4 HOURS PRN
Status: DISCONTINUED | OUTPATIENT
Start: 2021-08-17 | End: 2021-08-18 | Stop reason: HOSPADM

## 2021-08-17 RX ORDER — SODIUM CHLORIDE 9 MG/ML
INJECTION, SOLUTION INTRAVENOUS CONTINUOUS
Status: ACTIVE | OUTPATIENT
Start: 2021-08-17 | End: 2021-08-17

## 2021-08-17 RX ORDER — NICOTINE POLACRILEX 4 MG
15-30 LOZENGE BUCCAL
Status: DISCONTINUED | OUTPATIENT
Start: 2021-08-17 | End: 2021-08-18 | Stop reason: HOSPADM

## 2021-08-17 RX ORDER — OXYCODONE HYDROCHLORIDE 5 MG/1
5 TABLET ORAL EVERY 4 HOURS PRN
Status: DISCONTINUED | OUTPATIENT
Start: 2021-08-17 | End: 2021-08-18 | Stop reason: HOSPADM

## 2021-08-17 RX ORDER — LIDOCAINE 40 MG/G
CREAM TOPICAL
Status: DISCONTINUED | OUTPATIENT
Start: 2021-08-17 | End: 2021-08-17 | Stop reason: HOSPADM

## 2021-08-17 RX ORDER — NITROGLYCERIN 5 MG/ML
VIAL (ML) INTRAVENOUS
Status: DISCONTINUED | OUTPATIENT
Start: 2021-08-17 | End: 2021-08-17 | Stop reason: HOSPADM

## 2021-08-17 RX ORDER — SODIUM CHLORIDE 9 MG/ML
INJECTION, SOLUTION INTRAVENOUS CONTINUOUS
Status: DISCONTINUED | OUTPATIENT
Start: 2021-08-17 | End: 2021-08-17 | Stop reason: HOSPADM

## 2021-08-17 RX ORDER — ASPIRIN 325 MG
325 TABLET ORAL ONCE
Status: DISCONTINUED | OUTPATIENT
Start: 2021-08-17 | End: 2021-08-17 | Stop reason: HOSPADM

## 2021-08-17 RX ORDER — ASPIRIN 81 MG/1
81 TABLET ORAL DAILY
Status: DISCONTINUED | OUTPATIENT
Start: 2021-08-18 | End: 2021-08-18 | Stop reason: HOSPADM

## 2021-08-17 RX ORDER — DEXTROSE MONOHYDRATE 25 G/50ML
25-50 INJECTION, SOLUTION INTRAVENOUS
Status: DISCONTINUED | OUTPATIENT
Start: 2021-08-17 | End: 2021-08-18 | Stop reason: HOSPADM

## 2021-08-17 RX ORDER — LISINOPRIL 20 MG/1
20 TABLET ORAL DAILY
Status: DISCONTINUED | OUTPATIENT
Start: 2021-08-18 | End: 2021-08-18 | Stop reason: HOSPADM

## 2021-08-17 RX ORDER — METOPROLOL TARTRATE 1 MG/ML
5-10 INJECTION, SOLUTION INTRAVENOUS
Status: DISCONTINUED | OUTPATIENT
Start: 2021-08-17 | End: 2021-08-18 | Stop reason: HOSPADM

## 2021-08-17 RX ORDER — ONDANSETRON 2 MG/ML
4 INJECTION INTRAMUSCULAR; INTRAVENOUS EVERY 6 HOURS PRN
Status: DISCONTINUED | OUTPATIENT
Start: 2021-08-17 | End: 2021-08-18 | Stop reason: HOSPADM

## 2021-08-17 RX ORDER — FLUMAZENIL 0.1 MG/ML
0.2 INJECTION, SOLUTION INTRAVENOUS
Status: ACTIVE | OUTPATIENT
Start: 2021-08-17 | End: 2021-08-18

## 2021-08-17 RX ORDER — FENTANYL CITRATE 50 UG/ML
INJECTION, SOLUTION INTRAMUSCULAR; INTRAVENOUS
Status: DISCONTINUED | OUTPATIENT
Start: 2021-08-17 | End: 2021-08-17 | Stop reason: HOSPADM

## 2021-08-17 RX ORDER — ASPIRIN 81 MG/1
243 TABLET, CHEWABLE ORAL ONCE
Status: DISCONTINUED | OUTPATIENT
Start: 2021-08-17 | End: 2021-08-17 | Stop reason: HOSPADM

## 2021-08-17 RX ORDER — ACETAMINOPHEN 325 MG/1
650 TABLET ORAL EVERY 4 HOURS PRN
Status: DISCONTINUED | OUTPATIENT
Start: 2021-08-17 | End: 2021-08-18 | Stop reason: HOSPADM

## 2021-08-17 RX ORDER — FENTANYL CITRATE 50 UG/ML
25 INJECTION, SOLUTION INTRAMUSCULAR; INTRAVENOUS
Status: DISCONTINUED | OUTPATIENT
Start: 2021-08-17 | End: 2021-08-18 | Stop reason: HOSPADM

## 2021-08-17 RX ORDER — POTASSIUM CHLORIDE 1500 MG/1
20 TABLET, EXTENDED RELEASE ORAL
Status: DISCONTINUED | OUTPATIENT
Start: 2021-08-17 | End: 2021-08-17 | Stop reason: HOSPADM

## 2021-08-17 RX ORDER — ATORVASTATIN CALCIUM 20 MG/1
20 TABLET, FILM COATED ORAL DAILY
Status: DISCONTINUED | OUTPATIENT
Start: 2021-08-18 | End: 2021-08-18 | Stop reason: HOSPADM

## 2021-08-17 RX ORDER — FENTANYL CITRATE-0.9 % NACL/PF 10 MCG/ML
PLASTIC BAG, INJECTION (ML) INTRAVENOUS
Status: DISCONTINUED | OUTPATIENT
Start: 2021-08-17 | End: 2021-08-17 | Stop reason: HOSPADM

## 2021-08-17 RX ORDER — HEPARIN SODIUM 1000 [USP'U]/ML
INJECTION, SOLUTION INTRAVENOUS; SUBCUTANEOUS
Status: DISCONTINUED | OUTPATIENT
Start: 2021-08-17 | End: 2021-08-17 | Stop reason: HOSPADM

## 2021-08-17 RX ORDER — ADENOSINE 3 MG/ML
INJECTION, SOLUTION INTRAVENOUS
Status: DISCONTINUED | OUTPATIENT
Start: 2021-08-17 | End: 2021-08-17 | Stop reason: HOSPADM

## 2021-08-17 RX ORDER — METOPROLOL TARTRATE 25 MG/1
25 TABLET, FILM COATED ORAL 2 TIMES DAILY
Status: DISCONTINUED | OUTPATIENT
Start: 2021-08-17 | End: 2021-08-18 | Stop reason: HOSPADM

## 2021-08-17 RX ORDER — IOPAMIDOL 755 MG/ML
INJECTION, SOLUTION INTRAVASCULAR
Status: DISCONTINUED | OUTPATIENT
Start: 2021-08-17 | End: 2021-08-17 | Stop reason: HOSPADM

## 2021-08-17 RX ADMIN — SODIUM CHLORIDE: 9 INJECTION, SOLUTION INTRAVENOUS at 11:58

## 2021-08-17 RX ADMIN — SODIUM CHLORIDE: 9 INJECTION, SOLUTION INTRAVENOUS at 18:30

## 2021-08-17 RX ADMIN — TICAGRELOR 90 MG: 90 TABLET ORAL at 20:27

## 2021-08-17 RX ADMIN — METOPROLOL TARTRATE 25 MG: 25 TABLET, FILM COATED ORAL at 20:27

## 2021-08-17 ASSESSMENT — MIFFLIN-ST. JEOR: SCORE: 1711.64

## 2021-08-17 NOTE — Clinical Note
The first balloon was inserted into the left anterior descending.Max pressure = 4 su. Total duration = 10 seconds.

## 2021-08-17 NOTE — Clinical Note
The first balloon was inserted into the left anterior descending.Max pressure = 6 su. Total duration = 12 seconds.     The second balloon was inserted into the Left Anterior Descending and proximal left anterior descending. Max pressure = 6 su. Total duration = 12 seconds.   Max pressure = 6 su. Total duration = 12 seconds.

## 2021-08-17 NOTE — Clinical Note
The first balloon was inserted into the left anterior descending and proximal left anterior descending.Max pressure = 8 su. Total duration = 22 seconds.     Max pressure = 8 su. Total duration = 25 seconds.    Balloon reinflated a second time: Max pressure = 8 su. Total duration = 25 seconds.  Balloon reinflated a third time: Max pressure = 6 su. Total duration = 25 seconds.

## 2021-08-17 NOTE — PROGRESS NOTES
Care Suites Admission Nursing Note    Patient Information  Name: Abhishek Gomez  Age: 61 year old  Reason for admission: Heart cath  Care Suites arrival time: 1100    Visitor Information  Name: Larry  Informed of visitor restrictions: Yes  1 visitor allowed per patient   Visitor must screen negative for COVID symptoms   Visitor must wear a mask  Waiting rooms closed to visitors    Patient Admission/Assessment   Pre-procedure assessment complete: Yes  If abnormal assessment/labs, provider notified: N/A  NPO: Yes  Medications held per instructions/orders: Yes  Consent: obtained  Patient oriented to room: Yes  Education/questions answered: Yes  Plan/other: To cath lab    Discharge Planning  Discharge name/phone number: Larry [phone]  Overnight post sedation caregiver: same  Discharge location: home    PATIENT/VISITOR WELLNESS SCREENING    Step 1 Patient Screening    1. In the last month, have you been in contact with someone who was confirmed or suspected to have Coronavirus/COVID-19? No    2. Do you have the following symptoms?  Fever/Chills? No   Cough? No   Shortness of breath? No   New loss of taste or smell? No  Sore throat? No  Muscle or body aches? No  Headaches? No  Fatigue? No  Vomiting or diarrhea? No    Step 2 Visitor Screening    1. Name of Visitor (1 visitor per patient): Jeandieter    2. In the last month, have you been in contact with someone who was confirmed or suspected to have Coronavirus/COVID-19? No    3. Do you have the following symptoms?  Fever/Chills? No   Cough? No   Shortness of breath? No   Skin rash? No   Loss of taste or smell? No  Sore throat? No  Runny or stuffy nose? No  Muscle or body aches? No  Headaches? No  Fatigue? No  Vomiting or diarrhea? No    If the visitor has positive symptoms, notify supervisor/manger  Per policy, the visitor will need to leave the facility     Step 3 Refer to logic grid below for actions    NO SYMPTOM(S)    ACTIONS:  1. Standard rooming  process  2. Provider to assess per normal protocol  3. Implement precautions as needed and per guidelines     POSITIVE SYMPTOM(S)  If positive for ANY of the following symptoms: fever, cough, shortness of breath, rash    ACTION:  1. Continue to have the patient wear a mask   2. Room patient as soon as possible  3. Don appropriate PPE when entering room  4. Provider evaluation      Cari Thurman RN

## 2021-08-17 NOTE — Clinical Note
Max pressure = 14 su. Total duration = 15 seconds.     Max pressure = 15 su. Total duration = 10 seconds.    Balloon reinflated a second time: Max pressure = 15 su. Total duration = 10 seconds.

## 2021-08-17 NOTE — Clinical Note
The first balloon was inserted into the left anterior descending.Max pressure = 14 su. Total duration = 30 seconds.

## 2021-08-17 NOTE — Clinical Note
Max pressure = 12 su. Total duration = 29 seconds.     Max pressure = 12 su. Total duration = 29 seconds.    Balloon reinflated a second time: Max pressure = 12 su. Total duration = 29 seconds.

## 2021-08-17 NOTE — Clinical Note
Max pressure = 12 su. Total duration = 20 seconds.     Max pressure = 18 su. Total duration = 30 seconds.    Balloon reinflated a second time: Max pressure = 18 su. Total duration = 30 seconds.

## 2021-08-17 NOTE — Clinical Note
The first balloon was inserted into the other vessel.Max pressure = 12 su. Total duration = 23 seconds.     Max pressure = 12 su. Total duration = 23 seconds.    Balloon reinflated a second time: Max pressure = 12 su. Total duration = 23 seconds.

## 2021-08-17 NOTE — Clinical Note
Stent deployed in the proximal left anterior descending. Max pressure = 12 su. Total duration = 14 seconds. Balloon reinflated a second time: Max pressure = 12 su. Total duration = 16 seconds.

## 2021-08-17 NOTE — Clinical Note
Max pressure = 12 su. Total duration = 29 seconds.     Max pressure = 12 su. Total duration = 26 seconds.    Balloon reinflated a second time: Max pressure = 12 su. Total duration = 26 seconds.

## 2021-08-17 NOTE — LETTER
[facility]  [address]  [facility] [address]  Phone: [phone]    August 18, 2021        Nalinidanaavi PITT Jason  [address]  [facility] [address]          To whom it may concern:    RE: Abhishek PITT Jason    Patient may return to work on 9/5/2021 with the following:  No working or lifting restrictions.    Please contact me for questions or concerns.      Sincerely,      Colin Felipe PA-C

## 2021-08-17 NOTE — Clinical Note
Max pressure = 14 su. Total duration = 14 seconds.     Max pressure = 14 su. Total duration = 15 seconds.    Balloon reinflated a second time: Max pressure = 14 su. Total duration = 15 seconds.  Balloon reinflated a third time: Max pressure = 20 su. Total duration = 20 seconds. Max pressure = 20 su. Total duration = 15 seconds.  Balloon reinflated a fourth time: Max pressure = 14 su. Total duration = 25 seconds.

## 2021-08-17 NOTE — Clinical Note
The first balloon was inserted into the left anterior descending.Max pressure = 12 su. Total duration = 12 seconds.

## 2021-08-17 NOTE — Clinical Note
Stent deployed in the middle left anterior descending. Max pressure = 8 su. Total duration = 10 seconds. Balloon reinflated a second time: Max pressure = 12 su. Total duration = 10 seconds.

## 2021-08-18 ENCOUNTER — APPOINTMENT (OUTPATIENT)
Dept: OCCUPATIONAL THERAPY | Facility: CLINIC | Age: 61
End: 2021-08-18
Attending: STUDENT IN AN ORGANIZED HEALTH CARE EDUCATION/TRAINING PROGRAM
Payer: COMMERCIAL

## 2021-08-18 VITALS
DIASTOLIC BLOOD PRESSURE: 70 MMHG | HEART RATE: 80 BPM | BODY MASS INDEX: 27.02 KG/M2 | SYSTOLIC BLOOD PRESSURE: 172 MMHG | HEIGHT: 71 IN | OXYGEN SATURATION: 99 % | WEIGHT: 193 LBS | RESPIRATION RATE: 9 BRPM | TEMPERATURE: 97.3 F

## 2021-08-18 LAB
ANION GAP SERPL CALCULATED.3IONS-SCNC: 4 MMOL/L (ref 3–14)
BUN SERPL-MCNC: 12 MG/DL (ref 7–30)
CALCIUM SERPL-MCNC: 8.1 MG/DL (ref 8.5–10.1)
CHLORIDE BLD-SCNC: 110 MMOL/L (ref 94–109)
CO2 SERPL-SCNC: 26 MMOL/L (ref 20–32)
CREAT SERPL-MCNC: 1.24 MG/DL (ref 0.66–1.25)
GFR SERPL CREATININE-BSD FRML MDRD: 62 ML/MIN/1.73M2
GLUCOSE BLD-MCNC: 103 MG/DL (ref 70–99)
POTASSIUM BLD-SCNC: 4.6 MMOL/L (ref 3.4–5.3)
SODIUM SERPL-SCNC: 140 MMOL/L (ref 133–144)

## 2021-08-18 PROCEDURE — 97165 OT EVAL LOW COMPLEX 30 MIN: CPT | Mod: GO

## 2021-08-18 PROCEDURE — 97535 SELF CARE MNGMENT TRAINING: CPT | Mod: GO

## 2021-08-18 PROCEDURE — 999N000054 HC STATISTIC EKG NON-CHARGEABLE

## 2021-08-18 PROCEDURE — G0378 HOSPITAL OBSERVATION PER HR: HCPCS

## 2021-08-18 PROCEDURE — 97110 THERAPEUTIC EXERCISES: CPT | Mod: GO

## 2021-08-18 PROCEDURE — 250N000013 HC RX MED GY IP 250 OP 250 PS 637: Performed by: STUDENT IN AN ORGANIZED HEALTH CARE EDUCATION/TRAINING PROGRAM

## 2021-08-18 PROCEDURE — 93005 ELECTROCARDIOGRAM TRACING: CPT

## 2021-08-18 PROCEDURE — 80048 BASIC METABOLIC PNL TOTAL CA: CPT | Performed by: STUDENT IN AN ORGANIZED HEALTH CARE EDUCATION/TRAINING PROGRAM

## 2021-08-18 PROCEDURE — 36415 COLL VENOUS BLD VENIPUNCTURE: CPT | Performed by: STUDENT IN AN ORGANIZED HEALTH CARE EDUCATION/TRAINING PROGRAM

## 2021-08-18 RX ORDER — ISOSORBIDE MONONITRATE 30 MG/1
60 TABLET, EXTENDED RELEASE ORAL DAILY
Qty: 90 TABLET | Refills: 0 | Status: SHIPPED | OUTPATIENT
Start: 2021-08-18 | End: 2021-08-31

## 2021-08-18 RX ADMIN — LISINOPRIL 20 MG: 20 TABLET ORAL at 08:29

## 2021-08-18 RX ADMIN — ISOSORBIDE MONONITRATE 30 MG: 30 TABLET, EXTENDED RELEASE ORAL at 08:29

## 2021-08-18 RX ADMIN — METOPROLOL TARTRATE 25 MG: 25 TABLET, FILM COATED ORAL at 08:29

## 2021-08-18 RX ADMIN — ATORVASTATIN CALCIUM 20 MG: 20 TABLET, FILM COATED ORAL at 08:29

## 2021-08-18 RX ADMIN — TICAGRELOR 90 MG: 90 TABLET ORAL at 08:29

## 2021-08-18 RX ADMIN — ASPIRIN 81 MG: 81 TABLET, COATED ORAL at 08:29

## 2021-08-18 RX ADMIN — GLIMEPIRIDE 2 MG: 2 TABLET ORAL at 08:29

## 2021-08-18 ASSESSMENT — MIFFLIN-ST. JEOR: SCORE: 1702.57

## 2021-08-18 NOTE — PROGRESS NOTES
08/18/21 0900   Quick Adds   Type of Visit Initial Occupational Therapy Evaluation   Living Environment   People in home spouse;child(bony), dependent   Current Living Arrangements house   Home Accessibility stairs to enter home;stairs within home   Number of Stairs, Main Entrance 2   Stair Railings, Main Entrance none   Number of Stairs, Within Home, Primary greater than 10 stairs   Stair Railings, Within Home, Primary railings safe and in good condition   Transportation Anticipated car, drives self;family or friend will provide   Living Environment Comments Lives in rambler 1-story home w/basement.   Self-Care   Usual Activity Tolerance moderate  (over past couple months)   Current Activity Tolerance good   Regular Exercise No   Equipment Currently Used at Home none   Activity/Exercise/Self-Care Comment Indep all ADL, IADL, works two jobs, [employer] overnights in stocking, [employer] as well in Stocking.    Instrumental Activities of Daily Living (IADL)   IADL Comments Pt and spouse share HH tasks, .   Disability/Function   Hearing Difficulty or Deaf no   Wear Glasses or Blind no   Concentrating, Remembering or Making Decisions Difficulty no   Difficulty Communicating no   Difficulty Eating/Swallowing no   Walking or Climbing Stairs Difficulty no   Dressing/Bathing Difficulty no   Toileting issues no   Doing Errands Independently Difficulty (such as shopping) no   Fall history within last six months no   General Information   Onset of Illness/Injury or Date of Surgery 08/17/21   Referring Physician Dr. Whitaker   Patient/Family Therapy Goal Statement (OT) return home   Additional Occupational Profile Info/Pertinent History of Current Problem 60yo male POD#1 Stent to LAD PMH includes previous stenting approx two years ago in LAD, also DM, HTN, HLP.   Existing Precautions/Restrictions no known precautions/restrictions   General Observations and Info Groin a bit sore when ambulating but no hematoma or redness    Cognitive Status Examination   Orientation Status orientation to person, place and time   Affect/Mental Status (Cognitive) WNL   Follows Commands WNL   Pain Assessment   Patient Currently in Pain No   Range of Motion Comprehensive   General Range of Motion no range of motion deficits identified   Strength Comprehensive (MMT)   General Manual Muscle Testing (MMT) Assessment no strength deficits identified   Coordination   Upper Extremity Coordination No deficits were identified   Bed Mobility   Comment (Bed Mobility) Independent   Transfers   Transfer Comments Independent   Balance   Balance Comments Independent   Clinical Impression   Criteria for Skilled Therapeutic Interventions Met (OT) yes   OT Diagnosis decreased IADL perrformance   OT Problem List-Impairments impacting ADL problems related to;activity tolerance impaired   Assessment of Occupational Performance 1-3 Performance Deficits   Identified Performance Deficits HH mgmt, work/exercise task   Planned Therapy Interventions (OT) ADL retraining;IADL retraining;home program guidelines;progressive activity/exercise;risk factor education   Clinical Decision Making Complexity (OT) low complexity   Therapy Frequency (OT) 2x/day   Predicted Duration of Therapy 1-2 days   Risk & Benefits of therapy have been explained evaluation/treatment results reviewed;care plan/treatment goals reviewed;participants voiced agreement with care plan;participants included;patient   OT Discharge Planning    OT Discharge Recommendation (DC Rec) home with outpatient cardiac rehab   OT Rationale for DC Rec Pt will benefit from progressive, monitored exercise program and chooses to attend [place] location.   OT Brief overview of current status  Indep all mobility, amb 10min, completed 12 stairs SBA, maintained NSR. Hypertensive response but had just taken meds at start of session.   Total Evaluation Time (Minutes)   Total Evaluation Time (Minutes) 15

## 2021-08-18 NOTE — PLAN OF CARE
Neuro: A&Ox4  Cardiac: Tele reads SR/SB. No complaints of chest pain  Respiratory: Pt on RA. Lungs clear  Activity: Up SBA  GI/: Bowel sounds audible and active. Up to bathroom  Musculoskeletal: Generalized weakness  Skin: CDI. R groin site CDI  Pain: Denied throughout shift  Drips: None  Drains/Tubes: PIV x1  Other/Plan: Plan to discharge today

## 2021-08-18 NOTE — CONSULTS
Antiplatelet coverage check.  Patient has insurance through an employer.    Brilinta: $25/mo.     Prasugrel: $4/mo.   Clopidogrel: $1/mo.       -AYAH Lynn, Pharmacy Technician/Liaison, Discharge Pharmacy [phone]

## 2021-08-18 NOTE — DISCHARGE SUMMARY
[facility]  Cardiology Progress Note/Discharge Summary    Date of Service (when I saw the patient): 08/18/2021  Primary Cardiologist: Dr. Oneil       Interval History:   Tolerated cardiac rehab. No CP or SOB. No discomfort at the femoral site.     ----------------------------------------------------------------------------------------    Assessment:  Abhishek Gomez is a 61 year old male who was admitted on 8/17/2021 after elective left heart catheterization.     #  CAD  -- Successful IVUS-guided  PCI of the proximal/mid LAD and D1 with 1 MICHELLE in LAD and 1 MICHELLE in D1 using DK crush technique; also MICHELLE to mid LAD overlapping distally with the previous stent. And, another 1 MICHELLE to ostial LAD. RCA lesions was hemodynamically insignificant; pt is on brilinta 90 mg BID and asa  -- Hx of MICHELLE to the proximal LAD for a 99% lesion in 2019, with a balloon angioplasty of the D1 as it was at the bifurcation, with a 70% ramus lesion at that point and a 30% proximal RCA lesion.  He recently underwent an angiogram and was found to have a stenosed LAD proximal to the stent, with a small collateral, 60%-70% ramus and a 70% mid RCA.    #  Dyslipidemia  -- LDL 46 on atorvastatin 20 mg    # Hypertension  -- a bit high this morning     # T2DM  -- hA1c 7.1%  -- renal fxn nl today    ----------------------------------------------------------------------------------------    Plan:  -- Continue with DAPT  -- Discharge to home with outpatient cardiac rehab  -- Cardiology follow up in 2 weeks- recheck BP at that visit     ----------------------------------------------------------------------------------------  Physical Exam   Temp: 97.3  F (36.3  C) Temp src: Oral BP: (!) 145/68 Pulse: 82   Resp: 9 SpO2: 99 % O2 Device: None (Room air)    Vitals:    08/17/21 1104 08/18/21 0541   Weight: 88.5 kg (195 lb) 87.5 kg (193 lb)     GEN: NAD. Oxygen on room air.   HEENT: Mucous membranes moist.  NECK:  No  JVD.  C/V:  Regular rate and rhythm, no murmur, rub or gallop.   RESP: CTA, bhakti.  GI: Abdomen soft, nontender, nondistended.    EXTREM: No pitting LE edema.   NEURO: Alert and oriented, cooperative.   PSYCH: Normal affect.  SKIN: No ecchymosis, hematoma or bruit at the femoral site.  VASC: 2+ radial and dorsalis pedis pulses bilaterally.      Medications     - MEDICATION INSTRUCTIONS -       - MEDICATION INSTRUCTIONS -       Percutaneous Coronary Intervention orders placed (this is information for BPA alerting)         aspirin  81 mg Oral Daily     atorvastatin  20 mg Oral Daily     glimepiride  2 mg Oral QAM AC     isosorbide mononitrate  30 mg Oral Daily     lisinopril  20 mg Oral Daily     metoprolol tartrate  25 mg Oral BID     ticagrelor  90 mg Oral Q12H       Data   Reviewed.     Tele: NSR with occasional PAC's    Colin Felipe PA-C  8/18/2021

## 2021-08-18 NOTE — DISCHARGE INSTRUCTIONS

## 2021-08-19 ENCOUNTER — PATIENT OUTREACH (OUTPATIENT)
Dept: INTERNAL MEDICINE | Facility: CLINIC | Age: 61
End: 2021-08-19

## 2021-08-19 ENCOUNTER — TELEPHONE (OUTPATIENT)
Dept: CARDIOLOGY | Facility: CLINIC | Age: 61
End: 2021-08-19

## 2021-08-19 LAB
ATRIAL RATE - MUSE: 56 BPM
ATRIAL RATE - MUSE: 60 BPM
DIASTOLIC BLOOD PRESSURE - MUSE: NORMAL MMHG
DIASTOLIC BLOOD PRESSURE - MUSE: NORMAL MMHG
INTERPRETATION ECG - MUSE: NORMAL
INTERPRETATION ECG - MUSE: NORMAL
P AXIS - MUSE: 52 DEGREES
P AXIS - MUSE: 65 DEGREES
PR INTERVAL - MUSE: 178 MS
PR INTERVAL - MUSE: 196 MS
QRS DURATION - MUSE: 74 MS
QRS DURATION - MUSE: 78 MS
QT - MUSE: 396 MS
QT - MUSE: 428 MS
QTC - MUSE: 396 MS
QTC - MUSE: 413 MS
R AXIS - MUSE: 62 DEGREES
R AXIS - MUSE: 62 DEGREES
SYSTOLIC BLOOD PRESSURE - MUSE: NORMAL MMHG
SYSTOLIC BLOOD PRESSURE - MUSE: NORMAL MMHG
T AXIS - MUSE: 43 DEGREES
T AXIS - MUSE: 55 DEGREES
VENTRICULAR RATE- MUSE: 56 BPM
VENTRICULAR RATE- MUSE: 60 BPM

## 2021-08-19 NOTE — TELEPHONE ENCOUNTER
What type of discharge? Observation  Risk of Hospital admission or ED visit: 3%  Is a TCM episode required? No  When should the patient follow up with PCP? 14 days of discharge.    Allison Swartz RN  [facility]

## 2021-08-19 NOTE — TELEPHONE ENCOUNTER
Patient was evaluated by cardiology while inpatient for elective cath (Successful IVUS-guided  PCI of the proximal/mid LAD and D1 with 1 MICHELLE in LAD and 1 MICHELLE in D1 using DK crush technique; also MICHELLE to mid LAD overlapping distally with the previous stent. And, another 1 MICHELLE to ostial LAD). Called patient to discuss any post hospital d/c questions he may have, review medication changes, and confirm f/u appts. Patient denied any questions regarding new medications or changes to PTA medications. Patient confirmed for RN that he did receive his rx for Brilinta and is taking it as rx. Patient denied any SOB, chest pain, or light headedness. RN confirmed with patient that he has an apt scheduled on 8/24/21 for OP cardiac rehab and on 8/31/21 with MIKEL Leila. Patient advised to call clinic with any cardiac related questions or concerns prior to this mikel't. Patient verbalized understanding and agreed with plan.             8/18/21 cardiology progress note  Assessment:  Abhishek Gomez is a 61 year old male who was admitted on 8/17/2021 after elective left heart catheterization.      #  CAD  -- Successful IVUS-guided  PCI of the proximal/mid LAD and D1 with 1 MICHELLE in LAD and 1 MICHELLE in D1 using DK crush technique; also MICHELLE to mid LAD overlapping distally with the previous stent. And, another 1 MICHELLE to ostial LAD. RCA lesions was hemodynamically insignificant; pt is on brilinta 90 mg BID and asa  -- Hx of MICHELLE to the proximal LAD for a 99% lesion in 2019, with a balloon angioplasty of the D1 as it was at the bifurcation, with a 70% ramus lesion at that point and a 30% proximal RCA lesion.  He recently underwent an angiogram and was found to have a stenosed LAD proximal to the stent, with a small collateral, 60%-70% ramus and a 70% mid RCA.     #  Dyslipidemia  -- LDL 46 on atorvastatin 20 mg     # Hypertension  -- a bit high this morning      # T2DM  -- hA1c 7.1%  -- renal fxn nl  today     ----------------------------------------------------------------------------------------     Plan:  -- Continue with DAPT  -- Discharge to home with outpatient cardiac rehab  -- Cardiology follow up in 2 weeks- recheck BP at that visit

## 2021-08-20 LAB
ATRIAL RATE - MUSE: 66 BPM
DIASTOLIC BLOOD PRESSURE - MUSE: NORMAL MMHG
INTERPRETATION ECG - MUSE: NORMAL
P AXIS - MUSE: 68 DEGREES
PR INTERVAL - MUSE: 160 MS
QRS DURATION - MUSE: 74 MS
QT - MUSE: 384 MS
QTC - MUSE: 402 MS
R AXIS - MUSE: 61 DEGREES
SYSTOLIC BLOOD PRESSURE - MUSE: NORMAL MMHG
T AXIS - MUSE: 55 DEGREES
VENTRICULAR RATE- MUSE: 66 BPM

## 2021-08-23 NOTE — TELEPHONE ENCOUNTER
Patient called back.     Patient reports doing well, he does not have any questions, he has upcoming appointment with cardiology. He will call back as needed.     Marla Delarosa RN

## 2021-08-24 ENCOUNTER — HOSPITAL ENCOUNTER (OUTPATIENT)
Dept: CARDIAC REHAB | Facility: CLINIC | Age: 61
End: 2021-08-24
Attending: STUDENT IN AN ORGANIZED HEALTH CARE EDUCATION/TRAINING PROGRAM
Payer: COMMERCIAL

## 2021-08-24 ENCOUNTER — NURSE TRIAGE (OUTPATIENT)
Dept: INTERNAL MEDICINE | Facility: CLINIC | Age: 61
End: 2021-08-24

## 2021-08-24 ENCOUNTER — MYC MEDICAL ADVICE (OUTPATIENT)
Dept: INTERNAL MEDICINE | Facility: CLINIC | Age: 61
End: 2021-08-24

## 2021-08-24 ENCOUNTER — OFFICE VISIT (OUTPATIENT)
Dept: URGENT CARE | Facility: URGENT CARE | Age: 61
End: 2021-08-24
Payer: COMMERCIAL

## 2021-08-24 VITALS — HEART RATE: 66 BPM | SYSTOLIC BLOOD PRESSURE: 123 MMHG | DIASTOLIC BLOOD PRESSURE: 71 MMHG | TEMPERATURE: 96.7 F

## 2021-08-24 DIAGNOSIS — I25.10 CORONARY ARTERY DISEASE INVOLVING NATIVE CORONARY ARTERY OF NATIVE HEART WITHOUT ANGINA PECTORIS: ICD-10-CM

## 2021-08-24 DIAGNOSIS — L50.9 URTICARIA: Primary | ICD-10-CM

## 2021-08-24 PROCEDURE — 99213 OFFICE O/P EST LOW 20 MIN: CPT | Performed by: PHYSICIAN ASSISTANT

## 2021-08-24 PROCEDURE — 93798 PHYS/QHP OP CAR RHAB W/ECG: CPT | Performed by: REHABILITATION PRACTITIONER

## 2021-08-24 RX ORDER — METHYLPREDNISOLONE 4 MG
TABLET, DOSE PACK ORAL
Qty: 21 TABLET | Refills: 0 | Status: SHIPPED | OUTPATIENT
Start: 2021-08-24 | End: 2021-12-10

## 2021-08-24 RX ORDER — CETIRIZINE HYDROCHLORIDE 10 MG/1
10 TABLET ORAL EVERY EVENING
Qty: 10 TABLET | Refills: 0 | Status: SHIPPED | OUTPATIENT
Start: 2021-08-24 | End: 2022-04-20

## 2021-08-24 NOTE — PATIENT INSTRUCTIONS
(L50.9) Urticaria  (primary encounter diagnosis)  Comment:   Plan: methylPREDNISolone (MEDROL DOSEPAK) 4 MG tablet        therapy pack, cetirizine (ZYRTEC) 10 MG tablet          Avoid the new lotion that you used.      Follow up with primary clinic should symptoms persist or worsen    Cool compress to areas that itch to help avoid scratching

## 2021-08-24 NOTE — TELEPHONE ENCOUNTER
"Patient calling in regarding intense itching around incision on chest along with redness, rash and warmth. Patient instructed to go to urgent care to rule out infections. Patient in agreement with plan    Chucky Taylor RN      Reason for Disposition    Localized rash also present    Patient wants to be seen    Incision looks infected (spreading redness, pain)    Additional Information    Negative: Major abdominal surgical incision and wound gaping open with visible internal organs    Negative: Sounds like a life-threatening emergency to the triager    Negative: Bleeding from incision and won't stop after 10 minutes of direct pressure    Negative: Widespread rash and bright red, sunburn-like    Negative: Severe pain in the incision    Negative: Incision gaping open and < 2 days (48 hours) since wound re-opened    Negative: Incision gaping open and length of opening > 2 inches (5 cm)    Negative: Patient sounds very sick or weak to the triager    Negative: Sounds like a serious complication to the triager    Negative: Fever > 100.4 F (38.0 C)    Negative: Red streak runs from the incision and longer than 1 inch (2.5 cm)    Negative: Pus or bad-smelling fluid draining from incision    Negative: Dressing soaked with blood or body fluid (e.g., drainage)    Negative: Raised bruise and size > 2 inches (5 cm) and expanding    Negative: Caller has URGENT question and triager unable to answer question    Negative: Incision gaping open and length of opening > 1/4 inch (6 mm) and on the face and over 2 days since wound re-opened    Negative: Incision gaping open and length of opening > 1/2 inch (1 cm) and over 2 days since wound re-opened    Negative: Clear or blood-tinged fluid draining from wound and no fever    Negative: Suture or staple removal is overdue    Answer Assessment - Initial Assessment Questions  1. SYMPTOM: \"What's the main symptom you're concerned about?\" (e.g., redness, pain, drainage)      Redness, itching and " "pain around Incision and on back   2. ONSET: \"When did itching  start?\"      yesterday  4. DATE of SURGERY: \"When was surgery performed?\"       2 days ago  5. INCISION SITE: \"Where is the incision located?\"       chest  6. REDNESS: \"Is there any redness at the incision site?\" If yes, ask: \"How wide across is the redness?\" (Inches, centimeters)       yes  7. PAIN: \"Is there any pain?\" If so, ask: \"How bad is it?\"  (Scale 1-10; or mild, moderate, severe)      carl of painful  8. BLEEDING: \"Is there any bleeding?\" If so, ask: \"How much?\" and \"Where?\"      na  9. DRAINAGE: \"Is there any drainage from the incision site?\" If yes, ask: \"What color and how much?\" (e.g., red, cloudy, pus; drops, teaspoon)      na  10. FEVER: \"Do you have a fever?\" If so, ask: \"What is your temperature, how was it measured, and when did it start?\"        no  11. OTHER SYMPTOMS: \"Do you have any other symptoms?\" (e.g., shaking chills, weakness, rash elsewhere on body)        na    Protocols used: ITCHING - [id number], POST-OP INCISION SYMPTOMS AND PTRNLULOH-A-OD      "

## 2021-08-24 NOTE — PROGRESS NOTES
Patient presents with:  Urgent Care: itchy skin over various parts of body since yesterday.     (L50.9) Urticaria  (primary encounter diagnosis)  Comment:   Plan: methylPREDNISolone (MEDROL DOSEPAK) 4 MG tablet        therapy pack, cetirizine (ZYRTEC) 10 MG tablet          Avoid the new lotion that you used.      Follow up with primary clinic should symptoms persist or worsen    Cool compress to areas that itch to help avoid scratching         SUBJECTIVE:   Abhishek Gomez is a 61 year old male who presents today with itching on his torso and arms onset yesterday after using a new lotion.  He did not use the lotion on his lower extremities and does NOT have a rash there.      Of note is that he is a few days s/p angioplasty and was started on 2 new medications, but tells me that he has taken them in the past without any side effects.  Started IMDUR and Brinlinta on 8/18/21 after      Past Medical History:   Diagnosis Date     Abnormal stress test 07/14/2021     Coronary artery disease involving native coronary artery of native heart without angina pectoris 07/14/2021     Essential hypertension, benign 05/18/2016     Hyperlipidemia LDL goal <70 03/16/2015     NSTEMI (non-ST elevated myocardial infarction) (H) 05/09/2019    s/p MICHELLE to pLAD     Status post coronary angiogram 07/26/2021    Complex Multivessel CAD. CABG cosult     Type 2 diabetes mellitus without complication, without long-term current use of insulin (H) 07/06/2017     Unstable angina (H) 05/09/2019         Current Outpatient Medications   Medication Sig Dispense Refill     Multiple Vitamins-Iron (DAILY-OSWALDO/IRON/BETA-CAROTENE) TABS TAKE 1 TABLET BY MOUTH DAILY. (Patient not taking: Reported on 10/19/2020) 30 tablet 7     Social History     Tobacco Use     Smoking status: Never Smoker     Smokeless tobacco: Never Used   Substance Use Topics     Alcohol use: Not on file     Family History   Problem Relation Age of Onset     Diabetes Mother       Diabetes Father          ROS:    10 point ROS of systems including Constitutional, Eyes, Respiratory, Cardiovascular, Gastroenterology, Genitourinary, Integumentary, Muscularskeletal, Psychiatric ,neurological were all negative except for pertinent positives noted in my HPI       OBJECTIVE:  /71   Pulse 66   Temp (!) 96.7  F (35.9  C) (Tympanic)   Physical Exam:  GENERAL APPEARANCE: healthy, alert and no distress  RESP: lungs clear to auscultation - no rales, rhonchi or wheezes  CV: regular rates and rhythm, normal S1 S2, no murmur noted  SKIN: erythematous rash on torso and arms, macular papular without open sores or vesicles.

## 2021-08-26 ENCOUNTER — HOSPITAL ENCOUNTER (OUTPATIENT)
Dept: CARDIAC REHAB | Facility: CLINIC | Age: 61
End: 2021-08-26
Attending: STUDENT IN AN ORGANIZED HEALTH CARE EDUCATION/TRAINING PROGRAM
Payer: COMMERCIAL

## 2021-08-26 PROCEDURE — 93798 PHYS/QHP OP CAR RHAB W/ECG: CPT | Performed by: OCCUPATIONAL THERAPIST

## 2021-08-31 ENCOUNTER — OFFICE VISIT (OUTPATIENT)
Dept: CARDIOLOGY | Facility: CLINIC | Age: 61
End: 2021-08-31
Payer: COMMERCIAL

## 2021-08-31 ENCOUNTER — HOSPITAL ENCOUNTER (OUTPATIENT)
Dept: CARDIAC REHAB | Facility: CLINIC | Age: 61
End: 2021-08-31
Attending: STUDENT IN AN ORGANIZED HEALTH CARE EDUCATION/TRAINING PROGRAM
Payer: COMMERCIAL

## 2021-08-31 VITALS
WEIGHT: 191.5 LBS | DIASTOLIC BLOOD PRESSURE: 63 MMHG | HEART RATE: 93 BPM | SYSTOLIC BLOOD PRESSURE: 95 MMHG | HEIGHT: 71 IN | BODY MASS INDEX: 26.81 KG/M2 | OXYGEN SATURATION: 98 %

## 2021-08-31 DIAGNOSIS — I25.10 CORONARY ARTERY DISEASE INVOLVING NATIVE CORONARY ARTERY OF NATIVE HEART WITHOUT ANGINA PECTORIS: ICD-10-CM

## 2021-08-31 PROCEDURE — 99214 OFFICE O/P EST MOD 30 MIN: CPT | Performed by: PHYSICIAN ASSISTANT

## 2021-08-31 PROCEDURE — 93798 PHYS/QHP OP CAR RHAB W/ECG: CPT | Performed by: CLINICAL EXERCISE PHYSIOLOGIST

## 2021-08-31 RX ORDER — ISOSORBIDE MONONITRATE 30 MG/1
30 TABLET, EXTENDED RELEASE ORAL DAILY
Qty: 90 TABLET | Refills: 0 | COMMUNITY
Start: 2021-08-31 | End: 2021-10-27

## 2021-08-31 ASSESSMENT — MIFFLIN-ST. JEOR: SCORE: 1695.77

## 2021-08-31 NOTE — PATIENT INSTRUCTIONS
Today's Plan:   - Remember, aspirin and Brilinta (every 12 hours) are most important for keeping your stent(s) open. Do not miss a dose!  - Reduce the Imdur back to 1 tab (30 mg) daily. If your lightheadedness doesn't improve over the next few days, let me know.   - Cardiac rehab will help to get you back to your functional baseline.   - A mediterranean-style diet and eventual routine exercise regimen is recommended for heart health and weight loss.   - Return to see your cardiologist Dr. Oneil as scheduled in October.  - Please arrange a visit with your PCP Dr. Méndez to discuss your anemia and calcium levels, which were abnormal in the hospital.     If you have questions or concerns please call my nurse team at [phone].  Scheduling phone number: [phone]  For after hours urgent concerns call [phone] option 2.   Reminder: Please bring in all current medications, over the counter supplements and vitamin bottles to your next appointment.    It was a pleasure seeing you today!     Leila Curran PA-C

## 2021-08-31 NOTE — Clinical Note
8/31/2021    Michael Méndez MD  [address] [facility] [address]    RE: Abhishek Gomez       Dear Colleague,    I had the pleasure of seeing Abhishek Gomez in the [facility].      Cardiology Clinic Progress Note    Abhishek Gomez MRN# [id number]   YOB: 1960 Age: 61 year old   Primary cardiologist: Dr. Oneil         Assessment and Plan:     In summary, Abhishek Gomez presents today for follow up post-angiogram with complex PCI on 8/17 with Dr. Sarah.    1. CAD, s/p PCI to  of ISR prior prox/mid LAD and D1 lesion with 4 stents on 8/17/21. On DAPT with Brilinta. Denies angina.   2. Hypertension. Marginal. Mildly symptomatic. Imdur up-titrated pre-procedure.   3. Hyperlipidemia. Well-controlled.   4. New hypocalcemia and mild anemia on recent labs.    Plan:  - Reduce Imdur from 60 to 30 mg daily.   - Discussed the importance of absolute compliance with DAPT for > 1 year.  - Cardiac rehab.  - Advised mediterranean-style diet and routine cardiovascular exercise regimen for heart health and weight loss.  - Follow up as scheduled with Dr. Oneil in October.  - Advised to follow up with PCP regarding anemia and hypocalcemia.         History of Presenting Illness:      Abhishek Gomez is a pleasant 61 year old patient who presents today for an angiogram follow up.    He has known coronary artery disease, diabetes, hypertension and hyperlipidemia.  He was evaluated here in 2019 with unstable angina.  A stress test prior to that evaluation was very abnormal.  He then proceeded to have a coronary angiogram, which revealed high-grade proximal LAD stenosis.  The LAD was stented with a 2.5 mm stent.  The first diagonal was also ballooned at that time.  He developed atypical chest discomfort in late 2019, and a stress test performed at that time showed no inducible ischemia.     Over the last  several months, he had been noticing typical exertional angina.  As a result, he was referred for a stress test.  The stress test was read as showing anteroseptal and apical wall ischemia. Coronary angiogram was subsequently performed on 07/26/2021. This showed an occluded proximal LAD at the site of the prior stent with left-to-left and right-to-left collaterals.  The circumflex and rami were small with moderate disease.  The mid RCA had a focal 50% stenosis. He was referred to CV surgery, however declined bypass, strongly preferring complex PCI. He returned to the cath lab with Dr. Sarah on 8/17, where he underwent successful IVUS-guided  PCI of the proximal/mid LAD and D1. The LAD  was crossed and predilated. IVUS was used to guide stent sizing. Prior stent was poorly apposed within the proximal LAD. A 3.0x32 mm Synergy was deployed in the proximal LAD while a 2.75x16 mm Synergy MICHELLE was deployed in the D1 using DK crush technique. A 2.75x32 mm Synergy MICHELLE was deployed in the mid LAD, overlapping distally with the 3.0x32 mm Synergy. A 3.5x8mm Synergy was deployed in the ostial LAD. There was a hemodynamically insignificant mid RCA lesion (IFR 0.95). He was placed on DAPT with Brilinta. TTE from 8/3/21 showed normal biventricular function.    Today, Adrien returns to clinic stating he's feeling much better. Patient denies chest pain, shortness of breath, PND, orthopnea, edema, claudication, palpitations. Some intermittent lightheadedness since PCI. I note that his Imdur was increased pre-angio. LDL is well controlled at 27, HDL 28, triglycerides 174, total cholesterol 90.  Creatinine postprocedure was 1.24, with a BUN of 12.  I note that his calcium level has been low at 8.1. He's also mildly anemic with a Hemoglobin of 11.7. He is compliant with DAPT.         Review of Systems:     12-pt ROS is negative except for as noted in the HPI.          Physical Exam:     Vitals: BP 95/63   Pulse 93   Ht 1.803 m (5'  "11\")   Wt 86.9 kg (191 lb 8 oz)   SpO2 98%   BMI 26.71 kg/m    Wt Readings from Last 10 Encounters:   08/31/21 86.9 kg (191 lb 8 oz)   08/18/21 87.5 kg (193 lb)   08/13/21 87.5 kg (192 lb 14.4 oz)   08/03/21 88.5 kg (195 lb)   07/26/21 87.5 kg (193 lb)   07/15/21 89.4 kg (197 lb)   03/02/21 89.9 kg (198 lb 3.2 oz)   05/13/20 87.5 kg (193 lb)   12/24/19 87.5 kg (193 lb)   12/17/19 86.6 kg (191 lb)       Constitutional:  Patient is pleasant, alert, cooperative, and in NAD.  HEENT:  NCAT. PERRLA. EOM's intact.   Neck:  CVP appears normal. No carotid bruits.   Pulmonary: Normal respiratory effort. CTAB.   Cardiac: RRR, normal S1/S2, no S3/S4, no murmur or rub.   Abdomen:  Non-tender abdomen, no hepatosplenomegaly appreciated.   Vascular: Pulses in the upper and lower extremities are 2+ and equal bilaterally.  Extremities: No edema, erythema, cyanosis or tenderness appreciated.  Skin:  No rashes or lesions appreciated.   Neurological:  No gross motor or sensory deficits.   Psych: Appropriate affect.        Data:     Labs reviewed:  Recent Labs   Lab Test 08/17/21  1155 03/02/21  1107 05/13/20  0803 12/26/19  0732 10/31/18  0825   LDL 27 46 63  --  38   HDL 28* 33* 39*  --  37*   NHDL 62 75 88  --  58   CHOL 90 108 127  --  95   TRIG 174* 145 126  --  101   TSH  --  6.06*  --  6.39* 4.83*       Lab Results   Component Value Date    WBC 8.3 08/17/2021    WBC 7.2 05/13/2019    RBC 4.48 08/17/2021    RBC 4.91 05/13/2019    HGB 11.7 (L) 08/17/2021    HGB 13.4 05/13/2019    HCT 35.5 (L) 08/17/2021    HCT 38.6 (L) 05/13/2019    MCV 79 08/17/2021    MCV 79 05/13/2019    MCH 26.1 (L) 08/17/2021    MCH 27.3 05/13/2019    MCHC 33.0 08/17/2021    MCHC 34.7 05/13/2019    RDW 12.2 08/17/2021    RDW 12.7 05/13/2019     08/17/2021     05/13/2019       Lab Results   Component Value Date     08/18/2021     03/02/2021    POTASSIUM 4.6 08/18/2021    POTASSIUM 4.1 03/02/2021    CHLORIDE 110 (H) 08/18/2021    " CHLORIDE 107 03/02/2021    CO2 26 08/18/2021    CO2 26 03/02/2021    ANIONGAP 4 08/18/2021    ANIONGAP 6 03/02/2021     (H) 08/18/2021    GLC 90 03/02/2021    BUN 12 08/18/2021    BUN 10 03/02/2021    CR 1.24 08/18/2021    CR 1.09 03/02/2021    GFRESTIMATED 62 08/18/2021    GFRESTIMATED 73 03/02/2021    GFRESTBLACK 85 03/02/2021    KRIS 8.1 (L) 08/18/2021    KRIS 9.3 03/02/2021      Lab Results   Component Value Date    AST 15 07/26/2021    AST 26 03/02/2021    ALT 28 07/26/2021    ALT 35 03/02/2021       Lab Results   Component Value Date    A1C 7.1 (H) 07/26/2021    A1C 7.6 (H) 03/02/2021       Lab Results   Component Value Date    INR 1.13 08/17/2021    INR 1.03 07/26/2021    INR 1.01 05/09/2019           Problem List:     Patient Active Problem List   Diagnosis     Hyperlipidemia LDL goal <70     Essential hypertension, benign     Type 2 diabetes mellitus without complication, without long-term current use of insulin (H)     Unstable angina (H)     NSTEMI (non-ST elevated myocardial infarction) (H)     Coronary artery disease involving native coronary artery of native heart without angina pectoris     Abnormal stress test     Stable angina (H)           Medications:     Current Outpatient Medications   Medication Sig Dispense Refill     aspirin 81 MG EC tablet Take 1 tablet (81 mg) by mouth daily 90 tablet 3     atorvastatin (LIPITOR) 20 MG tablet Take 1 tablet (20 mg) by mouth daily 90 tablet 3     cetirizine (ZYRTEC) 10 MG tablet Take 1 tablet (10 mg) by mouth every evening 10 tablet 0     glimepiride (AMARYL) 2 MG tablet Take 1 tablet (2 mg) by mouth every morning (before breakfast) 90 tablet 3     isosorbide mononitrate (IMDUR) 30 MG 24 hr tablet Take 2 tablets (60 mg) by mouth daily 90 tablet 0     lisinopril (ZESTRIL) 20 MG tablet Take 1 tablet (20 mg) by mouth daily 90 tablet 3     metFORMIN (GLUCOPHAGE) 1000 MG tablet Take 1 tablet (1,000 mg) by mouth 2 times daily (with meals) (Patient taking  differently: Take 1,000-2,000 mg by mouth every morning ) 180 tablet 3     methylPREDNISolone (MEDROL DOSEPAK) 4 MG tablet therapy pack Follow Package Directions 21 tablet 0     metoprolol tartrate (LOPRESSOR) 25 MG tablet Take 1 tablet (25 mg) by mouth 2 times daily 180 tablet 3     ticagrelor (BRILINTA) 90 MG tablet Take 1 tablet (90 mg) by mouth every 12 hours 90 tablet 3     nitroGLYcerin (NITROSTAT) 0.4 MG sublingual tablet For chest pain place 1 tablet under the tongue every 5 minutes for up to 3 doses. If symptoms persist 5 minutes after 2nd dose call 911. (Patient not taking: Reported on 8/31/2021) 30 tablet 0           Past Medical History:     Past Medical History:   Diagnosis Date     Abnormal stress test 07/14/2021     Coronary artery disease involving native coronary artery of native heart without angina pectoris 07/14/2021     Essential hypertension, benign 05/18/2016     Hyperlipidemia LDL goal <70 03/16/2015     NSTEMI (non-ST elevated myocardial infarction) (H) 05/09/2019    s/p MICHELLE to pLAD     Status post coronary angiogram 07/26/2021    Complex Multivessel CAD. CABG cosult     Type 2 diabetes mellitus without complication, without long-term current use of insulin (H) 07/06/2017     Unstable angina (H) 05/09/2019     Past Surgical History:   Procedure Laterality Date     CV CORONARY ANGIOGRAM N/A 7/26/2021    Procedure: Coronary Angiogram;  Surgeon: Sylvester Westbrook MD;  Location: [facility] CARDIAC CATH LAB     CV HEART CATHETERIZATION WITH POSSIBLE INTERVENTION N/A 5/9/2019    Procedure: Coronary Angiogram;  Surgeon: Jose Enrique Stanley MD;  Location: [facility] CARDIAC CATH LAB     CV HEART CATHETERIZATION WITH POSSIBLE INTERVENTION N/A 8/17/2021    Procedure: Coronary Angiogram;  Surgeon: Sanchez Sarah MD;  Location: [facility] CARDIAC CATH LAB     CV INSTANTANEOUS WAVE-FREE RATIO N/A 8/17/2021    Procedure: Instantaneous Wave-Free Ratio;  Surgeon: Sanchez Sarah MD;  Location:   HEART CARDIAC CATH LAB     CV INTRAVASULAR ULTRASOUND N/A 8/17/2021    Procedure: Intravascular Ultrasound;  Surgeon: Sanchez Sarah MD;  Location:  HEART CARDIAC CATH LAB     CV LEFT HEART CATH N/A 7/26/2021    Procedure: Left Heart Cath;  Surgeon: Sylvester Westbrook MD;  Location:  HEART CARDIAC CATH LAB     CV PCI ANGIOPLASTY N/A 5/9/2019    Procedure: PCI Angioplasty;  Surgeon: Jose Enrique Stanley MD;  Location:  HEART CARDIAC CATH LAB     CV PCI CHRONIC TOTAL OCCLUSION N/A 8/17/2021    Procedure: Percutaneous Coronary Intervention of Chronic Total Occlusion;  Surgeon: Sanchez Sarah MD;  Location:  HEART CARDIAC CATH LAB     Family History   Problem Relation Age of Onset     Diabetes Paternal Grandmother      Diabetes Nephew      Social History     Socioeconomic History     Marital status:      Spouse name: Not on file     Number of children: Not on file     Years of education: Not on file     Highest education level: Not on file   Occupational History     Not on file   Tobacco Use     Smoking status: Never Smoker     Smokeless tobacco: Never Used   Substance and Sexual Activity     Alcohol use: No     Drug use: No     Sexual activity: Yes     Partners: Female   Other Topics Concern     Parent/sibling w/ CABG, MI or angioplasty before 65F 55M? Not Asked   Social History Narrative     Not on file     Social Determinants of Health     Financial Resource Strain:      Difficulty of Paying Living Expenses:    Food Insecurity:      Worried About Running Out of Food in the Last Year:      Ran Out of Food in the Last Year:    Transportation Needs:      Lack of Transportation (Medical):      Lack of Transportation (Non-Medical):    Physical Activity:      Days of Exercise per Week:      Minutes of Exercise per Session:    Stress:      Feeling of Stress :    Social Connections:      Frequency of Communication with Friends and Family:      Frequency of Social Gatherings with Friends and  Family:      Attends Yazidism Services:      Active Member of Clubs or Organizations:      Attends Club or Organization Meetings:      Marital Status:    Intimate Partner Violence:      Fear of Current or Ex-Partner:      Emotionally Abused:      Physically Abused:      Sexually Abused:            Allergies:   Patient has no known allergies.      ARMOND Galarza [facility]  Pager: [phone]      Thank you for allowing me to participate in the care of your patient.      Sincerely,     ARMOND Galarza [facility]  cc:   No referring provider defined for this encounter.

## 2021-08-31 NOTE — LETTER
August 31, 2021    RE:  Abhishek Gomez 1960  [address] [facility] [address]          To whom it may concern:    This letter is to certify that Abhishek Gomez is able to return to work on Monday, September 6, with no restrictions.    Should you have any questions, please call [phone].    Sincerely,          ARMOND Galarza [facility]

## 2021-08-31 NOTE — PROGRESS NOTES
Cardiology Clinic Progress Note    Abhishek Gomez MRN# [id number]   YOB: 1960 Age: 61 year old   Primary cardiologist: Dr. Oneil         Assessment and Plan:     In summary, Abhishek Gomez presents today for follow up post-angiogram with complex PCI on 8/17 with Dr. Sarah.    1. CAD, s/p PCI to  of ISR prior prox/mid LAD and D1 lesion with 4 stents on 8/17/21. On DAPT with Brilinta. Denies angina.   2. Hypertension. Marginal. Mildly symptomatic. Imdur up-titrated pre-procedure.   3. Hyperlipidemia. Well-controlled.   4. New hypocalcemia and mild anemia on recent labs.    Plan:  - Reduce Imdur from 60 to 30 mg daily.   - Discussed the importance of absolute compliance with DAPT for > 1 year.  - Cardiac rehab.  - Advised mediterranean-style diet and routine cardiovascular exercise regimen for heart health and weight loss.  - Follow up as scheduled with Dr. Oneil in October.  - Advised to follow up with PCP regarding anemia and hypocalcemia.         History of Presenting Illness:      Abhishek Gomez is a pleasant 61 year old patient who presents today for an angiogram follow up.    He has known coronary artery disease, diabetes, hypertension and hyperlipidemia.  He was evaluated here in 2019 with unstable angina.  A stress test prior to that evaluation was very abnormal.  He then proceeded to have a coronary angiogram, which revealed high-grade proximal LAD stenosis.  The LAD was stented with a 2.5 mm stent.  The first diagonal was also ballooned at that time.  He developed atypical chest discomfort in late 2019, and a stress test performed at that time showed no inducible ischemia.     Over the last several months, he had been noticing typical exertional angina.  As a result, he was referred for a stress test.  The stress test was read as showing anteroseptal and apical wall ischemia. Coronary angiogram was subsequently performed on 07/26/2021. This showed an  "occluded proximal LAD at the site of the prior stent with left-to-left and right-to-left collaterals.  The circumflex and rami were small with moderate disease.  The mid RCA had a focal 50% stenosis. He was referred to CV surgery, however declined bypass, strongly preferring complex PCI. He returned to the cath lab with Dr. Sarah on 8/17, where he underwent successful IVUS-guided  PCI of the proximal/mid LAD and D1. The LAD  was crossed and predilated. IVUS was used to guide stent sizing. Prior stent was poorly apposed within the proximal LAD. A 3.0x32 mm Synergy was deployed in the proximal LAD while a 2.75x16 mm Synergy MICHELLE was deployed in the D1 using DK crush technique. A 2.75x32 mm Synergy MICHELLE was deployed in the mid LAD, overlapping distally with the 3.0x32 mm Synergy. A 3.5x8mm Synergy was deployed in the ostial LAD. There was a hemodynamically insignificant mid RCA lesion (IFR 0.95). He was placed on DAPT with Brilinta. TTE from 8/3/21 showed normal biventricular function.    Today, Adrien returns to clinic stating he's feeling much better. Patient denies chest pain, shortness of breath, PND, orthopnea, edema, claudication, palpitations. Some intermittent lightheadedness since PCI. I note that his Imdur was increased pre-angio. LDL is well controlled at 27, HDL 28, triglycerides 174, total cholesterol 90.  Creatinine postprocedure was 1.24, with a BUN of 12.  I note that his calcium level has been low at 8.1. He's also mildly anemic with a Hemoglobin of 11.7. He is compliant with DAPT.         Review of Systems:     12-pt ROS is negative except for as noted in the HPI.          Physical Exam:     Vitals: BP 95/63   Pulse 93   Ht 1.803 m (5' 11\")   Wt 86.9 kg (191 lb 8 oz)   SpO2 98%   BMI 26.71 kg/m    Wt Readings from Last 10 Encounters:   08/31/21 86.9 kg (191 lb 8 oz)   08/18/21 87.5 kg (193 lb)   08/13/21 87.5 kg (192 lb 14.4 oz)   08/03/21 88.5 kg (195 lb)   07/26/21 87.5 kg (193 lb)   07/15/21 " 89.4 kg (197 lb)   03/02/21 89.9 kg (198 lb 3.2 oz)   05/13/20 87.5 kg (193 lb)   12/24/19 87.5 kg (193 lb)   12/17/19 86.6 kg (191 lb)       Constitutional:  Patient is pleasant, alert, cooperative, and in NAD.  HEENT:  NCAT. PERRLA. EOM's intact.   Neck:  CVP appears normal. No carotid bruits.   Pulmonary: Normal respiratory effort. CTAB.   Cardiac: RRR, normal S1/S2, no S3/S4, no murmur or rub.   Abdomen:  Non-tender abdomen, no hepatosplenomegaly appreciated.   Vascular: Pulses in the upper and lower extremities are 2+ and equal bilaterally.  Extremities: No edema, erythema, cyanosis or tenderness appreciated.  Skin:  No rashes or lesions appreciated.   Neurological:  No gross motor or sensory deficits.   Psych: Appropriate affect.        Data:     Labs reviewed:  Recent Labs   Lab Test 08/17/21  1155 03/02/21  1107 05/13/20  0803 12/26/19  0732 10/31/18  0825   LDL 27 46 63  --  38   HDL 28* 33* 39*  --  37*   NHDL 62 75 88  --  58   CHOL 90 108 127  --  95   TRIG 174* 145 126  --  101   TSH  --  6.06*  --  6.39* 4.83*       Lab Results   Component Value Date    WBC 8.3 08/17/2021    WBC 7.2 05/13/2019    RBC 4.48 08/17/2021    RBC 4.91 05/13/2019    HGB 11.7 (L) 08/17/2021    HGB 13.4 05/13/2019    HCT 35.5 (L) 08/17/2021    HCT 38.6 (L) 05/13/2019    MCV 79 08/17/2021    MCV 79 05/13/2019    MCH 26.1 (L) 08/17/2021    MCH 27.3 05/13/2019    MCHC 33.0 08/17/2021    MCHC 34.7 05/13/2019    RDW 12.2 08/17/2021    RDW 12.7 05/13/2019     08/17/2021     05/13/2019       Lab Results   Component Value Date     08/18/2021     03/02/2021    POTASSIUM 4.6 08/18/2021    POTASSIUM 4.1 03/02/2021    CHLORIDE 110 (H) 08/18/2021    CHLORIDE 107 03/02/2021    CO2 26 08/18/2021    CO2 26 03/02/2021    ANIONGAP 4 08/18/2021    ANIONGAP 6 03/02/2021     (H) 08/18/2021    GLC 90 03/02/2021    BUN 12 08/18/2021    BUN 10 03/02/2021    CR 1.24 08/18/2021    CR 1.09 03/02/2021    GFRESTIMATED 62  08/18/2021    GFRESTIMATED 73 03/02/2021    GFRESTBLACK 85 03/02/2021    KRIS 8.1 (L) 08/18/2021    KRIS 9.3 03/02/2021      Lab Results   Component Value Date    AST 15 07/26/2021    AST 26 03/02/2021    ALT 28 07/26/2021    ALT 35 03/02/2021       Lab Results   Component Value Date    A1C 7.1 (H) 07/26/2021    A1C 7.6 (H) 03/02/2021       Lab Results   Component Value Date    INR 1.13 08/17/2021    INR 1.03 07/26/2021    INR 1.01 05/09/2019           Problem List:     Patient Active Problem List   Diagnosis     Hyperlipidemia LDL goal <70     Essential hypertension, benign     Type 2 diabetes mellitus without complication, without long-term current use of insulin (H)     Unstable angina (H)     NSTEMI (non-ST elevated myocardial infarction) (H)     Coronary artery disease involving native coronary artery of native heart without angina pectoris     Abnormal stress test     Stable angina (H)           Medications:     Current Outpatient Medications   Medication Sig Dispense Refill     aspirin 81 MG EC tablet Take 1 tablet (81 mg) by mouth daily 90 tablet 3     atorvastatin (LIPITOR) 20 MG tablet Take 1 tablet (20 mg) by mouth daily 90 tablet 3     cetirizine (ZYRTEC) 10 MG tablet Take 1 tablet (10 mg) by mouth every evening 10 tablet 0     glimepiride (AMARYL) 2 MG tablet Take 1 tablet (2 mg) by mouth every morning (before breakfast) 90 tablet 3     isosorbide mononitrate (IMDUR) 30 MG 24 hr tablet Take 2 tablets (60 mg) by mouth daily 90 tablet 0     lisinopril (ZESTRIL) 20 MG tablet Take 1 tablet (20 mg) by mouth daily 90 tablet 3     metFORMIN (GLUCOPHAGE) 1000 MG tablet Take 1 tablet (1,000 mg) by mouth 2 times daily (with meals) (Patient taking differently: Take 1,000-2,000 mg by mouth every morning ) 180 tablet 3     methylPREDNISolone (MEDROL DOSEPAK) 4 MG tablet therapy pack Follow Package Directions 21 tablet 0     metoprolol tartrate (LOPRESSOR) 25 MG tablet Take 1 tablet (25 mg) by mouth 2 times daily 180  tablet 3     ticagrelor (BRILINTA) 90 MG tablet Take 1 tablet (90 mg) by mouth every 12 hours 90 tablet 3     nitroGLYcerin (NITROSTAT) 0.4 MG sublingual tablet For chest pain place 1 tablet under the tongue every 5 minutes for up to 3 doses. If symptoms persist 5 minutes after 2nd dose call 911. (Patient not taking: Reported on 8/31/2021) 30 tablet 0           Past Medical History:     Past Medical History:   Diagnosis Date     Abnormal stress test 07/14/2021     Coronary artery disease involving native coronary artery of native heart without angina pectoris 07/14/2021     Essential hypertension, benign 05/18/2016     Hyperlipidemia LDL goal <70 03/16/2015     NSTEMI (non-ST elevated myocardial infarction) (H) 05/09/2019    s/p MICHELLE to pLAD     Status post coronary angiogram 07/26/2021    Complex Multivessel CAD. CABG cosult     Type 2 diabetes mellitus without complication, without long-term current use of insulin (H) 07/06/2017     Unstable angina (H) 05/09/2019     Past Surgical History:   Procedure Laterality Date     CV CORONARY ANGIOGRAM N/A 7/26/2021    Procedure: Coronary Angiogram;  Surgeon: Sylvester Westbrook MD;  Location:  HEART CARDIAC CATH LAB     CV HEART CATHETERIZATION WITH POSSIBLE INTERVENTION N/A 5/9/2019    Procedure: Coronary Angiogram;  Surgeon: Jose Enrique Stanley MD;  Location: [facility] CARDIAC CATH LAB     CV HEART CATHETERIZATION WITH POSSIBLE INTERVENTION N/A 8/17/2021    Procedure: Coronary Angiogram;  Surgeon: Sanchez Sarah MD;  Location: [facility] CARDIAC CATH LAB     CV INSTANTANEOUS WAVE-FREE RATIO N/A 8/17/2021    Procedure: Instantaneous Wave-Free Ratio;  Surgeon: Sanchez Sarah MD;  Location:  HEART CARDIAC CATH LAB     CV INTRAVASULAR ULTRASOUND N/A 8/17/2021    Procedure: Intravascular Ultrasound;  Surgeon: Sanchez Sarah MD;  Location:  HEART CARDIAC CATH LAB     CV LEFT HEART CATH N/A 7/26/2021    Procedure: Left Heart Cath;  Surgeon: Sylvester Westbrook  MD Brodie;  Location:  HEART CARDIAC CATH LAB     CV PCI ANGIOPLASTY N/A 5/9/2019    Procedure: PCI Angioplasty;  Surgeon: Jose Enrique Stanley MD;  Location:  HEART CARDIAC CATH LAB     CV PCI CHRONIC TOTAL OCCLUSION N/A 8/17/2021    Procedure: Percutaneous Coronary Intervention of Chronic Total Occlusion;  Surgeon: Sanchez Sarah MD;  Location:  HEART CARDIAC CATH LAB     Family History   Problem Relation Age of Onset     Diabetes Paternal Grandmother      Diabetes Nephew      Social History     Socioeconomic History     Marital status:      Spouse name: Not on file     Number of children: Not on file     Years of education: Not on file     Highest education level: Not on file   Occupational History     Not on file   Tobacco Use     Smoking status: Never Smoker     Smokeless tobacco: Never Used   Substance and Sexual Activity     Alcohol use: No     Drug use: No     Sexual activity: Yes     Partners: Female   Other Topics Concern     Parent/sibling w/ CABG, MI or angioplasty before 65F 55M? Not Asked   Social History Narrative     Not on file     Social Determinants of Health     Financial Resource Strain:      Difficulty of Paying Living Expenses:    Food Insecurity:      Worried About Running Out of Food in the Last Year:      Ran Out of Food in the Last Year:    Transportation Needs:      Lack of Transportation (Medical):      Lack of Transportation (Non-Medical):    Physical Activity:      Days of Exercise per Week:      Minutes of Exercise per Session:    Stress:      Feeling of Stress :    Social Connections:      Frequency of Communication with Friends and Family:      Frequency of Social Gatherings with Friends and Family:      Attends Anabaptism Services:      Active Member of Clubs or Organizations:      Attends Club or Organization Meetings:      Marital Status:    Intimate Partner Violence:      Fear of Current or Ex-Partner:      Emotionally Abused:      Physically Abused:       Sexually Abused:            Allergies:   Patient has no known allergies.      Leila Curran PA-C  [facility] - Heart Clinic  Pager: [phone]

## 2021-09-02 ENCOUNTER — HOSPITAL ENCOUNTER (OUTPATIENT)
Dept: CARDIAC REHAB | Facility: CLINIC | Age: 61
End: 2021-09-02
Attending: STUDENT IN AN ORGANIZED HEALTH CARE EDUCATION/TRAINING PROGRAM
Payer: COMMERCIAL

## 2021-09-02 PROCEDURE — 93798 PHYS/QHP OP CAR RHAB W/ECG: CPT

## 2021-09-03 ENCOUNTER — HOSPITAL ENCOUNTER (OUTPATIENT)
Dept: CARDIAC REHAB | Facility: CLINIC | Age: 61
End: 2021-09-03
Attending: STUDENT IN AN ORGANIZED HEALTH CARE EDUCATION/TRAINING PROGRAM
Payer: COMMERCIAL

## 2021-09-03 PROCEDURE — 93798 PHYS/QHP OP CAR RHAB W/ECG: CPT

## 2021-09-08 ENCOUNTER — HOSPITAL ENCOUNTER (OUTPATIENT)
Dept: CARDIAC REHAB | Facility: CLINIC | Age: 61
End: 2021-09-08
Attending: STUDENT IN AN ORGANIZED HEALTH CARE EDUCATION/TRAINING PROGRAM
Payer: COMMERCIAL

## 2021-09-08 PROCEDURE — 93798 PHYS/QHP OP CAR RHAB W/ECG: CPT

## 2021-09-09 ENCOUNTER — HOSPITAL ENCOUNTER (OUTPATIENT)
Dept: CARDIAC REHAB | Facility: CLINIC | Age: 61
End: 2021-09-09
Attending: STUDENT IN AN ORGANIZED HEALTH CARE EDUCATION/TRAINING PROGRAM
Payer: COMMERCIAL

## 2021-09-09 PROCEDURE — 93798 PHYS/QHP OP CAR RHAB W/ECG: CPT | Performed by: OCCUPATIONAL THERAPIST

## 2021-09-14 ENCOUNTER — HOSPITAL ENCOUNTER (OUTPATIENT)
Dept: CARDIAC REHAB | Facility: CLINIC | Age: 61
End: 2021-09-14
Attending: STUDENT IN AN ORGANIZED HEALTH CARE EDUCATION/TRAINING PROGRAM
Payer: COMMERCIAL

## 2021-09-14 PROCEDURE — 93798 PHYS/QHP OP CAR RHAB W/ECG: CPT

## 2021-09-16 ENCOUNTER — HOSPITAL ENCOUNTER (OUTPATIENT)
Dept: CARDIAC REHAB | Facility: CLINIC | Age: 61
End: 2021-09-16
Attending: STUDENT IN AN ORGANIZED HEALTH CARE EDUCATION/TRAINING PROGRAM
Payer: COMMERCIAL

## 2021-09-16 PROCEDURE — 93798 PHYS/QHP OP CAR RHAB W/ECG: CPT

## 2021-09-21 ENCOUNTER — HOSPITAL ENCOUNTER (OUTPATIENT)
Dept: CARDIAC REHAB | Facility: CLINIC | Age: 61
End: 2021-09-21
Attending: STUDENT IN AN ORGANIZED HEALTH CARE EDUCATION/TRAINING PROGRAM
Payer: COMMERCIAL

## 2021-09-21 PROCEDURE — 93798 PHYS/QHP OP CAR RHAB W/ECG: CPT

## 2021-09-22 ENCOUNTER — HOSPITAL ENCOUNTER (OUTPATIENT)
Dept: CARDIAC REHAB | Facility: CLINIC | Age: 61
End: 2021-09-22
Attending: STUDENT IN AN ORGANIZED HEALTH CARE EDUCATION/TRAINING PROGRAM
Payer: COMMERCIAL

## 2021-09-22 PROCEDURE — 93798 PHYS/QHP OP CAR RHAB W/ECG: CPT | Performed by: OCCUPATIONAL THERAPIST

## 2021-09-23 ENCOUNTER — HOSPITAL ENCOUNTER (OUTPATIENT)
Dept: CARDIAC REHAB | Facility: CLINIC | Age: 61
End: 2021-09-23
Attending: STUDENT IN AN ORGANIZED HEALTH CARE EDUCATION/TRAINING PROGRAM
Payer: COMMERCIAL

## 2021-09-23 PROCEDURE — 93798 PHYS/QHP OP CAR RHAB W/ECG: CPT

## 2021-09-28 ENCOUNTER — HOSPITAL ENCOUNTER (OUTPATIENT)
Dept: CARDIAC REHAB | Facility: CLINIC | Age: 61
End: 2021-09-28
Attending: STUDENT IN AN ORGANIZED HEALTH CARE EDUCATION/TRAINING PROGRAM
Payer: COMMERCIAL

## 2021-09-28 PROCEDURE — 93798 PHYS/QHP OP CAR RHAB W/ECG: CPT

## 2021-09-29 ENCOUNTER — HOSPITAL ENCOUNTER (OUTPATIENT)
Dept: CARDIAC REHAB | Facility: CLINIC | Age: 61
End: 2021-09-29
Attending: STUDENT IN AN ORGANIZED HEALTH CARE EDUCATION/TRAINING PROGRAM
Payer: COMMERCIAL

## 2021-09-29 PROCEDURE — 93798 PHYS/QHP OP CAR RHAB W/ECG: CPT | Performed by: REHABILITATION PRACTITIONER

## 2021-09-30 ENCOUNTER — HOSPITAL ENCOUNTER (OUTPATIENT)
Dept: CARDIAC REHAB | Facility: CLINIC | Age: 61
End: 2021-09-30
Attending: STUDENT IN AN ORGANIZED HEALTH CARE EDUCATION/TRAINING PROGRAM
Payer: COMMERCIAL

## 2021-09-30 PROCEDURE — 93798 PHYS/QHP OP CAR RHAB W/ECG: CPT

## 2021-10-03 ENCOUNTER — HEALTH MAINTENANCE LETTER (OUTPATIENT)
Age: 61
End: 2021-10-03

## 2021-10-05 ENCOUNTER — HOSPITAL ENCOUNTER (OUTPATIENT)
Dept: CARDIAC REHAB | Facility: CLINIC | Age: 61
End: 2021-10-05
Attending: STUDENT IN AN ORGANIZED HEALTH CARE EDUCATION/TRAINING PROGRAM
Payer: COMMERCIAL

## 2021-10-05 PROCEDURE — 93798 PHYS/QHP OP CAR RHAB W/ECG: CPT

## 2021-10-06 ENCOUNTER — HOSPITAL ENCOUNTER (OUTPATIENT)
Dept: CARDIAC REHAB | Facility: CLINIC | Age: 61
End: 2021-10-06
Attending: STUDENT IN AN ORGANIZED HEALTH CARE EDUCATION/TRAINING PROGRAM
Payer: COMMERCIAL

## 2021-10-06 PROCEDURE — 93798 PHYS/QHP OP CAR RHAB W/ECG: CPT

## 2021-10-11 ENCOUNTER — HOSPITAL ENCOUNTER (OUTPATIENT)
Dept: CARDIAC REHAB | Facility: CLINIC | Age: 61
End: 2021-10-11
Attending: STUDENT IN AN ORGANIZED HEALTH CARE EDUCATION/TRAINING PROGRAM
Payer: COMMERCIAL

## 2021-10-11 PROCEDURE — 93798 PHYS/QHP OP CAR RHAB W/ECG: CPT | Performed by: OCCUPATIONAL THERAPIST

## 2021-10-13 ENCOUNTER — HOSPITAL ENCOUNTER (OUTPATIENT)
Dept: CARDIAC REHAB | Facility: CLINIC | Age: 61
End: 2021-10-13
Attending: STUDENT IN AN ORGANIZED HEALTH CARE EDUCATION/TRAINING PROGRAM
Payer: COMMERCIAL

## 2021-10-13 PROCEDURE — 93798 PHYS/QHP OP CAR RHAB W/ECG: CPT | Performed by: CLINICAL EXERCISE PHYSIOLOGIST

## 2021-10-25 ENCOUNTER — LAB (OUTPATIENT)
Dept: LAB | Facility: CLINIC | Age: 61
End: 2021-10-25
Attending: INTERNAL MEDICINE
Payer: COMMERCIAL

## 2021-10-25 DIAGNOSIS — I25.10 CORONARY ARTERY DISEASE INVOLVING NATIVE CORONARY ARTERY OF NATIVE HEART WITHOUT ANGINA PECTORIS: ICD-10-CM

## 2021-10-25 DIAGNOSIS — E78.5 HYPERLIPIDEMIA LDL GOAL <100: ICD-10-CM

## 2021-10-25 LAB
ALT SERPL W P-5'-P-CCNC: 26 U/L (ref 0–70)
ANION GAP SERPL CALCULATED.3IONS-SCNC: 5 MMOL/L (ref 3–14)
BUN SERPL-MCNC: 14 MG/DL (ref 7–30)
CALCIUM SERPL-MCNC: 8.5 MG/DL (ref 8.5–10.1)
CHLORIDE BLD-SCNC: 110 MMOL/L (ref 94–109)
CHOLEST SERPL-MCNC: 108 MG/DL
CO2 SERPL-SCNC: 24 MMOL/L (ref 20–32)
CREAT SERPL-MCNC: 1.24 MG/DL (ref 0.66–1.25)
FASTING STATUS PATIENT QL REPORTED: YES
GFR SERPL CREATININE-BSD FRML MDRD: 62 ML/MIN/1.73M2
GLUCOSE BLD-MCNC: 124 MG/DL (ref 70–99)
HDLC SERPL-MCNC: 34 MG/DL
LDLC SERPL CALC-MCNC: 49 MG/DL
NONHDLC SERPL-MCNC: 74 MG/DL
POTASSIUM BLD-SCNC: 3.8 MMOL/L (ref 3.4–5.3)
SODIUM SERPL-SCNC: 139 MMOL/L (ref 133–144)
TRIGL SERPL-MCNC: 125 MG/DL

## 2021-10-25 PROCEDURE — 80048 BASIC METABOLIC PNL TOTAL CA: CPT | Performed by: INTERNAL MEDICINE

## 2021-10-25 PROCEDURE — 36415 COLL VENOUS BLD VENIPUNCTURE: CPT | Performed by: INTERNAL MEDICINE

## 2021-10-25 PROCEDURE — 80061 LIPID PANEL: CPT | Performed by: INTERNAL MEDICINE

## 2021-10-25 PROCEDURE — 84460 ALANINE AMINO (ALT) (SGPT): CPT | Performed by: INTERNAL MEDICINE

## 2021-10-27 ENCOUNTER — OFFICE VISIT (OUTPATIENT)
Dept: CARDIOLOGY | Facility: CLINIC | Age: 61
End: 2021-10-27
Payer: COMMERCIAL

## 2021-10-27 VITALS
BODY MASS INDEX: 26.32 KG/M2 | WEIGHT: 188 LBS | OXYGEN SATURATION: 100 % | HEIGHT: 71 IN | DIASTOLIC BLOOD PRESSURE: 55 MMHG | SYSTOLIC BLOOD PRESSURE: 93 MMHG | HEART RATE: 77 BPM

## 2021-10-27 DIAGNOSIS — E11.9 TYPE 2 DIABETES MELLITUS WITHOUT COMPLICATION, WITHOUT LONG-TERM CURRENT USE OF INSULIN (H): ICD-10-CM

## 2021-10-27 DIAGNOSIS — E78.5 HYPERLIPIDEMIA LDL GOAL <100: ICD-10-CM

## 2021-10-27 DIAGNOSIS — I20.0 UNSTABLE ANGINA (H): ICD-10-CM

## 2021-10-27 DIAGNOSIS — I25.10 CORONARY ARTERY DISEASE INVOLVING NATIVE CORONARY ARTERY OF NATIVE HEART WITHOUT ANGINA PECTORIS: Primary | ICD-10-CM

## 2021-10-27 DIAGNOSIS — I10 ESSENTIAL HYPERTENSION, BENIGN: ICD-10-CM

## 2021-10-27 PROCEDURE — 99214 OFFICE O/P EST MOD 30 MIN: CPT | Performed by: INTERNAL MEDICINE

## 2021-10-27 ASSESSMENT — MIFFLIN-ST. JEOR: SCORE: 1679.89

## 2021-10-27 NOTE — PROGRESS NOTES
Service Date: 10/27/2021    HISTORY OF PRESENT ILLNESS:  I had the opportunity to see Mr. Adrien Gomez in Cardiology Clinic today at [facility] in [place] for reevaluation of recurrent coronary artery disease and cardiac risk factors including hypertension, dyslipidemia, and type 2 diabetes.    Mr. Gomez has a history of coronary artery disease and chest discomfort symptoms leading to a stress test in 05/2019.  That stress test was abnormal and he went on to have cardiac catheterization with stenting of the proximal LAD, which was subtotally occluded.  A followup nuclear stress test in 10/2019 looked normal.    He began experiencing symptoms again in 05/2021 and underwent evaluation with exercise stress echo on 07/14/2021.  The study was again abnormal suggestive of anteroseptal and apical ischemia, and he followed up with Dr. Sarah since he was unable to see me.  He went on to cardiac catheterization, which revealed an occluded proximal LAD within the previous stented territory.  He was initially referred for surgery but declined surgical intervention.  He went back to the Cardiac Catheterization Laboratory and had stenting of the chronic total occlusion of the LAD and stenting of the first diagonal artery with a total of 4 stents placed.  This was performed on 08/17/2021.    Since then, he has felt much better.  He no longer has the left-sided exertional chest discomfort and shortness of breath that he had for several months leading up to his recent stenting procedure.  He does have some more atypical symptoms including a sharp prickly sensation in the left side of his chest that he describes as ants biting him.  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  He thinks this is muscular pain.    Fortunately, his left ventricular function looks normal and there is no evidence of myocardial infarction by  echo.    PHYSICAL EXAMINATION:  On examination today his blood pressure is 93/55, heart rate 77 and weight 188 pounds.  His lungs are clear.  Heart rhythm is regular.  He has no cardiac murmurs and no carotid bruits.    IMPRESSIONS:  Mr. Adrien Gomez is a 61-year-old gentleman with recurrent coronary artery disease with stenting of his proximal LAD in 2019 and restenosis of that proximal LAD stent with repeat stenting in 2021.  He has no residual occlusive disease and is not having any symptoms suggestive of his previous angina.  He has some atypical symptoms which are not likely angina.    His blood pressure is low, and I will discontinue his isosorbide mononitrate altogether.  His cholesterol numbers are excellent, and his diabetes control looks pretty good.  I did suggest considering changing glimepiride to an SGLT2 inhibitor for cardioprotective benefits, and I think that suggestion still has merit.    I will plan to see him back again in 1 year for reevaluation of these issues.    Mark Oneil MD    cc:  Michael Méndez MD  [address] [address]    [facility] Oneil [facility], [facility]        D: 10/27/2021   T: 10/27/2021   MT: [email or web address]    Name:     JENNIFER GOMEZ  MRN:      [id number]        Account:      [id number]   :      1960           Service Date: 10/27/2021       Document: [id number]

## 2021-10-27 NOTE — PROGRESS NOTES
HPI and Plan:   See dictation    Today's clinic visit entailed:  The following tests were independently interpreted by me as noted in my documentation: angiogram  Ordering of each unique test  Prescription drug management  30 minutes spent on the date of the encounter doing chart review, review of test results, interpretation of tests, patient visit and documentation   Provider  Link to [facility] Help Grid     The level of medical decision making during this visit was of moderate complexity.    Orders Placed This Encounter   Procedures     Lipid Profile     ALT     Basic metabolic panel     Follow-Up with Cardiac Advanced Practice Provider     Follow-Up with Cardiologist       No orders of the defined types were placed in this encounter.      Medications Discontinued During This Encounter   Medication Reason     isosorbide mononitrate (IMDUR) 30 MG 24 hr tablet          Encounter Diagnoses   Name Primary?     Coronary artery disease involving native coronary artery of native heart without angina pectoris Yes     Type 2 diabetes mellitus without complication, without long-term current use of insulin (H)      Hyperlipidemia LDL goal <100      Essential hypertension, benign      Unstable angina (H)        CURRENT MEDICATIONS:  Current Outpatient Medications   Medication Sig Dispense Refill     aspirin 81 MG EC tablet Take 1 tablet (81 mg) by mouth daily 90 tablet 3     atorvastatin (LIPITOR) 20 MG tablet Take 1 tablet (20 mg) by mouth daily 90 tablet 3     cetirizine (ZYRTEC) 10 MG tablet Take 1 tablet (10 mg) by mouth every evening 10 tablet 0     glimepiride (AMARYL) 2 MG tablet Take 1 tablet (2 mg) by mouth every morning (before breakfast) 90 tablet 3     lisinopril (ZESTRIL) 20 MG tablet Take 1 tablet (20 mg) by mouth daily 90 tablet 3     metFORMIN (GLUCOPHAGE) 1000 MG tablet Take 1 tablet (1,000 mg) by mouth 2 times daily (with meals) (Patient taking differently: Take 1,000-2,000 mg by mouth every morning ) 180 tablet  3     methylPREDNISolone (MEDROL DOSEPAK) 4 MG tablet therapy pack Follow Package Directions 21 tablet 0     metoprolol tartrate (LOPRESSOR) 25 MG tablet Take 1 tablet (25 mg) by mouth 2 times daily 180 tablet 3     nitroGLYcerin (NITROSTAT) 0.4 MG sublingual tablet For chest pain place 1 tablet under the tongue every 5 minutes for up to 3 doses. If symptoms persist 5 minutes after 2nd dose call 911. 30 tablet 0     ticagrelor (BRILINTA) 90 MG tablet Take 1 tablet (90 mg) by mouth every 12 hours 90 tablet 3       ALLERGIES   No Known Allergies    PAST MEDICAL HISTORY:  Past Medical History:   Diagnosis Date     Abnormal stress test 07/14/2021     Coronary artery disease involving native coronary artery of native heart without angina pectoris 07/14/2021     Essential hypertension, benign 05/18/2016     Hyperlipidemia LDL goal <70 03/16/2015     NSTEMI (non-ST elevated myocardial infarction) (H) 05/09/2019    s/p MICHELLE to pLAD     Status post coronary angiogram 07/26/2021    Complex Multivessel CAD. CABG cosult     Type 2 diabetes mellitus without complication, without long-term current use of insulin (H) 07/06/2017     Unstable angina (H) 05/09/2019       PAST SURGICAL HISTORY:  Past Surgical History:   Procedure Laterality Date     CV CORONARY ANGIOGRAM N/A 7/26/2021    Procedure: Coronary Angiogram;  Surgeon: Sylvester Westbrook MD;  Location: [facility] CARDIAC CATH LAB     CV HEART CATHETERIZATION WITH POSSIBLE INTERVENTION N/A 5/9/2019    Procedure: Coronary Angiogram;  Surgeon: Jose Enrique Stanley MD;  Location: [facility] CARDIAC CATH LAB     CV HEART CATHETERIZATION WITH POSSIBLE INTERVENTION N/A 8/17/2021    Procedure: Coronary Angiogram;  Surgeon: Sanchez Sarah MD;  Location: [facility] CARDIAC CATH LAB     CV INSTANTANEOUS WAVE-FREE RATIO N/A 8/17/2021    Procedure: Instantaneous Wave-Free Ratio;  Surgeon: Sanchez Sarah MD;  Location: [facility] CARDIAC CATH LAB     CV INTRAVASULAR ULTRASOUND N/A  8/17/2021    Procedure: Intravascular Ultrasound;  Surgeon: Sanchez Sarah MD;  Location:  HEART CARDIAC CATH LAB     CV LEFT HEART CATH N/A 7/26/2021    Procedure: Left Heart Cath;  Surgeon: Sylvester Westbrook MD;  Location:  HEART CARDIAC CATH LAB     CV PCI ANGIOPLASTY N/A 5/9/2019    Procedure: PCI Angioplasty;  Surgeon: Jose Enrique Stanley MD;  Location:  HEART CARDIAC CATH LAB     CV PCI CHRONIC TOTAL OCCLUSION N/A 8/17/2021    Procedure: Percutaneous Coronary Intervention of Chronic Total Occlusion;  Surgeon: Sanchez Sarah MD;  Location:  HEART CARDIAC CATH LAB       FAMILY HISTORY:  Family History   Problem Relation Age of Onset     Diabetes Paternal Grandmother      Diabetes Nephew        SOCIAL HISTORY:  Social History     Socioeconomic History     Marital status:      Spouse name: None     Number of children: None     Years of education: None     Highest education level: None   Occupational History     None   Tobacco Use     Smoking status: Never Smoker     Smokeless tobacco: Never Used   Substance and Sexual Activity     Alcohol use: No     Drug use: No     Sexual activity: Yes     Partners: Female   Other Topics Concern     Parent/sibling w/ CABG, MI or angioplasty before 65F 55M? Not Asked   Social History Narrative     None     Social Determinants of Health     Financial Resource Strain:      Difficulty of Paying Living Expenses:    Food Insecurity:      Worried About Running Out of Food in the Last Year:      Ran Out of Food in the Last Year:    Transportation Needs:      Lack of Transportation (Medical):      Lack of Transportation (Non-Medical):    Physical Activity:      Days of Exercise per Week:      Minutes of Exercise per Session:    Stress:      Feeling of Stress :    Social Connections:      Frequency of Communication with Friends and Family:      Frequency of Social Gatherings with Friends and Family:      Attends Caodaism Services:      Active Member of Clubs  "or Organizations:      Attends Club or Organization Meetings:      Marital Status:    Intimate Partner Violence:      Fear of Current or Ex-Partner:      Emotionally Abused:      Physically Abused:      Sexually Abused:        Review of Systems:  Skin:  Negative       Eyes:  Positive for glasses    ENT:  Negative      Respiratory:  Negative       Cardiovascular:    Positive for;palpitations;chest pain;dizziness occasional  Gastroenterology: Negative heartburn    Genitourinary:  Negative      Musculoskeletal:  not assessed      Neurologic:  Negative      Psychiatric:  Negative      Heme/Lymph/Imm:  Negative      Endocrine:  Positive for diabetes Type II    Physical Exam:  Vitals: BP 93/55   Pulse 77   Ht 1.803 m (5' 11\")   Wt 85.3 kg (188 lb)   SpO2 100%   BMI 26.22 kg/m      Constitutional:           Skin:             Head:           Eyes:           Lymph:      ENT:           Neck:           Respiratory:            Cardiac:                                                           GI:           Extremities and Muscular Skeletal:                 Neurological:           Psych:           CC  No referring provider defined for this encounter.              "

## 2021-11-04 ENCOUNTER — VIRTUAL VISIT (OUTPATIENT)
Dept: CARDIOLOGY | Facility: CLINIC | Age: 61
End: 2021-11-04
Payer: COMMERCIAL

## 2021-11-04 DIAGNOSIS — I25.10 CORONARY ARTERY DISEASE INVOLVING NATIVE CORONARY ARTERY OF NATIVE HEART WITHOUT ANGINA PECTORIS: ICD-10-CM

## 2021-11-04 DIAGNOSIS — E11.9 TYPE 2 DIABETES MELLITUS WITHOUT COMPLICATION, WITHOUT LONG-TERM CURRENT USE OF INSULIN (H): Primary | ICD-10-CM

## 2021-11-04 DIAGNOSIS — Z00.6 EXAMINATION OF PARTICIPANT OR CONTROL IN CLINICAL RESEARCH: ICD-10-CM

## 2021-11-04 PROCEDURE — 99212 OFFICE O/P EST SF 10 MIN: CPT | Mod: 95

## 2021-11-04 NOTE — LETTER
11/4/2021    Michael Méndez MD  [address] [facility] [address]    RE: Abhishek Gomez       Dear Colleague,    I had the pleasure of seeing Aubreyavi PITT Jason in the [facility].    Sw patient this am and discussed the Surpass study and mediations, side effects with him. He is interested in the study. A consent form was mailed to him for further review and he will be contacted for a screening visit.    Stephenie Diaz RN      Thank you for allowing me to participate in the care of your patient.      Sincerely,     Stephenie Diaz RN     [facility]  cc:   No referring provider defined for this encounter.

## 2021-11-04 NOTE — PROGRESS NOTES
Left message for patient on 11-2-21 as a 60 day follow up of radiation exposure from August cath. Bouchra Matthews CV

## 2021-11-04 NOTE — PROGRESS NOTES
Sw patient this am and discussed the Surpass study and mediations, side effects with him. He is interested in the study. A consent form was mailed to him for further review and he will be contacted for a screening visit.    Stephenie Diaz RN

## 2021-12-09 NOTE — PROGRESS NOTES
"  Cardiology Clinic Progress Note    Service Date: December 10, 2021    Primary Cardiologist: Dr. Oneil      Reason for Visit: SURPASS-CVOT trial enrollment     HPI:   I had the pleasure of meeting Mr. Weiss \"Adrien\" SHANNA Gomez in the clinic today. He is a very pleasant 61 year old male with a past medical history notable for hypertension, dyslipidemia, type 2 diabetes mellitus, and coronary artery disease. Mr. Gomez has followed with Dr. Oneil for his cardiology care. He had chest discomfort symptoms leading to a stress test in 05/2019. That stress test was abnormal and he went on to have cardiac catheterization with stenting of the proximal LAD, which was subtotally occluded. A followup nuclear stress test in 10/2019 looked normal.    He began experiencing anginal symptoms again in May 2021 and underwent evaluation with exercise stress echocardiogram on 07/14/2021. The study was again abnormal suggestive of anteroseptal and apical ischemia, and he followed up with Dr. Sarah since he was unable to see me. He went on to cardiac catheterization, which revealed an occluded proximal LAD within the previous stented territory. He was initially referred for surgery but declined surgical intervention. He went back to the cath lab on 08/17/21 and had stenting of the chronic total occlusion of the LAD and stenting of the first diagonal artery with a total of 4 stents placed. Left ventricular systolic function has been preserved with EF 55% on most recent echocardiogram 08/03/2021.    Today, Adrien presents to the clinic in follow up for enrollment in the SURPASS-CVOT trial comparing tirzepatide to dulaglutide (Trulicity) in reducing major adverse cardiovascular events in people with type 2 diabetes mellitus and coronary artery disease. He tells me that he has been feeling quite well since his last visit with Dr. Oneil this past October. He notes occasional heartburn symptoms after eating larger meals. He " otherwise denies symptoms of chest pain, shortness of breath, dyspnea on exertion, orthopnea, PND, or lower extremity edema. He has not had palpitations, dizziness, presyncope, or syncope. For his diabetes, he has been on a regimen of metformin 1000 mg BID and and glimepiride 2 mg once daily. He is not on insulin.    ASSESSMENT AND PLAN:  1. Coronary artery disease   - Status post stenting of the proximal LAD initially in 05/2019. He developed recurrent symptoms and subsequently underwent successful IVUS-guided  PCI of restenosis of previously placed proximal LAD MICHELLE, just distal to the bifurcation of 1st diagonal branch with a total of 4 drug eluting stents.  - Stable without anginal symptoms. Will continue current cardiac regimen.     2. Type 2 diabetes mellitus  - Most recent hemoglobin a1c of 7.1% in July 2021. He is stable from a cardiac standpoint and is a good candidate to proceed with the SURPASS trial as planned.    3. Dyslipidemia  - Treated on atorvastatin 20 mg once daily with a most recent LDL cholesterol at target of >70 at 49 in 10/2021.    Thank you for the opportunity to participate in this pleasant patient's care. He will see Dr. Oneil for a routine annual follow up around October 2022 as previously planned. We would be happy to see him sooner if needed for any concerns in the meantime.     25 total minutes was spent today including chart review, precharting, history and exam, post visit documentation, and reviewing studies as outlined above.     28902 20-29 min  [id number]  30-39 min  23156 40-54 min    MITZY Parker, CNP   Nurse Practitioner  [facility]  Pager: [phone]  Text Page  (8am - 5pm, M-F)    Orders this Visit:  No orders of the defined types were placed in this encounter.    No orders of the defined types were placed in this encounter.    There are no discontinued medications.  Encounter Diagnoses   Name Primary?     Coronary artery disease involving native  coronary artery of native heart without angina pectoris Yes     Type 2 diabetes mellitus without complication, without long-term current use of insulin (H)      Dyslipidemia        CURRENT MEDICATIONS:  Current Outpatient Medications   Medication Sig Dispense Refill     aspirin 81 MG EC tablet Take 1 tablet (81 mg) by mouth daily 90 tablet 3     atorvastatin (LIPITOR) 20 MG tablet Take 1 tablet (20 mg) by mouth daily 90 tablet 3     cetirizine (ZYRTEC) 10 MG tablet Take 1 tablet (10 mg) by mouth every evening 10 tablet 0     glimepiride (AMARYL) 2 MG tablet Take 1 tablet (2 mg) by mouth every morning (before breakfast) 90 tablet 3     lisinopril (ZESTRIL) 20 MG tablet Take 1 tablet (20 mg) by mouth daily 90 tablet 3     metFORMIN (GLUCOPHAGE) 1000 MG tablet Take 1 tablet (1,000 mg) by mouth 2 times daily (with meals) (Patient taking differently: Take 1,000-2,000 mg by mouth every morning ) 180 tablet 3     metoprolol tartrate (LOPRESSOR) 25 MG tablet Take 1 tablet (25 mg) by mouth 2 times daily 180 tablet 3     nitroGLYcerin (NITROSTAT) 0.4 MG sublingual tablet For chest pain place 1 tablet under the tongue every 5 minutes for up to 3 doses. If symptoms persist 5 minutes after 2nd dose call 911. 30 tablet 0     ticagrelor (BRILINTA) 90 MG tablet Take 1 tablet (90 mg) by mouth every 12 hours 90 tablet 3     ALLERGIES  No Known Allergies    PAST MEDICAL, SURGICAL, FAMILY HISTORY:  History was reviewed and updated as needed, see medical record.    SOCIAL HISTORY:  Social History     Socioeconomic History     Marital status:      Spouse name: Not on file     Number of children: Not on file     Years of education: Not on file     Highest education level: Not on file   Occupational History     Not on file   Tobacco Use     Smoking status: Never Smoker     Smokeless tobacco: Never Used   Substance and Sexual Activity     Alcohol use: No     Drug use: No     Sexual activity: Yes     Partners: Female   Other Topics  Concern     Parent/sibling w/ CABG, MI or angioplasty before 65F 55M? Not Asked   Social History Narrative     Not on file     Social Determinants of Health     Financial Resource Strain: Not on file   Food Insecurity: Not on file   Transportation Needs: Not on file   Physical Activity: Not on file   Stress: Not on file   Social Connections: Not on file   Intimate Partner Violence: Not on file   Housing Stability: Not on file     Review of Systems:  10 point review of symptoms negative other than the symptoms noted above in the HPI    Physical Exam:  Wt Readings from Last 4 Encounters:   12/10/21 89 kg (196 lb 3.2 oz)   10/27/21 85.3 kg (188 lb)   08/31/21 86.9 kg (191 lb 8 oz)   08/18/21 87.5 kg (193 lb)     CONSTITUTIONAL: Appears his stated age, well nourished, and in no acute distress.  HEENT: Pupils equal, round. Sclerae nonicteric.    NECK: Supple, no masses or JVD appreciated.   C/V:  Regular rate and rhythm, normal S1 and S2, no S3 or S4, no murmur, rub or gallop. Radial pulses are full and equal bilaterally.  RESP: Respirations are unlabored. Lungs are clear to auscultation bilaterally without wheezing, rales, or rhonchi.  EXTREM: No clubbing, cyanosis, or lower extremity edema bilaterally.   NEURO: Alert and oriented, cooperative. Gait steady. No gross focal deficits.   PSYCH: Affect appropriate. Mentation normal. Responds to questions appropriately.  SKIN: Warm and dry. No apparent rashes or bruising.    Recent Lab Results:  LIPID RESULTS:  Lab Results   Component Value Date    CHOL 108 10/25/2021    CHOL 108 03/02/2021    HDL 34 (L) 10/25/2021    HDL 33 (L) 03/02/2021    LDL 49 10/25/2021    LDL 46 03/02/2021    TRIG 125 10/25/2021    TRIG 145 03/02/2021    CHOLHDLRATIO 4.6 03/16/2015     LIVER ENZYME RESULTS:  Lab Results   Component Value Date    AST 15 07/26/2021    AST 26 03/02/2021    ALT 26 10/25/2021    ALT 35 03/02/2021     CBC RESULTS:  Lab Results   Component Value Date    WBC 8.3 08/17/2021     WBC 7.2 05/13/2019    RBC 4.48 08/17/2021    RBC 4.91 05/13/2019    HGB 11.7 (L) 08/17/2021    HGB 13.4 05/13/2019    HCT 35.5 (L) 08/17/2021    HCT 38.6 (L) 05/13/2019    MCV 79 08/17/2021    MCV 79 05/13/2019    MCH 26.1 (L) 08/17/2021    MCH 27.3 05/13/2019    MCHC 33.0 08/17/2021    MCHC 34.7 05/13/2019    RDW 12.2 08/17/2021    RDW 12.7 05/13/2019     08/17/2021     05/13/2019     BMP RESULTS:  Lab Results   Component Value Date     10/25/2021     03/02/2021    POTASSIUM 3.8 10/25/2021    POTASSIUM 4.1 03/02/2021    CHLORIDE 110 (H) 10/25/2021    CHLORIDE 107 03/02/2021    CO2 24 10/25/2021    CO2 26 03/02/2021    ANIONGAP 5 10/25/2021    ANIONGAP 6 03/02/2021     (H) 10/25/2021    GLC 90 03/02/2021    BUN 14 10/25/2021    BUN 10 03/02/2021    CR 1.24 10/25/2021    CR 1.09 03/02/2021    GFRESTIMATED 62 10/25/2021    GFRESTIMATED 73 03/02/2021    GFRESTBLACK 85 03/02/2021    KRIS 8.5 10/25/2021    KRIS 9.3 03/02/2021      A1C RESULTS:  Lab Results   Component Value Date    A1C 7.1 (H) 07/26/2021    A1C 7.6 (H) 03/02/2021     This note was completed in part using Dragon voice recognition software. Although reviewed after completion, some word and grammatical errors may occur.

## 2021-12-10 ENCOUNTER — TRANSFERRED RECORDS (OUTPATIENT)
Dept: CARDIOLOGY | Facility: CLINIC | Age: 61
End: 2021-12-10

## 2021-12-10 ENCOUNTER — OFFICE VISIT (OUTPATIENT)
Dept: CARDIOLOGY | Facility: CLINIC | Age: 61
End: 2021-12-10
Payer: COMMERCIAL

## 2021-12-10 ENCOUNTER — RESEARCH ENCOUNTER (OUTPATIENT)
Dept: CARDIOLOGY | Facility: CLINIC | Age: 61
End: 2021-12-10

## 2021-12-10 VITALS
SYSTOLIC BLOOD PRESSURE: 133 MMHG | OXYGEN SATURATION: 96 % | BODY MASS INDEX: 27.47 KG/M2 | DIASTOLIC BLOOD PRESSURE: 70 MMHG | HEART RATE: 71 BPM | WEIGHT: 196.2 LBS | HEIGHT: 71 IN

## 2021-12-10 DIAGNOSIS — E78.5 DYSLIPIDEMIA: ICD-10-CM

## 2021-12-10 DIAGNOSIS — Z00.6 EXAMINATION OF PARTICIPANT OR CONTROL IN CLINICAL RESEARCH: ICD-10-CM

## 2021-12-10 DIAGNOSIS — E11.9 TYPE 2 DIABETES MELLITUS WITHOUT COMPLICATION, WITHOUT LONG-TERM CURRENT USE OF INSULIN (H): ICD-10-CM

## 2021-12-10 DIAGNOSIS — I25.10 CORONARY ARTERY DISEASE INVOLVING NATIVE CORONARY ARTERY OF NATIVE HEART WITHOUT ANGINA PECTORIS: Primary | ICD-10-CM

## 2021-12-10 PROCEDURE — 99215 OFFICE O/P EST HI 40 MIN: CPT

## 2021-12-10 PROCEDURE — 99214 OFFICE O/P EST MOD 30 MIN: CPT | Performed by: NURSE PRACTITIONER

## 2021-12-10 ASSESSMENT — MIFFLIN-ST. JEOR
SCORE: 1773.13
SCORE: 1717.09

## 2021-12-10 NOTE — LETTER
"12/10/2021    Michael Méndez MD  [address] [facility] [address]    RE: Abhishek Vernonalvino       Dear Colleague,     I had the pleasure of seeing Abhishek Gomez in the [facility].    Cardiology Clinic Progress Note    Service Date: December 10, 2021    Primary Cardiologist: Dr. Oneil      Reason for Visit: SURPASS-CVOT trial enrollment     HPI:   I had the pleasure of meeting Mr. Weiss \"Adrien\" SHANNA Gomez in the clinic today. He is a very pleasant 61 year old male with a past medical history notable for hypertension, dyslipidemia, type 2 diabetes mellitus, and coronary artery disease. Mr. Gomez has followed with Dr. Oneil for his cardiology care. He had chest discomfort symptoms leading to a stress test in 05/2019. That stress test was abnormal and he went on to have cardiac catheterization with stenting of the proximal LAD, which was subtotally occluded. A followup nuclear stress test in 10/2019 looked normal.    He began experiencing anginal symptoms again in May 2021 and underwent evaluation with exercise stress echocardiogram on 07/14/2021. The study was again abnormal suggestive of anteroseptal and apical ischemia, and he followed up with Dr. Sarah since he was unable to see me. He went on to cardiac catheterization, which revealed an occluded proximal LAD within the previous stented territory. He was initially referred for surgery but declined surgical intervention. He went back to the cath lab on 08/17/21 and had stenting of the chronic total occlusion of the LAD and stenting of the first diagonal artery with a total of 4 stents placed. Left ventricular systolic function has been preserved with EF 55% on most recent echocardiogram 08/03/2021.    Today, Adrien presents to the clinic in follow up for enrollment in the SURPASS-CVOT trial comparing tirzepatide to dulaglutide (Trulicity) in reducing major adverse cardiovascular events in people with " type 2 diabetes mellitus and coronary artery disease. He tells me that he has been feeling quite well since his last visit with Dr. Oneil this past October. He notes occasional heartburn symptoms after eating larger meals. He otherwise denies symptoms of chest pain, shortness of breath, dyspnea on exertion, orthopnea, PND, or lower extremity edema. He has not had palpitations, dizziness, presyncope, or syncope. For his diabetes, he has been on a regimen of metformin 1000 mg BID and and glimepiride 2 mg once daily. He is not on insulin.    ASSESSMENT AND PLAN:  1. Coronary artery disease   - Status post stenting of the proximal LAD initially in 05/2019. He developed recurrent symptoms and subsequently underwent successful IVUS-guided  PCI of restenosis of previously placed proximal LAD MICHELLE, just distal to the bifurcation of 1st diagonal branch with a total of 4 drug eluting stents.  - Stable without anginal symptoms. Will continue current cardiac regimen.     2. Type 2 diabetes mellitus  - Most recent hemoglobin a1c of 7.1% in July 2021. He is stable from a cardiac standpoint and is a good candidate to proceed with the SURPASS trial as planned.    3. Dyslipidemia  - Treated on atorvastatin 20 mg once daily with a most recent LDL cholesterol at target of >70 at 49 in 10/2021.    Thank you for the opportunity to participate in this pleasant patient's care. He will see Dr. Oneil for a routine annual follow up around October 2022 as previously planned. We would be happy to see him sooner if needed for any concerns in the meantime.     25 total minutes was spent today including chart review, precharting, history and exam, post visit documentation, and reviewing studies as outlined above.     44350 20-29 min  [id number]  30-39 min  87993 40-54 min    MITZY Parker, CNP   Nurse Practitioner  [facility]  Pager: [phone]  Text Page  (8am - 5pm, M-F)    Orders this Visit:  No orders of the defined  types were placed in this encounter.    No orders of the defined types were placed in this encounter.    There are no discontinued medications.  Encounter Diagnoses   Name Primary?     Coronary artery disease involving native coronary artery of native heart without angina pectoris Yes     Type 2 diabetes mellitus without complication, without long-term current use of insulin (H)      Dyslipidemia        CURRENT MEDICATIONS:  Current Outpatient Medications   Medication Sig Dispense Refill     aspirin 81 MG EC tablet Take 1 tablet (81 mg) by mouth daily 90 tablet 3     atorvastatin (LIPITOR) 20 MG tablet Take 1 tablet (20 mg) by mouth daily 90 tablet 3     cetirizine (ZYRTEC) 10 MG tablet Take 1 tablet (10 mg) by mouth every evening 10 tablet 0     glimepiride (AMARYL) 2 MG tablet Take 1 tablet (2 mg) by mouth every morning (before breakfast) 90 tablet 3     lisinopril (ZESTRIL) 20 MG tablet Take 1 tablet (20 mg) by mouth daily 90 tablet 3     metFORMIN (GLUCOPHAGE) 1000 MG tablet Take 1 tablet (1,000 mg) by mouth 2 times daily (with meals) (Patient taking differently: Take 1,000-2,000 mg by mouth every morning ) 180 tablet 3     metoprolol tartrate (LOPRESSOR) 25 MG tablet Take 1 tablet (25 mg) by mouth 2 times daily 180 tablet 3     nitroGLYcerin (NITROSTAT) 0.4 MG sublingual tablet For chest pain place 1 tablet under the tongue every 5 minutes for up to 3 doses. If symptoms persist 5 minutes after 2nd dose call 911. 30 tablet 0     ticagrelor (BRILINTA) 90 MG tablet Take 1 tablet (90 mg) by mouth every 12 hours 90 tablet 3     ALLERGIES  No Known Allergies    PAST MEDICAL, SURGICAL, FAMILY HISTORY:  History was reviewed and updated as needed, see medical record.    SOCIAL HISTORY:  Social History     Socioeconomic History     Marital status:      Spouse name: Not on file     Number of children: Not on file     Years of education: Not on file     Highest education level: Not on file   Occupational History      Not on file   Tobacco Use     Smoking status: Never Smoker     Smokeless tobacco: Never Used   Substance and Sexual Activity     Alcohol use: No     Drug use: No     Sexual activity: Yes     Partners: Female   Other Topics Concern     Parent/sibling w/ CABG, MI or angioplasty before 65F 55M? Not Asked   Social History Narrative     Not on file     Social Determinants of Health     Financial Resource Strain: Not on file   Food Insecurity: Not on file   Transportation Needs: Not on file   Physical Activity: Not on file   Stress: Not on file   Social Connections: Not on file   Intimate Partner Violence: Not on file   Housing Stability: Not on file     Review of Systems:  10 point review of symptoms negative other than the symptoms noted above in the HPI    Physical Exam:  Wt Readings from Last 4 Encounters:   12/10/21 89 kg (196 lb 3.2 oz)   10/27/21 85.3 kg (188 lb)   08/31/21 86.9 kg (191 lb 8 oz)   08/18/21 87.5 kg (193 lb)     CONSTITUTIONAL: Appears his stated age, well nourished, and in no acute distress.  HEENT: Pupils equal, round. Sclerae nonicteric.    NECK: Supple, no masses or JVD appreciated.   C/V:  Regular rate and rhythm, normal S1 and S2, no S3 or S4, no murmur, rub or gallop. Radial pulses are full and equal bilaterally.  RESP: Respirations are unlabored. Lungs are clear to auscultation bilaterally without wheezing, rales, or rhonchi.  EXTREM: No clubbing, cyanosis, or lower extremity edema bilaterally.   NEURO: Alert and oriented, cooperative. Gait steady. No gross focal deficits.   PSYCH: Affect appropriate. Mentation normal. Responds to questions appropriately.  SKIN: Warm and dry. No apparent rashes or bruising.    Recent Lab Results:  LIPID RESULTS:  Lab Results   Component Value Date    CHOL 108 10/25/2021    CHOL 108 03/02/2021    HDL 34 (L) 10/25/2021    HDL 33 (L) 03/02/2021    LDL 49 10/25/2021    LDL 46 03/02/2021    TRIG 125 10/25/2021    TRIG 145 03/02/2021    CHOLHDLRATIO 4.6 03/16/2015      LIVER ENZYME RESULTS:  Lab Results   Component Value Date    AST 15 07/26/2021    AST 26 03/02/2021    ALT 26 10/25/2021    ALT 35 03/02/2021     CBC RESULTS:  Lab Results   Component Value Date    WBC 8.3 08/17/2021    WBC 7.2 05/13/2019    RBC 4.48 08/17/2021    RBC 4.91 05/13/2019    HGB 11.7 (L) 08/17/2021    HGB 13.4 05/13/2019    HCT 35.5 (L) 08/17/2021    HCT 38.6 (L) 05/13/2019    MCV 79 08/17/2021    MCV 79 05/13/2019    MCH 26.1 (L) 08/17/2021    MCH 27.3 05/13/2019    MCHC 33.0 08/17/2021    MCHC 34.7 05/13/2019    RDW 12.2 08/17/2021    RDW 12.7 05/13/2019     08/17/2021     05/13/2019     BMP RESULTS:  Lab Results   Component Value Date     10/25/2021     03/02/2021    POTASSIUM 3.8 10/25/2021    POTASSIUM 4.1 03/02/2021    CHLORIDE 110 (H) 10/25/2021    CHLORIDE 107 03/02/2021    CO2 24 10/25/2021    CO2 26 03/02/2021    ANIONGAP 5 10/25/2021    ANIONGAP 6 03/02/2021     (H) 10/25/2021    GLC 90 03/02/2021    BUN 14 10/25/2021    BUN 10 03/02/2021    CR 1.24 10/25/2021    CR 1.09 03/02/2021    GFRESTIMATED 62 10/25/2021    GFRESTIMATED 73 03/02/2021    GFRESTBLACK 85 03/02/2021    KRIS 8.5 10/25/2021    KRIS 9.3 03/02/2021      A1C RESULTS:  Lab Results   Component Value Date    A1C 7.1 (H) 07/26/2021    A1C 7.6 (H) 03/02/2021     This note was completed in part using Dragon voice recognition software. Although reviewed after completion, some word and grammatical errors may occur.    Thank you for allowing me to participate in the care of your patient.      Sincerely,     Art Whiting NP     [facility]  cc:   No referring provider defined for this encounter.

## 2021-12-10 NOTE — PROGRESS NOTES
"  SURPASS-CVOT Study [Effect of tirzepatide versus Dulaglutide on Major Cardiovascular Events in Patients with Type 2 Diabetes]    Subject seen for screening visit to discuss review the study details/consent form and collect qualifying data per protocol.    The study discussion included the following:     Study purpose    Qualifications for participation    Length of study participation    Study procedures    Risks and side effects    Benefits (if any)    Voluntary nature of participation    Alternatives to participation    Confidentiality of records    Financial considerations     Subject asked questions and agreed that he received answers that satisfied him.    Consent form (Version 8/17/21 Approved 2/3/21) signed by the subject.   A signed copy was given to the subject & a copy was forwarded to medical records.    No study procedures were done prior to obtaining informed consent.     Inclusion/exclusion criteria reviewed.    Vitals:    12/10/21 1130 12/10/21 1135   BP: 111/68 133/70   BP Location: Right arm Right arm   Patient Position: Chair Chair   Cuff Size: Adult Regular Adult Regular   Pulse: 74 71   SpO2: 96%    Weight: 89 kg (196 lb 3.2 oz)    Height: 1.803 m (5' 11\")      BP measured after 5 minutes resting in a seated position - two readings taken one minute apart using automated cuff.    Waist circumference: 100 cm and 100 cm  measured immediately above iliac crest while subject was standing    male  61 year old  Past Medical History:   Diagnosis Date     Abnormal stress test 07/14/2021     Coronary artery disease involving native coronary artery of native heart without angina pectoris 07/14/2021     Essential hypertension, benign 05/18/2016     Hyperlipidemia LDL goal <70 03/16/2015     NSTEMI (non-ST elevated myocardial infarction) (H) 05/09/2019    s/p MICHELLE to pLAD     Status post coronary angiogram 07/26/2021    Complex Multivessel CAD. CABG cosult     Type 2 diabetes mellitus without complication, " without long-term current use of insulin (H) 07/06/2017     Unstable angina (H) 05/09/2019       Have subject's First Degree Relatives been diagnosed with Coronary Artery Disease (males <55 years old, females <65 years old)?  NO    Have subject's First Degree Relatives been diagnosed with Cerebrovascular Disease?  NO     Subject is currently taking an SGLT-2 inhibitor [Invokana, Farxiga & Jardiance (flozins)]  [] Yes  [x]  No  [randomization stratified by use of SGLT -2 inhibitors]    Physical exam done by NP-please refer to that encounter for details.    Non fasting central labs were drawn and sent to central lab per protocol..    Patient will be scheduled for an ophthalmologic exam with fundoscopy prior to randomization visit.     Plan: Pending lab results and eye exam will have randomization visit on 12/29/21.    Stephenie Diaz RN      Current Outpatient Medications:      aspirin 81 MG EC tablet, Take 1 tablet (81 mg) by mouth daily, Disp: 90 tablet, Rfl: 3     atorvastatin (LIPITOR) 20 MG tablet, Take 1 tablet (20 mg) by mouth daily, Disp: 90 tablet, Rfl: 3     cetirizine (ZYRTEC) 10 MG tablet, Take 1 tablet (10 mg) by mouth every evening, Disp: 10 tablet, Rfl: 0     glimepiride (AMARYL) 2 MG tablet, Take 1 tablet (2 mg) by mouth every morning (before breakfast), Disp: 90 tablet, Rfl: 3     lisinopril (ZESTRIL) 20 MG tablet, Take 1 tablet (20 mg) by mouth daily, Disp: 90 tablet, Rfl: 3     metFORMIN (GLUCOPHAGE) 1000 MG tablet, Take 1 tablet (1,000 mg) by mouth 2 times daily (with meals) (Patient taking differently: Take 1,000-2,000 mg by mouth every morning ), Disp: 180 tablet, Rfl: 3     metoprolol tartrate (LOPRESSOR) 25 MG tablet, Take 1 tablet (25 mg) by mouth 2 times daily, Disp: 180 tablet, Rfl: 3     nitroGLYcerin (NITROSTAT) 0.4 MG sublingual tablet, For chest pain place 1 tablet under the tongue every 5 minutes for up to 3 doses. If symptoms persist 5 minutes after 2nd dose call 911., Disp: 30 tablet,  Rfl: 0     ticagrelor (BRILINTA) 90 MG tablet, Take 1 tablet (90 mg) by mouth every 12 hours, Disp: 90 tablet, Rfl: 3

## 2021-12-20 ENCOUNTER — TRANSFERRED RECORDS (OUTPATIENT)
Dept: CARDIOLOGY | Facility: CLINIC | Age: 61
End: 2021-12-20
Payer: COMMERCIAL

## 2021-12-20 NOTE — PROGRESS NOTES
Research labs from December 10 as well as December 15 reviewed, only abnormalities are glucose of 213 as well as hemoglobin A1c of 8.3 which are not clinically significant.  LF

## 2021-12-20 NOTE — PROGRESS NOTES
December 10 research labs reviewed, creatinine mildly increased at 1.48 not clinically significant, uric acid mildly increased at 8.8 not clinically significant, CK mildly increased at 385 and not clinically significant, hemoglobin mildly low at 11.6 consistent with microcytic anemia and not clinically significant.  Hemoglobin A1c is mildly elevated at 8.2 and this is not clinically significant.  LF

## 2021-12-23 ENCOUNTER — RESEARCH ENCOUNTER (OUTPATIENT)
Dept: CARDIOLOGY | Facility: CLINIC | Age: 61
End: 2021-12-23
Payer: COMMERCIAL

## 2021-12-23 DIAGNOSIS — I25.10 CORONARY ARTERY DISEASE INVOLVING NATIVE CORONARY ARTERY OF NATIVE HEART WITHOUT ANGINA PECTORIS: Primary | ICD-10-CM

## 2021-12-23 DIAGNOSIS — Z00.6 EXAMINATION OF PARTICIPANT OR CONTROL IN CLINICAL RESEARCH: ICD-10-CM

## 2021-12-23 DIAGNOSIS — E11.9 TYPE 2 DIABETES MELLITUS WITHOUT COMPLICATION, WITHOUT LONG-TERM CURRENT USE OF INSULIN (H): ICD-10-CM

## 2021-12-29 ENCOUNTER — OFFICE VISIT (OUTPATIENT)
Dept: CARDIOLOGY | Facility: CLINIC | Age: 61
End: 2021-12-29
Payer: COMMERCIAL

## 2021-12-29 ENCOUNTER — TRANSFERRED RECORDS (OUTPATIENT)
Dept: CARDIOLOGY | Facility: CLINIC | Age: 61
End: 2021-12-29

## 2021-12-29 VITALS
HEART RATE: 71 BPM | WEIGHT: 196.21 LBS | HEIGHT: 75 IN | BODY MASS INDEX: 24.4 KG/M2 | DIASTOLIC BLOOD PRESSURE: 70 MMHG | SYSTOLIC BLOOD PRESSURE: 133 MMHG

## 2021-12-29 VITALS
HEIGHT: 71 IN | SYSTOLIC BLOOD PRESSURE: 116 MMHG | WEIGHT: 192 LBS | DIASTOLIC BLOOD PRESSURE: 77 MMHG | BODY MASS INDEX: 26.88 KG/M2 | OXYGEN SATURATION: 97 % | HEART RATE: 68 BPM

## 2021-12-29 DIAGNOSIS — E11.9 TYPE 2 DIABETES MELLITUS WITHOUT COMPLICATION, WITHOUT LONG-TERM CURRENT USE OF INSULIN (H): ICD-10-CM

## 2021-12-29 DIAGNOSIS — Z00.6 EXAMINATION OF PARTICIPANT OR CONTROL IN CLINICAL RESEARCH: ICD-10-CM

## 2021-12-29 DIAGNOSIS — I21.4 NSTEMI (NON-ST ELEVATED MYOCARDIAL INFARCTION) (H): Primary | ICD-10-CM

## 2021-12-29 DIAGNOSIS — I25.10 CORONARY ARTERY DISEASE INVOLVING NATIVE CORONARY ARTERY OF NATIVE HEART WITHOUT ANGINA PECTORIS: Primary | ICD-10-CM

## 2021-12-29 PROCEDURE — 99207 PR NO CHARGE-RESEARCH SERVICE: CPT | Performed by: INTERNAL MEDICINE

## 2021-12-29 PROCEDURE — 99215 OFFICE O/P EST HI 40 MIN: CPT

## 2021-12-29 PROCEDURE — 93000 ELECTROCARDIOGRAM COMPLETE: CPT | Performed by: NURSE PRACTITIONER

## 2021-12-29 ASSESSMENT — MIFFLIN-ST. JEOR: SCORE: 1698.04

## 2021-12-29 NOTE — PROGRESS NOTES
"SURPASS-CVOT Study [Effect of tirzepatide versus Dulaglutide on Major Cardiovascular Events in Patients with Type 2 Diabetes]    Subject seen for randomization visit    Inclusion/exclusion criteria reviewed and subject remains eligible to participate.    Patient reported outcomes testing [APPADL, EQ-5D-5L, and IW-SP] was completed prior to other evaluations    Vitals:    12/29/21 0822 12/29/21 0825   BP: 116/69 116/77   BP Location: Left arm Left arm   Patient Position: Chair Chair   Cuff Size: Adult Regular Adult Regular   Pulse: 64 68   SpO2: 97%    Weight: 87.1 kg (192 lb)    Height: 1.803 m (5' 11\")      BP measured after 5 minutes resting in a seated position - two readings taken one minute apart using automated cuff.      Waist circumference  103cm  and 103.2cm   [measured immediately above iliac crest while subject is standing]    Subject queried for adverse events, endpoints and medication changes. None reported.    Local EKG done.    Fasting labs done and sent to central lab.     Adherence/lifestyle reinforcement done.    Subject randomized via IVRS to  Kit #[id number].     Subject received training in self-injection and performed self injection without problems.     Injection done in L abdomen at 11:10am    All study supplies dispensed, including subject ID card.    Follow-up 2 weeks for phone visit.     Stephenie Diaz RN      Current Outpatient Medications:      aspirin 81 MG EC tablet, Take 1 tablet (81 mg) by mouth daily, Disp: 90 tablet, Rfl: 3     atorvastatin (LIPITOR) 20 MG tablet, Take 1 tablet (20 mg) by mouth daily, Disp: 90 tablet, Rfl: 3     glimepiride (AMARYL) 2 MG tablet, Take 1 tablet (2 mg) by mouth every morning (before breakfast), Disp: 90 tablet, Rfl: 3     lisinopril (ZESTRIL) 20 MG tablet, Take 1 tablet (20 mg) by mouth daily, Disp: 90 tablet, Rfl: 3     metFORMIN (GLUCOPHAGE) 1000 MG tablet, Take 1 tablet (1,000 mg) by mouth 2 times daily (with meals) (Patient taking differently: Take " 1,000-2,000 mg by mouth every morning ), Disp: 180 tablet, Rfl: 3     metoprolol tartrate (LOPRESSOR) 25 MG tablet, Take 1 tablet (25 mg) by mouth 2 times daily, Disp: 180 tablet, Rfl: 3     nitroGLYcerin (NITROSTAT) 0.4 MG sublingual tablet, For chest pain place 1 tablet under the tongue every 5 minutes for up to 3 doses. If symptoms persist 5 minutes after 2nd dose call 911., Disp: 30 tablet, Rfl: 0     ticagrelor (BRILINTA) 90 MG tablet, Take 1 tablet (90 mg) by mouth every 12 hours, Disp: 90 tablet, Rfl: 3     cetirizine (ZYRTEC) 10 MG tablet, Take 1 tablet (10 mg) by mouth every evening (Patient not taking: Reported on 12/29/2021), Disp: 10 tablet, Rfl: 0    Current Facility-Administered Medications:      study - tirzepatide 2.5-15 mg or dulaglutide 1.5 mg (SURPASS) (IDS# [id number]) injection 1.5-15 mg, 1.5-15 mg, Subcutaneous, Q7 Days, Apolinar Rodriguez MD

## 2021-12-29 NOTE — PROGRESS NOTES
"  SURPASS-CVOT Study [Effect of tirzepatide versus Dulaglutide on Major Cardiovascular Events in Patients with Type 2 Diabetes]    Subject seen for screening visit to discuss review the study details/consent form and collect qualifying data per protocol.    The study discussion included the following:     Study purpose    Qualifications for participation    Length of study participation    Study procedures    Risks and side effects    Benefits (if any)    Voluntary nature of participation    Alternatives to participation    Confidentiality of records    Financial considerations     Subject asked questions and agreed that he received answers that satisfied him.    Consent form (Version [id number] Approved [id number]) signed by the subject.   A signed copy was given to the subject & a copy was forwarded to medical records.    No study procedures were done prior to obtaining informed consent.     Inclusion/exclusion criteria reviewed with patient.    BP measured after 5 minutes resting in a seated position - two readings taken one minute apart using automated cuff.  Vitals:    12/10/21 1310 12/10/21 1312   BP: 111/74 133/70   BP Location: Right arm Right arm   Patient Position: Chair Chair   Cuff Size: Adult Regular Adult Regular   Pulse: 74 71   Weight: 89 kg (196 lb 3.4 oz)    Height: 1.893 m (6' 2.53\")        Waist circumference: 100 cm X 2  measured immediately above iliac crest while subject was standing  male  61 year old  Past Medical History:   Diagnosis Date     Abnormal stress test 07/14/2021     Coronary artery disease involving native coronary artery of native heart without angina pectoris 07/14/2021     Essential hypertension, benign 05/18/2016     Hyperlipidemia LDL goal <70 03/16/2015     NSTEMI (non-ST elevated myocardial infarction) (H) 05/09/2019    s/p MICHELLE to pLAD     Status post coronary angiogram 07/26/2021    Complex Multivessel CAD. CABG cosult     Type 2 diabetes mellitus without complication, " without long-term current use of insulin (H) 07/06/2017     Unstable angina (H) 05/09/2019       Have subject's First Degree Relatives been diagnosed with Coronary Artery Disease (males <55 years old, females <65 years old)? NO    Have subject's First Degree Relatives been diagnosed with Cerebrovascular Disease? NO     Subject is currently taking an SGLT-2 inhibitor [Invokana, Farxiga & Jardiance (flozins)]  []  Yes    [x]  No  [randomization stratified by use of SGLT -2 inhibitors]    Physical exam done by NP-please refer to that encounter for details.    Non fasting labs were drawn and sent to central lab.    Patient will be scheduled for an ophthalmologic exam with fundoscopy prior to randomization visit.    Plan: Will contact subject when qualifying labs and eye exam are reported. Randomization visit tentatively scheduled for 12/29/2021     Stephenie Diaz RN      Current Outpatient Medications:      aspirin 81 MG EC tablet, Take 1 tablet (81 mg) by mouth daily, Disp: 90 tablet, Rfl: 3     atorvastatin (LIPITOR) 20 MG tablet, Take 1 tablet (20 mg) by mouth daily, Disp: 90 tablet, Rfl: 3     cetirizine (ZYRTEC) 10 MG tablet, Take 1 tablet (10 mg) by mouth every evening, Disp: 10 tablet, Rfl: 0     glimepiride (AMARYL) 2 MG tablet, Take 1 tablet (2 mg) by mouth every morning (before breakfast), Disp: 90 tablet, Rfl: 3     lisinopril (ZESTRIL) 20 MG tablet, Take 1 tablet (20 mg) by mouth daily, Disp: 90 tablet, Rfl: 3     metFORMIN (GLUCOPHAGE) 1000 MG tablet, Take 1 tablet (1,000 mg) by mouth 2 times daily (with meals) (Patient taking differently: Take 1,000-2,000 mg by mouth every morning ), Disp: 180 tablet, Rfl: 3     metoprolol tartrate (LOPRESSOR) 25 MG tablet, Take 1 tablet (25 mg) by mouth 2 times daily, Disp: 180 tablet, Rfl: 3     nitroGLYcerin (NITROSTAT) 0.4 MG sublingual tablet, For chest pain place 1 tablet under the tongue every 5 minutes for up to 3 doses. If symptoms persist 5 minutes after 2nd dose  call 911., Disp: 30 tablet, Rfl: 0     ticagrelor (BRILINTA) 90 MG tablet, Take 1 tablet (90 mg) by mouth every 12 hours, Disp: 90 tablet, Rfl: 3

## 2021-12-29 NOTE — PROGRESS NOTES
SURPASS-CVOT INCLUSION/EXCLUSION CRITERIA     Yes  No Criteria #   Inclusion Criteria: All must be answered  yes  to be eligible for study      [x] Yes  [] No 1 Men or women at least 40 years old with a diagnosis of T2DM [per WHO 2019]      [x] Yes [] No 2 Established CVD, including at least one of the CVD criteria noted below [ a-c ] see CVD Criteria     [x]  A. Coronary artery disease (CAD) with EITHER of the following:  Documented history of spontaneous MI  ?50% stenosis in 1 or more major coronary arteries, determined by invasive angiography  ?50% stenosis in 2 or more major coronary arteries, determined by computed   tomography coronary angiography (CTCA), or  History of surgical or percutaneous coronary revascularization procedure     []  b. Cerebrovascular disease - ANY of the following:  Documented history of ischemic stroke  Carotid arterial disease with ?50% stenosis, documented by carotid ultrasound, magnetic resonance imaging (MRI), or angiography  Carotid stenting or surgical revascularization     []  c. Peripheral arterial disease with EITHER of the following:  Intermittent claudication and ankle-brachial index <0.9  Prior nontraumatic amputation or peripheral vascular procedure (eg, stenting or surgical revascularization), due to peripheral arterial ischemia    [x] Yes  [] No 3 HbA1c >7% [ >53 mmol/mol ] and <10.5% [ <91.3 mmol/mol ] based on central laboratory assessment at screening.    [x] Yes  [] No 4 Body mass index [BMI] >25 kg/m2    [x] Yes [] No 5   At the time of signing the informed consent: Contraceptive use by men or women should be consistent with local regulations regarding the methods of contraception for those participating in clinical trials.    (Refer to protocol for full details)              [x] Yes [] No 6   In the Investigator's opinion, are well motivated , capable and willing to   Learn how to self-inject treatment [tirzepatide or dulaglutide] as required for this protocol.  Visually impaired persons who are not able to perform the injections must have the assistance of a sighted indicidual trained to inject the study drug. Persons with physical limitations who are not able to perform the injections must have the assistance of an individual trained to inject the study drug.  Inject study drug every week.  Have a sufficient understanding of one of the provided languages of the country such that they will be able to complete the patient questionnaires  Make themselves available for the duration of the study and who will comply with the required study visits.     [x] Yes [] No 7   Capable of giving signed written informed consent as described in Appendix 3, which includes compliance with the requirements and restrictions listed in the informed consent form [ICF] and in this protocol.     Yes No Criteria #    Exclusion Criteria: All must be answered  no  to be eligible for study     [] Yes [x] No 8 Have type 1 diabetes mellitus     [] Yes  [x] No 9   Have uncontrolled diabetes requiring immediate therapy [such as diabetic ketoacidosis] at screening or randomization, in the judgment of the physician.       [] Yes  [x] No 10   Have had 1 or more episodes of severe hypoglycemia and/or 1 or more events of hypoglycemia unawareness within 6 months prior to screening.       [] Yes  [x] No 11   Have a history of proliferative retinopathy or macular edema. A dilated fundoscopic exam, evaluated by and eye care professional [ophthalmologist or optometrist] is required to confirm eligibility. In addition, non proliferative diabetic retinopathy that requires acute treatment according to the opinion of the investigator is also excluded.       [] Yes  [x] No 12 Have been hospitalized for CHF within 2 months prior to screening.     [] Yes  [x] No 13 Have chronic New [place] Heart Association Functional Classification IV CHF     [] Yes [x] No 14 Are currently planning a coronary, carotid or peripheral artery  revascularization.     [] Yes  [x] No 15   Had chronic or acute pancreatitis any time prior to screening, irrespective of etiology       [] Yes  [x] No 16   Have a known clinically significant gastric emptying abnormality [eg, severe diabetic gastroparesis or gastric outlet obstruction], have undergone  or currently planning any gastric bypass [bariatric] surgery or restrictive bariatric surgery [eg, Lap-BandR vertical sleeve gastrectomy].       [] Yes  [x] No 17   Have acute or chronic hepatiits, signs or symptoms of any other liver disease, or an alanine aminotransaminase [ALT] level >3.0 x the upper limit of normal [ULN] for the reference range as determined by the central laboratory.    Note: Patients with nonalcoholic fatty liver disease are eligible to particpate if their ALT level is <3 x the ULN for the reference range      [] Yes [x] No 18 Have known chronic severe renal failure [defined as a known eGFR <15 mL/minute/1.73m2] or are on chronic dialysis    [] Yes  [x] No 19   Have evidence of a significant, uncontrolled endocrine abnormality [eg,  thyrotoxicosis or adrenal crises]      [] Yes  [x] No 20   Have a family or personal history of mutliple endocrine neoplasia type 2 [MEN2] or familial medullary thyroid carcinoma [MTC] or personal history of non-familial MTC      [] Yes  [x] No 21   Have a serum calcitonin level at screening [based on central laboratory results] of:  >20 ng/L at Visit 1 if eGFR  >60 mL/min/1.73m2  >35 ng/L at Visit 1 if eGFR  <60 mL/min/1.73m2      [] Yes  [x] No 22   Have a history of an active or untreated malignancy or are in remission from a clinically significant malignancy for less than 5 years. An exception for this criterion is  basal or squamous cell skin cancer, in situ carcinomas of the cervix, or in situ prostate cancer      [] Yes  [x] No 23   Have a history of any other condition [such as known drug or alcohol abuse or psychiatric disorder] that, in the opinion of the  investigator, may preclude the pateint from following and completing the protocol.      [] Yes  [x] No 24   Have had a transplanted organ [corneal transplants (keratoplasty) allowed] or awaiting an organ transplant.     [] Yes  [x] No 25   Have any other conditions [eg, hypersensitivity] that is a contraindication to any incretin or GLP-1 Herve     [] Yes  [x] No 26   Have had an MI, percutaneous coronary revascularization procedure, ischemic stroke, carotid stenting or surgical revascularization, non traumatic amputation or peripheral vascular procedure [eg, stenting or surgical revascularization] less than 60 days prior to screening.     [] Yes  [x] No 27   Have had coronary artery bypass graft surgery less than 5 years prior to screening     [] Yes  [x] No 28   Treatment with any incretin, GLP-1 RA or pramlintide in a period of 3 months prior to Visit 1     [] Yes [x] No 29   Discontinuation of any incretin, GLP-1 RA or pramlintide due to intolerability any time prior to Visit 1     [] Yes [x] No 31   Are currently enrolled in any other clinical study involving an investigational product or any other type of medical research judged not to be scientifically or medically compatible with this study.     [] Yes [x] No 32   Have participated within the last 30 days in a clinical trial involving an investigational product. If the previous investigational product has a long half-life, 3 months or 5 half lives [whichever is longer] should have passed     [] Yes  [x] No 33   Have previously completed or withdrawn from this study or randomized into any other study investigating tirzepatide     [] Yes [x] No 34   Are investigator site personnel directly affiliated with this study and/or their immediate families. Immediate family is defined as a spouse, parent, child or sibling whether biological or legally adopted     [] Yes [x] No 35 Are [email or web address] employees   [] Yes [x] No 36 Are women who are pregnant or breastfeeding   [] Yes  [x] No 37 Have had a blood transfusion or severe blood loss within 90 days prior to screening or have known hematological conditions that may interfere with HbA1c measurement     [x] I have personally reviewed all of these criteria and agree that Abhishek Gomez is able to be randomized in the SURPASS CVOT clinical trial.      Stephenie Diaz RN  Clinical Trials Nurse

## 2022-01-07 NOTE — PROGRESS NOTES
Research laboratory data from December 10 shows hemoglobin A1c of 8.2 and glucose of 213 which are not clinically significant. Creatinine 1.48 with uric acid mildly elevated 8.8 also not clinically significant. Hemoglobin mildly low at 11.6 not clinically significant, CK of 385 not clinically significant but would trend this.  LF

## 2022-01-12 ENCOUNTER — VIRTUAL VISIT (OUTPATIENT)
Dept: CARDIOLOGY | Facility: CLINIC | Age: 62
End: 2022-01-12
Payer: COMMERCIAL

## 2022-01-12 DIAGNOSIS — Z00.6 EXAMINATION OF PARTICIPANT OR CONTROL IN CLINICAL RESEARCH: ICD-10-CM

## 2022-01-12 DIAGNOSIS — E11.9 TYPE 2 DIABETES MELLITUS WITHOUT COMPLICATION, WITHOUT LONG-TERM CURRENT USE OF INSULIN (H): Primary | ICD-10-CM

## 2022-01-12 DIAGNOSIS — I25.10 CORONARY ARTERY DISEASE INVOLVING NATIVE CORONARY ARTERY OF NATIVE HEART WITHOUT ANGINA PECTORIS: ICD-10-CM

## 2022-01-12 PROCEDURE — 99207 PR NO CHARGE-RESEARCH SERVICE: CPT

## 2022-01-12 NOTE — LETTER
1/12/2022    Michael Méndez MD  [address] [facility] [address]    RE: Abhishek SWEETIE Jason       Dear Colleague,     I had the pleasure of seeing Danoleticiaavi PITT Jason in the [facility].  SURPASS-CVOT Study [Effect of tirzepatide versus Dulaglutide on Major Cardiovascular Events in Patients with Type 2Diabetes]    Subject contacted by telephone to evaluate/ensure compliance.     Sujbect queried for adverse events, endpoints and medication changes.     Patient reports abdominal bloating and nausea starting the day after his injections and lasting for 3 days then resolving. Has happened after both injections. Patient reminded that these are typical AE's related to the IP and that they usually resolve as time goes on. Asked to call back and inform me if the symptoms worsen.    Queried regarding compliance bev-educated if necessary. Has taken both injections since last visit.    Will see him in clinic in 2 weeks.      SURPASS-CVOT Study [Effect of tirzepatide versus Dulaglutide on Major Cardiovascular Events in Patients with Type 2 Diabetes]    Diagnosis/event description (diagnosis preferred):  Abdominal Bloating  Start date: 12/30/21  Date resolved:  1/2/22  Intensity: ([x] mild /[]  moderate / [] severe)   Study Medication adjustment:  [] Yes   [x] No  Outcome: resolved  Date study team aware of event: 01/12/22  Was treatment given for this event:  [] Yes   [x] No   If yes, provide details  Serious adverse event?    Yes   [x] No  Special interest event?  [] Yes   [x] No  Endpoint? [] Yes   [x] No   Fatal event?  [] Yes   [x] No    Dr. Rodriguez: Reasonable possibility that AE is related to study treatment    [] Yes   [] No      SURPASS-CVOT Study [Effect of tirzepatide versus Dulaglutide on Major Cardiovascular Events in Patients with Type 2 Diabetes]    Diagnosis/event description (diagnosis preferred):  Nausea  Start date: 12/30/21   Date resolved: 1/2/22   Intensity: ([x]  mild /[]  moderate / [] severe)   Study Medication adjustment:  [] Yes   [x] No  Outcome: resolved   Date study team aware of event: 1/12/22  Was treatment given for this event:  [] Yes   [x] No   If yes, provide details  Serious adverse event?    Yes   [x] No  Special interest event?  [] Yes   [x] No  Endpoint? [] Yes   [x] No   Fatal event?  [] Yes   [x] No    Dr. Rodriguez: Reasonable possibility that AE is related to study treatment    [] Yes   [] No      SURPASS-CVOT Study [Effect of tirzepatide versus Dulaglutide on Major Cardiovascular Events in Patients with Type 2 Diabetes]    Diagnosis/event description (diagnosis preferred):  Stomach Bloating  Start date: 1/6/22   Date resolved: 1/9/22  Intensity: ([x] mild /[]  moderate / [] severe)   Study Medication adjustment:  [] Yes   [x] No  Outcome: resolved  Date study team aware of event: 1/12/22  Was treatment given for this event:  [] Yes   [x] No   If yes, provide details  Serious adverse event?    Yes   [x] No  Special interest event?  [] Yes   [x] No  Endpoint? [] Yes   [x] No   Fatal event?  [] Yes   [x] No    Dr. Rodriguez: Reasonable possibility that AE is related to study treatment    [] Yes   [] No    SURPASS-CVOT Study [Effect of tirzepatide versus Dulaglutide on Major Cardiovascular Events in Patients with Type 2 Diabetes]    Diagnosis/event description (diagnosis preferred):  Nausea  Start date: 1/6/22  Date resolved: 1/9/22   Intensity: ([x] mild /[]  moderate / [] severe)   Study Medication adjustment:  [] Yes   [x] No  Outcome: resolved  Date study team aware of event: 1/12/22  Was treatment given for this event:  [] Yes   [x] No   If yes, provide details  Serious adverse event?    Yes   [x] No  Special interest event?  [] Yes   [x] No  Endpoint? [] Yes   [x] No   Fatal event?  [] Yes   [x] No    Dr. Rodriguez: Reasonable possibility that AE is related to study treatment    [] Yes   [] No          Stephenie Diaz RN   [facility]  [facility]

## 2022-01-12 NOTE — PROGRESS NOTES
SURPASS-CVOT Study [Effect of tirzepatide versus Dulaglutide on Major Cardiovascular Events in Patients with Type 2Diabetes]    Subject contacted by telephone to evaluate/ensure compliance.     Uvaldo queried for adverse events, endpoints and medication changes.     Patient reports abdominal bloating and nausea starting the day after his injections and lasting for 3 days then resolving. Has happened after both injections. Patient reminded that these are typical AE's related to the IP and that they usually resolve as time goes on. Asked to call back and inform me if the symptoms worsen.    Queried regarding compliance bev-educated if necessary. Has taken both injections since last visit.    Will see him in clinic in 2 weeks.      SURPASS-CVOT Study [Effect of tirzepatide versus Dulaglutide on Major Cardiovascular Events in Patients with Type 2 Diabetes]    Diagnosis/event description (diagnosis preferred):  Abdominal Bloating  Start date: 12/30/21  Date resolved:  1/2/22  Intensity: ([x] mild /[]  moderate / [] severe)   Study Medication adjustment:  [] Yes   [x] No  Outcome: resolved  Date study team aware of event: 01/12/22  Was treatment given for this event:  [] Yes   [x] No   If yes, provide details  Serious adverse event?    Yes   [x] No  Special interest event?  [] Yes   [x] No  Endpoint? [] Yes   [x] No   Fatal event?  [] Yes   [x] No    Dr. Rodriguez: Reasonable possibility that AE is related to study treatment    [] Yes   [x] No      SURPASS-CVOT Study [Effect of tirzepatide versus Dulaglutide on Major Cardiovascular Events in Patients with Type 2 Diabetes]    Diagnosis/event description (diagnosis preferred):  Nausea  Start date: 12/30/21   Date resolved: 1/2/22   Intensity: ([x] mild /[]  moderate / [] severe)   Study Medication adjustment:  [] Yes   [x] No  Outcome: resolved   Date study team aware of event: 1/12/22  Was treatment given for this event:  [] Yes   [x] No   If yes, provide details  Serious  adverse event?    Yes   [x] No  Special interest event?  [] Yes   [x] No  Endpoint? [] Yes   [x] No   Fatal event?  [] Yes   [x] No    Dr. Rodriguez: Reasonable possibility that AE is related to study treatment    [] Yes   [x] No      SURPASS-CVOT Study [Effect of tirzepatide versus Dulaglutide on Major Cardiovascular Events in Patients with Type 2 Diabetes]    Diagnosis/event description (diagnosis preferred):  Stomach Bloating  Start date: 1/6/22   Date resolved: 1/9/22  Intensity: ([x] mild /[]  moderate / [] severe)   Study Medication adjustment:  [] Yes   [x] No  Outcome: resolved  Date study team aware of event: 1/12/22  Was treatment given for this event:  [] Yes   [x] No   If yes, provide details  Serious adverse event?    Yes   [x] No  Special interest event?  [] Yes   [x] No  Endpoint? [] Yes   [x] No   Fatal event?  [] Yes   [x] No    Dr. Rodriguez: Reasonable possibility that AE is related to study treatment    [] Yes   [x] No    SURPASS-CVOT Study [Effect of tirzepatide versus Dulaglutide on Major Cardiovascular Events in Patients with Type 2 Diabetes]    Diagnosis/event description (diagnosis preferred):  Nausea  Start date: 1/6/22  Date resolved: 1/9/22   Intensity: ([x] mild /[]  moderate / [] severe)   Study Medication adjustment:  [] Yes   [x] No  Outcome: resolved  Date study team aware of event: 1/12/22  Was treatment given for this event:  [] Yes   [x] No   If yes, provide details  Serious adverse event?    Yes   [x] No  Special interest event?  [] Yes   [x] No  Endpoint? [] Yes   [x] No   Fatal event?  [] Yes   [x] No    Dr. Rodriguez: Reasonable possibility that AE is related to study treatment    [] Yes   [x] No    If the symptoms continue and are temporally related to investigational product might need to consider a relationship.

## 2022-01-26 ENCOUNTER — OFFICE VISIT (OUTPATIENT)
Dept: CARDIOLOGY | Facility: CLINIC | Age: 62
End: 2022-01-26
Payer: COMMERCIAL

## 2022-01-26 VITALS
DIASTOLIC BLOOD PRESSURE: 72 MMHG | HEIGHT: 71 IN | SYSTOLIC BLOOD PRESSURE: 117 MMHG | HEART RATE: 77 BPM | BODY MASS INDEX: 25.56 KG/M2 | WEIGHT: 182.6 LBS

## 2022-01-26 DIAGNOSIS — E11.9 TYPE 2 DIABETES MELLITUS WITHOUT COMPLICATION, WITHOUT LONG-TERM CURRENT USE OF INSULIN (H): Primary | ICD-10-CM

## 2022-01-26 DIAGNOSIS — Z00.6 EXAMINATION OF PARTICIPANT OR CONTROL IN CLINICAL RESEARCH: ICD-10-CM

## 2022-01-26 DIAGNOSIS — I25.10 CORONARY ARTERY DISEASE INVOLVING NATIVE CORONARY ARTERY OF NATIVE HEART WITHOUT ANGINA PECTORIS: ICD-10-CM

## 2022-01-26 PROCEDURE — 99207 PR NO CHARGE-RESEARCH SERVICE: CPT

## 2022-01-26 ASSESSMENT — MIFFLIN-ST. JEOR: SCORE: 1655.4

## 2022-01-26 NOTE — PROGRESS NOTES
"SURPASS-CVOT Study [Effect of tirzepatide versus Dulaglutide on Major Cardiovascular Events in Patients with Type 2 Diabetes]    Subject seen for Visit week 4    Subject queried for adverse events, endpoints and medication changes.   Reports loss of appetite  Nausea starts 3 days after injection and subsides by day 5 for all 4 injections  Weight loss of 10#  Pain in back of head starts 3 days after injection and subsides by day 5 for all 4 injections, encouraged to try Tylenol or Advil for relief.    Encouraged patient to eat small portions of food several times per day to maintain caloric intake and drink more liquids.     Denied having any hypoglycemic events, patient encouraged to monitor and check blood sugars due to loss of appetite.     Adherence/lifestyle reinforcement done     He reports that he also decreased his Metformin to Q 3 days a couple days after starting study med.    Subject drug dispensed, Kit # [id number]      No of pens dispensed at last visit 4  Any Returned medication 0  Date of first dose since last visit: 12/29/2021  Date of last dose taken since last visit: 01/19/2022    Vitals:    01/26/22 0849 01/26/22 0850   BP: 122/81 117/72   BP Location: Left arm Left arm   Patient Position: Sitting Chair   Cuff Size:  Adult Regular   Pulse: 73 77   Weight: 82.8 kg (182 lb 9.6 oz)    Height: 1.803 m (5' 11\")        Next visit schedule for 2 week phone visit.         SURPASS-CVOT Study [Effect of tirzepatide versus Dulaglutide on Major Cardiovascular Events in Patients with Type 2 Diabetes]    Diagnosis/event description (diagnosis preferred):  Loss of Appetite  Start date: 01/01/2022   Date resolved:  3/9/2022  Intensity: ([] mild /[x]  moderate / [] severe)   Study Medication adjustment:  [] Yes   [x] No  Outcome:  recovered  Date study team aware of event: 01/26/2022  Was treatment given for this event:  [] Yes   [x] No   If yes, provide details  Serious adverse event?    Yes   [x] No  Special " interest event?  [] Yes   [x] No  Endpoint? [] Yes   [x] No   Fatal event?  [] Yes   [x] No      Dr. Rodriguez: Reasonable possibility that AE is related to study treatment    [x] Yes   [] No    SURPASS-CVOT Study [Effect of tirzepatide versus Dulaglutide on Major Cardiovascular Events in Patients with Type 2 Diabetes]    Diagnosis/event description (diagnosis preferred):  Nausea  Start date: 1/13/2022   Date resolved: 1/16/2022  Intensity: ([x] mild /[]  moderate / [] severe)   Study Medication adjustment:  [] Yes   [x] No  Outcome: recovered  Date study team aware of event: 01/26/2022  Was treatment given for this event:  [] Yes   [x] No   If yes, provide details  Serious adverse event?    Yes   [x] No  Special interest event?  [] Yes   [x] No  Endpoint? [] Yes   [x] No   Fatal event?  [] Yes   [x] No      Dr. Rodriguez: Reasonable possibility that AE is related to study treatment    [x] Yes   [] No        SURPASS-CVOT Study [Effect of tirzepatide versus Dulaglutide on Major Cardiovascular Events in Patients with Type 2 Diabetes]    Diagnosis/event description (diagnosis preferred):  Nausea  Start date: 1/20/2022  Date resolved: 1/23/2022   Intensity: ([x] mild /[]  moderate / [] severe)   Study Medication adjustment:  [] Yes   [x] No  Outcome: recovered   Date study team aware of event: 1/26/2022  Was treatment given for this event:  [] Yes   [x] No   If yes, provide details  Serious adverse event?    Yes   [x] No  Special interest event?  [] Yes   [x] No  Endpoint? [] Yes   [x] No   Fatal event?  [] Yes   [x] No      Dr. Rodriguez: Reasonable possibility that AE is related to study treatment    [x] Yes   [] No        SURPASS-CVOT Study [Effect of tirzepatide versus Dulaglutide on Major Cardiovascular Events in Patients with Type 2 Diabetes]    Diagnosis/event description (diagnosis preferred):  Abdominal Bloating  Start date: 1/13/2022  Date resolved: 1/16/2022  Intensity: ([x] mild /[]  moderate / [] severe)   Study  Medication adjustment:  [] Yes   [x] No  Outcome:  recovered   Date study team aware of event: 1/26/2022  Was treatment given for this event:  [] Yes   [x] No   If yes, provide details  Serious adverse event?    Yes   [x] No  Special interest event?  [] Yes   [x] No  Endpoint? [] Yes   [x] No   Fatal event?  [] Yes   [x] No      Dr. Rodriguez: Reasonable possibility that AE is related to study treatment    [x] Yes   [] No    SURPASS-CVOT Study [Effect of tirzepatide versus Dulaglutide on Major Cardiovascular Events in Patients with Type 2 Diabetes]    Diagnosis/event description (diagnosis preferred):  Abdominal Bloating  Start date: 1/20/2022  Date resolved: 1/23/2022  Intensity: ([x] mild /[]  moderate / [] severe)   Study Medication adjustment:  [] Yes   [x] No  Outcome: recovered   Date study team aware of event: 1/26/2022  Was treatment given for this event:  [] Yes   [x] No   If yes, provide details  Serious adverse event?    Yes   [x] No  Special interest event?  [] Yes   [x] No  Endpoint? [] Yes   [x] No   Fatal event?  [] Yes   [x] No      Dr. Rodriguez: Reasonable possibility that AE is related to study treatment    [x] Yes   [] No        SURPASS-CVOT Study [Effect of tirzepatide versus Dulaglutide on Major Cardiovascular Events in Patients with Type 2 Diabetes]    Diagnosis/event description (diagnosis preferred):  Weight loss  Start date: 01/01/2022  Date resolved:  3/9/2022  Intensity: ([] mild /[x]  moderate / [] severe)   Study Medication adjustment:  [] Yes   [x] No  Outcome: recovered  Date study team aware of event: 01/26/2022  Was treatment given for this event:  [] Yes   [x] No   If yes, provide details  Serious adverse event?    Yes   [x] No  Special interest event?  [] Yes   [x] No  Endpoint? [] Yes   [x] No   Fatal event?  [] Yes   [x] No      Dr. Rodriguez: Reasonable possibility that AE is related to study treatment    [x] Yes   [] No    SURPASS-CVOT Study [Effect of tirzepatide versus Dulaglutide  on Major Cardiovascular Events in Patients with Type 2 Diabetes]    Diagnosis/event description (diagnosis preferred): Pain in back of head   Start date: 01/01/2022  Date resolved: 1/3/2022  Intensity: ([x] mild /[]  moderate / [] severe)   Study Medication adjustment:  [] Yes   [x] No  Outcome: recovered   Date study team aware of event: 01/26/2022  Was treatment given for this event:  [] Yes   [x] No   If yes, provide details  Serious adverse event?    Yes   [x] No  Special interest event?  [] Yes   [x] No  Endpoint? [] Yes   [x] No   Fatal event?  [] Yes   [x] No      Dr. Rodriguez: Reasonable possibility that AE is related to study treatment    [] Yes   [x] No    Given the temporal relationship of symptoms to injections strongly suspect due to investigational product, not sure headache is.    SURPASS-CVOT Study [Effect of tirzepatide versus Dulaglutide on Major Cardiovascular Events in Patients with Type 2 Diabetes]    Diagnosis/event description (diagnosis preferred):  Pain in back of head  Start date:  1/8/2022  Date resolved: 1/10/2022  Intensity: ([x] mild /[]  moderate / [] severe)   Study Medication adjustment:  [] Yes   [x] No  Outcome: recovered   Date study team aware of event: 1/26/2022  Was treatment given for this event:  [] Yes   [x] No   If yes, provide details  Serious adverse event?    Yes   [x] No  Special interest event?  [] Yes   [x] No  Endpoint? [] Yes   [x] No   Fatal event?  [] Yes   [x] No      Dr. Rodriguez: Reasonable possibility that AE is related to study treatment    [] Yes   [x] No      SURPASS-CVOT Study [Effect of tirzepatide versus Dulaglutide on Major Cardiovascular Events in Patients with Type 2 Diabetes]    Diagnosis/event description (diagnosis preferred):  Pain in back of head  Start date: 1/15/2022  Date resolved: 1/17/2022   Intensity: ([x] mild /[]  moderate / [] severe)   Study Medication adjustment:  [] Yes   [x] No  Outcome:  recovered   Date study team aware of event:  1/26/2022  Was treatment given for this event:  [] Yes   [x] No   If yes, provide details  Serious adverse event?    Yes   [x] No  Special interest event?  [] Yes   [x] No  Endpoint? [] Yes   [x] No   Fatal event?  [] Yes   [x] No      Dr. Rodriguez: Reasonable possibility that AE is related to study treatment    [] Yes   [x] No      SURPASS-CVOT Study [Effect of tirzepatide versus Dulaglutide on Major Cardiovascular Events in Patients with Type 2 Diabetes]    Diagnosis/event description (diagnosis preferred):  Pain in back of head  Start date: 1/22/2022   Date resolved: 2/09/2022   Intensity: ([x] mild /[]  moderate / [] severe)   Study Medication adjustment:  [] Yes   [x] No  Outcome:  recovered   Date study team aware of event: 1/26/2022  Was treatment given for this event:  [] Yes   [x] No   If yes, provide details  Serious adverse event?    Yes   [x] No  Special interest event?  [] Yes   [x] No  Endpoint? [] Yes   [x] No   Fatal event?  [] Yes   [x] No      Dr. Rodriguez: Reasonable possibility that AE is related to study treatment    [] Yes   [x] No            Stephenie Diaz, RN  Clinical Trials Nurse

## 2022-01-26 NOTE — LETTER
"1/26/2022    Michael Méndez MD  [address] [facility] [address]    RE: Abhishek Gomez       Dear Colleague,     I had the pleasure of seeing Abhishek Gomez in the [facility].  SURPASS-CVOT Study [Effect of tirzepatide versus Dulaglutide on Major Cardiovascular Events in Patients with Type 2 Diabetes]    Subject seen for Visit week 4    Subject queried for adverse events, endpoints and medication changes.   Reports loss of appetite  Nausea starts 3 days after injection and subsides by day 5 for all 4 injections  Weight loss of 10#  Pain in back of head starts 3 days after injection and subsides by day 5 for all 4 injections, encouraged to try Tylenol or Advil for relief.    Encouraged patient to eat small portions of food several times per day to maintain caloric intake and drink more liquids.     Denied having any hypoglycemic events, patient encouraged to monitor and check blood sugars due to loss of appetite.     Adherence/lifestyle reinforcement done     He reports that he also decreased his Metformin to Q 3 days a couple days after starting study med.    Subject drug dispensed, Kit # [id number]      No of pens dispensed at last visit 4  Any Returned medication 0  Date of first dose since last visit: 12/29/2021  Date of last dose taken since last visit: 01/19/2022    Vitals:    01/26/22 0849 01/26/22 0850   BP: 122/81 117/72   BP Location: Left arm Left arm   Patient Position: Sitting Chair   Cuff Size:  Adult Regular   Pulse: 73 77   Weight: 82.8 kg (182 lb 9.6 oz)    Height: 1.803 m (5' 11\")        Next visit schedule for 2 week phone visit.       Stephenie Diaz RN  Clinical Trials Nurse      SURPASS-CVOT Study [Effect of tirzepatide versus Dulaglutide on Major Cardiovascular Events in Patients with Type 2 Diabetes]    Diagnosis/event description (diagnosis preferred):  Loss of Appetite  Start date: 01/01/2022   Date resolved:    Intensity: ([] mild /[x]  " moderate / [] severe)   Study Medication adjustment:  [] Yes   [x] No  Outcome:  not recovered  Date study team aware of event: 01/26/2022  Was treatment given for this event:  [] Yes   [x] No   If yes, provide details  Serious adverse event?    Yes   [x] No  Special interest event?  [] Yes   [x] No  Endpoint? [] Yes   [x] No   Fatal event?  [] Yes   [x] No      Dr. Rodriguez: Reasonable possibility that AE is related to study treatment    [] Yes   [] No    SURPASS-CVOT Study [Effect of tirzepatide versus Dulaglutide on Major Cardiovascular Events in Patients with Type 2 Diabetes]    Diagnosis/event description (diagnosis preferred):  Intermittent Nausea  Start date: 1/14/2022   Date resolved:  Intensity: ([x] mild /[]  moderate / [] severe)   Study Medication adjustment:  [] Yes   [x] No  Outcome:not recovered  Date study team aware of event: 01/26/2022  Was treatment given for this event:  [] Yes   [x] No   If yes, provide details  Serious adverse event?    Yes   [x] No  Special interest event?  [] Yes   [x] No  Endpoint? [] Yes   [x] No   Fatal event?  [] Yes   [x] No      Dr. Rodriguez: Reasonable possibility that AE is related to study treatment    [] Yes   [] No        SURPASS-CVOT Study [Effect of tirzepatide versus Dulaglutide on Major Cardiovascular Events in Patients with Type 2 Diabetes]    Diagnosis/event description (diagnosis preferred):  Weight loss  Start date: 01/01/2022  Date resolved:    Intensity: ([] mild /[x]  moderate / [] severe)   Study Medication adjustment:  [] Yes   [x] No  Outcome:not recovered  Date study team aware of event: 01/26/2022  Was treatment given for this event:  [] Yes   [x] No   If yes, provide details  Serious adverse event?    Yes   [x] No  Special interest event?  [] Yes   [x] No  Endpoint? [] Yes   [x] No   Fatal event?  [] Yes   [x] No      Dr. Rodriguez: Reasonable possibility that AE is related to study treatment    [] Yes   [] No    SURPASS-CVOT Study [Effect of  tirzepatide versus Dulaglutide on Major Cardiovascular Events in Patients with Type 2 Diabetes]    Diagnosis/event description (diagnosis preferred): Intermittent Pain in back of head   Start date: 01/01/2022  Date resolved:   Intensity: ([x] mild /[]  moderate / [] severe)   Study Medication adjustment:  [] Yes   [x] No  Outcome:not recovered   Date study team aware of event: 01/26/2022  Was treatment given for this event:  [] Yes   [x] No   If yes, provide details  Serious adverse event?    Yes   [x] No  Special interest event?  [] Yes   [x] No  Endpoint? [] Yes   [x] No   Fatal event?  [] Yes   [x] No      Dr. Rodriguez: Reasonable possibility that AE is related to study treatment    [] Yes   [] No      Thank you for allowing me to participate in the care of your patient.      Sincerely,     Stephenie Diaz RN     [facility]  cc:   No referring provider defined for this encounter.

## 2022-02-01 ENCOUNTER — TRANSFERRED RECORDS (OUTPATIENT)
Dept: MULTI SPECIALTY CLINIC | Facility: CLINIC | Age: 62
End: 2022-02-01
Payer: COMMERCIAL

## 2022-02-01 LAB — RETINOPATHY: NORMAL

## 2022-02-09 ENCOUNTER — VIRTUAL VISIT (OUTPATIENT)
Dept: CARDIOLOGY | Facility: CLINIC | Age: 62
End: 2022-02-09
Payer: COMMERCIAL

## 2022-02-09 DIAGNOSIS — I25.10 CORONARY ARTERY DISEASE INVOLVING NATIVE CORONARY ARTERY OF NATIVE HEART WITHOUT ANGINA PECTORIS: ICD-10-CM

## 2022-02-09 DIAGNOSIS — Z00.6 EXAMINATION OF PARTICIPANT OR CONTROL IN CLINICAL RESEARCH: ICD-10-CM

## 2022-02-09 DIAGNOSIS — E11.9 DIABETES MELLITUS (H): Primary | ICD-10-CM

## 2022-02-09 PROCEDURE — 99207 PR NO CHARGE-RESEARCH SERVICE: CPT

## 2022-02-09 NOTE — PROGRESS NOTES
SURPASS-CVOT Study [Effect of tirzepatide versus Dulaglutide on Major Cardiovascular Events in Patients with Type 2Diabetes]    Subject contacted by telephone to evaluate/ensure compliance.He has taken his IP but is considering stopping the study as his side effects have not improved. He continues with severe nausea, and weight loss, now c/o some dizziness and has had some GERD sx's @ noc and weakness. Discussed with him whether he would consider stopping and re-starting at some point if can better manage his side effects and he stated yes. He will not take his IP today.    Will discuss with Dr Rodriguez. Patient will return for his week 8 visit in 2 weeks and will evaluate at that time whether his symptoms have improved and whether he chooses to restart.     Subject queried for adverse events, endpoints and medication changes.  New AE's GERD, intermittent dizziness and weakness. Is not eating.         Will see him in clinic in 2 weeks.    Stephenie Diaz RN    SURPASS-CVOT Study [Effect of tirzepatide versus Dulaglutide on Major Cardiovascular Events in Patients with Type 2 Diabetes]    Diagnosis/event description (diagnosis preferred):  Nausea (severe per patient)  Start date: 1/27/2022  Date resolved: 1/30/2022   Intensity: ([x] mild /[]  moderate / [] severe)   Study Medication adjustment:  [x] Yes   [] No     Outcome: recovered   Date study team aware of event: 2/9/2022  Was treatment given for this event:  [] Yes   [x] No   If yes, provide details  Serious adverse event?    Yes   [x] No  Special interest event?  [] Yes   [x] No  Endpoint? [] Yes   [x] No   Fatal event?  [] Yes   [x] No      Dr. Rodriguez: Reasonable possibility that AE is related to study treatment    [x] Yes   [] No      SURPASS-CVOT Study [Effect of tirzepatide versus Dulaglutide on Major Cardiovascular Events in Patients with Type 2 Diabetes]    Diagnosis/event description (diagnosis preferred):  Nausea (severe per patient)  Start date:  2/3/2022  Date resolved: 2/6/2022   Intensity: ([x] mild /[]  moderate / [] severe)   Study Medication adjustment:  [] Yes   [x] No   Patient stopping IP today  Outcome:  recovered   Date study team aware of event: 2/9/2022  Was treatment given for this event:  [] Yes   [x] No   If yes, provide details  Serious adverse event?    Yes   [x] No  Special interest event?  [] Yes   [x] No  Endpoint? [] Yes   [x] No   Fatal event?  [] Yes   [x] No      Dr. Rodriguez: Reasonable possibility that AE is related to study treatment    [x] Yes   [] No        SURPASS-CVOT Study [Effect of tirzepatide versus Dulaglutide on Major Cardiovascular Events in Patients with Type 2 Diabetes]    Diagnosis/event description (diagnosis preferred):  Weakness  Start date: 1/28/2022  Date resolved: 3/9/2022  Intensity: ([x] mild /[]  moderate / [] severe)   Study Medication adjustment:  [x] Yes   [] No      Outcome:  recovered  Date study team aware of event: 02/09/2022  Was treatment given for this event:  [] Yes   [x] No   If yes, provide details  Serious adverse event?    Yes   [x] No  Special interest event?  [] Yes   [x] No  Endpoint? [] Yes   [x] No   Fatal event?  [] Yes   [x] No      Dr. Rodriguez: Reasonable possibility that AE is related to study treatment    [x] Yes   [] No        SURPASS-CVOT Study [Effect of tirzepatide versus Dulaglutide on Major Cardiovascular Events in Patients with Type 2 Diabetes]    Diagnosis/event description (diagnosis preferred):  GERD  Start date: 1/28/2022  Date resolved:    Intensity: ([x] mild /[]  moderate / [] severe)   Study Medication adjustment:  [x] Yes   [] No   Outcome:  not recovered  Date study team aware of event: 02/09/2022  Was treatment given for this event:  [x] Yes   [x] No   If yes, provide details Patient started Omeprazole 20 mg every day on 3/15/2022  Serious adverse event?    Yes   [x] No  Special interest event?  [] Yes   [x] No  Endpoint? [] Yes   [x] No   Fatal event?  [] Yes   [x]  No      Dr. Rodriguez: Reasonable possibility that AE is related to study treatment    [] Yes   [x] No        SURPASS-CVOT Study [Effect of tirzepatide versus Dulaglutide on Major Cardiovascular Events in Patients with Type 2 Diabetes]    Diagnosis/event description (diagnosis preferred):  Dizziness  Start date: 01/28/2022  Date resolved: 3/9/2022  Intensity: ([x] mild /[]  moderate / [] severe)   Study Medication adjustment:  [x] Yes   [] No   Outcome:  not recovered   Date study team aware of event: 02/09/2022  Was treatment given for this event:  [] Yes   [x] No   If yes, provide details  Serious adverse event?    Yes   [x] No  Special interest event?  [] Yes   [x] No  Endpoint? [] Yes   [x] No   Fatal event?  [] Yes   [x] No      Dr. Rodriguez: Reasonable possibility that AE is related to study treatment    [x] Yes   [] No

## 2022-02-22 ENCOUNTER — RESEARCH ENCOUNTER (OUTPATIENT)
Dept: CARDIOLOGY | Facility: CLINIC | Age: 62
End: 2022-02-22
Payer: COMMERCIAL

## 2022-02-22 DIAGNOSIS — E11.65 TYPE 2 DIABETES MELLITUS WITH HYPERGLYCEMIA, UNSPECIFIED WHETHER LONG TERM INSULIN USE (H): Primary | ICD-10-CM

## 2022-02-22 DIAGNOSIS — R11.0 NAUSEA: ICD-10-CM

## 2022-02-22 DIAGNOSIS — I25.10 CORONARY ARTERY DISEASE INVOLVING NATIVE CORONARY ARTERY OF NATIVE HEART WITHOUT ANGINA PECTORIS: ICD-10-CM

## 2022-02-22 DIAGNOSIS — Z00.6 EXAMINATION OF PARTICIPANT OR CONTROL IN CLINICAL RESEARCH: ICD-10-CM

## 2022-02-22 RX ORDER — ONDANSETRON 4 MG/1
4 TABLET, FILM COATED ORAL EVERY 12 HOURS PRN
Qty: 30 TABLET | Refills: 0 | Status: SHIPPED | OUTPATIENT
Start: 2022-02-22 | End: 2022-03-15

## 2022-02-22 NOTE — PROGRESS NOTES
I reached out to patient this am to discuss visit tomorrow to restart IP for the Surpass study (off IP since 2/16/22, last dose 2/9/22).. He is reporting that he has chest pain,chest burning while at rest and fast walking lasting  5 to10 minutes and sometimes up to 30 minutes for the past 4 days. He reports to have taken Aspirin yesterday.     I informed him that as he has been off of study IP for 2 weeks this is likely not related to study medication and he needs to see a doctor asap as he has a significant cardiac history. Patient was asked to keep me informed and to let me know if he goes to the doctor or ER.     SURPASS-CVOT Study [Effect of tirzepatide versus Dulaglutide on Major Cardiovascular Events in Patients with Type 2 Diabetes]    Diagnosis/event description (diagnosis preferred):  Chest Pain  Start date: 2/19/2022   Date resolved: 01/20/23  Intensity: ([] mild /[x]  moderate / [] severe)   Study Medication adjustment:  [] Yes   [x] No Has not taken IP for 2 weeks.   Outcome: not recovered  Date study team aware of event: 2/22/2022  Was treatment given for this event:  [x] Yes   [] No   If yes, provide details Patient reports taking 2 ASA, dose unknown on 2/21/2022  Serious adverse event?    Yes   [x] No  Special interest event?  [] Yes   [x] No  Endpoint? [] Yes   [x] No    Fatal event?  [] Yes   [x] No      Dr. Rodriguez: Reasonable possibility that AE is related to study treatment    [] Yes   [x] No

## 2022-02-23 ENCOUNTER — CARE COORDINATION (OUTPATIENT)
Dept: CARDIOLOGY | Facility: CLINIC | Age: 62
End: 2022-02-23

## 2022-02-23 ENCOUNTER — OFFICE VISIT (OUTPATIENT)
Dept: CARDIOLOGY | Facility: CLINIC | Age: 62
End: 2022-02-23
Payer: COMMERCIAL

## 2022-02-23 ENCOUNTER — LAB (OUTPATIENT)
Dept: LAB | Facility: CLINIC | Age: 62
End: 2022-02-23
Payer: COMMERCIAL

## 2022-02-23 VITALS
HEART RATE: 81 BPM | SYSTOLIC BLOOD PRESSURE: 119 MMHG | BODY MASS INDEX: 25.06 KG/M2 | HEIGHT: 71 IN | WEIGHT: 179 LBS | DIASTOLIC BLOOD PRESSURE: 66 MMHG

## 2022-02-23 DIAGNOSIS — I20.89 STABLE ANGINA (H): ICD-10-CM

## 2022-02-23 DIAGNOSIS — I25.10 CORONARY ARTERY DISEASE INVOLVING NATIVE CORONARY ARTERY OF NATIVE HEART WITHOUT ANGINA PECTORIS: Primary | ICD-10-CM

## 2022-02-23 DIAGNOSIS — I10 ESSENTIAL HYPERTENSION, BENIGN: ICD-10-CM

## 2022-02-23 DIAGNOSIS — I25.110 CORONARY ARTERY DISEASE INVOLVING NATIVE CORONARY ARTERY OF NATIVE HEART WITH UNSTABLE ANGINA PECTORIS (H): Primary | ICD-10-CM

## 2022-02-23 DIAGNOSIS — Z11.59 ENCOUNTER FOR SCREENING FOR OTHER VIRAL DISEASES: Primary | ICD-10-CM

## 2022-02-23 DIAGNOSIS — I25.10 CORONARY ARTERY DISEASE INVOLVING NATIVE CORONARY ARTERY OF NATIVE HEART WITHOUT ANGINA PECTORIS: ICD-10-CM

## 2022-02-23 LAB
ANION GAP SERPL CALCULATED.3IONS-SCNC: 1 MMOL/L (ref 3–14)
BASOPHILS # BLD AUTO: 0 10E3/UL (ref 0–0.2)
BASOPHILS NFR BLD AUTO: 0 %
BUN SERPL-MCNC: 14 MG/DL (ref 7–30)
CALCIUM SERPL-MCNC: 8.8 MG/DL (ref 8.5–10.1)
CHLORIDE BLD-SCNC: 109 MMOL/L (ref 94–109)
CO2 SERPL-SCNC: 29 MMOL/L (ref 20–32)
CREAT SERPL-MCNC: 1.38 MG/DL (ref 0.66–1.25)
EOSINOPHIL # BLD AUTO: 0.2 10E3/UL (ref 0–0.7)
EOSINOPHIL NFR BLD AUTO: 2 %
ERYTHROCYTE [DISTWIDTH] IN BLOOD BY AUTOMATED COUNT: 13 % (ref 10–15)
GFR SERPL CREATININE-BSD FRML MDRD: 58 ML/MIN/1.73M2
GLUCOSE BLD-MCNC: 92 MG/DL (ref 70–99)
HCT VFR BLD AUTO: 36.9 % (ref 40–53)
HGB BLD-MCNC: 11.6 G/DL (ref 13.3–17.7)
IMM GRANULOCYTES # BLD: 0 10E3/UL
IMM GRANULOCYTES NFR BLD: 0 %
LYMPHOCYTES # BLD AUTO: 2.1 10E3/UL (ref 0.8–5.3)
LYMPHOCYTES NFR BLD AUTO: 20 %
MCH RBC QN AUTO: 26.1 PG (ref 26.5–33)
MCHC RBC AUTO-ENTMCNC: 31.4 G/DL (ref 31.5–36.5)
MCV RBC AUTO: 83 FL (ref 78–100)
MONOCYTES # BLD AUTO: 0.7 10E3/UL (ref 0–1.3)
MONOCYTES NFR BLD AUTO: 6 %
NEUTROPHILS # BLD AUTO: 7.7 10E3/UL (ref 1.6–8.3)
NEUTROPHILS NFR BLD AUTO: 72 %
NRBC # BLD AUTO: 0 10E3/UL
NRBC BLD AUTO-RTO: 0 /100
PLATELET # BLD AUTO: 275 10E3/UL (ref 150–450)
POTASSIUM BLD-SCNC: 4.5 MMOL/L (ref 3.4–5.3)
RBC # BLD AUTO: 4.45 10E6/UL (ref 4.4–5.9)
SODIUM SERPL-SCNC: 139 MMOL/L (ref 133–144)
WBC # BLD AUTO: 10.7 10E3/UL (ref 4–11)

## 2022-02-23 PROCEDURE — 93000 ELECTROCARDIOGRAM COMPLETE: CPT | Performed by: PHYSICIAN ASSISTANT

## 2022-02-23 PROCEDURE — 80048 BASIC METABOLIC PNL TOTAL CA: CPT | Performed by: PHYSICIAN ASSISTANT

## 2022-02-23 PROCEDURE — 85025 COMPLETE CBC W/AUTO DIFF WBC: CPT | Performed by: PHYSICIAN ASSISTANT

## 2022-02-23 PROCEDURE — 36415 COLL VENOUS BLD VENIPUNCTURE: CPT | Performed by: PHYSICIAN ASSISTANT

## 2022-02-23 PROCEDURE — 99215 OFFICE O/P EST HI 40 MIN: CPT | Performed by: PHYSICIAN ASSISTANT

## 2022-02-23 NOTE — PROGRESS NOTES
Cardiology Clinic Progress Note    Abhishek Gomez MRN# [id number]   YOB: 1960 Age: 61 year old   Primary cardiologist: Dr. Oneil         Assessment and Plan:     In summary, Abhishek Gomez presents today for evaluation of chest pain.    1. Chest pain, concerning for unstable angina. Last episode 2/22, with exertion. EKG benign.  2. CAD, s/p PCI to  of ISR prior prox/mid LAD and D1 lesion with 4 stents on 8/17/21.  Residual mid RCA lesion which is hemodynamically insignificant at the time, And a 70% ostial moderately sized ramus lesion. On DAPT with Brilinta.   3. Hypertension.  Well-controlled.  4. Hyperlipidemia. Well-controlled.   5. Diabetes mellitus.  Well-controlled.  6. CKD-III, with a stable creatinine of 1.2-1.3 (10/2021).    Plan:  - Coronary angiogram with PCI recommended (reviewed with Dr. Oneil). Scheduled for 3/1/22 with Dr. Sarah.   Risks and benefits of left heart catheterization and coronary angiogram were discussed with the patient in detail. 0.1-0.3% (for diagnostic angio) and 1-2% (for PCI)  risk of stroke, MI, death, emergent bypass for diagnostic angio, risk of contrast induced allergic reaction, renal dysfunction, vascular complications were discussed. Patient understands and wishes to proceed.  - BMP and CBC today.    - Continue DAPT, statin, beta blocker.   - HOLD Metformin post-procedure until BMP can be obtained 48 hours post-procedure.   - Advised to avoid exertion until procedure. Work letter written.  - ED if he develops worsening or resting chest discomfort in the interim. Has SL NTG at home, instructed on use.         History of Presenting Illness:      Abhishek Gomez is a pleasant 61 year old patient who presents today for evaluation of chest pain.    He has known coronary artery disease, diabetes, hypertension and hyperlipidemia.  He was evaluated here in 2019 with unstable angina.  A stress test prior to that evaluation was very  abnormal.  He then proceeded to have a coronary angiogram, which revealed high-grade proximal LAD stenosis.  The LAD was stented with a 2.5 mm stent.  The first diagonal was also ballooned at that time.  He developed atypical chest discomfort in late 2019, and a stress test performed at that time showed no inducible ischemia.     In the summer of 2021, he re-developed typical exertional angina.  Stress test showed anteroseptal and apical wall ischemia. Coronary angiogram was subsequently performed on 07/26/2021. This showed an occluded proximal LAD at the site of the prior stent with left-to-left and right-to-left collaterals.  The circumflex and rami were small with moderate disease.  The mid RCA had a focal 50% stenosis. He was referred to CV surgery, however declined bypass, strongly preferring complex PCI. He returned to the cath lab with Dr. Sarah in August, where he underwent successful IVUS-guided  PCI of the ostial, proximal-mid LAD and D1 with MICHELLE x4. There was a hemodynamically insignificant mid RCA lesion (IFR 0.95), and a 70% ostial moderately sized ramus lesion. He was placed on DAPT with Brilinta. TTE from 8/3/21 showed normal biventricular function.    After that, he had done quite well from a cardiac standpoint.  At his last visit with Dr. Oneil in October 2021, his blood pressure was marginal, and therefore his isosorbide was discontinued. He was recently enrolled in the surpass trial through our clinic, however was disenrolled 2 weeks ago due to development of GI symptoms which is a known side effect of the study drug.  When our nurse called to follow-up with him on that symptom, he reported development of chest pain over the past several days.  He was advised to come in to clinic for further evaluation.    Adrien tells me that he was having chest tightness as well as stomach pain, nausea, and gas while on the study drug, and those symptoms resolved after the med was stopped. However he recently  "re-developed chest tightness. The first time he had recurrent chest pain was this past weekend. 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 A couple days later, he again had pain after eating dinner after a long work day, that resolved with using an old Imdur tablet he had at home, as well as an extra Lopressor tablet. Yesterday, he was again pulling pallettes and had chest pressure. He took two aspirin and rested, and it resolved after about 20 minutes. He denies associated symptoms - denies shortness of breath, PND, orthopnea, edema, palpitations. He is compliant with DAPT. His most recent lipid profile was in October, showing an LDL of 49, HDL 34, triglycerides 125, Total 108. Renal function in October was stable with a creat at 1.24. EKG today shows normal ST segments. BP is well-controlled.          Review of Systems:     12-pt ROS is negative except for as noted in the HPI.          Physical Exam:     Vitals: /66   Pulse 81   Ht 1.803 m (5' 10.98\")   Wt 81.2 kg (179 lb)   BMI 24.98 kg/m    Wt Readings from Last 10 Encounters:   02/23/22 81.2 kg (179 lb)   01/26/22 82.8 kg (182 lb 9.6 oz)   12/29/21 87.1 kg (192 lb)   12/10/21 89 kg (196 lb 3.4 oz)   12/10/21 89 kg (196 lb 3.2 oz)   10/27/21 85.3 kg (188 lb)   08/31/21 86.9 kg (191 lb 8 oz)   08/18/21 87.5 kg (193 lb)   08/13/21 87.5 kg (192 lb 14.4 oz)   08/03/21 88.5 kg (195 lb)       Constitutional:  Patient is pleasant, alert, cooperative, and in NAD.  HEENT:  NCAT. PERRLA. EOM's intact.   Neck:  CVP appears normal. No carotid bruits.   Pulmonary: Normal respiratory effort. CTAB.   Cardiac: RRR, normal S1/S2, no S3/S4, no murmur or rub.   Abdomen:  Non-tender abdomen, no hepatosplenomegaly appreciated.   Vascular: Pulses in the upper and lower extremities are 2+ and equal bilaterally.  Extremities: No edema, erythema, cyanosis or tenderness appreciated.  Skin:  No rashes or " lesions appreciated.   Neurological:  No gross motor or sensory deficits.   Psych: Appropriate affect.        Data:     Labs reviewed:  Recent Labs   Lab Test 10/25/21  0851 08/17/21  1155 03/02/21  1107 05/13/20  0803 12/26/19  0732 05/09/19  1240 10/31/18  0825   LDL 49 27 46   < >  --    < > 38   HDL 34* 28* 33*   < >  --    < > 37*   NHDL 74 62 75   < >  --    < > 58   CHOL 108 90 108   < >  --    < > 95   TRIG 125 174* 145   < >  --    < > 101   TSH  --   --  6.06*  --  6.39*  --  4.83*    < > = values in this interval not displayed.       Lab Results   Component Value Date    WBC 10.7 02/23/2022    WBC 7.2 05/13/2019    RBC 4.45 02/23/2022    RBC 4.91 05/13/2019    HGB 11.6 (L) 02/23/2022    HGB 13.4 05/13/2019    HCT 36.9 (L) 02/23/2022    HCT 38.6 (L) 05/13/2019    MCV 83 02/23/2022    MCV 79 05/13/2019    MCH 26.1 (L) 02/23/2022    MCH 27.3 05/13/2019    MCHC 31.4 (L) 02/23/2022    MCHC 34.7 05/13/2019    RDW 13.0 02/23/2022    RDW 12.7 05/13/2019     02/23/2022     05/13/2019       Lab Results   Component Value Date     10/25/2021     03/02/2021    POTASSIUM 3.8 10/25/2021    POTASSIUM 4.1 03/02/2021    CHLORIDE 110 (H) 10/25/2021    CHLORIDE 107 03/02/2021    CO2 24 10/25/2021    CO2 26 03/02/2021    ANIONGAP 5 10/25/2021    ANIONGAP 6 03/02/2021     (H) 10/25/2021    GLC 90 03/02/2021    BUN 14 10/25/2021    BUN 10 03/02/2021    CR 1.24 10/25/2021    CR 1.09 03/02/2021    GFRESTIMATED 62 10/25/2021    GFRESTIMATED 73 03/02/2021    GFRESTBLACK 85 03/02/2021    KRIS 8.5 10/25/2021    KRIS 9.3 03/02/2021      Lab Results   Component Value Date    AST 15 07/26/2021    AST 26 03/02/2021    ALT 26 10/25/2021    ALT 35 03/02/2021       Lab Results   Component Value Date    A1C 7.1 (H) 07/26/2021    A1C 7.6 (H) 03/02/2021       Lab Results   Component Value Date    INR 1.13 08/17/2021    INR 1.03 07/26/2021    INR 1.01 05/09/2019           Problem List:     Patient Active Problem  List   Diagnosis     Hyperlipidemia LDL goal <70     Essential hypertension, benign     Type 2 diabetes mellitus without complication, without long-term current use of insulin (H)     Unstable angina (H)     NSTEMI (non-ST elevated myocardial infarction) (H)     Coronary artery disease involving native coronary artery of native heart without angina pectoris     Abnormal stress test     Stable angina (H)           Medications:     Current Outpatient Medications   Medication Sig Dispense Refill     aspirin 81 MG EC tablet Take 1 tablet (81 mg) by mouth daily 90 tablet 3     atorvastatin (LIPITOR) 20 MG tablet Take 1 tablet (20 mg) by mouth daily 90 tablet 3     glimepiride (AMARYL) 2 MG tablet Take 1 tablet (2 mg) by mouth every morning (before breakfast) 90 tablet 3     lisinopril (ZESTRIL) 20 MG tablet Take 1 tablet (20 mg) by mouth daily 90 tablet 3     metFORMIN (GLUCOPHAGE) 1000 MG tablet Take 1 tablet (1,000 mg) by mouth 2 times daily (with meals) (Patient taking differently: Take 1,000-2,000 mg by mouth every morning Patient reports that he is taking 1000mg Q3days since starting new study medication.) 180 tablet 3     metoprolol tartrate (LOPRESSOR) 25 MG tablet Take 1 tablet (25 mg) by mouth 2 times daily 180 tablet 3     nitroGLYcerin (NITROSTAT) 0.4 MG sublingual tablet For chest pain place 1 tablet under the tongue every 5 minutes for up to 3 doses. If symptoms persist 5 minutes after 2nd dose call 911. 30 tablet 0     ticagrelor (BRILINTA) 90 MG tablet Take 1 tablet (90 mg) by mouth every 12 hours 90 tablet 3     cetirizine (ZYRTEC) 10 MG tablet Take 1 tablet (10 mg) by mouth every evening (Patient not taking: Reported on 12/29/2021) 10 tablet 0     ondansetron (ZOFRAN) 4 MG tablet Take 1 tablet (4 mg) by mouth every 12 hours as needed for nausea or vomiting (Patient not taking: Reported on 2/23/2022) 30 tablet 0           Past Medical History:     Past Medical History:   Diagnosis Date     Abnormal stress  test 07/14/2021     Coronary artery disease involving native coronary artery of native heart without angina pectoris 07/14/2021     Essential hypertension, benign 05/18/2016     Hyperlipidemia LDL goal <70 03/16/2015     NSTEMI (non-ST elevated myocardial infarction) (H) 05/09/2019    s/p MICHELLE to pLAD     Status post coronary angiogram 07/26/2021    Complex Multivessel CAD. CABG cosult     Type 2 diabetes mellitus without complication, without long-term current use of insulin (H) 07/06/2017     Unstable angina (H) 05/09/2019     Past Surgical History:   Procedure Laterality Date     CV CORONARY ANGIOGRAM N/A 7/26/2021    Procedure: Coronary Angiogram;  Surgeon: Sylvester Westbrook MD;  Location: [facility] CARDIAC CATH LAB     CV HEART CATHETERIZATION WITH POSSIBLE INTERVENTION N/A 5/9/2019    Procedure: Coronary Angiogram;  Surgeon: Jose Enrique Stanley MD;  Location: [facility] CARDIAC CATH LAB     CV HEART CATHETERIZATION WITH POSSIBLE INTERVENTION N/A 8/17/2021    Procedure: Coronary Angiogram;  Surgeon: Sanchez Sarah MD;  Location: [facility] CARDIAC CATH LAB     CV INSTANTANEOUS WAVE-FREE RATIO N/A 8/17/2021    Procedure: Instantaneous Wave-Free Ratio;  Surgeon: Sanchez Sarah MD;  Location: [facility] CARDIAC CATH LAB     CV INTRAVASULAR ULTRASOUND N/A 8/17/2021    Procedure: Intravascular Ultrasound;  Surgeon: Sanchez Sarah MD;  Location: [facility] CARDIAC CATH LAB     CV LEFT HEART CATH N/A 7/26/2021    Procedure: Left Heart Cath;  Surgeon: Sylvester Westbrook MD;  Location: [facility] CARDIAC CATH LAB     CV PCI ANGIOPLASTY N/A 5/9/2019    Procedure: PCI Angioplasty;  Surgeon: Jose Enrique Stanley MD;  Location: [facility] CARDIAC CATH LAB     CV PCI CHRONIC TOTAL OCCLUSION N/A 8/17/2021    Procedure: Percutaneous Coronary Intervention of Chronic Total Occlusion;  Surgeon: Sanchez Sarah MD;  Location: [facility] CARDIAC CATH LAB     Family History   Problem Relation Age of Onset     Diabetes  Paternal Grandmother      Diabetes Nephew      Social History     Socioeconomic History     Marital status:      Spouse name: Not on file     Number of children: Not on file     Years of education: Not on file     Highest education level: Not on file   Occupational History     Not on file   Tobacco Use     Smoking status: Never Smoker     Smokeless tobacco: Never Used   Substance and Sexual Activity     Alcohol use: No     Drug use: No     Sexual activity: Yes     Partners: Female   Other Topics Concern     Parent/sibling w/ CABG, MI or angioplasty before 65F 55M? Not Asked   Social History Narrative     Not on file     Social Determinants of Health     Financial Resource Strain: Not on file   Food Insecurity: Not on file   Transportation Needs: Not on file   Physical Activity: Not on file   Stress: Not on file   Social Connections: Not on file   Intimate Partner Violence: Not on file   Housing Stability: Not on file           Allergies:   Patient has no known allergies.      Leila Curran PA-C  [facility] - Heart Clinic  Pager: [phone]    Today's clinic visit entailed:  Review of the result(s) of each unique test - EKG  I spent a total of 60 minutes on the day of the visit.   Time spent doing chart review, history and exam, documentation and further activities per the note  Provider  Link to [facility] Help Grid     The level of medical decision making during this visit was of high complexity.

## 2022-02-23 NOTE — LETTER
2/23/2022    Michael Méndez MD  [address] [facility] [address]    RE: Abhishek Gomez       Dear Colleague,     I had the pleasure of seeing Abhishek Gomez in the [facility].    Cardiology Clinic Progress Note    Abhishek Gomez MRN# [id number]   YOB: 1960 Age: 61 year old   Primary cardiologist: Dr. Oneil         Assessment and Plan:     In summary, Abhishek Gomez presents today for evaluation of chest pain.    1. Chest pain, concerning for unstable angina. Last episode 2/22, with exertion. EKG benign.  2. CAD, s/p PCI to  of ISR prior prox/mid LAD and D1 lesion with 4 stents on 8/17/21.  Residual mid RCA lesion which is hemodynamically insignificant at the time, And a 70% ostial moderately sized ramus lesion. On DAPT with Brilinta.   3. Hypertension.  Well-controlled.  4. Hyperlipidemia. Well-controlled.   5. Diabetes mellitus.  Well-controlled.  6. CKD-III, with a stable creatinine of 1.2-1.3 (10/2021).    Plan:  - Coronary angiogram with PCI recommended (reviewed with Dr. Oneil). Scheduled for 3/1/22 with Dr. Sarah.   Risks and benefits of left heart catheterization and coronary angiogram were discussed with the patient in detail. 0.1-0.3% (for diagnostic angio) and 1-2% (for PCI)  risk of stroke, MI, death, emergent bypass for diagnostic angio, risk of contrast induced allergic reaction, renal dysfunction, vascular complications were discussed. Patient understands and wishes to proceed.  - BMP and CBC today.    - Continue DAPT, statin, beta blocker.   - HOLD Metformin post-procedure until BMP can be obtained 48 hours post-procedure.   - Advised to avoid exertion until procedure. Work letter written.  - ED if he develops worsening or resting chest discomfort in the interim. Has SL NTG at home, instructed on use.         History of Presenting Illness:      Abhishek Gomez is a pleasant 61 year old patient who  presents today for evaluation of chest pain.    He has known coronary artery disease, diabetes, hypertension and hyperlipidemia.  He was evaluated here in 2019 with unstable angina.  A stress test prior to that evaluation was very abnormal.  He then proceeded to have a coronary angiogram, which revealed high-grade proximal LAD stenosis.  The LAD was stented with a 2.5 mm stent.  The first diagonal was also ballooned at that time.  He developed atypical chest discomfort in late 2019, and a stress test performed at that time showed no inducible ischemia.     In the summer of 2021, he re-developed typical exertional angina.  Stress test showed anteroseptal and apical wall ischemia. Coronary angiogram was subsequently performed on 07/26/2021. This showed an occluded proximal LAD at the site of the prior stent with left-to-left and right-to-left collaterals.  The circumflex and rami were small with moderate disease.  The mid RCA had a focal 50% stenosis. He was referred to CV surgery, however declined bypass, strongly preferring complex PCI. He returned to the cath lab with Dr. Sarah in August, where he underwent successful IVUS-guided  PCI of the ostial, proximal-mid LAD and D1 with MICHELLE x4. There was a hemodynamically insignificant mid RCA lesion (IFR 0.95), and a 70% ostial moderately sized ramus lesion. He was placed on DAPT with Brilinta. TTE from 8/3/21 showed normal biventricular function.    After that, he had done quite well from a cardiac standpoint.  At his last visit with Dr. Oneil in October 2021, his blood pressure was marginal, and therefore his isosorbide was discontinued. He was recently enrolled in the surpass trial through our clinic, however was disenrolled 2 weeks ago due to development of GI symptoms which is a known side effect of the study drug.  When our nurse called to follow-up with him on that symptom, he reported development of chest pain over the past several days.  He was advised to come  "in to clinic for further evaluation.    Adrien tells me that he was having chest tightness as well as stomach pain, nausea, and gas while on the study drug, and those symptoms resolved after the med was stopped. However he recently re-developed chest tightness. The first time he had recurrent chest pain was this past weekend. 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 A couple days later, he again had pain after eating dinner after a long work day, that resolved with using an old Imdur tablet he had at home, as well as an extra Lopressor tablet. Yesterday, he was again pulling pallettes and had chest pressure. He took two aspirin and rested, and it resolved after about 20 minutes. He denies associated symptoms - denies shortness of breath, PND, orthopnea, edema, palpitations. He is compliant with DAPT. His most recent lipid profile was in October, showing an LDL of 49, HDL 34, triglycerides 125, Total 108. Renal function in October was stable with a creat at 1.24. EKG today shows normal ST segments. BP is well-controlled.          Review of Systems:     12-pt ROS is negative except for as noted in the HPI.          Physical Exam:     Vitals: /66   Pulse 81   Ht 1.803 m (5' 10.98\")   Wt 81.2 kg (179 lb)   BMI 24.98 kg/m    Wt Readings from Last 10 Encounters:   02/23/22 81.2 kg (179 lb)   01/26/22 82.8 kg (182 lb 9.6 oz)   12/29/21 87.1 kg (192 lb)   12/10/21 89 kg (196 lb 3.4 oz)   12/10/21 89 kg (196 lb 3.2 oz)   10/27/21 85.3 kg (188 lb)   08/31/21 86.9 kg (191 lb 8 oz)   08/18/21 87.5 kg (193 lb)   08/13/21 87.5 kg (192 lb 14.4 oz)   08/03/21 88.5 kg (195 lb)       Constitutional:  Patient is pleasant, alert, cooperative, and in NAD.  HEENT:  NCAT. PERRLA. EOM's intact.   Neck:  CVP appears normal. No carotid bruits.   Pulmonary: Normal respiratory effort. CTAB.   Cardiac: RRR, normal S1/S2, no S3/S4, no murmur or rub.   Abdomen:  " Non-tender abdomen, no hepatosplenomegaly appreciated.   Vascular: Pulses in the upper and lower extremities are 2+ and equal bilaterally.  Extremities: No edema, erythema, cyanosis or tenderness appreciated.  Skin:  No rashes or lesions appreciated.   Neurological:  No gross motor or sensory deficits.   Psych: Appropriate affect.        Data:     Labs reviewed:  Recent Labs   Lab Test 10/25/21  0851 08/17/21  1155 03/02/21  1107 05/13/20  0803 12/26/19  0732 05/09/19  1240 10/31/18  0825   LDL 49 27 46   < >  --    < > 38   HDL 34* 28* 33*   < >  --    < > 37*   NHDL 74 62 75   < >  --    < > 58   CHOL 108 90 108   < >  --    < > 95   TRIG 125 174* 145   < >  --    < > 101   TSH  --   --  6.06*  --  6.39*  --  4.83*    < > = values in this interval not displayed.       Lab Results   Component Value Date    WBC 10.7 02/23/2022    WBC 7.2 05/13/2019    RBC 4.45 02/23/2022    RBC 4.91 05/13/2019    HGB 11.6 (L) 02/23/2022    HGB 13.4 05/13/2019    HCT 36.9 (L) 02/23/2022    HCT 38.6 (L) 05/13/2019    MCV 83 02/23/2022    MCV 79 05/13/2019    MCH 26.1 (L) 02/23/2022    MCH 27.3 05/13/2019    MCHC 31.4 (L) 02/23/2022    MCHC 34.7 05/13/2019    RDW 13.0 02/23/2022    RDW 12.7 05/13/2019     02/23/2022     05/13/2019       Lab Results   Component Value Date     10/25/2021     03/02/2021    POTASSIUM 3.8 10/25/2021    POTASSIUM 4.1 03/02/2021    CHLORIDE 110 (H) 10/25/2021    CHLORIDE 107 03/02/2021    CO2 24 10/25/2021    CO2 26 03/02/2021    ANIONGAP 5 10/25/2021    ANIONGAP 6 03/02/2021     (H) 10/25/2021    GLC 90 03/02/2021    BUN 14 10/25/2021    BUN 10 03/02/2021    CR 1.24 10/25/2021    CR 1.09 03/02/2021    GFRESTIMATED 62 10/25/2021    GFRESTIMATED 73 03/02/2021    GFRESTBLACK 85 03/02/2021    KRIS 8.5 10/25/2021    KRIS 9.3 03/02/2021      Lab Results   Component Value Date    AST 15 07/26/2021    AST 26 03/02/2021    ALT 26 10/25/2021    ALT 35 03/02/2021       Lab Results    Component Value Date    A1C 7.1 (H) 07/26/2021    A1C 7.6 (H) 03/02/2021       Lab Results   Component Value Date    INR 1.13 08/17/2021    INR 1.03 07/26/2021    INR 1.01 05/09/2019           Problem List:     Patient Active Problem List   Diagnosis     Hyperlipidemia LDL goal <70     Essential hypertension, benign     Type 2 diabetes mellitus without complication, without long-term current use of insulin (H)     Unstable angina (H)     NSTEMI (non-ST elevated myocardial infarction) (H)     Coronary artery disease involving native coronary artery of native heart without angina pectoris     Abnormal stress test     Stable angina (H)           Medications:     Current Outpatient Medications   Medication Sig Dispense Refill     aspirin 81 MG EC tablet Take 1 tablet (81 mg) by mouth daily 90 tablet 3     atorvastatin (LIPITOR) 20 MG tablet Take 1 tablet (20 mg) by mouth daily 90 tablet 3     glimepiride (AMARYL) 2 MG tablet Take 1 tablet (2 mg) by mouth every morning (before breakfast) 90 tablet 3     lisinopril (ZESTRIL) 20 MG tablet Take 1 tablet (20 mg) by mouth daily 90 tablet 3     metFORMIN (GLUCOPHAGE) 1000 MG tablet Take 1 tablet (1,000 mg) by mouth 2 times daily (with meals) (Patient taking differently: Take 1,000-2,000 mg by mouth every morning Patient reports that he is taking 1000mg Q3days since starting new study medication.) 180 tablet 3     metoprolol tartrate (LOPRESSOR) 25 MG tablet Take 1 tablet (25 mg) by mouth 2 times daily 180 tablet 3     nitroGLYcerin (NITROSTAT) 0.4 MG sublingual tablet For chest pain place 1 tablet under the tongue every 5 minutes for up to 3 doses. If symptoms persist 5 minutes after 2nd dose call 911. 30 tablet 0     ticagrelor (BRILINTA) 90 MG tablet Take 1 tablet (90 mg) by mouth every 12 hours 90 tablet 3     cetirizine (ZYRTEC) 10 MG tablet Take 1 tablet (10 mg) by mouth every evening (Patient not taking: Reported on 12/29/2021) 10 tablet 0     ondansetron (ZOFRAN) 4 MG  tablet Take 1 tablet (4 mg) by mouth every 12 hours as needed for nausea or vomiting (Patient not taking: Reported on 2/23/2022) 30 tablet 0           Past Medical History:     Past Medical History:   Diagnosis Date     Abnormal stress test 07/14/2021     Coronary artery disease involving native coronary artery of native heart without angina pectoris 07/14/2021     Essential hypertension, benign 05/18/2016     Hyperlipidemia LDL goal <70 03/16/2015     NSTEMI (non-ST elevated myocardial infarction) (H) 05/09/2019    s/p MICHELLE to pLAD     Status post coronary angiogram 07/26/2021    Complex Multivessel CAD. CABG cosult     Type 2 diabetes mellitus without complication, without long-term current use of insulin (H) 07/06/2017     Unstable angina (H) 05/09/2019     Past Surgical History:   Procedure Laterality Date     CV CORONARY ANGIOGRAM N/A 7/26/2021    Procedure: Coronary Angiogram;  Surgeon: Sylvester Westbrook MD;  Location: [facility] CARDIAC CATH LAB     CV HEART CATHETERIZATION WITH POSSIBLE INTERVENTION N/A 5/9/2019    Procedure: Coronary Angiogram;  Surgeon: Jose Enrique Stanley MD;  Location: [facility] CARDIAC CATH LAB     CV HEART CATHETERIZATION WITH POSSIBLE INTERVENTION N/A 8/17/2021    Procedure: Coronary Angiogram;  Surgeon: Sanchez Sarah MD;  Location: [facility] CARDIAC CATH LAB     CV INSTANTANEOUS WAVE-FREE RATIO N/A 8/17/2021    Procedure: Instantaneous Wave-Free Ratio;  Surgeon: Sanchez Sarah MD;  Location: [facility] CARDIAC CATH LAB     CV INTRAVASULAR ULTRASOUND N/A 8/17/2021    Procedure: Intravascular Ultrasound;  Surgeon: Sanchez Sarah MD;  Location: [facility] CARDIAC CATH LAB     CV LEFT HEART CATH N/A 7/26/2021    Procedure: Left Heart Cath;  Surgeon: Sylvester Westbrook MD;  Location: [facility] CARDIAC CATH LAB     CV PCI ANGIOPLASTY N/A 5/9/2019    Procedure: PCI Angioplasty;  Surgeon: Jose Enrique Stanley MD;  Location: [facility] CARDIAC CATH LAB     CV PCI CHRONIC TOTAL  OCCLUSION N/A 8/17/2021    Procedure: Percutaneous Coronary Intervention of Chronic Total Occlusion;  Surgeon: Sanchez Sarah MD;  Location:  HEART CARDIAC CATH LAB     Family History   Problem Relation Age of Onset     Diabetes Paternal Grandmother      Diabetes Nephew      Social History     Socioeconomic History     Marital status:      Spouse name: Not on file     Number of children: Not on file     Years of education: Not on file     Highest education level: Not on file   Occupational History     Not on file   Tobacco Use     Smoking status: Never Smoker     Smokeless tobacco: Never Used   Substance and Sexual Activity     Alcohol use: No     Drug use: No     Sexual activity: Yes     Partners: Female   Other Topics Concern     Parent/sibling w/ CABG, MI or angioplasty before 65F 55M? Not Asked   Social History Narrative     Not on file     Social Determinants of Health     Financial Resource Strain: Not on file   Food Insecurity: Not on file   Transportation Needs: Not on file   Physical Activity: Not on file   Stress: Not on file   Social Connections: Not on file   Intimate Partner Violence: Not on file   Housing Stability: Not on file           Allergies:   Patient has no known allergies.      ARMOND Galarza [facility]  Pager: [phone]    Today's clinic visit entailed:  Review of the result(s) of each unique test - EKG  I spent a total of 60 minutes on the day of the visit.   Time spent doing chart review, history and exam, documentation and further activities per the note  Provider  Link to [facility] Help Grid     The level of medical decision making during this visit was of high complexity.      Thank you for allowing me to participate in the care of your patient.      Sincerely,     ARMOND Galarza [facility]  cc:   No referring provider defined for this encounter.

## 2022-02-23 NOTE — PROGRESS NOTES
Mark Oneil MD  [facility] Heart Team 7  Cc: Leila Curran PA-C; Art Whiting NP  Team 7, could you please see how this patient is doing and whether we should push him to go to the ER. If he won't go to the ER, please set up a visit with Irwin (who saw him most recently) or Sanchez Segovia, or myself to get set up for cardiac cath. Thanks. EE

## 2022-02-23 NOTE — LETTER
February 23, 2022    RE:  Abhishek Gomez 1960  [address] [facility] [address]          To whom it may concern:    This letter is to certify that Abhishek Gomez must be off work from 2/23/22 - 3/2/22. On 3/3/22, he is able to return to work with the following restrictions: Avoid heavy lifting > 20 lbs. On 3/8/22, he is able to return to work without restrictions.     Should you have any questions, please call [phone].    Sincerely,        Leila Curran PA-C  [facility]

## 2022-02-23 NOTE — PATIENT INSTRUCTIONS
Today's Plan:   -Please arrange a coronary angiogram sometime in the next week.  -Hold Metformin the day of and following the procedure, you will need your blood drawn 2 days after the procedure prior to restarting the medication.  -Avoid exertion until your procedure.  If you develop worsening pain or pain at rest, take a nitroglycerin, and go to the ER.     If you have questions or concerns please call my nurse team at [phone].   Scheduling phone number: [phone]  For after hours urgent concerns call [phone] option 2.   Reminder: Please bring in all current medications, over the counter supplements and vitamin bottles to your next appointment.    It was a pleasure seeing you today!     Leila Curran PA-C

## 2022-02-23 NOTE — PROGRESS NOTES
I called pt to discuss his symptoms. Pt said he had around 2-3 episodes of chest pain/burning sensation with activity last week, and had one twenty minute episode yesterday. He is not having any active chest pain.     Leila Curran PA-C has been updated and can see pt at 12:30 today in our [place] clinic. She requested that pt have CBC and BMP today. I called pt back and scheduled him for 12:15 lab and 12:30 visit. Nataliia VYAS February 23, 2022, 11:46 AM

## 2022-02-25 ENCOUNTER — CARE COORDINATION (OUTPATIENT)
Dept: CARDIOLOGY | Facility: CLINIC | Age: 62
End: 2022-02-25
Payer: COMMERCIAL

## 2022-02-25 DIAGNOSIS — I20.89 STABLE ANGINA (H): ICD-10-CM

## 2022-02-25 DIAGNOSIS — I25.10 CORONARY ARTERY DISEASE INVOLVING NATIVE CORONARY ARTERY OF NATIVE HEART WITHOUT ANGINA PECTORIS: Primary | ICD-10-CM

## 2022-02-25 RX ORDER — LIDOCAINE 40 MG/G
CREAM TOPICAL
Status: CANCELLED | OUTPATIENT
Start: 2022-02-25

## 2022-02-25 RX ORDER — POTASSIUM CHLORIDE 1500 MG/1
20 TABLET, EXTENDED RELEASE ORAL
Status: CANCELLED | OUTPATIENT
Start: 2022-02-25

## 2022-02-25 RX ORDER — SODIUM CHLORIDE 9 MG/ML
INJECTION, SOLUTION INTRAVENOUS CONTINUOUS
Status: CANCELLED | OUTPATIENT
Start: 2022-02-25

## 2022-02-25 RX ORDER — ASPIRIN 325 MG
325 TABLET ORAL ONCE
Status: CANCELLED | OUTPATIENT
Start: 2022-02-25 | End: 2022-02-25

## 2022-02-25 RX ORDER — ASPIRIN 81 MG/1
243 TABLET, CHEWABLE ORAL ONCE
Status: CANCELLED | OUTPATIENT
Start: 2022-02-25

## 2022-02-25 NOTE — PROGRESS NOTES
Pt is scheduled for coronary angiogram on 3/1/22 @ 0830, with a check in time of 0630 at [facility].     I called pt and reviewed coronary angiogram prep instructions with him. Pt will not eat or drink anything after midnight prior to morning of procedure except for AM medications with a small sip of water. He will hold metformin morning of procedure and two days post procedure. Pt has bmp lab on 3/3/22. I told him he will be notified of results and told if it's ok to resume metformin. Pt to hold glimepiride morning of procedure. No known allergy to contrast dye. He has someone to drive him home and stay with him 24 hours post procedure. He will have his COVID test on 2/26/22. Nataliia VYAS February 25, 2022, 4:15 PM

## 2022-02-26 ENCOUNTER — LAB (OUTPATIENT)
Dept: URGENT CARE | Facility: URGENT CARE | Age: 62
End: 2022-02-26
Payer: COMMERCIAL

## 2022-02-26 DIAGNOSIS — I25.10 CORONARY ARTERY DISEASE INVOLVING NATIVE CORONARY ARTERY OF NATIVE HEART WITHOUT ANGINA PECTORIS: ICD-10-CM

## 2022-02-26 LAB — SARS-COV-2 RNA RESP QL NAA+PROBE: NEGATIVE

## 2022-02-26 PROCEDURE — U0003 INFECTIOUS AGENT DETECTION BY NUCLEIC ACID (DNA OR RNA); SEVERE ACUTE RESPIRATORY SYNDROME CORONAVIRUS 2 (SARS-COV-2) (CORONAVIRUS DISEASE [COVID-19]), AMPLIFIED PROBE TECHNIQUE, MAKING USE OF HIGH THROUGHPUT TECHNOLOGIES AS DESCRIBED BY CMS-2020-01-R: HCPCS

## 2022-02-26 PROCEDURE — U0005 INFEC AGEN DETEC AMPLI PROBE: HCPCS

## 2022-02-28 ENCOUNTER — TELEPHONE (OUTPATIENT)
Dept: MEDSURG UNIT | Facility: CLINIC | Age: 62
End: 2022-02-28

## 2022-02-28 NOTE — PROGRESS NOTES
Pre-procedure COVID test 2/26/22 is negative. Prep/orders placed by LILLIAM [place]. Kaitlin Cortez RN on 2/28/2022 at 12:01 PM

## 2022-03-01 ENCOUNTER — HOSPITAL ENCOUNTER (OUTPATIENT)
Facility: CLINIC | Age: 62
Discharge: HOME | End: 2022-03-01
Admitting: INTERNAL MEDICINE
Payer: COMMERCIAL

## 2022-03-01 VITALS
RESPIRATION RATE: 16 BRPM | DIASTOLIC BLOOD PRESSURE: 78 MMHG | SYSTOLIC BLOOD PRESSURE: 125 MMHG | BODY MASS INDEX: 24.5 KG/M2 | OXYGEN SATURATION: 100 % | TEMPERATURE: 98.5 F | WEIGHT: 175 LBS | HEART RATE: 64 BPM | HEIGHT: 71 IN

## 2022-03-01 DIAGNOSIS — I25.10 CORONARY ARTERY DISEASE INVOLVING NATIVE CORONARY ARTERY OF NATIVE HEART WITHOUT ANGINA PECTORIS: ICD-10-CM

## 2022-03-01 DIAGNOSIS — I25.110 CORONARY ARTERY DISEASE INVOLVING NATIVE CORONARY ARTERY OF NATIVE HEART WITH UNSTABLE ANGINA PECTORIS (H): ICD-10-CM

## 2022-03-01 DIAGNOSIS — I20.89 STABLE ANGINA (H): ICD-10-CM

## 2022-03-01 PROBLEM — Z98.890 STATUS POST CORONARY ANGIOGRAM: Status: ACTIVE | Noted: 2022-03-01

## 2022-03-01 LAB
ANION GAP SERPL CALCULATED.3IONS-SCNC: 1 MMOL/L (ref 3–14)
APTT PPP: 35 SECONDS (ref 22–38)
BUN SERPL-MCNC: 14 MG/DL (ref 7–30)
CALCIUM SERPL-MCNC: 8.7 MG/DL (ref 8.5–10.1)
CHLORIDE BLD-SCNC: 107 MMOL/L (ref 94–109)
CO2 SERPL-SCNC: 29 MMOL/L (ref 20–32)
CREAT SERPL-MCNC: 1.23 MG/DL (ref 0.66–1.25)
ERYTHROCYTE [DISTWIDTH] IN BLOOD BY AUTOMATED COUNT: 13.2 % (ref 10–15)
GFR SERPL CREATININE-BSD FRML MDRD: 67 ML/MIN/1.73M2
GLUCOSE BLD-MCNC: 122 MG/DL (ref 70–99)
HCT VFR BLD AUTO: 35.5 % (ref 40–53)
HGB BLD-MCNC: 11.6 G/DL (ref 13.3–17.7)
INR PPP: 1.06 (ref 0.85–1.15)
MCH RBC QN AUTO: 26 PG (ref 26.5–33)
MCHC RBC AUTO-ENTMCNC: 32.7 G/DL (ref 31.5–36.5)
MCV RBC AUTO: 80 FL (ref 78–100)
PLATELET # BLD AUTO: 292 10E3/UL (ref 150–450)
POTASSIUM BLD-SCNC: 4.2 MMOL/L (ref 3.4–5.3)
RBC # BLD AUTO: 4.46 10E6/UL (ref 4.4–5.9)
SODIUM SERPL-SCNC: 137 MMOL/L (ref 133–144)
WBC # BLD AUTO: 8.7 10E3/UL (ref 4–11)

## 2022-03-01 PROCEDURE — 36415 COLL VENOUS BLD VENIPUNCTURE: CPT | Performed by: INTERNAL MEDICINE

## 2022-03-01 PROCEDURE — 999N000184 HC STATISTIC TELEMETRY

## 2022-03-01 PROCEDURE — 99152 MOD SED SAME PHYS/QHP 5/>YRS: CPT | Performed by: INTERNAL MEDICINE

## 2022-03-01 PROCEDURE — 85610 PROTHROMBIN TIME: CPT | Performed by: INTERNAL MEDICINE

## 2022-03-01 PROCEDURE — 272N000001 HC OR GENERAL SUPPLY STERILE: Performed by: INTERNAL MEDICINE

## 2022-03-01 PROCEDURE — 93571 IV DOP VEL&/PRESS C FLO 1ST: CPT | Mod: 26 | Performed by: INTERNAL MEDICINE

## 2022-03-01 PROCEDURE — 99153 MOD SED SAME PHYS/QHP EA: CPT | Performed by: INTERNAL MEDICINE

## 2022-03-01 PROCEDURE — 36591 DRAW BLOOD OFF VENOUS DEVICE: CPT

## 2022-03-01 PROCEDURE — 85027 COMPLETE CBC AUTOMATED: CPT | Performed by: INTERNAL MEDICINE

## 2022-03-01 PROCEDURE — 258N000003 HC RX IP 258 OP 636: Performed by: INTERNAL MEDICINE

## 2022-03-01 PROCEDURE — 250N000009 HC RX 250: Performed by: INTERNAL MEDICINE

## 2022-03-01 PROCEDURE — 93005 ELECTROCARDIOGRAM TRACING: CPT

## 2022-03-01 PROCEDURE — 93454 CORONARY ARTERY ANGIO S&I: CPT | Performed by: INTERNAL MEDICINE

## 2022-03-01 PROCEDURE — 999N000071 HC STATISTIC HEART CATH LAB OR EP LAB

## 2022-03-01 PROCEDURE — 93454 CORONARY ARTERY ANGIO S&I: CPT | Mod: 26 | Performed by: INTERNAL MEDICINE

## 2022-03-01 PROCEDURE — 85730 THROMBOPLASTIN TIME PARTIAL: CPT | Performed by: INTERNAL MEDICINE

## 2022-03-01 PROCEDURE — 999N000054 HC STATISTIC EKG NON-CHARGEABLE

## 2022-03-01 PROCEDURE — 80048 BASIC METABOLIC PNL TOTAL CA: CPT | Performed by: INTERNAL MEDICINE

## 2022-03-01 PROCEDURE — 250N000011 HC RX IP 250 OP 636: Performed by: INTERNAL MEDICINE

## 2022-03-01 PROCEDURE — C1887 CATHETER, GUIDING: HCPCS | Performed by: INTERNAL MEDICINE

## 2022-03-01 PROCEDURE — C1894 INTRO/SHEATH, NON-LASER: HCPCS | Performed by: INTERNAL MEDICINE

## 2022-03-01 RX ORDER — OXYCODONE HYDROCHLORIDE 5 MG/1
5 TABLET ORAL EVERY 4 HOURS PRN
Status: DISCONTINUED | OUTPATIENT
Start: 2022-03-01 | End: 2022-03-01 | Stop reason: HOSPADM

## 2022-03-01 RX ORDER — NALOXONE HYDROCHLORIDE 0.4 MG/ML
0.4 INJECTION, SOLUTION INTRAMUSCULAR; INTRAVENOUS; SUBCUTANEOUS
Status: DISCONTINUED | OUTPATIENT
Start: 2022-03-01 | End: 2022-03-01 | Stop reason: HOSPADM

## 2022-03-01 RX ORDER — ATROPINE SULFATE 0.1 MG/ML
0.5 INJECTION INTRAVENOUS
Status: DISCONTINUED | OUTPATIENT
Start: 2022-03-01 | End: 2022-03-01 | Stop reason: HOSPADM

## 2022-03-01 RX ORDER — POTASSIUM CHLORIDE 1500 MG/1
20 TABLET, EXTENDED RELEASE ORAL
Status: DISCONTINUED | OUTPATIENT
Start: 2022-03-01 | End: 2022-03-01 | Stop reason: HOSPADM

## 2022-03-01 RX ORDER — ASPIRIN 81 MG/1
243 TABLET, CHEWABLE ORAL ONCE
Status: DISCONTINUED | OUTPATIENT
Start: 2022-03-01 | End: 2022-03-01 | Stop reason: HOSPADM

## 2022-03-01 RX ORDER — ACETAMINOPHEN 325 MG/1
650 TABLET ORAL EVERY 4 HOURS PRN
Status: DISCONTINUED | OUTPATIENT
Start: 2022-03-01 | End: 2022-03-01 | Stop reason: HOSPADM

## 2022-03-01 RX ORDER — ASPIRIN 325 MG
325 TABLET ORAL ONCE
Status: DISCONTINUED | OUTPATIENT
Start: 2022-03-01 | End: 2022-03-01 | Stop reason: HOSPADM

## 2022-03-01 RX ORDER — FENTANYL CITRATE 50 UG/ML
INJECTION, SOLUTION INTRAMUSCULAR; INTRAVENOUS
Status: DISCONTINUED | OUTPATIENT
Start: 2022-03-01 | End: 2022-03-01 | Stop reason: HOSPADM

## 2022-03-01 RX ORDER — LIDOCAINE 40 MG/G
CREAM TOPICAL
Status: DISCONTINUED | OUTPATIENT
Start: 2022-03-01 | End: 2022-03-01 | Stop reason: HOSPADM

## 2022-03-01 RX ORDER — FLUMAZENIL 0.1 MG/ML
0.2 INJECTION, SOLUTION INTRAVENOUS
Status: DISCONTINUED | OUTPATIENT
Start: 2022-03-01 | End: 2022-03-01 | Stop reason: HOSPADM

## 2022-03-01 RX ORDER — IOPAMIDOL 755 MG/ML
INJECTION, SOLUTION INTRAVASCULAR
Status: DISCONTINUED | OUTPATIENT
Start: 2022-03-01 | End: 2022-03-01 | Stop reason: HOSPADM

## 2022-03-01 RX ORDER — NALOXONE HYDROCHLORIDE 0.4 MG/ML
0.2 INJECTION, SOLUTION INTRAMUSCULAR; INTRAVENOUS; SUBCUTANEOUS
Status: DISCONTINUED | OUTPATIENT
Start: 2022-03-01 | End: 2022-03-01 | Stop reason: HOSPADM

## 2022-03-01 RX ORDER — FENTANYL CITRATE 50 UG/ML
25 INJECTION, SOLUTION INTRAMUSCULAR; INTRAVENOUS
Status: DISCONTINUED | OUTPATIENT
Start: 2022-03-01 | End: 2022-03-01 | Stop reason: HOSPADM

## 2022-03-01 RX ORDER — OXYCODONE HYDROCHLORIDE 5 MG/1
10 TABLET ORAL EVERY 4 HOURS PRN
Status: DISCONTINUED | OUTPATIENT
Start: 2022-03-01 | End: 2022-03-01 | Stop reason: HOSPADM

## 2022-03-01 RX ORDER — SODIUM CHLORIDE 9 MG/ML
INJECTION, SOLUTION INTRAVENOUS CONTINUOUS
Status: DISCONTINUED | OUTPATIENT
Start: 2022-03-01 | End: 2022-03-01 | Stop reason: HOSPADM

## 2022-03-01 RX ORDER — ISOSORBIDE MONONITRATE 30 MG/1
30 TABLET, EXTENDED RELEASE ORAL DAILY
Qty: 90 TABLET | Refills: 0 | Status: SHIPPED | OUTPATIENT
Start: 2022-03-01 | End: 2022-03-15

## 2022-03-01 RX ADMIN — SODIUM CHLORIDE: 9 INJECTION, SOLUTION INTRAVENOUS at 07:21

## 2022-03-01 NOTE — PROGRESS NOTES
"Care Suites Post Procedure Note    Patient Information  Name: Abhishek Gomez  Age: 61 year old    Post Procedure  Time patient returned to Care Suites: 0910  Concerns/abnormal assessment: n/a, right groin site covered with tegaderm, CDI, CMS intact  If abnormal assessment, provider notified: N/A  Plan/Other: Pt on bedrest, resting comfortably. Page to Dr. Sarah: \"Orders for 2 hours bedrest with closure device - got report that there was no closure device only manual pressure, clarifying how long you would like bedrest?\"    Sandy Delaney RN       "

## 2022-03-01 NOTE — Clinical Note
Handoff to CS RN MD paged: \"Patient has been in 4 point restraints since 2300 last night due to not staying in bed, leaving room and taking O2 off and desats into the 70s. Patient has managed to untie and get out of restraints multiple times. May we please have an order for mittens?  \"

## 2022-03-01 NOTE — PRE-PROCEDURE
GENERAL PRE-PROCEDURE:   Procedure:  Coronary angiogram with possible PCI  Date/Time:  3/1/2022 8:26 AM    Verbal consent obtained?: Yes    Written consent obtained?: Yes    Risks and benefits: Risks, benefits and alternatives were discussed    Consent given by:  Patient  Patient states understanding of procedure being performed: Yes    Patient's understanding of procedure matches consent: Yes    Procedure consent matches procedure scheduled: Yes    Expected level of sedation:  Moderate  Appropriately NPO:  Yes  ASA Class:  3  Mallampati  :  Grade 2- soft palate, base of uvula, tonsillar pillars, and portion of posterior pharyngeal wall visible  Lungs:  Lungs clear with good breath sounds bilaterally  Heart:  Normal heart sounds and rate  History & Physical reviewed:  History and physical reviewed and no updates needed  Statement of review:  I have reviewed the lab findings, diagnostic data, medications, and the plan for sedation

## 2022-03-01 NOTE — Clinical Note
Potential access sites were evaluated for patency using ultrasound.   The right femoral artery was selected. Access was obtained under with Sonosite and Fluoroscopic guidance using a micropuncture 21 gauge needle with direct visualization of needle entry.

## 2022-03-01 NOTE — DISCHARGE INSTRUCTIONS
Cardiac Angiogram Discharge Instructions - Femoral    After you go home:      Have an adult stay with you until tomorrow.    Drink extra fluids for 2 days.    You may resume your normal diet.    No smoking       For 24 hours - due to the sedation you received:    Relax and take it easy.    Do NOT make any important or legal decisions.    Do NOT drive or operate machines at home or at work.    Do NOT drink alcohol.    Care of Groin Puncture Site:      For the first 24 hrs - check the puncture site every 1-2 hours while awake.    For 2 days, when you cough, sneeze, laugh or move your bowels, hold your hand over the puncture site and press firmly.    Remove the bandaid after 24 hours. If there is minor oozing, apply another bandaid and remove it after 12 hours.    It is normal to have a small bruise or pea size lump at the site.    You may shower tomorrow. Do NOT take a bath, or use a hot tub or pool for at least 3 days. Do NOT scrub the site. Do not use lotion or powder near the puncture site.    Activity:            For 2 days:    No stooping or squatting    Do NOT do any heavy activity such as exercise, lifting, or straining.     No housework, yard work or any activity that make you sweat    Do NOT lift more than 10 pounds    Bleeding:      If you start bleeding from the site in your groin, lie down flat and press firmly on/above the site for 10 minutes.     Once bleeding stops, lay flat for 2 hours.     Call [facility] as soon as you can.       Call 911 right away if you have heavy bleeding or bleeding that does not stop.      Medicines:      If you are taking an antiplatelet medication such as Plavix, Brilinta or Effient, do not stop taking it until you talk to your cardiologist.      If you are on Metformin (Glucophage), do not restart it until you have blood tests (within 2 to 3 days after discharge).  After you have your blood drawn, you may restart the Metformin.     Take your medications, including blood  thinners, unless your provider tells you not to.      If you have stopped any medicines, check with your provider about when to restart them.    Follow Up Appointments:      Follow up with [facility] Nurse Practitioner at [facility] of patient preference in 7-10 days.    Call the clinic if:      You have increased pain or a large or growing hard lump around the site.    The site is red, swollen, hot or tender.    Blood or fluid is draining from the site.    You have chills or a fever greater than 101 F (38 C).    Your leg feels numb, cool or changes color.    You have hives, a rash or unusual itching.    New pain in the back or belly that you cannot control with Tylenol.    Any questions or concerns.          [facility] [place]:    [phone] [facility] (7 days a week)

## 2022-03-01 NOTE — PROGRESS NOTES
PATIENT/VISITOR WELLNESS SCREENING    Step 1 Patient Screening    1. In the last month, have you been in contact with someone who was confirmed or suspected to have Coronavirus/COVID-19? No    2. Do you have the following symptoms?  Fever/Chills? No   Cough? No   Shortness of breath? No   New loss of taste or smell? No  Sore throat? No  Muscle or body aches? No  Headaches? No  Fatigue? No  Vomiting or diarrhea? No    Step 2 Visitor Screening    1. Name of Visitor (1 visitor per patient): Kaity    2. In the last month, have you been in contact with someone who was confirmed or suspected to have Coronavirus/COVID-19? No    3. Do you have the following symptoms?  Fever/Chills? No   Cough? No   Shortness of breath? No   Skin rash? No   Loss of taste or smell? No  Sore throat? No  Runny or stuffy nose? No  Muscle or body aches? No  Headaches? No  Fatigue? No  Vomiting or diarrhea? No    If the visitor has positive symptoms, notify supervisor/manger  Per policy, the visitor will need to leave the facility     Step 3 Refer to logic grid below for actions    NO SYMPTOM(S)    ACTIONS:  1. Standard rooming process  2. Provider to assess per normal protocol  3. Implement precautions as needed and per guidelines     POSITIVE SYMPTOM(S)  If positive for ANY of the following symptoms: fever, cough, shortness of breath, rash    ACTION:  1. Continue to have the patient wear a mask   2. Room patient as soon as possible  3. Don appropriate PPE when entering room  4. Provider evaluation  Care Suites Admission Nursing Note    Patient Information  Name: Abhishek Gomez  Age: 61 year old  Reason for admission: Heart cath  Care Suites arrival time: 0645    Visitor Information  Name: Kaity  Informed of visitor restrictions: Yes  1 visitor allowed per patient   Visitor must screen negative for COVID symptoms   Visitor must wear a mask  Waiting rooms closed to visitors    Patient Admission/Assessment   Pre-procedure  assessment complete: Yes  If abnormal assessment/labs, provider notified: N/A  NPO: Yes  Medications held per instructions/orders: Yes  Consent: deferred  If applicable, pregnancy test status: deferred  Patient oriented to room: Yes  Education/questions answered: Yes  Plan/other: Pt has not questions and has been through this procedure before. Last time in  August of 2021. Lungs clear, S1S2, 2+peripheral pulses. Reviewed contrast information, pt will have labs drawn before restarting his metformin, apt already scheduled.    Discharge Planning  Discharge name/phone number: Kaity   Overnight post sedation caregiver: Kaity  Discharge location: home    Nilsa Sherwood RN

## 2022-03-01 NOTE — PROGRESS NOTES
Care Suites Discharge Nursing Note    Patient Information  Name: Abhishek Gomez  Age: 61 year old    Discharge Education:  Discharge instructions reviewed: Yes Checked with Dr. Sarah if he needed to see pt prior to discharge, and OK to discharge without seeing pt.  Additional education/resources provided: NA  Patient/patient representative verbalizes understanding: Yes Pt had questions that were answered and instructions verified.  Patient discharging on new medications: Yes  Medication education completed: Yes Pt given information in writing, after teaching pt states he was on this medication before but is no longer.    Discharge Plans:   Discharge location: home  Discharge ride contacted: Yes  Approximate discharge time: 1205    Discharge Criteria:  Discharge criteria met and vital signs stable: Yes    Patient Belongs:  Patient belongings returned to patient: Yes    Nilsa Sherwood RN

## 2022-03-03 ENCOUNTER — LAB (OUTPATIENT)
Dept: LAB | Facility: CLINIC | Age: 62
End: 2022-03-03
Payer: COMMERCIAL

## 2022-03-03 DIAGNOSIS — I25.110 CORONARY ARTERY DISEASE INVOLVING NATIVE CORONARY ARTERY OF NATIVE HEART WITH UNSTABLE ANGINA PECTORIS (H): ICD-10-CM

## 2022-03-03 LAB
ANION GAP SERPL CALCULATED.3IONS-SCNC: 1 MMOL/L (ref 3–14)
ATRIAL RATE - MUSE: 64 BPM
BUN SERPL-MCNC: 13 MG/DL (ref 7–30)
CALCIUM SERPL-MCNC: 8.4 MG/DL (ref 8.5–10.1)
CHLORIDE BLD-SCNC: 108 MMOL/L (ref 94–109)
CO2 SERPL-SCNC: 28 MMOL/L (ref 20–32)
CREAT SERPL-MCNC: 1.2 MG/DL (ref 0.66–1.25)
DIASTOLIC BLOOD PRESSURE - MUSE: NORMAL MMHG
GFR SERPL CREATININE-BSD FRML MDRD: 69 ML/MIN/1.73M2
GLUCOSE BLD-MCNC: 127 MG/DL (ref 70–99)
INTERPRETATION ECG - MUSE: NORMAL
P AXIS - MUSE: 58 DEGREES
POTASSIUM BLD-SCNC: 4.2 MMOL/L (ref 3.4–5.3)
PR INTERVAL - MUSE: 176 MS
QRS DURATION - MUSE: 76 MS
QT - MUSE: 396 MS
QTC - MUSE: 408 MS
R AXIS - MUSE: 59 DEGREES
SODIUM SERPL-SCNC: 137 MMOL/L (ref 133–144)
SYSTOLIC BLOOD PRESSURE - MUSE: NORMAL MMHG
T AXIS - MUSE: 73 DEGREES
VENTRICULAR RATE- MUSE: 64 BPM

## 2022-03-03 PROCEDURE — 36415 COLL VENOUS BLD VENIPUNCTURE: CPT | Performed by: PHYSICIAN ASSISTANT

## 2022-03-03 PROCEDURE — 80048 BASIC METABOLIC PNL TOTAL CA: CPT | Performed by: PHYSICIAN ASSISTANT

## 2022-03-04 ENCOUNTER — CARE COORDINATION (OUTPATIENT)
Dept: CARDIOLOGY | Facility: CLINIC | Age: 62
End: 2022-03-04
Payer: COMMERCIAL

## 2022-03-04 NOTE — PROGRESS NOTES
Call out to pt to review post-cath BMP. Pt was holding Metformin for angiogram. Pt did not answer. I left a VM for pt letting him know the BMP is stable and he can resume the Metformin. Asked pt to call back and leave a VM confirming this; if he has any concerns he should also call me back. Kaitlin Cortez RN on 3/4/2022 at 10:33 AM        Component      Latest Ref Rng & Units 3/1/2022 3/3/2022   Sodium      133 - 144 mmol/L 137 137   Potassium      3.4 - 5.3 mmol/L 4.2 4.2   Chloride      94 - 109 mmol/L 107 108   Carbon Dioxide      20 - 32 mmol/L 29 28   Anion Gap      3 - 14 mmol/L 1 (L) 1 (L)   Urea Nitrogen      7 - 30 mg/dL 14 13   Creatinine      0.66 - 1.25 mg/dL 1.23 1.20   Calcium      8.5 - 10.1 mg/dL 8.7 8.4 (L)   Glucose      70 - 99 mg/dL 122 (H) 127 (H)   GFR Estimate      >60 mL/min/1.73m2 67 69

## 2022-03-09 ENCOUNTER — VIRTUAL VISIT (OUTPATIENT)
Dept: CARDIOLOGY | Facility: CLINIC | Age: 62
End: 2022-03-09
Payer: COMMERCIAL

## 2022-03-09 DIAGNOSIS — I25.10 CORONARY ARTERY DISEASE INVOLVING NATIVE CORONARY ARTERY OF NATIVE HEART WITHOUT ANGINA PECTORIS: Primary | ICD-10-CM

## 2022-03-09 DIAGNOSIS — I20.89 STABLE ANGINA (H): ICD-10-CM

## 2022-03-09 DIAGNOSIS — Z11.59 ENCOUNTER FOR SCREENING FOR OTHER VIRAL DISEASES: ICD-10-CM

## 2022-03-09 PROCEDURE — 99207 PR NO CHARGE-RESEARCH SERVICE: CPT

## 2022-03-09 NOTE — PROGRESS NOTES
SURPASS-CVOT Study [Effect of tirzepatide versus Dulaglutide on Major Cardiovascular Events in Patients with Type 2Diabetes]    Subject contacted by telephone to evaluate/ensure compliance.    Spoke with subject who has been off study IP due to side effects. He was evaluated by Cardiology for Unstable Angina and had an angiogram on 3/1/2022.     Results show: Subtotal occlusion of previously stented first diagonal with CHARLETTE 0-1 flow.     Recommend medical management as this is a bifurcation lesion with 2 layers of stent already. Will consider PCI if continues to have unacceptable angina despite medical therapy.  Patent proximal to mid LAD stents with minimal ISR.  Stable CAD in the remaining vessels.     His angina will be managed medically.New medication includes: Isosorbide 30mg every day. He states he still has some angina, but otherwise is feeling well.    Pain in back of head. Loss of appetite resolved.    We discussed whether he wanted to restart study meds and he does intend to, he will discuss with his cardiologist and also will discuss with Dr Sarah. Patient will return in 2 weeks for study visit and discuss study restart then.      Subject queried for adverse events, endpoints and medication changes. Prior AE's have resolved other than ongoing chest pain. New medication Isosorbide 30mg every day st 03/01/2022.  .  No reports of hypoglycemia.    SURPASS-CVOT Study [Effect of tirzepatide versus Dulaglutide on Major Cardiovascular Events in Patients with Type 2 Diabetes]    Diagnosis/event description (diagnosis preferred):  Coronary Angiogram  Start date: 03/01/2022  Date resolved: 03/01/2022    Intensity: ([] mild /[x]  moderate / [] severe)     Study Medication adjustment:  [] Yes   [x] No NA-off study IP  Outcome: ([x] resolved [with or without sequelae] /[] recovering / [] not recovered / [] death / [] unknown)      Date study team aware of event: 03/09/2022    Was treatment given for this event:  [] Yes    [x] No   If yes, provide details     Serious adverse event?    Yes   [x] No    Special interest event?  [] Yes   [x] No    Endpoint? [] Yes   [x] No     Fatal event?  [] Yes   [x] No      Dr. Rodriguez: Reasonable possibility that AE is related to study treatment    [] Yes   [x] No    Stephenie Diaz RN    SURPASS-CVOT Study [Effect of tirzepatide versus Dulaglutide on Major Cardiovascular Events in Patients with Type 2 Diabetes]    Diagnosis/event description (diagnosis preferred):  Stable Exertional Angina  Start date: 03/01/2022   Date resolved: 01/20/2023     Intensity: ([] mild /[x]  moderate / [] severe)     Study Medication adjustment:  [] Yes   [x] No NA -Off study IP for months  Outcome: ([x] resolved [with or without sequelae] /[] recovering / [] not recovered / [] death / [] unknown)      Date study team aware of event: 03/09/2022    Was treatment given for this event:  [x] Yes   [] No   If yes, provide details Isosorbide as directed    Serious adverse event?    Yes   [x] No    Special interest event?  [] Yes   [x] No    Endpoint? [] Yes   [x] No     Fatal event?  [] Yes   [x] No      Dr. Rodriguez: Reasonable possibility that AE is related to study treatment    [] Yes   [x] No    Stephenie Diaz RN      Will see him/her in clinic in 2 weeks.

## 2022-03-11 DIAGNOSIS — E78.5 HYPERLIPIDEMIA LDL GOAL <100: ICD-10-CM

## 2022-03-11 DIAGNOSIS — I10 ESSENTIAL HYPERTENSION, BENIGN: ICD-10-CM

## 2022-03-11 DIAGNOSIS — E11.9 TYPE 2 DIABETES MELLITUS WITHOUT COMPLICATION, WITHOUT LONG-TERM CURRENT USE OF INSULIN (H): ICD-10-CM

## 2022-03-11 RX ORDER — ATORVASTATIN CALCIUM 20 MG/1
20 TABLET, FILM COATED ORAL DAILY
Qty: 30 TABLET | Refills: 0 | Status: SHIPPED | OUTPATIENT
Start: 2022-03-11 | End: 2022-04-06

## 2022-03-11 RX ORDER — GLIMEPIRIDE 2 MG/1
2 TABLET ORAL
Qty: 30 TABLET | Refills: 0 | Status: SHIPPED | OUTPATIENT
Start: 2022-03-11 | End: 2022-04-06

## 2022-03-11 RX ORDER — LISINOPRIL 20 MG/1
20 TABLET ORAL DAILY
Qty: 30 TABLET | Refills: 0 | Status: SHIPPED | OUTPATIENT
Start: 2022-03-11 | End: 2022-04-06

## 2022-03-11 NOTE — TELEPHONE ENCOUNTER
Spoke to patient with providers message.   Patient verbalized understanding and agrees to schedule appointment 4/6 for follow-up and labs.

## 2022-03-11 NOTE — TELEPHONE ENCOUNTER
Routing refill request to provider for review/approval because:  Patient needs to be seen because it has been more than 1 year since last office visit. Last OV 3/2/21.   A1c overdue. A1c lab pended. [employer] message sent to patient to schedule appt.      Lab Results   Component Value Date    A1C 7.1 07/26/2021    A1C 7.6 03/02/2021    A1C 7.7 12/26/2019    A1C 7.4 10/31/2018    A1C 8.7 06/29/2017    A1C 9.1 05/18/2016         Teresita Bergeron RN

## 2022-03-11 NOTE — TELEPHONE ENCOUNTER
Patient given only 30-day supply until seen in clinic for follow-up as is overdue for clinic visit and lab work, please inform

## 2022-03-13 DIAGNOSIS — R94.39 ABNORMAL STRESS TEST: ICD-10-CM

## 2022-03-13 DIAGNOSIS — I25.10 CORONARY ARTERY DISEASE INVOLVING NATIVE CORONARY ARTERY OF NATIVE HEART WITHOUT ANGINA PECTORIS: ICD-10-CM

## 2022-03-15 ENCOUNTER — OFFICE VISIT (OUTPATIENT)
Dept: CARDIOLOGY | Facility: CLINIC | Age: 62
End: 2022-03-15
Attending: INTERNAL MEDICINE
Payer: COMMERCIAL

## 2022-03-15 VITALS
DIASTOLIC BLOOD PRESSURE: 69 MMHG | WEIGHT: 188 LBS | OXYGEN SATURATION: 100 % | HEART RATE: 73 BPM | SYSTOLIC BLOOD PRESSURE: 111 MMHG | BODY MASS INDEX: 26.32 KG/M2 | HEIGHT: 71 IN

## 2022-03-15 DIAGNOSIS — I20.89 STABLE ANGINA (H): ICD-10-CM

## 2022-03-15 DIAGNOSIS — I25.10 CORONARY ARTERY DISEASE INVOLVING NATIVE CORONARY ARTERY OF NATIVE HEART WITHOUT ANGINA PECTORIS: ICD-10-CM

## 2022-03-15 DIAGNOSIS — R94.39 ABNORMAL STRESS TEST: ICD-10-CM

## 2022-03-15 DIAGNOSIS — I25.118 CORONARY ARTERY DISEASE OF NATIVE ARTERY OF NATIVE HEART WITH STABLE ANGINA PECTORIS (H): Primary | ICD-10-CM

## 2022-03-15 PROCEDURE — 99215 OFFICE O/P EST HI 40 MIN: CPT | Performed by: PHYSICIAN ASSISTANT

## 2022-03-15 RX ORDER — NITROGLYCERIN 0.4 MG/1
TABLET SUBLINGUAL
Qty: 30 TABLET | Refills: 0 | Status: SHIPPED | OUTPATIENT
Start: 2022-03-15

## 2022-03-15 RX ORDER — ISOSORBIDE MONONITRATE 60 MG/1
60 TABLET, EXTENDED RELEASE ORAL DAILY
Qty: 90 TABLET | Refills: 3 | Status: SHIPPED | OUTPATIENT
Start: 2022-03-15 | End: 2022-06-30

## 2022-03-15 NOTE — LETTER
March 15, 2022    RE:  Abhishek Gomez 1960  [id number] [facility] [address]          To whom it may concern:    This letter is to certify that Abhishek Gomez is under my care, and is required to be off work starting on 3/15/22. He may return to work on 5/15/22, with no restrictions.    Should you have any questions, please call [phone].    Sincerely,        [facility]

## 2022-03-15 NOTE — PATIENT INSTRUCTIONS
Today's Plan:   -INCREASE Imdur to 60 mg every morning.   - START a medication to protect the lining of your gut, called Omeprazole, once daily.   - Continue to monitor your symptoms of angina, and alert me if they change.    -Please see your PCP for evaluation of your mild anemia and GI symptoms.  -Return to see me in 1 month, and Dr. Oneil in 3 months.    If you have questions or concerns please call my nurse team at [phone].   Scheduling phone number: [phone]  For after hours urgent concerns call [phone] option 2.   Reminder: Please bring in all current medications, over the counter supplements and vitamin bottles to your next appointment.    It was a pleasure seeing you today!     Leila Curran PA-C

## 2022-03-15 NOTE — TELEPHONE ENCOUNTER
Routing refill request to provider for review/approval because:  Failed protocol due to:    Normal HGB on file in past 12 months  Hemoglobin   Date Value Ref Range Status   03/01/2022 11.6 (L) 13.3 - 17.7 g/dL Final   05/13/2019 13.4 13.3 - 17.7 g/dL Final     Pt scheduled for an appt 4/6/22.     Deepthi Hernandes RN

## 2022-03-15 NOTE — LETTER
3/15/2022    Michael Méndez MD  [address] [facility] [address]    RE: Abhishek Gomez       Dear Colleague,     I had the pleasure of seeing Abhishek Gomez in the [facility].    Cardiology Clinic Progress Note    Abhishek Gomez MRN# [id number]   YOB: 1960 Age: 61 year old   Primary cardiologist: Dr. Oneil         Assessment and Plan:     In summary, Abhishek Gomez presents today for follow-up on coronary angiogram, performed for the development of new exertional angina.  This showed a subtotal occlusion of his prior D1 stent, for which medical management has been advised.  Imdur has been initiated with minimal improvement in his symptoms.    1. Stable exertional angina.  2. CAD, s/p PCI to  of ISR prior prox/mid LAD and D1 lesion with 4 stents on 8/17/21.  Angiogram in 3/2022 shows a subtotal occlusion the previously stented ostial D1.  LAD stent is patent, and there is otherwise stable, residual mid RCA lesion which is hemodynamically insignificant, and a 70% ostial moderately sized ramus lesion.  Medical management is recommended unless he develops limiting angina with therapy.  On Lopressor 25 mg twice daily, Imdur 30 mg daily, and DAPT with Brilinta.   3. Hypertension.  Well-controlled.  4. Hyperlipidemia. Well-controlled.   5. Diabetes mellitus.  Well-controlled.  6. CKD-III, with a stable creatinine of 1.2-1.3.  7. Persistent mild anemia.  8. Probable GERD.    Plan:  -Increase Imdur to 60 mg daily.    -Start omeprazole 20 mg daily.  -SL NTG refilled.   -Letter written to avoid working his highly exertional job at [employer] for the next 8 weeks while we get his symptoms under better control.  -Instructed to arrange a visit with his PCP at this time for evaluation of persistent anemia.  -Follow-up with me in 1 month for reassessment.        History of Presenting Illness:      Abhishek Gomez is a pleasant 61  year old patient who presents today for evaluation of chest pain.    He has known coronary artery disease, diabetes, hypertension and hyperlipidemia.  He was evaluated here in 2019 with unstable angina.  A stress test prior to that evaluation was very abnormal.  He then proceeded to have a coronary angiogram, which revealed high-grade proximal LAD stenosis.  The LAD was stented with a 2.5 mm stent.  The first diagonal was also ballooned at that time.  He developed atypical chest discomfort in late 2019, and a stress test performed at that time showed no inducible ischemia.     In the summer of 2021, he re-developed typical exertional angina.  Stress test showed anteroseptal and apical wall ischemia. Coronary angiogram was subsequently performed on 07/26/2021. This showed an occluded proximal LAD at the site of the prior stent with left-to-left and right-to-left collaterals.  The circumflex and rami were small with moderate disease.  The mid RCA had a focal 50% stenosis. He was referred to CV surgery, however declined bypass, strongly preferring complex PCI. He returned to the cath lab with Dr. Sarah in August, where he underwent successful IVUS-guided  PCI of the ostial, proximal-mid LAD and D1 with MICHELLE x4. There was a hemodynamically insignificant mid RCA lesion (IFR 0.95), and a 70% ostial moderately sized ramus lesion. He was placed on DAPT with Brilinta. TTE from 8/3/21 showed normal biventricular function.    After that, he had done quite well from a cardiac standpoint.  At his last visit with Dr. Oneil in October 2021, his blood pressure was marginal, and therefore his isosorbide was discontinued.     He was recently enrolled in the surpass trial through our clinic, however was then disenrolled due to development of GI symptoms which is a known side effect of the study drug.  When our nurse called to follow-up with him on that symptom, he reported development of exertional chest pain which ultimately  "prompted coronary angiogram. This took place on March 1, showing subtotal occlusion of previously stented D1.  As this was a bifurcation lesion with 2 layers of stent already, medical management was recommended, with consideration for PCI down the road if he continued to have limiting angina despite medical therapy.  The proximal to mid LAD stents were patent. The remainder of his CAD was stable from July 2021. Imdur was re-initiated.     Today, Adrien tells me that he thinks his angina has somewhat improved, but still continues to come on at work, both with exertion and also sometimes after he eats.  GERD may also be playing a role, but the symptoms are quite similar to his exertional angina. He denies shortness of breath, PND, orthopnea, edema, palpitations. BP is well-controlled. Creatinine is stable at 1.2. His most recent lipid profile was in October, showing an LDL of 49, HDL 34, triglycerides 125, Total 108. Of note, recent CBC shows a mild persistent anemia with a hemoglobin of 11.6, hematocrit 35.5.  This was first noted back in August 2021, patient was encouraged to follow-up with his PCP regarding this but has not yet done so. He requests a letter to be off work from [employer] [facility] while we work on getting his symptoms under better control, which I provided today. He will continue to work his job at [employer] which is less exertional.          Review of Systems:     12-pt ROS is negative except for as noted in the HPI.          Physical Exam:     Vitals: /69 (BP Location: Right arm, Cuff Size: Adult Regular)   Pulse 73   Ht 1.803 m (5' 11\")   Wt 85.3 kg (188 lb)   SpO2 100%   BMI 26.22 kg/m    Wt Readings from Last 10 Encounters:   03/15/22 85.3 kg (188 lb)   03/01/22 79.4 kg (175 lb)   02/23/22 81.2 kg (179 lb)   01/26/22 82.8 kg (182 lb 9.6 oz)   12/29/21 87.1 kg (192 lb)   12/10/21 89 kg (196 lb 3.4 oz)   12/10/21 89 kg (196 lb 3.2 oz)   10/27/21 85.3 kg (188 lb)   08/31/21 86.9 kg (191 lb 8 oz)   08/18/21 " 87.5 kg (193 lb)       Constitutional:  Patient is pleasant, alert, cooperative, and in NAD.  HEENT:  NCAT. PERRLA. EOM's intact.   Neck:  CVP appears normal. No carotid bruits.   Pulmonary: Normal respiratory effort. CTAB.   Cardiac: RRR, normal S1/S2, no S3/S4, no murmur or rub.   Abdomen:  Non-tender abdomen, no hepatosplenomegaly appreciated.   Vascular: Pulses in the upper and lower extremities are 2+ and equal bilaterally.  Extremities: No edema, erythema, cyanosis or tenderness appreciated.  Skin:  No rashes or lesions appreciated.   Neurological:  No gross motor or sensory deficits.   Psych: Appropriate affect.        Data:     Labs reviewed:  Recent Labs   Lab Test 10/25/21  0851 08/17/21  1155 03/02/21  1107 05/13/20  0803 12/26/19  0732 05/09/19  1240 10/31/18  0825   LDL 49 27 46   < >  --    < > 38   HDL 34* 28* 33*   < >  --    < > 37*   NHDL 74 62 75   < >  --    < > 58   CHOL 108 90 108   < >  --    < > 95   TRIG 125 174* 145   < >  --    < > 101   TSH  --   --  6.06*  --  6.39*  --  4.83*    < > = values in this interval not displayed.       Lab Results   Component Value Date    WBC 8.7 03/01/2022    WBC 7.2 05/13/2019    RBC 4.46 03/01/2022    RBC 4.91 05/13/2019    HGB 11.6 (L) 03/01/2022    HGB 13.4 05/13/2019    HCT 35.5 (L) 03/01/2022    HCT 38.6 (L) 05/13/2019    MCV 80 03/01/2022    MCV 79 05/13/2019    MCH 26.0 (L) 03/01/2022    MCH 27.3 05/13/2019    MCHC 32.7 03/01/2022    MCHC 34.7 05/13/2019    RDW 13.2 03/01/2022    RDW 12.7 05/13/2019     03/01/2022     05/13/2019       Lab Results   Component Value Date     03/03/2022     03/02/2021    POTASSIUM 4.2 03/03/2022    POTASSIUM 4.1 03/02/2021    CHLORIDE 108 03/03/2022    CHLORIDE 107 03/02/2021    CO2 28 03/03/2022    CO2 26 03/02/2021    ANIONGAP 1 (L) 03/03/2022    ANIONGAP 6 03/02/2021     (H) 03/03/2022    GLC 90 03/02/2021    BUN 13 03/03/2022    BUN 10 03/02/2021    CR 1.20 03/03/2022    CR 1.09  03/02/2021    GFRESTIMATED 69 03/03/2022    GFRESTIMATED 73 03/02/2021    GFRESTBLACK 85 03/02/2021    KRIS 8.4 (L) 03/03/2022    KRIS 9.3 03/02/2021      Lab Results   Component Value Date    AST 15 07/26/2021    AST 26 03/02/2021    ALT 26 10/25/2021    ALT 35 03/02/2021       Lab Results   Component Value Date    A1C 7.1 (H) 07/26/2021    A1C 7.6 (H) 03/02/2021       Lab Results   Component Value Date    INR 1.06 03/01/2022    INR 1.13 08/17/2021    INR 1.01 05/09/2019           Problem List:     Patient Active Problem List   Diagnosis     Hyperlipidemia LDL goal <70     Essential hypertension, benign     Type 2 diabetes mellitus without complication, without long-term current use of insulin (H)     Unstable angina (H)     NSTEMI (non-ST elevated myocardial infarction) (H)     Coronary artery disease of native artery of native heart with stable angina pectoris (H)     Abnormal stress test     Stable angina (H)     Status post coronary angiogram           Medications:     Current Outpatient Medications   Medication Sig Dispense Refill     aspirin 81 MG EC tablet Take 1 tablet (81 mg) by mouth daily 90 tablet 3     atorvastatin (LIPITOR) 20 MG tablet Take 1 tablet (20 mg) by mouth daily 30 tablet 0     cetirizine (ZYRTEC) 10 MG tablet Take 1 tablet (10 mg) by mouth every evening 10 tablet 0     glimepiride (AMARYL) 2 MG tablet Take 1 tablet (2 mg) by mouth every morning (before breakfast) 30 tablet 0     isosorbide mononitrate (IMDUR) 30 MG 24 hr tablet Take 1 tablet (30 mg) by mouth daily 90 tablet 0     lisinopril (ZESTRIL) 20 MG tablet Take 1 tablet (20 mg) by mouth daily 30 tablet 0     metFORMIN (GLUCOPHAGE) 1000 MG tablet Take 1 tablet (1,000 mg) by mouth 2 times daily (with meals) 60 tablet 0     metoprolol tartrate (LOPRESSOR) 25 MG tablet Take 1 tablet (25 mg) by mouth 2 times daily 180 tablet 3     nitroGLYcerin (NITROSTAT) 0.4 MG sublingual tablet For chest pain place 1 tablet under the tongue every 5  minutes for up to 3 doses. If symptoms persist 5 minutes after 2nd dose call 911. 30 tablet 0     ticagrelor (BRILINTA) 90 MG tablet Take 1 tablet (90 mg) by mouth every 12 hours 90 tablet 3           Past Medical History:     Past Medical History:   Diagnosis Date     Abnormal stress test 07/14/2021     Coronary artery disease involving native coronary artery of native heart without angina pectoris 07/14/2021     Essential hypertension, benign 05/18/2016     Hyperlipidemia LDL goal <70 03/16/2015     NSTEMI (non-ST elevated myocardial infarction) (H) 05/09/2019    s/p MICHELLE to pLAD     Status post coronary angiogram 07/26/2021    Complex Multivessel CAD. CABG cosult     Type 2 diabetes mellitus without complication, without long-term current use of insulin (H) 07/06/2017     Unstable angina (H) 05/09/2019     Past Surgical History:   Procedure Laterality Date     CV CORONARY ANGIOGRAM N/A 7/26/2021    Procedure: Coronary Angiogram;  Surgeon: Sylvester Westbrook MD;  Location: [facility] CARDIAC CATH LAB     CV CORONARY ANGIOGRAM N/A 3/1/2022    Procedure: Coronary Angiogram;  Surgeon: Sanchez Sarah MD;  Location: [facility] CARDIAC CATH LAB     CV HEART CATHETERIZATION WITH POSSIBLE INTERVENTION N/A 5/9/2019    Procedure: Coronary Angiogram;  Surgeon: Jose Enrique Stanley MD;  Location: [facility] CARDIAC CATH LAB     CV HEART CATHETERIZATION WITH POSSIBLE INTERVENTION N/A 8/17/2021    Procedure: Coronary Angiogram;  Surgeon: Sanchez Sarah MD;  Location: [facility] CARDIAC CATH LAB     CV INSTANTANEOUS WAVE-FREE RATIO N/A 8/17/2021    Procedure: Instantaneous Wave-Free Ratio;  Surgeon: Sacnhez Sarah MD;  Location: [facility] CARDIAC CATH LAB     CV INTRAVASULAR ULTRASOUND N/A 8/17/2021    Procedure: Intravascular Ultrasound;  Surgeon: Sanchez Sarah MD;  Location: [facility] CARDIAC CATH LAB     CV LEFT HEART CATH N/A 7/26/2021    Procedure: Left Heart Cath;  Surgeon: Sylvester Westbrook MD;  Location:   HEART CARDIAC CATH LAB     CV PCI ANGIOPLASTY N/A 5/9/2019    Procedure: PCI Angioplasty;  Surgeon: Jose Enrique Stanley MD;  Location:  HEART CARDIAC CATH LAB     CV PCI CHRONIC TOTAL OCCLUSION N/A 8/17/2021    Procedure: Percutaneous Coronary Intervention of Chronic Total Occlusion;  Surgeon: Sanchez Sarah MD;  Location:  HEART CARDIAC CATH LAB     Family History   Problem Relation Age of Onset     Diabetes Paternal Grandmother      Diabetes Nephew      Social History     Socioeconomic History     Marital status:      Spouse name: Not on file     Number of children: Not on file     Years of education: Not on file     Highest education level: Not on file   Occupational History     Not on file   Tobacco Use     Smoking status: Never Smoker     Smokeless tobacco: Never Used   Substance and Sexual Activity     Alcohol use: No     Drug use: No     Sexual activity: Yes     Partners: Female   Other Topics Concern     Parent/sibling w/ CABG, MI or angioplasty before 65F 55M? Not Asked   Social History Narrative     Not on file     Social Determinants of Health     Financial Resource Strain: Not on file   Food Insecurity: Not on file   Transportation Needs: Not on file   Physical Activity: Not on file   Stress: Not on file   Social Connections: Not on file   Intimate Partner Violence: Not on file   Housing Stability: Not on file           Allergies:   Patient has no known allergies.      Leila Curran PA-C  [facility]  Pager: [phone]    Today's clinic visit entailed:  Review of the result(s) of each unique test - coronary angiogram  Prescription drug management  I spent a total of 40 minutes on the day of the visit.   Time spent doing chart review, history and exam, documentation and further activities per the note  Provider  Link to [facility] Help Grid     The level of medical decision making during this visit was of moderate complexity.    Thank you for allowing me to  participate in the care of your patient.      Sincerely,     Leila Curran PA-C     [facility]  cc:   Mark Oneil MD  [id number]  [place]  [place] [address]

## 2022-03-15 NOTE — PROGRESS NOTES
Cardiology Clinic Progress Note    Abhishek Gomez MRN# [id number]   YOB: 1960 Age: 61 year old   Primary cardiologist: Dr. Oneil         Assessment and Plan:     In summary, Abhishek Gomez presents today for follow-up on coronary angiogram, performed for the development of new exertional angina.  This showed a subtotal occlusion of his prior D1 stent, for which medical management has been advised.  Imdur has been initiated with minimal improvement in his symptoms.    1. Stable exertional angina.  2. CAD, s/p PCI to  of ISR prior prox/mid LAD and D1 lesion with 4 stents on 8/17/21.  Angiogram in 3/2022 shows a subtotal occlusion the previously stented ostial D1.  LAD stent is patent, and there is otherwise stable, residual mid RCA lesion which is hemodynamically insignificant, and a 70% ostial moderately sized ramus lesion.  Medical management is recommended unless he develops limiting angina with therapy.  On Lopressor 25 mg twice daily, Imdur 30 mg daily, and DAPT with Brilinta.   3. Hypertension.  Well-controlled.  4. Hyperlipidemia. Well-controlled.   5. Diabetes mellitus.  Well-controlled.  6. CKD-III, with a stable creatinine of 1.2-1.3.  7. Persistent mild anemia.  8. Probable GERD.    Plan:  -Increase Imdur to 60 mg daily.    -Start omeprazole 20 mg daily.  -SL NTG refilled.   -Letter written to avoid working his highly exertional job at [employer] for the next 8 weeks while we get his symptoms under better control.  -Instructed to arrange a visit with his PCP at this time for evaluation of persistent anemia.  -Follow-up with me in 1 month for reassessment.        History of Presenting Illness:      Abhishek Gomez is a pleasant 61 year old patient who presents today for evaluation of chest pain.    He has known coronary artery disease, diabetes, hypertension and hyperlipidemia.  He was evaluated here in 2019 with unstable angina.  A stress test prior to that  evaluation was very abnormal.  He then proceeded to have a coronary angiogram, which revealed high-grade proximal LAD stenosis.  The LAD was stented with a 2.5 mm stent.  The first diagonal was also ballooned at that time.  He developed atypical chest discomfort in late 2019, and a stress test performed at that time showed no inducible ischemia.     In the summer of 2021, he re-developed typical exertional angina.  Stress test showed anteroseptal and apical wall ischemia. Coronary angiogram was subsequently performed on 07/26/2021. This showed an occluded proximal LAD at the site of the prior stent with left-to-left and right-to-left collaterals.  The circumflex and rami were small with moderate disease.  The mid RCA had a focal 50% stenosis. He was referred to CV surgery, however declined bypass, strongly preferring complex PCI. He returned to the cath lab with Dr. Sarah in August, where he underwent successful IVUS-guided  PCI of the ostial, proximal-mid LAD and D1 with MICHELLE x4. There was a hemodynamically insignificant mid RCA lesion (IFR 0.95), and a 70% ostial moderately sized ramus lesion. He was placed on DAPT with Brilinta. TTE from 8/3/21 showed normal biventricular function.    After that, he had done quite well from a cardiac standpoint.  At his last visit with Dr. Oneil in October 2021, his blood pressure was marginal, and therefore his isosorbide was discontinued.     He was recently enrolled in the surpass trial through our clinic, however was then disenrolled due to development of GI symptoms which is a known side effect of the study drug.  When our nurse called to follow-up with him on that symptom, he reported development of exertional chest pain which ultimately prompted coronary angiogram. This took place on March 1, showing subtotal occlusion of previously stented D1.  As this was a bifurcation lesion with 2 layers of stent already, medical management was recommended, with consideration for PCI  "down the road if he continued to have limiting angina despite medical therapy.  The proximal to mid LAD stents were patent. The remainder of his CAD was stable from July 2021. Imdur was re-initiated.     Today, Adrien tells me that he thinks his angina has somewhat improved, but still continues to come on at work, both with exertion and also sometimes after he eats.  GERD may also be playing a role, but the symptoms are quite similar to his exertional angina. He denies shortness of breath, PND, orthopnea, edema, palpitations. BP is well-controlled. Creatinine is stable at 1.2. His most recent lipid profile was in October, showing an LDL of 49, HDL 34, triglycerides 125, Total 108. Of note, recent CBC shows a mild persistent anemia with a hemoglobin of 11.6, hematocrit 35.5.  This was first noted back in August 2021, patient was encouraged to follow-up with his PCP regarding this but has not yet done so. He requests a letter to be off work from [facility] while we work on getting his symptoms under better control, which I provided today. He will continue to work his job at [employer] which is less exertional.          Review of Systems:     12-pt ROS is negative except for as noted in the HPI.          Physical Exam:     Vitals: /69 (BP Location: Right arm, Cuff Size: Adult Regular)   Pulse 73   Ht 1.803 m (5' 11\")   Wt 85.3 kg (188 lb)   SpO2 100%   BMI 26.22 kg/m    Wt Readings from Last 10 Encounters:   03/15/22 85.3 kg (188 lb)   03/01/22 79.4 kg (175 lb)   02/23/22 81.2 kg (179 lb)   01/26/22 82.8 kg (182 lb 9.6 oz)   12/29/21 87.1 kg (192 lb)   12/10/21 89 kg (196 lb 3.4 oz)   12/10/21 89 kg (196 lb 3.2 oz)   10/27/21 85.3 kg (188 lb)   08/31/21 86.9 kg (191 lb 8 oz)   08/18/21 87.5 kg (193 lb)       Constitutional:  Patient is pleasant, alert, cooperative, and in NAD.  HEENT:  NCAT. PERRLA. EOM's intact.   Neck:  CVP appears normal. No carotid bruits.   Pulmonary: Normal respiratory effort. CTAB.   Cardiac: " RRR, normal S1/S2, no S3/S4, no murmur or rub.   Abdomen:  Non-tender abdomen, no hepatosplenomegaly appreciated.   Vascular: Pulses in the upper and lower extremities are 2+ and equal bilaterally.  Extremities: No edema, erythema, cyanosis or tenderness appreciated.  Skin:  No rashes or lesions appreciated.   Neurological:  No gross motor or sensory deficits.   Psych: Appropriate affect.        Data:     Labs reviewed:  Recent Labs   Lab Test 10/25/21  0851 08/17/21  1155 03/02/21  1107 05/13/20  0803 12/26/19  0732 05/09/19  1240 10/31/18  0825   LDL 49 27 46   < >  --    < > 38   HDL 34* 28* 33*   < >  --    < > 37*   NHDL 74 62 75   < >  --    < > 58   CHOL 108 90 108   < >  --    < > 95   TRIG 125 174* 145   < >  --    < > 101   TSH  --   --  6.06*  --  6.39*  --  4.83*    < > = values in this interval not displayed.       Lab Results   Component Value Date    WBC 8.7 03/01/2022    WBC 7.2 05/13/2019    RBC 4.46 03/01/2022    RBC 4.91 05/13/2019    HGB 11.6 (L) 03/01/2022    HGB 13.4 05/13/2019    HCT 35.5 (L) 03/01/2022    HCT 38.6 (L) 05/13/2019    MCV 80 03/01/2022    MCV 79 05/13/2019    MCH 26.0 (L) 03/01/2022    MCH 27.3 05/13/2019    MCHC 32.7 03/01/2022    MCHC 34.7 05/13/2019    RDW 13.2 03/01/2022    RDW 12.7 05/13/2019     03/01/2022     05/13/2019       Lab Results   Component Value Date     03/03/2022     03/02/2021    POTASSIUM 4.2 03/03/2022    POTASSIUM 4.1 03/02/2021    CHLORIDE 108 03/03/2022    CHLORIDE 107 03/02/2021    CO2 28 03/03/2022    CO2 26 03/02/2021    ANIONGAP 1 (L) 03/03/2022    ANIONGAP 6 03/02/2021     (H) 03/03/2022    GLC 90 03/02/2021    BUN 13 03/03/2022    BUN 10 03/02/2021    CR 1.20 03/03/2022    CR 1.09 03/02/2021    GFRESTIMATED 69 03/03/2022    GFRESTIMATED 73 03/02/2021    GFRESTBLACK 85 03/02/2021    KRIS 8.4 (L) 03/03/2022    KRIS 9.3 03/02/2021      Lab Results   Component Value Date    AST 15 07/26/2021    AST 26 03/02/2021    ALT 26  10/25/2021    ALT 35 03/02/2021       Lab Results   Component Value Date    A1C 7.1 (H) 07/26/2021    A1C 7.6 (H) 03/02/2021       Lab Results   Component Value Date    INR 1.06 03/01/2022    INR 1.13 08/17/2021    INR 1.01 05/09/2019           Problem List:     Patient Active Problem List   Diagnosis     Hyperlipidemia LDL goal <70     Essential hypertension, benign     Type 2 diabetes mellitus without complication, without long-term current use of insulin (H)     Unstable angina (H)     NSTEMI (non-ST elevated myocardial infarction) (H)     Coronary artery disease of native artery of native heart with stable angina pectoris (H)     Abnormal stress test     Stable angina (H)     Status post coronary angiogram           Medications:     Current Outpatient Medications   Medication Sig Dispense Refill     aspirin 81 MG EC tablet Take 1 tablet (81 mg) by mouth daily 90 tablet 3     atorvastatin (LIPITOR) 20 MG tablet Take 1 tablet (20 mg) by mouth daily 30 tablet 0     cetirizine (ZYRTEC) 10 MG tablet Take 1 tablet (10 mg) by mouth every evening 10 tablet 0     glimepiride (AMARYL) 2 MG tablet Take 1 tablet (2 mg) by mouth every morning (before breakfast) 30 tablet 0     isosorbide mononitrate (IMDUR) 30 MG 24 hr tablet Take 1 tablet (30 mg) by mouth daily 90 tablet 0     lisinopril (ZESTRIL) 20 MG tablet Take 1 tablet (20 mg) by mouth daily 30 tablet 0     metFORMIN (GLUCOPHAGE) 1000 MG tablet Take 1 tablet (1,000 mg) by mouth 2 times daily (with meals) 60 tablet 0     metoprolol tartrate (LOPRESSOR) 25 MG tablet Take 1 tablet (25 mg) by mouth 2 times daily 180 tablet 3     nitroGLYcerin (NITROSTAT) 0.4 MG sublingual tablet For chest pain place 1 tablet under the tongue every 5 minutes for up to 3 doses. If symptoms persist 5 minutes after 2nd dose call 911. 30 tablet 0     ticagrelor (BRILINTA) 90 MG tablet Take 1 tablet (90 mg) by mouth every 12 hours 90 tablet 3           Past Medical History:     Past Medical  History:   Diagnosis Date     Abnormal stress test 07/14/2021     Coronary artery disease involving native coronary artery of native heart without angina pectoris 07/14/2021     Essential hypertension, benign 05/18/2016     Hyperlipidemia LDL goal <70 03/16/2015     NSTEMI (non-ST elevated myocardial infarction) (H) 05/09/2019    s/p MICHELLE to pLAD     Status post coronary angiogram 07/26/2021    Complex Multivessel CAD. CABG cosult     Type 2 diabetes mellitus without complication, without long-term current use of insulin (H) 07/06/2017     Unstable angina (H) 05/09/2019     Past Surgical History:   Procedure Laterality Date     CV CORONARY ANGIOGRAM N/A 7/26/2021    Procedure: Coronary Angiogram;  Surgeon: Sylvester Westbrook MD;  Location: [facility] CARDIAC CATH LAB     CV CORONARY ANGIOGRAM N/A 3/1/2022    Procedure: Coronary Angiogram;  Surgeon: Sanchez Sarah MD;  Location: [facility] CARDIAC CATH LAB     CV HEART CATHETERIZATION WITH POSSIBLE INTERVENTION N/A 5/9/2019    Procedure: Coronary Angiogram;  Surgeon: Jose Enrique Stanley MD;  Location: [facility] CARDIAC CATH LAB     CV HEART CATHETERIZATION WITH POSSIBLE INTERVENTION N/A 8/17/2021    Procedure: Coronary Angiogram;  Surgeon: Sanchez Sarah MD;  Location: [facility] CARDIAC CATH LAB     CV INSTANTANEOUS WAVE-FREE RATIO N/A 8/17/2021    Procedure: Instantaneous Wave-Free Ratio;  Surgeon: Sanchez Sarah MD;  Location: [facility] CARDIAC CATH LAB     CV INTRAVASULAR ULTRASOUND N/A 8/17/2021    Procedure: Intravascular Ultrasound;  Surgeon: Sanchez Sarah MD;  Location: [facility] CARDIAC CATH LAB     CV LEFT HEART CATH N/A 7/26/2021    Procedure: Left Heart Cath;  Surgeon: Sylvester Westbrook MD;  Location: [facility] CARDIAC CATH LAB     CV PCI ANGIOPLASTY N/A 5/9/2019    Procedure: PCI Angioplasty;  Surgeon: Jose Enrique Stanley MD;  Location: [facility] CARDIAC CATH LAB     CV PCI CHRONIC TOTAL OCCLUSION N/A 8/17/2021    Procedure:  Percutaneous Coronary Intervention of Chronic Total Occlusion;  Surgeon: Sanchez Sarah MD;  Location:  HEART CARDIAC CATH LAB     Family History   Problem Relation Age of Onset     Diabetes Paternal Grandmother      Diabetes Nephew      Social History     Socioeconomic History     Marital status:      Spouse name: Not on file     Number of children: Not on file     Years of education: Not on file     Highest education level: Not on file   Occupational History     Not on file   Tobacco Use     Smoking status: Never Smoker     Smokeless tobacco: Never Used   Substance and Sexual Activity     Alcohol use: No     Drug use: No     Sexual activity: Yes     Partners: Female   Other Topics Concern     Parent/sibling w/ CABG, MI or angioplasty before 65F 55M? Not Asked   Social History Narrative     Not on file     Social Determinants of Health     Financial Resource Strain: Not on file   Food Insecurity: Not on file   Transportation Needs: Not on file   Physical Activity: Not on file   Stress: Not on file   Social Connections: Not on file   Intimate Partner Violence: Not on file   Housing Stability: Not on file           Allergies:   Patient has no known allergies.      Leila Curran PA-C  [facility]  Pager: [phone]    Today's clinic visit entailed:  Review of the result(s) of each unique test - coronary angiogram  Prescription drug management  I spent a total of 40 minutes on the day of the visit.   Time spent doing chart review, history and exam, documentation and further activities per the note  Provider  Link to [facility] Help Grid     The level of medical decision making during this visit was of moderate complexity.

## 2022-03-20 ENCOUNTER — HEALTH MAINTENANCE LETTER (OUTPATIENT)
Age: 62
End: 2022-03-20

## 2022-03-25 ENCOUNTER — OFFICE VISIT (OUTPATIENT)
Dept: CARDIOLOGY | Facility: CLINIC | Age: 62
End: 2022-03-25
Payer: COMMERCIAL

## 2022-03-25 ENCOUNTER — TRANSFERRED RECORDS (OUTPATIENT)
Dept: CARDIOLOGY | Facility: CLINIC | Age: 62
End: 2022-03-25

## 2022-03-25 VITALS — HEART RATE: 88 BPM | DIASTOLIC BLOOD PRESSURE: 73 MMHG | SYSTOLIC BLOOD PRESSURE: 149 MMHG

## 2022-03-25 DIAGNOSIS — E11.65 TYPE 2 DIABETES MELLITUS WITH HYPERGLYCEMIA, UNSPECIFIED WHETHER LONG TERM INSULIN USE (H): ICD-10-CM

## 2022-03-25 DIAGNOSIS — Z00.6 EXAMINATION OF PARTICIPANT OR CONTROL IN CLINICAL RESEARCH: ICD-10-CM

## 2022-03-25 DIAGNOSIS — I25.118 CORONARY ARTERY DISEASE OF NATIVE ARTERY OF NATIVE HEART WITH STABLE ANGINA PECTORIS (H): Primary | ICD-10-CM

## 2022-03-25 PROCEDURE — 99207 PR NO CHARGE NURSE ONLY: CPT

## 2022-03-25 NOTE — PROGRESS NOTES
"SURPASS-CVOT Study [Effect of tirzepatide versus Dulaglutide on Major Cardiovascular Events in Patients with Type 2 Diabetes]    Subject seen for Visit 8 Week 12    Subject queried for adverse events, endpoints and medication changes. All side effects resolved from study medication by 3/9/22. He does report ongoing exertional CP and is reporting to have CP after he eats. He is taking Isosorbide 60 mg QD and Omeprazole 20 mg every day. He is carrying his nitroglycerin with him and I have encouraged him to take it as needed, and reminded him he may get a headache. He is worried about his heart problems. He will remain off study drug and will make a decision of restarting after he sees Cardiology in a couple of weeks.     Labs drawn per protocol and sent to central lab.    Adherence/lifestyle reinforcement done     Subject drug dispensed, Lot Number No drug dispensed  No of pens dispensed at last visit NA  Any Returned medication NA  Date of first dose since last visit: NA  Date of last dose taken since last visit: 02/02/22    Vitals:    03/25/22 0825 03/25/22 0914   BP: (!) 148/78 (!) 149/73   Pulse: 90 88       Waist Circ 39\" 99.6 cm and 39.5\" 100.3 cm  Weight 85 kg    Next visit schedule for phone visit in 2 weeks.    Stephenie Diaz RN  Clinical Trials Nurse    "

## 2022-03-25 NOTE — LETTER
"3/25/2022    Michael Méndez MD  [address] [facility] [address]    RE: Nalinidanaavi PITT Jason       Dear Colleague,     I had the pleasure of seeing Abhishek Vernonguanakitolilia in the [facility].  SURPASS-CVOT Study [Effect of tirzepatide versus Dulaglutide on Major Cardiovascular Events in Patients with Type 2 Diabetes]    Subject seen for Visit 8 Week 12    Subject queried for adverse events, endpoints and medication changes. All side effects resolved from study medication by 3/9/22. He does report ongoing exertional CP and is reporting to have CP after he eats. He is taking Isosorbide 60 mg QD and Omeprazole 20 mg every day. He is carrying his nitroglycerin with him and I have encouraged him to take it as needed, and reminded him he may get a headache. He is worried about his heart problems. He will remain off study drug and will make a decision of restarting after he sees Cardiology in a couple of weeks.     Labs drawn per protocol and sent to central lab.    Adherence/lifestyle reinforcement done     Subject drug dispensed, Lot Number No drug dispensed  No of pens dispensed at last visit NA  Any Returned medication NA  Date of first dose since last visit: NA  Date of last dose taken since last visit: 02/02/22    Vitals:    03/25/22 0825 03/25/22 0914   BP: (!) 148/78 (!) 149/73   Pulse: 90 88       Waist Circ 39\" and 39.5\"    Next visit schedule for phone visit in 2 weeks.    Stephenie Diaz RN  Clinical Trials Nurse    Thank you for allowing me to participate in the care of your patient.      Sincerely,     Stephenie Diaz RN     [facility]  cc:   No referring provider defined for this encounter.        "

## 2022-04-06 ENCOUNTER — VIRTUAL VISIT (OUTPATIENT)
Dept: CARDIOLOGY | Facility: CLINIC | Age: 62
End: 2022-04-06
Payer: COMMERCIAL

## 2022-04-06 ENCOUNTER — OFFICE VISIT (OUTPATIENT)
Dept: INTERNAL MEDICINE | Facility: CLINIC | Age: 62
End: 2022-04-06
Payer: COMMERCIAL

## 2022-04-06 VITALS
HEART RATE: 72 BPM | OXYGEN SATURATION: 99 % | TEMPERATURE: 98.4 F | SYSTOLIC BLOOD PRESSURE: 138 MMHG | DIASTOLIC BLOOD PRESSURE: 74 MMHG

## 2022-04-06 DIAGNOSIS — E11.9 TYPE 2 DIABETES MELLITUS WITHOUT COMPLICATION, WITHOUT LONG-TERM CURRENT USE OF INSULIN (H): Primary | ICD-10-CM

## 2022-04-06 DIAGNOSIS — E11.65 TYPE 2 DIABETES MELLITUS WITH HYPERGLYCEMIA, UNSPECIFIED WHETHER LONG TERM INSULIN USE (H): ICD-10-CM

## 2022-04-06 DIAGNOSIS — I25.118 CORONARY ARTERY DISEASE OF NATIVE ARTERY OF NATIVE HEART WITH STABLE ANGINA PECTORIS (H): Primary | ICD-10-CM

## 2022-04-06 DIAGNOSIS — I25.118 CORONARY ARTERY DISEASE OF NATIVE ARTERY OF NATIVE HEART WITH STABLE ANGINA PECTORIS (H): ICD-10-CM

## 2022-04-06 DIAGNOSIS — E78.5 HYPERLIPIDEMIA LDL GOAL <70: ICD-10-CM

## 2022-04-06 DIAGNOSIS — I10 ESSENTIAL HYPERTENSION, BENIGN: ICD-10-CM

## 2022-04-06 DIAGNOSIS — Z12.5 SCREENING PSA (PROSTATE SPECIFIC ANTIGEN): ICD-10-CM

## 2022-04-06 DIAGNOSIS — Z12.11 COLON CANCER SCREENING: ICD-10-CM

## 2022-04-06 DIAGNOSIS — Z00.6 EXAMINATION OF PARTICIPANT OR CONTROL IN CLINICAL RESEARCH: ICD-10-CM

## 2022-04-06 PROCEDURE — 99214 OFFICE O/P EST MOD 30 MIN: CPT | Performed by: INTERNAL MEDICINE

## 2022-04-06 PROCEDURE — 99207 PR NO CHARGE NURSE ONLY: CPT

## 2022-04-06 RX ORDER — GLIMEPIRIDE 2 MG/1
2 TABLET ORAL
Qty: 30 TABLET | Refills: 5 | Status: SHIPPED | OUTPATIENT
Start: 2022-04-06 | End: 2022-10-18

## 2022-04-06 RX ORDER — LISINOPRIL 20 MG/1
20 TABLET ORAL DAILY
Qty: 30 TABLET | Refills: 5 | Status: SHIPPED | OUTPATIENT
Start: 2022-04-06 | End: 2022-10-18

## 2022-04-06 RX ORDER — ATORVASTATIN CALCIUM 20 MG/1
20 TABLET, FILM COATED ORAL DAILY
Qty: 30 TABLET | Refills: 5 | Status: SHIPPED | OUTPATIENT
Start: 2022-04-06 | End: 2022-10-18

## 2022-04-06 ASSESSMENT — PATIENT HEALTH QUESTIONNAIRE - PHQ9
10. IF YOU CHECKED OFF ANY PROBLEMS, HOW DIFFICULT HAVE THESE PROBLEMS MADE IT FOR YOU TO DO YOUR WORK, TAKE CARE OF THINGS AT HOME, OR GET ALONG WITH OTHER PEOPLE: SOMEWHAT DIFFICULT
SUM OF ALL RESPONSES TO PHQ QUESTIONS 1-9: 6
SUM OF ALL RESPONSES TO PHQ QUESTIONS 1-9: 6

## 2022-04-06 NOTE — PROGRESS NOTES
SURPASS-CVOT Study [Effect of tirzepatide versus Dulaglutide on Major Cardiovascular Events in Patients with Type 2Diabetes]    Subject contacted by telephone to see how he is feeling. He states he is still having chest pain and this is concerning for him. He is seeing cardiology next week.     He has not made a decision as to whether he will restart study due to anginal issues.    Will contact him in 2 weeks.    Stephenie Diaz RN

## 2022-04-06 NOTE — LETTER
4/6/2022    Michael Méndez MD  [address] [facility] [address]    RE: Abhishek Gomez       Dear Colleague,     I had the pleasure of seeing Nalinidanaavi PITT Jason in the [facility].  SURPASS-CVOT Study [Effect of tirzepatide versus Dulaglutide on Major Cardiovascular Events in Patients with Type 2Diabetes]    Subject contacted by telephone to see how he is feeling. He states he is still having chest pain and this is concerning for him. He is seeing cardiology next week.     He has not made a decision as to whether he will restart study due to anginal issues.    Will contact him in 2 weeks.    Stephneie Diaz RN      Thank you for allowing me to participate in the care of your patient.      Sincerely,     Stephenie Diaz RN     [facility]  cc:   No referring provider defined for this encounter.

## 2022-04-06 NOTE — PROGRESS NOTES
"  Assessment & Plan     Type 2 diabetes mellitus without complication, without long-term current use of insulin (H)  Continue with current medication as ordered, recheck labs as scheduled  - metFORMIN (GLUCOPHAGE) 1000 MG tablet; Take 1 tablet (1,000 mg) by mouth 2 times daily (with meals)  - lisinopril (ZESTRIL) 20 MG tablet; Take 1 tablet (20 mg) by mouth daily  - glimepiride (AMARYL) 2 MG tablet; Take 1 tablet (2 mg) by mouth every morning (before breakfast)  - Hemoglobin A1c; Future  - Albumin Random Urine Quantitative with Creat Ratio; Future    Coronary artery disease of native artery of native heart with stable angina pectoris (H)  Following up with cardiology as directed.  Continue with risk reduction  - Lipid Profile; Future    Hyperlipidemia LDL goal <70  Goal LDL discussed with patient  - Lipid Profile; Future    Essential hypertension, benign  Stable on therapy at present time continuing with current medical management.  - lisinopril (ZESTRIL) 20 MG tablet; Take 1 tablet (20 mg) by mouth daily      Screening PSA (prostate specific antigen)  Recommend routine screening  - PSA, screen; Future    Colon cancer screening  Discussed with patient colonoscopy need for update.  It is deferred at present time due to issues of underlying heart    BMI:   Estimated body mass index is 26.22 kg/m  as calculated from the following:    Height as of 3/15/22: 1.803 m (5' 11\").    Weight as of 3/15/22: 85.3 kg (188 lb).   Weight management plan: Discussed healthy diet and exercise guidelines    See Patient Instructions    Vies patient consider updating vaccinations as per recommendations and caregiver    Return in about 2 weeks (around 4/20/2022) for Lab Work appointment, f/u [facility] or primary Cardiologist.    Michael Méndez MD  [facility]    Glen Jurado is a 61 year old who presents for the following health issues  accompanied by his self.    History of Present Illness "       Mental Health Follow-up:                    Today's PHQ-9         PHQ-9 Total Score: 6  PHQ-9 Q9 Thoughts of better off dead/self-harm past 2 weeks :   (P) Not at all    How difficult have these problems made it for you to do your work, take care of things at home, or get along with other people: Somewhat difficult        Diabetes:   He presents for follow up of diabetes.  He is checking home blood glucose a few times a month. He checks blood glucose before meals.  Blood glucose is never over 200 and never under 70. He is aware of hypoglycemia symptoms including shakiness and dizziness. He is concerned about low blood sugar, several less than 70 in the past few weeks.  He is having numbness in feet. The patient has had a diabetic eye exam in the last 12 months. Eye exam performed on February. [facility]        He eats 2-3 servings of fruits and vegetables daily.He exercises with enough effort to increase his heart rate 9 or less minutes per day.  He exercises with enough effort to increase his heart rate 3 or less days per week.   He is taking medications regularly.         Review of Systems   CONSTITUTIONAL: NEGATIVE for fever, chills, change in weight  EYES: NEGATIVE for vision changes or irritation  ENT/MOUTH: NEGATIVE for ear, mouth and throat problems  RESP: NEGATIVE for significant cough or SOB  CV: NEGATIVE for chest pain, palpitations or peripheral edema  GI: NEGATIVE for nausea, abdominal pain, heartburn, or change in bowel habits  : NEGATIVE for frequency, dysuria, or hematuria  MUSCULOSKELETAL: NEGATIVE for significant arthralgias or myalgia  NEURO: NEGATIVE for weakness, dizziness or paresthesias  HEME: NEGATIVE for bleeding problems      Objective    BP (!) 148/74   Pulse 72   Temp 98.4  F (36.9  C)   SpO2 99%   There is no height or weight on file to calculate BMI.  Physical Exam   GENERAL: healthy, alert and no distress  EYES: Eyes grossly normal to inspection,  PERRL and conjunctivae and sclerae normal  HENT: ear canals and TM's normal, nose and mouth without ulcers or lesions  NECK: no adenopathy, no asymmetry, masses, or scars and thyroid normal to palpation  RESP: lungs clear to auscultation - no rales, rhonchi or wheezes  CV: regular rate and rhythm, normal S1 S2, no S3 or S4, no murmur, click or rub, no peripheral edema and peripheral pulses strong  NEURO: Normal strength and tone, mentation intact and speech normal  PSYCH: mentation appears normal, affect normal/bright    Lab Results   Component Value Date    A1C 7.1 07/26/2021    A1C 7.6 03/02/2021    A1C 7.7 12/26/2019    A1C 7.4 10/31/2018    A1C 8.7 06/29/2017    A1C 9.1 05/18/2016       LDL Cholesterol Calculated   Date Value Ref Range Status   10/25/2021 49 <=100 mg/dL Final   03/02/2021 46 <100 mg/dL Final     Comment:     Desirable:       <100 mg/dl     Lab Results   Component Value Date    ALT 26 10/25/2021    ALT 35 03/02/2021       Creatinine   Date Value Ref Range Status   03/03/2022 1.20 0.66 - 1.25 mg/dL Final   03/02/2021 1.09 0.66 - 1.25 mg/dL Final     PSA   Date Value Ref Range Status   03/02/2021 0.54 0 - 4 ug/L Final     Comment:     Assay Method:  Chemiluminescence using Siemens Vista analyzer

## 2022-04-07 ENCOUNTER — LAB (OUTPATIENT)
Dept: LAB | Facility: CLINIC | Age: 62
End: 2022-04-07
Payer: COMMERCIAL

## 2022-04-07 DIAGNOSIS — I25.118 CORONARY ARTERY DISEASE OF NATIVE ARTERY OF NATIVE HEART WITH STABLE ANGINA PECTORIS (H): ICD-10-CM

## 2022-04-07 DIAGNOSIS — E78.5 HYPERLIPIDEMIA LDL GOAL <70: ICD-10-CM

## 2022-04-07 DIAGNOSIS — Z12.5 SCREENING PSA (PROSTATE SPECIFIC ANTIGEN): ICD-10-CM

## 2022-04-07 DIAGNOSIS — E11.9 TYPE 2 DIABETES MELLITUS WITHOUT COMPLICATION, WITHOUT LONG-TERM CURRENT USE OF INSULIN (H): ICD-10-CM

## 2022-04-07 LAB
ALBUMIN SERPL-MCNC: 3.4 G/DL (ref 3.4–5)
ALP SERPL-CCNC: 82 U/L (ref 40–150)
ALT SERPL W P-5'-P-CCNC: 29 U/L (ref 0–70)
AST SERPL W P-5'-P-CCNC: 18 U/L (ref 0–45)
BILIRUB DIRECT SERPL-MCNC: 0.1 MG/DL (ref 0–0.2)
BILIRUB SERPL-MCNC: 0.5 MG/DL (ref 0.2–1.3)
CHOLEST SERPL-MCNC: 100 MG/DL
CREAT UR-MCNC: 157 MG/DL
FASTING STATUS PATIENT QL REPORTED: YES
HBA1C MFR BLD: 6.7 % (ref 0–5.6)
HDLC SERPL-MCNC: 38 MG/DL
LDLC SERPL CALC-MCNC: 39 MG/DL
MICROALBUMIN UR-MCNC: 17 MG/L
MICROALBUMIN/CREAT UR: 10.83 MG/G CR (ref 0–17)
NONHDLC SERPL-MCNC: 62 MG/DL
PROT SERPL-MCNC: 7.5 G/DL (ref 6.8–8.8)
PSA SERPL-MCNC: 0.65 UG/L (ref 0–4)
TRIGL SERPL-MCNC: 115 MG/DL

## 2022-04-07 PROCEDURE — G0103 PSA SCREENING: HCPCS

## 2022-04-07 PROCEDURE — 82043 UR ALBUMIN QUANTITATIVE: CPT

## 2022-04-07 PROCEDURE — 83036 HEMOGLOBIN GLYCOSYLATED A1C: CPT

## 2022-04-07 PROCEDURE — 36415 COLL VENOUS BLD VENIPUNCTURE: CPT

## 2022-04-07 PROCEDURE — 80076 HEPATIC FUNCTION PANEL: CPT

## 2022-04-07 PROCEDURE — 80061 LIPID PANEL: CPT

## 2022-04-07 ASSESSMENT — PATIENT HEALTH QUESTIONNAIRE - PHQ9: SUM OF ALL RESPONSES TO PHQ QUESTIONS 1-9: 6

## 2022-04-12 ENCOUNTER — OFFICE VISIT (OUTPATIENT)
Dept: CARDIOLOGY | Facility: CLINIC | Age: 62
End: 2022-04-12
Payer: COMMERCIAL

## 2022-04-12 VITALS
BODY MASS INDEX: 25.49 KG/M2 | WEIGHT: 182.1 LBS | HEART RATE: 66 BPM | SYSTOLIC BLOOD PRESSURE: 118 MMHG | OXYGEN SATURATION: 99 % | HEIGHT: 71 IN | DIASTOLIC BLOOD PRESSURE: 64 MMHG

## 2022-04-12 DIAGNOSIS — I25.118 CORONARY ARTERY DISEASE OF NATIVE ARTERY OF NATIVE HEART WITH STABLE ANGINA PECTORIS (H): ICD-10-CM

## 2022-04-12 PROCEDURE — 99214 OFFICE O/P EST MOD 30 MIN: CPT | Performed by: PHYSICIAN ASSISTANT

## 2022-04-12 NOTE — PATIENT INSTRUCTIONS
Today's Plan:   No changes today. I am glad you're feeling better. If your pain starts to occur more frequently in the future, let me know, and we will increase your Imdur further.     In the meantime, for episodes of pain that don't resolve quickly and spontaneously, you can always use the sublingual nitroglycerin.     If you have questions or concerns please call my nurse team at [phone].   Scheduling phone number: [phone]  For after hours urgent concerns call [phone] option 2.   Reminder: Please bring in all current medications, over the counter supplements and vitamin bottles to your next appointment.    It was a pleasure seeing you today!     Leila Curran PA-C

## 2022-04-12 NOTE — LETTER
4/12/2022    Michael Méndez MD  [address] [facility] [address]    RE: Abhishek Gomez       Dear Colleague,     I had the pleasure of seeing Abhishek Gomez in the [facility].    Cardiology Clinic Progress Note    Abhishek Gomez MRN# [id number]   YOB: 1960 Age: 61 year old   Primary cardiologist: Dr. Oneil / Dr. Sarah         Assessment and Plan:     In summary, Abhishek Gomez presents today for follow-up on stable angina, after up titration of Imdur and initiation of omeprazole.  With this, his symptoms have significantly improved.    1. CAD, with stable exertional angina.    -- s/p PCI to  of ISR prior prox/mid LAD and D1 lesion with 4 stents on 8/17/21.     -- Angiogram in 3/2022 shows a subtotal occlusion the previously stented ostial D1.  LAD stent is patent, and there is otherwise stable, residual mid RCA lesion which is hemodynamically insignificant, and a 70% ostial moderately sized ramus lesion.  Medical management is recommended unless he develops limiting angina with therapy.     -- Currently, his symptoms are well controlled on Lopressor 25 mg twice daily, Imdur 60 mg daily.    -- On DAPT with Brilinta.   2. Hypertension.  Well-controlled.  3. Hyperlipidemia. Well-controlled.   4. Diabetes mellitus.  Well-controlled.  5. CKD-III, with a stable creatinine of 1.2-1.3.  6. Persistent mild anemia.  Instructed to follow-up with PCP regarding this.  7. Probable GERD.  Now on PPI.    Plan:  - No changes.  Asked him to notify me if his symptoms increase in frequency in the future, which would prompt increasing the dose of his Imdur further.  Otherwise, we will plan to see him back in a couple months in clinic. He is comfortable with this plan.         History of Presenting Illness:      Abhishek Gomez is a pleasant 61 year old patient who presents today for evaluation of chest pain.    He has known coronary  artery disease, diabetes, hypertension and hyperlipidemia.  He was evaluated here in 2019 with unstable angina.  A stress test prior to that evaluation was very abnormal.  He then proceeded to have a coronary angiogram, which revealed high-grade proximal LAD stenosis.  The LAD was stented with a 2.5 mm stent.  The first diagonal was also ballooned at that time.  He developed atypical chest discomfort in late 2019, and a stress test performed at that time showed no inducible ischemia.     In the summer of 2021, he re-developed typical exertional angina.  Stress test showed anteroseptal and apical wall ischemia. Coronary angiogram was subsequently performed on 07/26/2021. This showed an occluded proximal LAD at the site of the prior stent with left-to-left and right-to-left collaterals.  The circumflex and rami were small with moderate disease.  The mid RCA had a focal 50% stenosis. He was referred to CV surgery, however declined bypass, strongly preferring complex PCI. He returned to the cath lab with Dr. Sarah in August, where he underwent successful IVUS-guided  PCI of the ostial, proximal-mid LAD and D1 with MICHELLE x4. There was a hemodynamically insignificant mid RCA lesion (IFR 0.95), and a 70% ostial moderately sized ramus lesion. He was placed on DAPT with Brilinta. TTE from 8/3/21 showed normal biventricular function.    After that, he had done quite well from a cardiac standpoint.  At his last visit with Dr. Oneil in October 2021, his blood pressure was marginal, and therefore his isosorbide was discontinued.     He was recently enrolled in the surpass trial through our clinic, however was then disenrolled due to development of GI symptoms which is a known side effect of the study drug.  When our nurse called to follow-up with him on that symptom, he reported development of exertional chest pain which ultimately prompted coronary angiogram. This took place on March 1, showing subtotal occlusion of previously  "stented D1.  As this was a bifurcation lesion with 2 layers of stent already, medical management was recommended, with consideration for PCI down the road if he continued to have limiting angina despite medical therapy.  The proximal to mid LAD stents were patent. The remainder of his CAD was stable from July 2021. Imdur was re-initiated.     His most recent lipid profile was in October, showing an LDL of 49, HDL 34, triglycerides 125, Total 108. Of note, recent CBC shows a mild persistent anemia with a hemoglobin of 11.6, hematocrit 35.5.  This was first noted back in August 2021, patient was encouraged to follow-up with his PCP regarding this but has not yet done so.     When I saw him after that, Adrien felt that his angina had perhaps slightly improved, but would still come on predictably with heavy exertion at work and also sometimes after eating.  I increase his Imdur to 60 mg daily, and also initiated omeprazole.  I wrote a letter for avoidance of work at his more exertional job at [employer] while we worked to get his symptoms under better control.    Today, Adrien returns to clinic stating his symptoms have significantly improved. He developed angina only a few times since I saw him last, and rates the level of discomfort a 1/10 in severity, lasting maybe five minutes in duration. He denies shortness of breath, PND, orthopnea, edema, palpitations. BP is well-controlled.  He denies any medication side effects.         Review of Systems:     12-pt ROS is negative except for as noted in the HPI.          Physical Exam:     Vitals: /64 (BP Location: Right arm, Patient Position: Sitting)   Pulse 66   Ht 1.803 m (5' 11\")   Wt 82.6 kg (182 lb 1.6 oz)   SpO2 99%   BMI 25.40 kg/m    Wt Readings from Last 10 Encounters:   04/12/22 82.6 kg (182 lb 1.6 oz)   03/15/22 85.3 kg (188 lb)   03/01/22 79.4 kg (175 lb)   02/23/22 81.2 kg (179 lb)   01/26/22 82.8 kg (182 lb 9.6 oz)   12/29/21 87.1 kg (192 lb)   12/10/21 89 " kg (196 lb 3.4 oz)   12/10/21 89 kg (196 lb 3.2 oz)   10/27/21 85.3 kg (188 lb)   08/31/21 86.9 kg (191 lb 8 oz)       Constitutional:  Patient is pleasant, alert, cooperative, and in NAD.  HEENT:  NCAT. PERRLA. EOM's intact.   Neck:  CVP appears normal. No carotid bruits.   Pulmonary: Normal respiratory effort. CTAB.   Cardiac: RRR, normal S1/S2, no S3/S4, no murmur or rub.   Abdomen:  Non-tender abdomen, no hepatosplenomegaly appreciated.   Vascular: Pulses in the upper and lower extremities are 2+ and equal bilaterally.  Extremities: No edema, erythema, cyanosis or tenderness appreciated.  Skin:  No rashes or lesions appreciated.   Neurological:  No gross motor or sensory deficits.   Psych: Appropriate affect.        Data:     Labs reviewed:  Recent Labs   Lab Test 04/07/22  0934 10/25/21  0851 08/17/21  1155 03/02/21  1107 05/13/20  0803 12/26/19  0732 05/09/19  1240 10/31/18  0825   LDL 39 49 27 46   < >  --    < > 38   HDL 38* 34* 28* 33*   < >  --    < > 37*   NHDL 62 74 62 75   < >  --    < > 58   CHOL 100 108 90 108   < >  --    < > 95   TRIG 115 125 174* 145   < >  --    < > 101   TSH  --   --   --  6.06*  --  6.39*  --  4.83*    < > = values in this interval not displayed.       Lab Results   Component Value Date    WBC 8.7 03/01/2022    WBC 7.2 05/13/2019    RBC 4.46 03/01/2022    RBC 4.91 05/13/2019    HGB 11.6 (L) 03/01/2022    HGB 13.4 05/13/2019    HCT 35.5 (L) 03/01/2022    HCT 38.6 (L) 05/13/2019    MCV 80 03/01/2022    MCV 79 05/13/2019    MCH 26.0 (L) 03/01/2022    MCH 27.3 05/13/2019    MCHC 32.7 03/01/2022    MCHC 34.7 05/13/2019    RDW 13.2 03/01/2022    RDW 12.7 05/13/2019     03/01/2022     05/13/2019       Lab Results   Component Value Date     03/03/2022     03/02/2021    POTASSIUM 4.2 03/03/2022    POTASSIUM 4.1 03/02/2021    CHLORIDE 108 03/03/2022    CHLORIDE 107 03/02/2021    CO2 28 03/03/2022    CO2 26 03/02/2021    ANIONGAP 1 (L) 03/03/2022    ANIONGAP 6  03/02/2021     (H) 03/03/2022    GLC 90 03/02/2021    BUN 13 03/03/2022    BUN 10 03/02/2021    CR 1.20 03/03/2022    CR 1.09 03/02/2021    GFRESTIMATED 69 03/03/2022    GFRESTIMATED 73 03/02/2021    GFRESTBLACK 85 03/02/2021    KRIS 8.4 (L) 03/03/2022    KRIS 9.3 03/02/2021      Lab Results   Component Value Date    AST 18 04/07/2022    AST 26 03/02/2021    ALT 29 04/07/2022    ALT 35 03/02/2021       Lab Results   Component Value Date    A1C 6.7 (H) 04/07/2022    A1C 7.6 (H) 03/02/2021       Lab Results   Component Value Date    INR 1.06 03/01/2022    INR 1.13 08/17/2021    INR 1.01 05/09/2019           Problem List:     Patient Active Problem List   Diagnosis     Hyperlipidemia LDL goal <70     Essential hypertension, benign     Type 2 diabetes mellitus without complication, without long-term current use of insulin (H)     Unstable angina (H)     NSTEMI (non-ST elevated myocardial infarction) (H)     Coronary artery disease of native artery of native heart with stable angina pectoris (H)     Abnormal stress test     Stable angina (H)     Status post coronary angiogram           Medications:     Current Outpatient Medications   Medication Sig Dispense Refill     isosorbide mononitrate (IMDUR) 60 MG 24 hr tablet Take 1 tablet (60 mg) by mouth daily 90 tablet 3     lisinopril (ZESTRIL) 20 MG tablet Take 1 tablet (20 mg) by mouth daily 30 tablet 5     metFORMIN (GLUCOPHAGE) 1000 MG tablet Take 1 tablet (1,000 mg) by mouth 2 times daily (with meals) 60 tablet 5     metoprolol tartrate (LOPRESSOR) 25 MG tablet Take 1 tablet (25 mg) by mouth 2 times daily 180 tablet 3     nitroGLYcerin (NITROSTAT) 0.4 MG sublingual tablet For chest pain place 1 tablet under the tongue every 5 minutes for up to 3 doses. If symptoms persist 5 minutes after 2nd dose call 911. 30 tablet 0     omeprazole (PRILOSEC) 20 MG DR capsule Take 1 capsule (20 mg) by mouth daily 90 capsule 3     ticagrelor (BRILINTA) 90 MG tablet Take 1 tablet  (90 mg) by mouth every 12 hours 180 tablet 3     aspirin 81 MG EC tablet Take 1 tablet (81 mg) by mouth daily 90 tablet 3     atorvastatin (LIPITOR) 20 MG tablet Take 1 tablet (20 mg) by mouth daily 30 tablet 5     cetirizine (ZYRTEC) 10 MG tablet Take 1 tablet (10 mg) by mouth every evening 10 tablet 0     glimepiride (AMARYL) 2 MG tablet Take 1 tablet (2 mg) by mouth every morning (before breakfast) 30 tablet 5           Past Medical History:     Past Medical History:   Diagnosis Date     Abnormal stress test 07/14/2021     Coronary artery disease involving native coronary artery of native heart without angina pectoris 07/14/2021     Essential hypertension, benign 05/18/2016     Hyperlipidemia LDL goal <70 03/16/2015     NSTEMI (non-ST elevated myocardial infarction) (H) 05/09/2019    s/p MICHELLE to pLAD     Status post coronary angiogram 07/26/2021    Complex Multivessel CAD. CABG cosult     Type 2 diabetes mellitus without complication, without long-term current use of insulin (H) 07/06/2017     Unstable angina (H) 05/09/2019     Past Surgical History:   Procedure Laterality Date     CV CORONARY ANGIOGRAM N/A 7/26/2021    Procedure: Coronary Angiogram;  Surgeon: Sylvester Westbrook MD;  Location: [facility] CARDIAC CATH LAB     CV CORONARY ANGIOGRAM N/A 3/1/2022    Procedure: Coronary Angiogram;  Surgeon: Sanchez Sarah MD;  Location: [facility] CARDIAC CATH LAB     CV HEART CATHETERIZATION WITH POSSIBLE INTERVENTION N/A 5/9/2019    Procedure: Coronary Angiogram;  Surgeon: Jose Enrique Stanley MD;  Location: [facility] CARDIAC CATH LAB     CV HEART CATHETERIZATION WITH POSSIBLE INTERVENTION N/A 8/17/2021    Procedure: Coronary Angiogram;  Surgeon: Sanchez Sarah MD;  Location: [facility] CARDIAC CATH LAB     CV INSTANTANEOUS WAVE-FREE RATIO N/A 8/17/2021    Procedure: Instantaneous Wave-Free Ratio;  Surgeon: Sanchez Sarah MD;  Location: [facility] CARDIAC CATH LAB     CV INTRAVASULAR ULTRASOUND N/A 8/17/2021     Procedure: Intravascular Ultrasound;  Surgeon: Sanchez Sarah MD;  Location:  HEART CARDIAC CATH LAB     CV LEFT HEART CATH N/A 7/26/2021    Procedure: Left Heart Cath;  Surgeon: Sylvester Westbrook MD;  Location:  HEART CARDIAC CATH LAB     CV PCI ANGIOPLASTY N/A 5/9/2019    Procedure: PCI Angioplasty;  Surgeon: Jose Enrique Stanley MD;  Location:  HEART CARDIAC CATH LAB     CV PCI CHRONIC TOTAL OCCLUSION N/A 8/17/2021    Procedure: Percutaneous Coronary Intervention of Chronic Total Occlusion;  Surgeon: Sanchez Sarah MD;  Location:  HEART CARDIAC CATH LAB     Family History   Problem Relation Age of Onset     Diabetes Paternal Grandmother      Diabetes Nephew      Social History     Socioeconomic History     Marital status:      Spouse name: Not on file     Number of children: Not on file     Years of education: Not on file     Highest education level: Not on file   Occupational History     Not on file   Tobacco Use     Smoking status: Never Smoker     Smokeless tobacco: Never Used   Substance and Sexual Activity     Alcohol use: No     Drug use: No     Sexual activity: Yes     Partners: Female   Other Topics Concern     Parent/sibling w/ CABG, MI or angioplasty before 65F 55M? Not Asked   Social History Narrative     Not on file     Social Determinants of Health     Financial Resource Strain: Not on file   Food Insecurity: Not on file   Transportation Needs: Not on file   Physical Activity: Not on file   Stress: Not on file   Social Connections: Not on file   Intimate Partner Violence: Not on file   Housing Stability: Not on file           Allergies:   Patient has no known allergies.      ARMOND Galarza [facility]  Pager: [phone]      Thank you for allowing me to participate in the care of your patient.      Sincerely,     ARMOND Galazra [facility]  cc:   Leila Curran,  ARMOND  [address]  [place] [id number]

## 2022-04-12 NOTE — PROGRESS NOTES
Cardiology Clinic Progress Note    Abhishek Gomez MRN# [id number]   YOB: 1960 Age: 61 year old   Primary cardiologist: Dr. Oneil / Dr. Sarah         Assessment and Plan:     In summary, Abhishek Gomez presents today for follow-up on stable angina, after up titration of Imdur and initiation of omeprazole.  With this, his symptoms have significantly improved.    1. CAD, with stable exertional angina.    -- s/p PCI to  of ISR prior prox/mid LAD and D1 lesion with 4 stents on 8/17/21.     -- Angiogram in 3/2022 shows a subtotal occlusion the previously stented ostial D1.  LAD stent is patent, and there is otherwise stable, residual mid RCA lesion which is hemodynamically insignificant, and a 70% ostial moderately sized ramus lesion.  Medical management is recommended unless he develops limiting angina with therapy.     -- Currently, his symptoms are well controlled on Lopressor 25 mg twice daily, Imdur 60 mg daily.    -- On DAPT with Brilinta.   2. Hypertension.  Well-controlled.  3. Hyperlipidemia. Well-controlled.   4. Diabetes mellitus.  Well-controlled.  5. CKD-III, with a stable creatinine of 1.2-1.3.  6. Persistent mild anemia.  Instructed to follow-up with PCP regarding this.  7. Probable GERD.  Now on PPI.    Plan:  - No changes.  Asked him to notify me if his symptoms increase in frequency in the future, which would prompt increasing the dose of his Imdur further.  Otherwise, we will plan to see him back in a couple months in clinic. He is comfortable with this plan.         History of Presenting Illness:      Abhishek Gomez is a pleasant 61 year old patient who presents today for evaluation of chest pain.    He has known coronary artery disease, diabetes, hypertension and hyperlipidemia.  He was evaluated here in 2019 with unstable angina.  A stress test prior to that evaluation was very abnormal.  He then proceeded to have a coronary angiogram, which  revealed high-grade proximal LAD stenosis.  The LAD was stented with a 2.5 mm stent.  The first diagonal was also ballooned at that time.  He developed atypical chest discomfort in late 2019, and a stress test performed at that time showed no inducible ischemia.     In the summer of 2021, he re-developed typical exertional angina.  Stress test showed anteroseptal and apical wall ischemia. Coronary angiogram was subsequently performed on 07/26/2021. This showed an occluded proximal LAD at the site of the prior stent with left-to-left and right-to-left collaterals.  The circumflex and rami were small with moderate disease.  The mid RCA had a focal 50% stenosis. He was referred to CV surgery, however declined bypass, strongly preferring complex PCI. He returned to the cath lab with Dr. Sarah in August, where he underwent successful IVUS-guided  PCI of the ostial, proximal-mid LAD and D1 with MICHELLE x4. There was a hemodynamically insignificant mid RCA lesion (IFR 0.95), and a 70% ostial moderately sized ramus lesion. He was placed on DAPT with Brilinta. TTE from 8/3/21 showed normal biventricular function.    After that, he had done quite well from a cardiac standpoint.  At his last visit with Dr. Oneil in October 2021, his blood pressure was marginal, and therefore his isosorbide was discontinued.     He was recently enrolled in the surpass trial through our clinic, however was then disenrolled due to development of GI symptoms which is a known side effect of the study drug.  When our nurse called to follow-up with him on that symptom, he reported development of exertional chest pain which ultimately prompted coronary angiogram. This took place on March 1, showing subtotal occlusion of previously stented D1.  As this was a bifurcation lesion with 2 layers of stent already, medical management was recommended, with consideration for PCI down the road if he continued to have limiting angina despite medical therapy.  The  "proximal to mid LAD stents were patent. The remainder of his CAD was stable from July 2021. Imdur was re-initiated.     His most recent lipid profile was in October, showing an LDL of 49, HDL 34, triglycerides 125, Total 108. Of note, recent CBC shows a mild persistent anemia with a hemoglobin of 11.6, hematocrit 35.5.  This was first noted back in August 2021, patient was encouraged to follow-up with his PCP regarding this but has not yet done so.     When I saw him after that, Adrien felt that his angina had perhaps slightly improved, but would still come on predictably with heavy exertion at work and also sometimes after eating.  I increase his Imdur to 60 mg daily, and also initiated omeprazole.  I wrote a letter for avoidance of work at his more exertional job at [employer] while we worked to get his symptoms under better control.    Today, Adrien returns to clinic stating his symptoms have significantly improved. He developed angina only a few times since I saw him last, and rates the level of discomfort a 1/10 in severity, lasting maybe five minutes in duration. He denies shortness of breath, PND, orthopnea, edema, palpitations. BP is well-controlled.  He denies any medication side effects.         Review of Systems:     12-pt ROS is negative except for as noted in the HPI.          Physical Exam:     Vitals: /64 (BP Location: Right arm, Patient Position: Sitting)   Pulse 66   Ht 1.803 m (5' 11\")   Wt 82.6 kg (182 lb 1.6 oz)   SpO2 99%   BMI 25.40 kg/m    Wt Readings from Last 10 Encounters:   04/12/22 82.6 kg (182 lb 1.6 oz)   03/15/22 85.3 kg (188 lb)   03/01/22 79.4 kg (175 lb)   02/23/22 81.2 kg (179 lb)   01/26/22 82.8 kg (182 lb 9.6 oz)   12/29/21 87.1 kg (192 lb)   12/10/21 89 kg (196 lb 3.4 oz)   12/10/21 89 kg (196 lb 3.2 oz)   10/27/21 85.3 kg (188 lb)   08/31/21 86.9 kg (191 lb 8 oz)       Constitutional:  Patient is pleasant, alert, cooperative, and in NAD.  HEENT:  NCAT. PERRLA. EOM's " intact.   Neck:  CVP appears normal. No carotid bruits.   Pulmonary: Normal respiratory effort. CTAB.   Cardiac: RRR, normal S1/S2, no S3/S4, no murmur or rub.   Abdomen:  Non-tender abdomen, no hepatosplenomegaly appreciated.   Vascular: Pulses in the upper and lower extremities are 2+ and equal bilaterally.  Extremities: No edema, erythema, cyanosis or tenderness appreciated.  Skin:  No rashes or lesions appreciated.   Neurological:  No gross motor or sensory deficits.   Psych: Appropriate affect.        Data:     Labs reviewed:  Recent Labs   Lab Test 04/07/22  0934 10/25/21  0851 08/17/21  1155 03/02/21  1107 05/13/20  0803 12/26/19  0732 05/09/19  1240 10/31/18  0825   LDL 39 49 27 46   < >  --    < > 38   HDL 38* 34* 28* 33*   < >  --    < > 37*   NHDL 62 74 62 75   < >  --    < > 58   CHOL 100 108 90 108   < >  --    < > 95   TRIG 115 125 174* 145   < >  --    < > 101   TSH  --   --   --  6.06*  --  6.39*  --  4.83*    < > = values in this interval not displayed.       Lab Results   Component Value Date    WBC 8.7 03/01/2022    WBC 7.2 05/13/2019    RBC 4.46 03/01/2022    RBC 4.91 05/13/2019    HGB 11.6 (L) 03/01/2022    HGB 13.4 05/13/2019    HCT 35.5 (L) 03/01/2022    HCT 38.6 (L) 05/13/2019    MCV 80 03/01/2022    MCV 79 05/13/2019    MCH 26.0 (L) 03/01/2022    MCH 27.3 05/13/2019    MCHC 32.7 03/01/2022    MCHC 34.7 05/13/2019    RDW 13.2 03/01/2022    RDW 12.7 05/13/2019     03/01/2022     05/13/2019       Lab Results   Component Value Date     03/03/2022     03/02/2021    POTASSIUM 4.2 03/03/2022    POTASSIUM 4.1 03/02/2021    CHLORIDE 108 03/03/2022    CHLORIDE 107 03/02/2021    CO2 28 03/03/2022    CO2 26 03/02/2021    ANIONGAP 1 (L) 03/03/2022    ANIONGAP 6 03/02/2021     (H) 03/03/2022    GLC 90 03/02/2021    BUN 13 03/03/2022    BUN 10 03/02/2021    CR 1.20 03/03/2022    CR 1.09 03/02/2021    GFRESTIMATED 69 03/03/2022    GFRESTIMATED 73 03/02/2021    GFRESTBLACK 85  03/02/2021    KRIS 8.4 (L) 03/03/2022    KRIS 9.3 03/02/2021      Lab Results   Component Value Date    AST 18 04/07/2022    AST 26 03/02/2021    ALT 29 04/07/2022    ALT 35 03/02/2021       Lab Results   Component Value Date    A1C 6.7 (H) 04/07/2022    A1C 7.6 (H) 03/02/2021       Lab Results   Component Value Date    INR 1.06 03/01/2022    INR 1.13 08/17/2021    INR 1.01 05/09/2019           Problem List:     Patient Active Problem List   Diagnosis     Hyperlipidemia LDL goal <70     Essential hypertension, benign     Type 2 diabetes mellitus without complication, without long-term current use of insulin (H)     Unstable angina (H)     NSTEMI (non-ST elevated myocardial infarction) (H)     Coronary artery disease of native artery of native heart with stable angina pectoris (H)     Abnormal stress test     Stable angina (H)     Status post coronary angiogram           Medications:     Current Outpatient Medications   Medication Sig Dispense Refill     isosorbide mononitrate (IMDUR) 60 MG 24 hr tablet Take 1 tablet (60 mg) by mouth daily 90 tablet 3     lisinopril (ZESTRIL) 20 MG tablet Take 1 tablet (20 mg) by mouth daily 30 tablet 5     metFORMIN (GLUCOPHAGE) 1000 MG tablet Take 1 tablet (1,000 mg) by mouth 2 times daily (with meals) 60 tablet 5     metoprolol tartrate (LOPRESSOR) 25 MG tablet Take 1 tablet (25 mg) by mouth 2 times daily 180 tablet 3     nitroGLYcerin (NITROSTAT) 0.4 MG sublingual tablet For chest pain place 1 tablet under the tongue every 5 minutes for up to 3 doses. If symptoms persist 5 minutes after 2nd dose call 911. 30 tablet 0     omeprazole (PRILOSEC) 20 MG DR capsule Take 1 capsule (20 mg) by mouth daily 90 capsule 3     ticagrelor (BRILINTA) 90 MG tablet Take 1 tablet (90 mg) by mouth every 12 hours 180 tablet 3     aspirin 81 MG EC tablet Take 1 tablet (81 mg) by mouth daily 90 tablet 3     atorvastatin (LIPITOR) 20 MG tablet Take 1 tablet (20 mg) by mouth daily 30 tablet 5      cetirizine (ZYRTEC) 10 MG tablet Take 1 tablet (10 mg) by mouth every evening 10 tablet 0     glimepiride (AMARYL) 2 MG tablet Take 1 tablet (2 mg) by mouth every morning (before breakfast) 30 tablet 5           Past Medical History:     Past Medical History:   Diagnosis Date     Abnormal stress test 07/14/2021     Coronary artery disease involving native coronary artery of native heart without angina pectoris 07/14/2021     Essential hypertension, benign 05/18/2016     Hyperlipidemia LDL goal <70 03/16/2015     NSTEMI (non-ST elevated myocardial infarction) (H) 05/09/2019    s/p MICHELLE to pLAD     Status post coronary angiogram 07/26/2021    Complex Multivessel CAD. CABG cosult     Type 2 diabetes mellitus without complication, without long-term current use of insulin (H) 07/06/2017     Unstable angina (H) 05/09/2019     Past Surgical History:   Procedure Laterality Date     CV CORONARY ANGIOGRAM N/A 7/26/2021    Procedure: Coronary Angiogram;  Surgeon: Sylvester Westbrook MD;  Location: [facility] CARDIAC CATH LAB     CV CORONARY ANGIOGRAM N/A 3/1/2022    Procedure: Coronary Angiogram;  Surgeon: Sanchez Sarah MD;  Location:  HEART CARDIAC CATH LAB     CV HEART CATHETERIZATION WITH POSSIBLE INTERVENTION N/A 5/9/2019    Procedure: Coronary Angiogram;  Surgeon: Jose Enrique Stanley MD;  Location: [facility] CARDIAC CATH LAB     CV HEART CATHETERIZATION WITH POSSIBLE INTERVENTION N/A 8/17/2021    Procedure: Coronary Angiogram;  Surgeon: Sanchez Sarah MD;  Location:  HEART CARDIAC CATH LAB     CV INSTANTANEOUS WAVE-FREE RATIO N/A 8/17/2021    Procedure: Instantaneous Wave-Free Ratio;  Surgeon: Sanchez Sarah MD;  Location: [facility] CARDIAC CATH LAB     CV INTRAVASULAR ULTRASOUND N/A 8/17/2021    Procedure: Intravascular Ultrasound;  Surgeon: Sanchez Sarah MD;  Location: [facility] CARDIAC CATH LAB     CV LEFT HEART CATH N/A 7/26/2021    Procedure: Left Heart Cath;  Surgeon: Sylvester Westbrook MD;   Location:  HEART CARDIAC CATH LAB     CV PCI ANGIOPLASTY N/A 5/9/2019    Procedure: PCI Angioplasty;  Surgeon: Jose Enrique Stanley MD;  Location:  HEART CARDIAC CATH LAB     CV PCI CHRONIC TOTAL OCCLUSION N/A 8/17/2021    Procedure: Percutaneous Coronary Intervention of Chronic Total Occlusion;  Surgeon: Sanchez Sarah MD;  Location:  HEART CARDIAC CATH LAB     Family History   Problem Relation Age of Onset     Diabetes Paternal Grandmother      Diabetes Nephew      Social History     Socioeconomic History     Marital status:      Spouse name: Not on file     Number of children: Not on file     Years of education: Not on file     Highest education level: Not on file   Occupational History     Not on file   Tobacco Use     Smoking status: Never Smoker     Smokeless tobacco: Never Used   Substance and Sexual Activity     Alcohol use: No     Drug use: No     Sexual activity: Yes     Partners: Female   Other Topics Concern     Parent/sibling w/ CABG, MI or angioplasty before 65F 55M? Not Asked   Social History Narrative     Not on file     Social Determinants of Health     Financial Resource Strain: Not on file   Food Insecurity: Not on file   Transportation Needs: Not on file   Physical Activity: Not on file   Stress: Not on file   Social Connections: Not on file   Intimate Partner Violence: Not on file   Housing Stability: Not on file           Allergies:   Patient has no known allergies.      Leila Curran PA-C  [facility]  Pager: [phone]

## 2022-04-14 NOTE — PROGRESS NOTES
Research lab work from March 25 not clinically significant, abnormalities that are not clinically significant include creatinine of 1.36, mildly increased , glucose of 217 with a hemoglobin A1c of 7.0.  LF

## 2022-04-20 ENCOUNTER — OFFICE VISIT (OUTPATIENT)
Dept: CARDIOLOGY | Facility: CLINIC | Age: 62
End: 2022-04-20
Payer: COMMERCIAL

## 2022-04-20 VITALS
SYSTOLIC BLOOD PRESSURE: 110 MMHG | HEIGHT: 71 IN | BODY MASS INDEX: 25.48 KG/M2 | DIASTOLIC BLOOD PRESSURE: 68 MMHG | HEART RATE: 71 BPM | WEIGHT: 182 LBS

## 2022-04-20 DIAGNOSIS — E11.65 TYPE 2 DIABETES MELLITUS WITH HYPERGLYCEMIA, UNSPECIFIED WHETHER LONG TERM INSULIN USE (H): ICD-10-CM

## 2022-04-20 DIAGNOSIS — Z00.6 EXAMINATION OF PARTICIPANT OR CONTROL IN CLINICAL RESEARCH: ICD-10-CM

## 2022-04-20 DIAGNOSIS — I25.118 CORONARY ARTERY DISEASE OF NATIVE ARTERY OF NATIVE HEART WITH STABLE ANGINA PECTORIS (H): Primary | ICD-10-CM

## 2022-04-20 PROCEDURE — 99207 PR NO CHARGE NURSE ONLY: CPT

## 2022-04-20 NOTE — PROGRESS NOTES
"SURPASS-CVOT Study [Effect of tirzepatide versus Dulaglutide on Major Cardiovascular Events in Patients with Type 2Diabetes]    Subject seen for Visit Week 16      Subject queried for adverse events, endpoints and medication changes. No AE's, continues to have mild chest pain noted more so at work. He is  taking 60 mg of Isosorbide and has been on Omeprazole 20 mg, states still has some GERD symptoms mike after eating. Encouraged to let his MD know this.    Adherence/lifestyle reinforcement done     Vital signs were done after 5 minutes resting in a seated position - two readings taken one minute apart using automated cuff.    Vitals:    04/20/22 0834 04/20/22 0840   BP: 131/71 110/68   BP Location:  Left arm   Patient Position:  Sitting   Cuff Size:  Adult Regular   Pulse: 74 71   Weight: 82.6 kg (182 lb)    Height: 1.803 m (5' 11\")        Patient has been off study IP since since 2/9/2022. Last dose 2/2/2022.  He is trying t decide whether he will restart study IP. He has an appt with Dr Sarah June 30th and will discuss with him. I told him he can certainly restart if he wants to or may remain off study IP.     Follow-up 2 weeks for Phone visit.    Stephenie Diaz RN  "

## 2022-04-20 NOTE — LETTER
"4/20/2022    Michael Méndez MD  [address] [facility] [id number]    RE: Abhishek SWEETIE Jason       Dear Colleague,     I had the pleasure of seeing Nalinidanaavi PITT Jason in the [facility].  SURPASS-CVOT Study [Effect of tirzepatide versus Dulaglutide on Major Cardiovascular Events in Patients with Type 2Diabetes]    Subject seen for Visit Week 16      Subject queried for adverse events, endpoints and medication changes. No AE's, continues to have mild chest pain noted more so at work. He is  taking 60 mg of Isosorbide and has been on Omeprazole 20 mg, states still has some GERD symptoms mike after eating. Encouraged to let his MD know this.    Adherence/lifestyle reinforcement done     Vital signs were done after 5 minutes resting in a seated position - two readings taken one minute apart using automated cuff.    Vitals:    04/20/22 0834 04/20/22 0840   BP: 131/71 110/68   BP Location:  Left arm   Patient Position:  Sitting   Cuff Size:  Adult Regular   Pulse: 74 71   Weight: 82.6 kg (182 lb)    Height: 1.803 m (5' 11\")        Patient has been off study IP since since 2/9/2022. Last dose 2/2/2022.  He is trying t decide whether he will restart study IP. He has an appt with Dr Sarah June 30th and will discuss with him. I told him he can certainly restart if he wants to or may remain off study IP.     Follow-up 2 weeks for Phone visit.    Stephenie Diaz RN      Thank you for allowing me to participate in the care of your patient.      Sincerely,     Stephenie Diaz RN     [facility]  cc:   No referring provider defined for this encounter.        "

## 2022-05-04 ENCOUNTER — VIRTUAL VISIT (OUTPATIENT)
Dept: CARDIOLOGY | Facility: CLINIC | Age: 62
End: 2022-05-04
Payer: COMMERCIAL

## 2022-05-04 DIAGNOSIS — E11.65 TYPE 2 DIABETES MELLITUS WITH HYPERGLYCEMIA, UNSPECIFIED WHETHER LONG TERM INSULIN USE (H): ICD-10-CM

## 2022-05-04 DIAGNOSIS — Z00.6 EXAMINATION OF PARTICIPANT OR CONTROL IN CLINICAL RESEARCH: ICD-10-CM

## 2022-05-04 DIAGNOSIS — I25.118 CORONARY ARTERY DISEASE OF NATIVE ARTERY OF NATIVE HEART WITH STABLE ANGINA PECTORIS (H): Primary | ICD-10-CM

## 2022-05-04 PROCEDURE — 99207 PR NO CHARGE NURSE ONLY: CPT

## 2022-05-04 NOTE — LETTER
5/4/2022    Michael Méndez MD  [address] [facility] [id number]    RE: Abhishek Gomez       Dear Colleague,     I had the pleasure of seeing Abhishek Gomez in the [facility].  SURPASS-CVOT Study [Effect of tirzepatide versus Dulaglutide on Major Cardiovascular Events in Patients with Type 2Diabetes]    SW patient regarding how he is feeling since having unstable angina, he states he is doing well and having less chest pain. He will be seeing Dr Sarah in June, will contact him after that  regarding whether he wants to go back on the study IP or not.  .  Stephenie Diaz RN      Thank you for allowing me to participate in the care of your patient.      Sincerely,     Stephenie Diaz RN     [facility]  cc:   No referring provider defined for this encounter.

## 2022-05-04 NOTE — PROGRESS NOTES
SURPASS-CVOT Study [Effect of tirzepatide versus Dulaglutide on Major Cardiovascular Events in Patients with Type 2Diabetes]    SW patient regarding how he is feeling since having unstable angina, he states he is doing well and having less chest pain. He will be seeing Dr Sarah in June, will contact him after that  regarding whether he wants to go back on the study IP or not.  .  Stephenie Diaz RN

## 2022-05-15 ENCOUNTER — HEALTH MAINTENANCE LETTER (OUTPATIENT)
Age: 62
End: 2022-05-15

## 2022-05-20 ENCOUNTER — RESEARCH ENCOUNTER (OUTPATIENT)
Dept: CARDIOLOGY | Facility: CLINIC | Age: 62
End: 2022-05-20
Payer: COMMERCIAL

## 2022-05-20 DIAGNOSIS — I25.118 CORONARY ARTERY DISEASE OF NATIVE ARTERY OF NATIVE HEART WITH STABLE ANGINA PECTORIS (H): Primary | ICD-10-CM

## 2022-05-20 DIAGNOSIS — Z00.6 EXAMINATION OF PARTICIPANT OR CONTROL IN CLINICAL RESEARCH: ICD-10-CM

## 2022-05-20 DIAGNOSIS — E11.65 TYPE 2 DIABETES MELLITUS WITH HYPERGLYCEMIA, UNSPECIFIED WHETHER LONG TERM INSULIN USE (H): ICD-10-CM

## 2022-05-25 NOTE — PROGRESS NOTES
Problem: Patient Education: Go to Patient Education Activity Goal: Patient/Family Education Outcome: Progressing Towards Goal 
  
Problem: Patient Education: Go to Patient Education Activity Goal: Patient/Family Education Outcome: Progressing Towards Goal 
  
Problem: Acute Renal Failure: Day 1 Goal: Off Pathway (Use only if patient is Off Pathway) Outcome: Progressing Towards Goal 
Goal: Activity/Safety Outcome: Progressing Towards Goal 
Goal: Consults, if ordered Outcome: Progressing Towards Goal 
Goal: Diagnostic Test/Procedures Outcome: Progressing Towards Goal 
Goal: Nutrition/Diet Outcome: Progressing Towards Goal 
Goal: Discharge Planning Outcome: Progressing Towards Goal 
Goal: Medications Outcome: Progressing Towards Goal 
Goal: Respiratory Outcome: Progressing Towards Goal 
Goal: Treatments/Interventions/Procedures Outcome: Progressing Towards Goal 
Goal: Psychosocial 
Outcome: Progressing Towards Goal 
Goal: *Optimal pain control at patient's stated goal 
Outcome: Progressing Towards Goal 
Goal: *Urinary output within identified parameters Outcome: Progressing Towards Goal 
Goal: *Hemodynamically stable Outcome: Progressing Towards Goal 
Goal: *Tolerating diet Outcome: Progressing Towards Goal 
  
Problem: Acute Renal Failure: Day 2 Goal: Off Pathway (Use only if patient is Off Pathway) Outcome: Progressing Towards Goal 
Goal: Activity/Safety Outcome: Progressing Towards Goal 
Goal: Consults, if ordered Outcome: Progressing Towards Goal 
Goal: Diagnostic Test/Procedures Outcome: Progressing Towards Goal 
Goal: Nutrition/Diet Outcome: Progressing Towards Goal 
Goal: Discharge Planning Outcome: Progressing Towards Goal 
Goal: Medications Outcome: Progressing Towards Goal 
Goal: Respiratory Outcome: Progressing Towards Goal 
Goal: Treatments/Interventions/Procedures Outcome: Progressing Towards Goal 
Goal: Psychosocial 
Outcome: Progressing Towards Goal 
Goal: *Optimal pain SURPASS-CVOT Study [Effect of tirzepatide versus Dulaglutide on Major Cardiovascular Events in Patients with Type 2Diabetes]    Subject contacted by telephone to evaluate/ensure compliance. Patient is off study IP due to side effects.     Sujbect queried for adverse events, endpoints and medication changes. Has had no issues since last visit, [place] is [place].    Will see him in clinic in 2 weeks.    Stephenie Diaz RN   control at patient's stated goal 
Outcome: Progressing Towards Goal 
Goal: *Urinary output within identified parameters Outcome: Progressing Towards Goal 
Goal: *Hemodynamically stable Outcome: Progressing Towards Goal 
Goal: *Tolerating diet Outcome: Progressing Towards Goal 
Goal: *Lab values improving Outcome: Progressing Towards Goal 
  
Problem: Acute Renal Failure: Day 3 Goal: Off Pathway (Use only if patient is Off Pathway) Outcome: Progressing Towards Goal 
Goal: Activity/Safety Outcome: Progressing Towards Goal 
Goal: Consults, if ordered Outcome: Progressing Towards Goal 
Goal: Diagnostic Test/Procedures Outcome: Progressing Towards Goal 
Goal: Nutrition/Diet Outcome: Progressing Towards Goal 
Goal: Discharge Planning Outcome: Progressing Towards Goal 
Goal: Medications Outcome: Progressing Towards Goal 
Goal: Respiratory Outcome: Progressing Towards Goal 
Goal: Treatments/Interventions/Procedures Outcome: Progressing Towards Goal 
Goal: Psychosocial 
Outcome: Progressing Towards Goal 
Goal: *Optimal pain control at patient's stated goal 
Outcome: Progressing Towards Goal 
Goal: *Urinary output within identified parameters Outcome: Progressing Towards Goal 
Goal: *Hemodynamically stable Outcome: Progressing Towards Goal 
Goal: *Tolerating diet Outcome: Progressing Towards Goal 
Goal: *Lab values improving Outcome: Progressing Towards Goal 
  
Problem: Acute Renal Failure: Day 4 Goal: Off Pathway (Use only if patient is Off Pathway) Outcome: Progressing Towards Goal 
Goal: Activity/Safety Outcome: Progressing Towards Goal 
Goal: Consults, if ordered Outcome: Progressing Towards Goal 
Goal: Diagnostic Test/Procedures Outcome: Progressing Towards Goal 
Goal: Nutrition/Diet Outcome: Progressing Towards Goal 
Goal: Discharge Planning Outcome: Progressing Towards Goal 
Goal: Medications Outcome: Progressing Towards Goal 
Goal: Respiratory Outcome: Progressing Towards Goal 
Goal: Treatments/Interventions/Procedures Outcome: Progressing Towards Goal 
Goal: Psychosocial 
Outcome: Progressing Towards Goal 
Goal: *Optimal pain control at patient's stated goal 
Outcome: Progressing Towards Goal 
Goal: *Urinary output within identified parameters Outcome: Progressing Towards Goal 
Goal: *Hemodynamically stable Outcome: Progressing Towards Goal 
Goal: *Tolerating diet Outcome: Progressing Towards Goal 
Goal: *Lab values improving Outcome: Progressing Towards Goal 
  
Problem: Acute Renal Failure: Day 5 Goal: Off Pathway (Use only if patient is Off Pathway) Outcome: Progressing Towards Goal 
Goal: Activity/Safety Outcome: Progressing Towards Goal 
Goal: Diagnostic Test/Procedures Outcome: Progressing Towards Goal 
Goal: Nutrition/Diet Outcome: Progressing Towards Goal 
Goal: Discharge Planning Outcome: Progressing Towards Goal 
Goal: Medications Outcome: Progressing Towards Goal 
Goal: Respiratory Outcome: Progressing Towards Goal 
Goal: Treatments/Interventions/Procedures Outcome: Progressing Towards Goal 
Goal: Psychosocial 
Outcome: Progressing Towards Goal 
Goal: *Optimal pain control at patient's stated goal 
Outcome: Progressing Towards Goal 
Goal: *Urinary output within identified parameters Outcome: Progressing Towards Goal 
Goal: *Hemodynamically stable Outcome: Progressing Towards Goal 
Goal: *Tolerating diet Outcome: Progressing Towards Goal 
Goal: *Lab values improving Outcome: Progressing Towards Goal 
  
Problem: Acute Renal Failure: Day 6 Goal: Off Pathway (Use only if patient is Off Pathway) Outcome: Progressing Towards Goal 
Goal: Activity/Safety Outcome: Progressing Towards Goal 
Goal: Diagnostic Test/Procedures Outcome: Progressing Towards Goal 
Goal: Nutrition/Diet Outcome: Progressing Towards Goal 
Goal: Discharge Planning Outcome: Progressing Towards Goal 
Goal: Medications Outcome: Progressing Towards Goal 
Goal: Respiratory Outcome: Progressing Towards Goal 
Goal: Treatments/Interventions/Procedures Outcome: Progressing Towards Goal 
Goal: Psychosocial 
Outcome: Progressing Towards Goal 
  
Problem: Acute Renal Failure: Discharge Outcomes Goal: *Optimal pain control at patient's stated goal 
Outcome: Progressing Towards Goal 
Goal: *Urinary output within identified parameters Outcome: Progressing Towards Goal 
Goal: *Hemodynamically stable Outcome: Progressing Towards Goal 
Goal: *Tolerating diet Outcome: Progressing Towards Goal 
Goal: *Lab values stabilized Outcome: Progressing Towards Goal 
Goal: *Verbalizes understanding and describes medication purposes and frequencies Outcome: Progressing Towards Goal 
Goal: *Medication reconciliation Outcome: Progressing Towards Goal

## 2022-06-15 ENCOUNTER — VIRTUAL VISIT (OUTPATIENT)
Dept: CARDIOLOGY | Facility: CLINIC | Age: 62
End: 2022-06-15
Payer: COMMERCIAL

## 2022-06-15 DIAGNOSIS — Z00.6 EXAMINATION OF PARTICIPANT OR CONTROL IN CLINICAL RESEARCH: ICD-10-CM

## 2022-06-15 DIAGNOSIS — E11.65 TYPE 2 DIABETES MELLITUS WITH HYPERGLYCEMIA, UNSPECIFIED WHETHER LONG TERM INSULIN USE (H): ICD-10-CM

## 2022-06-15 DIAGNOSIS — I25.118 CORONARY ARTERY DISEASE OF NATIVE ARTERY OF NATIVE HEART WITH STABLE ANGINA PECTORIS (H): Primary | ICD-10-CM

## 2022-06-15 PROCEDURE — 99207 PR NO CHARGE NURSE ONLY: CPT

## 2022-06-15 NOTE — LETTER
6/15/2022    Michael Méndez MD  [address] [facility] [address]    RE: Abhishek SWEETIE Jason       Dear Colleague,     I had the pleasure of seeing Abhishek Vernonkristianmirella in the [facility].  SW patient by phone for this visit. He has been off study IP since 3/12/22. He was thinking of possibly restarting IP at some point but now has decided to discontinue completely. Patient has agreed to 6 mo phone Follow-up and med record review until the end of the study. Informed if he at any point would like to restart study IP he just needs to let me know. Sponsor has agreed that subjects who have gone off study IP, can restart.    No issues or concerns other than continues to have intermittent chest pain on exertion. Has Follow-up with Dr Sarah on 6/30/22.    Will Follow-up 6 mo with phone call.    Stephenie Diaz RN    Thank you for allowing me to participate in the care of your patient.      Sincerely,     Stephenie Diaz RN     [facility]  cc:   No referring provider defined for this encounter.

## 2022-06-15 NOTE — PROGRESS NOTES
MAICOL patient by phone for this visit. He has been off study IP since 2/9/22. He was thinking of possibly restarting IP at some point but now has decided to discontinue completely. Patient has agreed to 6 mo phone Follow-up and med record review until the end of the study. Informed if he at any point would like to restart study IP he just needs to let me know. Sponsor has agreed that subjects who have gone off study IP, can restart.    No issues or concerns other than continues to have intermittent chest pain on exertion. Has Follow-up with Dr Sarah on 6/30/22.    Will Follow-up 6 mo with phone call.    Stephenie Diaz RN

## 2022-06-30 ENCOUNTER — OFFICE VISIT (OUTPATIENT)
Dept: CARDIOLOGY | Facility: CLINIC | Age: 62
End: 2022-06-30
Payer: COMMERCIAL

## 2022-06-30 VITALS
DIASTOLIC BLOOD PRESSURE: 70 MMHG | SYSTOLIC BLOOD PRESSURE: 120 MMHG | WEIGHT: 187.1 LBS | HEIGHT: 71 IN | HEART RATE: 69 BPM | BODY MASS INDEX: 26.19 KG/M2 | OXYGEN SATURATION: 99 %

## 2022-06-30 DIAGNOSIS — I73.9 PAD (PERIPHERAL ARTERY DISEASE) (H): ICD-10-CM

## 2022-06-30 DIAGNOSIS — I25.118 CORONARY ARTERY DISEASE OF NATIVE ARTERY OF NATIVE HEART WITH STABLE ANGINA PECTORIS (H): Primary | ICD-10-CM

## 2022-06-30 DIAGNOSIS — I20.89 STABLE ANGINA (H): ICD-10-CM

## 2022-06-30 DIAGNOSIS — I73.9 INTERMITTENT CLAUDICATION (H): ICD-10-CM

## 2022-06-30 PROCEDURE — 99214 OFFICE O/P EST MOD 30 MIN: CPT | Performed by: INTERNAL MEDICINE

## 2022-06-30 RX ORDER — ISOSORBIDE MONONITRATE 30 MG/1
90 TABLET, EXTENDED RELEASE ORAL DAILY
Qty: 180 TABLET | Refills: 3 | Status: SHIPPED | OUTPATIENT
Start: 2022-06-30 | End: 2023-03-03

## 2022-06-30 NOTE — PROGRESS NOTES
HPI and Plan:   See dictation    Today's clinic visit entailed:  30 minutes spent on the date of the encounter doing chart review, history and exam, documentation and further activities per the note  Provider  Link to MDM Help Grid         Orders Placed This Encounter   Procedures     US WILMAR Doppler with Exercise Bilateral     Follow-Up with Cardiology MORGAN       Orders Placed This Encounter   Medications     isosorbide mononitrate (IMDUR) 30 MG 24 hr tablet     Sig: Take 3 tablets (90 mg) by mouth daily     Dispense:  180 tablet     Refill:  3       Medications Discontinued During This Encounter   Medication Reason     isosorbide mononitrate (IMDUR) 60 MG 24 hr tablet          Encounter Diagnoses   Name Primary?     Coronary artery disease of native artery of native heart with stable angina pectoris (H) Yes     Stable angina (H)      PAD (peripheral artery disease) (H)      Intermittent claudication (H)        CURRENT MEDICATIONS:  Current Outpatient Medications   Medication Sig Dispense Refill     aspirin 81 MG EC tablet Take 1 tablet (81 mg) by mouth daily 90 tablet 3     atorvastatin (LIPITOR) 20 MG tablet Take 1 tablet (20 mg) by mouth daily 30 tablet 5     glimepiride (AMARYL) 2 MG tablet Take 1 tablet (2 mg) by mouth every morning (before breakfast) 30 tablet 5     isosorbide mononitrate (IMDUR) 30 MG 24 hr tablet Take 3 tablets (90 mg) by mouth daily 180 tablet 3     lisinopril (ZESTRIL) 20 MG tablet Take 1 tablet (20 mg) by mouth daily 30 tablet 5     metFORMIN (GLUCOPHAGE) 1000 MG tablet Take 1 tablet (1,000 mg) by mouth 2 times daily (with meals) 60 tablet 5     metoprolol tartrate (LOPRESSOR) 25 MG tablet Take 1 tablet (25 mg) by mouth 2 times daily 180 tablet 2     nitroGLYcerin (NITROSTAT) 0.4 MG sublingual tablet For chest pain place 1 tablet under the tongue every 5 minutes for up to 3 doses. If symptoms persist 5 minutes after 2nd dose call 911. 30 tablet 0     omeprazole (PRILOSEC) 20 MG DR capsule  Take 1 capsule (20 mg) by mouth daily 90 capsule 3     ticagrelor (BRILINTA) 90 MG tablet Take 1 tablet (90 mg) by mouth every 12 hours 180 tablet 3       ALLERGIES   No Known Allergies    PAST MEDICAL HISTORY:  Past Medical History:   Diagnosis Date     Abnormal stress test 07/14/2021     Coronary artery disease involving native coronary artery of native heart without angina pectoris 07/14/2021     Essential hypertension, benign 05/18/2016     Hyperlipidemia LDL goal <70 03/16/2015     NSTEMI (non-ST elevated myocardial infarction) (H) 05/09/2019    s/p MICHELLE to pLAD     Status post coronary angiogram 07/26/2021    Complex Multivessel CAD. CABG cosult     Type 2 diabetes mellitus without complication, without long-term current use of insulin (H) 07/06/2017     Unstable angina (H) 05/09/2019       PAST SURGICAL HISTORY:  Past Surgical History:   Procedure Laterality Date     CV CORONARY ANGIOGRAM N/A 7/26/2021    Procedure: Coronary Angiogram;  Surgeon: Sylvester Westbrook MD;  [facility]     CV CORONARY ANGIOGRAM N/A 3/1/2022    Procedure: Coronary Angiogram;  Surgeon: Sanchez Sarah MD;  Location:  HEART CARDIAC CATH LAB     CV HEART CATHETERIZATION WITH POSSIBLE INTERVENTION N/A 5/9/2019    Procedure: Coronary Angiogram;  Surgeon: Jose Enrique Stanley MD;  [facility]     CV HEART CATHETERIZATION WITH POSSIBLE INTERVENTION N/A 8/17/2021    Procedure: Coronary Angiogram;  Surgeon: Sanchez Sarah MD;  Location:  HEART CARDIAC CATH LAB     CV INSTANTANEOUS WAVE-FREE RATIO N/A 8/17/2021    Procedure: Instantaneous Wave-Free Ratio;  Surgeon: Sanchez Sarah MD;  [facility]     CV INTRAVASULAR ULTRASOUND N/A 8/17/2021    Procedure: Intravascular Ultrasound;  Surgeon: Sanchez Sarah MD;  [facility]     CV LEFT HEART CATH N/A 7/26/2021    Procedure: Left Heart Cath;  Surgeon: Sylvester Westbrook MD;   "Location:  HEART CARDIAC CATH LAB     CV PCI ANGIOPLASTY N/A 5/9/2019    Procedure: PCI Angioplasty;  Surgeon: Jose Enrique Stanley MD;  Location:  HEART CARDIAC CATH LAB     CV PCI CHRONIC TOTAL OCCLUSION N/A 8/17/2021    Procedure: Percutaneous Coronary Intervention of Chronic Total Occlusion;  Surgeon: Sanchez Sarah MD;  Location:  HEART CARDIAC CATH LAB       FAMILY HISTORY:  Family History   Problem Relation Age of Onset     Diabetes Paternal Grandmother      Diabetes Nephew        SOCIAL HISTORY:  Social History     Socioeconomic History     Marital status:      Spouse name: None     Number of children: None     Years of education: None     Highest education level: None   Tobacco Use     Smoking status: Never Smoker     Smokeless tobacco: Never Used   Substance and Sexual Activity     Alcohol use: No     Drug use: No     Sexual activity: Yes     Partners: Female       Review of Systems:  Skin:  Negative       Eyes:  Positive for glasses    ENT:  Negative      Respiratory:  Positive for dyspnea on exertion occ and sometimes after eating   Cardiovascular:  Negative;palpitations;edema;lightheadedness;dizziness chest pain;Positive for chest pain: happens a couple times a week and notices after eating big meals, pain is 1-2 out of 10  Gastroenterology: Positive for heartburn    Genitourinary:  Negative      Musculoskeletal:  Negative      Neurologic:  Negative headaches Occ warm feeling in left legs and numbness  Psychiatric:  Negative      Heme/Lymph/Imm:  Negative allergies seasonal  Endocrine:  Positive for diabetes Type II    Physical Exam:  Vitals: /70   Pulse 69   Ht 1.803 m (5' 11\")   Wt 84.9 kg (187 lb 1.6 oz)   SpO2 99%   BMI 26.10 kg/m      Constitutional:  cooperative;in no acute distress        Skin:  warm and dry to the touch          Head:  normocephalic        Eyes:  conjunctivae and lids unremarkable        Lymph:      ENT:  no pallor or cyanosis        Neck:  JVP " normal        Respiratory:  clear to auscultation         Cardiac: regular rhythm;no murmurs, gallops or rubs detected                    1+         1+   1+         1+            GI:  abdomen soft;non-tender        Extremities and Muscular Skeletal:  no edema              Neurological:  no gross motor deficits        Psych:  Alert and Oriented x 3        CC  Leila Curran, PA-C  [address]  [place] [id number]

## 2022-06-30 NOTE — PROGRESS NOTES
Service Date: 06/30/2022    OFFICE PROGRESS NOTE     REASON FOR CONSULTATION:  Followup for coronary artery disease and stable angina.    HISTORY OF PRESENT ILLNESS:  I had the pleasure of seeing Mr. Gomez at the [facility] in [place] this morning.  He is a very pleasant 62-year-old gentleman who is well known to me.  Please see my note from 08/2021 for details regarding his coronary artery disease history.    Briefly, I saw him last summer for unstable angina.  We referred him for coronary angiography which revealed an occluded LAD which was previously stented.  He also had moderate RCA stenosis.  He was initially referred to surgery but declined open heart surgery.  He later came back for complex  revascularization of the LAD.  This was successful.  The first diagonal branch was also stented.  His RCA had moderate disease which was managed medically.  His angina resolved after that intervention.    He was evaluated here in the spring with recurrent symptoms.  He had a repeat coronary angiogram which revealed patent LAD but occluded first diagonal with left-to-left collaterals.  His RCA disease remained stable.  Since the patient had bifurcation stenting and had 2 layers of stents across the diagonal, we recommended initial medical therapy.    Since then, the patient has had stable symptoms.  He continues to have occasional chest tightness which is less frequent now (perhaps 1-2 times per week) and occasional postprandial chest tightness.  He is now on Imdur 60 mg daily as well as metoprolol 25 mg twice daily.    His main concern today is that of left thigh burning with activity such as walking or climbing flights of stairs.  The symptoms usually go away within a few minutes of rest.  No resting lower extremity symptoms.    ASSESSMENT AND PLAN:  A very pleasant 62-year-old gentleman with known coronary artery disease and prior LAD and diagonal stenting as well as moderate residual RCA stenosis.   He continues to have stable symptoms which have progressively improved over the past several months.  We will increase his Imdur to 90 mg daily to give him additional antianginal relief.  Overall, his symptoms are quite infrequent and not interfering with his quality of life.  No unstable chest symptoms.    His left lower extremity symptoms are concerning for intermittent claudication.  We will have him undergo WILMAR with exercise to see if there is significant arterial insufficiency on the left.    We will see him in approximately 3 months for followup.  I appreciate the opportunity to participate in his care.    Sanchez Sarah MD        D: 2022   T: 2022   MT: nahum    Name:     JENNIFER VIDAL  MRN:      [id number]        Account:      [id number]   :      1960           Service Date: 2022       Document: [id number]

## 2022-06-30 NOTE — LETTER
6/30/2022    Michael Méndez MD  [address] [facility] [address]    RE: Abhishek Gomez       Dear Colleague,     I had the pleasure of seeing Abhishek Gomez in the [facility].  HPI and Plan:   See dictation    Today's clinic visit entailed:  30 minutes spent on the date of the encounter doing chart review, history and exam, documentation and further activities per the note  Provider  Link to MDM Help Grid         Orders Placed This Encounter   Procedures     US WILMAR Doppler with Exercise Bilateral     Follow-Up with Cardiology MORGAN       Orders Placed This Encounter   Medications     isosorbide mononitrate (IMDUR) 30 MG 24 hr tablet     Sig: Take 3 tablets (90 mg) by mouth daily     Dispense:  180 tablet     Refill:  3       Medications Discontinued During This Encounter   Medication Reason     isosorbide mononitrate (IMDUR) 60 MG 24 hr tablet          Encounter Diagnoses   Name Primary?     Coronary artery disease of native artery of native heart with stable angina pectoris (H) Yes     Stable angina (H)      PAD (peripheral artery disease) (H)      Intermittent claudication (H)        CURRENT MEDICATIONS:  Current Outpatient Medications   Medication Sig Dispense Refill     aspirin 81 MG EC tablet Take 1 tablet (81 mg) by mouth daily 90 tablet 3     atorvastatin (LIPITOR) 20 MG tablet Take 1 tablet (20 mg) by mouth daily 30 tablet 5     glimepiride (AMARYL) 2 MG tablet Take 1 tablet (2 mg) by mouth every morning (before breakfast) 30 tablet 5     isosorbide mononitrate (IMDUR) 30 MG 24 hr tablet Take 3 tablets (90 mg) by mouth daily 180 tablet 3     lisinopril (ZESTRIL) 20 MG tablet Take 1 tablet (20 mg) by mouth daily 30 tablet 5     metFORMIN (GLUCOPHAGE) 1000 MG tablet Take 1 tablet (1,000 mg) by mouth 2 times daily (with meals) 60 tablet 5     metoprolol tartrate (LOPRESSOR) 25 MG tablet Take 1 tablet (25 mg) by mouth 2 times daily 180 tablet 2      nitroGLYcerin (NITROSTAT) 0.4 MG sublingual tablet For chest pain place 1 tablet under the tongue every 5 minutes for up to 3 doses. If symptoms persist 5 minutes after 2nd dose call 911. 30 tablet 0     omeprazole (PRILOSEC) 20 MG DR capsule Take 1 capsule (20 mg) by mouth daily 90 capsule 3     ticagrelor (BRILINTA) 90 MG tablet Take 1 tablet (90 mg) by mouth every 12 hours 180 tablet 3       ALLERGIES   No Known Allergies    PAST MEDICAL HISTORY:  Past Medical History:   Diagnosis Date     Abnormal stress test 07/14/2021     Coronary artery disease involving native coronary artery of native heart without angina pectoris 07/14/2021     Essential hypertension, benign 05/18/2016     Hyperlipidemia LDL goal <70 03/16/2015     NSTEMI (non-ST elevated myocardial infarction) (H) 05/09/2019    s/p MICHELLE to pLAD     Status post coronary angiogram 07/26/2021    Complex Multivessel CAD. CABG cosult     Type 2 diabetes mellitus without complication, without long-term current use of insulin (H) 07/06/2017     Unstable angina (H) 05/09/2019       PAST SURGICAL HISTORY:  Past Surgical History:   Procedure Laterality Date     CV CORONARY ANGIOGRAM N/A 7/26/2021    Procedure: Coronary Angiogram;  Surgeon: Sylvester Westbrook MD;  Location: [facility] CARDIAC CATH LAB     CV CORONARY ANGIOGRAM N/A 3/1/2022    Procedure: Coronary Angiogram;  Surgeon: Sanchez Sarah MD;  Location: [facility] CARDIAC CATH LAB     CV HEART CATHETERIZATION WITH POSSIBLE INTERVENTION N/A 5/9/2019    Procedure: Coronary Angiogram;  Surgeon: Jose Enrique Stanley MD;  Location: [facility] CARDIAC CATH LAB     CV HEART CATHETERIZATION WITH POSSIBLE INTERVENTION N/A 8/17/2021    Procedure: Coronary Angiogram;  Surgeon: Sanchez Sarah MD;  Location: [facility] CARDIAC CATH LAB     CV INSTANTANEOUS WAVE-FREE RATIO N/A 8/17/2021    Procedure: Instantaneous Wave-Free Ratio;  Surgeon: Sanchez Sarah MD;  Location: [facility] CARDIAC CATH LAB     CV  "INTRAVASULAR ULTRASOUND N/A 8/17/2021    Procedure: Intravascular Ultrasound;  Surgeon: Sanchez Sarah MD;  Location:  HEART CARDIAC CATH LAB     CV LEFT HEART CATH N/A 7/26/2021    Procedure: Left Heart Cath;  Surgeon: Sylvester Westbrook MD;  Location:  HEART CARDIAC CATH LAB     CV PCI ANGIOPLASTY N/A 5/9/2019    Procedure: PCI Angioplasty;  Surgeon: Jose Enrique Stanley MD;  Location:  HEART CARDIAC CATH LAB     CV PCI CHRONIC TOTAL OCCLUSION N/A 8/17/2021    Procedure: Percutaneous Coronary Intervention of Chronic Total Occlusion;  Surgeon: Sanchez Sarah MD;  Location:  HEART CARDIAC CATH LAB       FAMILY HISTORY:  Family History   Problem Relation Age of Onset     Diabetes Paternal Grandmother      Diabetes Nephew        SOCIAL HISTORY:  Social History     Socioeconomic History     Marital status:      Spouse name: None     Number of children: None     Years of education: None     Highest education level: None   Tobacco Use     Smoking status: Never Smoker     Smokeless tobacco: Never Used   Substance and Sexual Activity     Alcohol use: No     Drug use: No     Sexual activity: Yes     Partners: Female       Review of Systems:  Skin:  Negative       Eyes:  Positive for glasses    ENT:  Negative      Respiratory:  Positive for dyspnea on exertion occ and sometimes after eating   Cardiovascular:  Negative;palpitations;edema;lightheadedness;dizziness chest pain;Positive for chest pain: happens a couple times a week and notices after eating big meals, pain is 1-2 out of 10  Gastroenterology: Positive for heartburn    Genitourinary:  Negative      Musculoskeletal:  Negative      Neurologic:  Negative headaches Occ warm feeling in left legs and numbness  Psychiatric:  Negative      Heme/Lymph/Imm:  Negative allergies seasonal  Endocrine:  Positive for diabetes Type II    Physical Exam:  Vitals: /70   Pulse 69   Ht 1.803 m (5' 11\")   Wt 84.9 kg (187 lb 1.6 oz)   SpO2 99%   BMI " 26.10 kg/m      Constitutional:  cooperative;in no acute distress        Skin:  warm and dry to the touch          Head:  normocephalic        Eyes:  conjunctivae and lids unremarkable        Lymph:      ENT:  no pallor or cyanosis        Neck:  JVP normal        Respiratory:  clear to auscultation         Cardiac: regular rhythm;no murmurs, gallops or rubs detected                    1+         1+   1+         1+            GI:  abdomen soft;non-tender        Extremities and Muscular Skeletal:  no edema              Neurological:  no gross motor deficits        Psych:  Alert and Oriented x 3        CC  Leila Curran PA-C  [address]  [place] [address]                Service Date: 06/30/2022    OFFICE PROGRESS NOTE     REASON FOR CONSULTATION:  Followup for coronary artery disease and stable angina.    HISTORY OF PRESENT ILLNESS:  I had the pleasure of seeing Mr. Gomez at the [facility] in [place] this morning.  He is a very pleasant 62-year-old gentleman who is well known to me.  Please see my note from 08/2021 for details regarding his coronary artery disease history.    Briefly, I saw him last summer for unstable angina.  We referred him for coronary angiography which revealed an occluded LAD which was previously stented.  He also had moderate RCA stenosis.  He was initially referred to surgery but declined open heart surgery.  He later came back for complex  revascularization of the LAD.  This was successful.  The first diagonal branch was also stented.  His RCA had moderate disease which was managed medically.  His angina resolved after that intervention.    He was evaluated here in the spring with recurrent symptoms.  He had a repeat coronary angiogram which revealed patent LAD but occluded first diagonal with left-to-left collaterals.  His RCA disease remained stable.  Since the patient had bifurcation stenting and had 2 layers of stents across the diagonal, we  recommended initial medical therapy.    Since then, the patient has had stable symptoms.  He continues to have occasional chest tightness which is less frequent now (perhaps 1-2 times per week) and occasional postprandial chest tightness.  He is now on Imdur 60 mg daily as well as metoprolol 25 mg twice daily.    His main concern today is that of left thigh burning with activity such as walking or climbing flights of stairs.  The symptoms usually go away within a few minutes of rest.  No resting lower extremity symptoms.    ASSESSMENT AND PLAN:  A very pleasant 62-year-old gentleman with known coronary artery disease and prior LAD and diagonal stenting as well as moderate residual RCA stenosis.  He continues to have stable symptoms which have progressively improved over the past several months.  We will increase his Imdur to 90 mg daily to give him additional antianginal relief.  Overall, his symptoms are quite infrequent and not interfering with his quality of life.  No unstable chest symptoms.    His left lower extremity symptoms are concerning for intermittent claudication.  We will have him undergo WILMAR with exercise to see if there is significant arterial insufficiency on the left.    We will see him in approximately 3 months for followup.  I appreciate the opportunity to participate in his care.    Sanchez Sarah MD        D: 2022   T: 2022   MT: nahum    Name:     JENNIFER VIDAL  MRN:      [id number]        Account:      [id number]   :      1960           Service Date: 2022       Document: [id number]      Thank you for allowing me to participate in the care of your patient.      Sincerely,     Sanchez Sarah MD, MD     [facility]  cc:   Leila Curran PA-C  [address]  [place] [id number]

## 2022-07-07 ENCOUNTER — MYC MEDICAL ADVICE (OUTPATIENT)
Dept: CARDIOLOGY | Facility: CLINIC | Age: 62
End: 2022-07-07

## 2022-07-07 DIAGNOSIS — I25.118 CORONARY ARTERY DISEASE OF NATIVE ARTERY OF NATIVE HEART WITH STABLE ANGINA PECTORIS (H): ICD-10-CM

## 2022-07-07 DIAGNOSIS — I20.0 UNSTABLE ANGINA (H): Primary | ICD-10-CM

## 2022-07-07 NOTE — TELEPHONE ENCOUNTER
My chart message from patient:  Hello,     I need another letter extending recovery time to my employer. Last one only goes up to 5/15/22.    Reply to patient:    Mr. Jason Muhammad,    What date do you have in mind for extending your recovery? Do you want this through your next visit on 9/13/2022?  The letter written in March states there were no restrictions on lifting or activity for your work place. Is this correct for the next letter?    Thank you for your help  Team 2 RNs  [phone] reply from patient  When I lift, I get chest pain. I am fine with extending it until my next visit.    Letter to MORGAN Leila Curran to review and sign      7/8/2022 signed letter is ready for the patient    My chart update to patient:    Hello, Mr. Veluppillai,    Your letter is ready. Do you want to pick it up or we can mail it to your home?    Thank you  Team 2 RNs  [phone]

## 2022-07-07 NOTE — LETTER
July 8, 2022       RE: Abhishek Gomez  [address]  [facility] [address]       To whom it may concern:     This letter is to certify that Abhishek Gomez is under my care, and should not be lifting >10 pounds through 9/15/2022.      Thank you,        ARMOND Curran  [phone]   [facility]

## 2022-07-12 ENCOUNTER — HOSPITAL ENCOUNTER (OUTPATIENT)
Dept: ULTRASOUND IMAGING | Facility: CLINIC | Age: 62
Discharge: HOME | End: 2022-07-12
Attending: INTERNAL MEDICINE | Admitting: INTERNAL MEDICINE
Payer: COMMERCIAL

## 2022-07-12 DIAGNOSIS — I73.9 INTERMITTENT CLAUDICATION (H): ICD-10-CM

## 2022-07-12 PROCEDURE — 93924 LWR XTR VASC STDY BILAT: CPT

## 2022-07-12 PROCEDURE — 93924 LWR XTR VASC STDY BILAT: CPT | Mod: 26 | Performed by: INTERNAL MEDICINE

## 2022-07-12 NOTE — TELEPHONE ENCOUNTER
My chart message from patient:      Hello, is it possible to get a letter excusing me from work. It is hard to lift under 10 pounds at my work. I rather wait until next appointment before going back to work. Please provide me with a letter similar to last time, excusing me from work.      Will message MORGAN Leila Curran to review

## 2022-07-12 NOTE — TELEPHONE ENCOUNTER
I worry that this request means his angina has worsened from when I saw him last. If this is the case, we should at least have a virtual visit to discuss and consider further medication adjustment.     In the meantime, I am happy to recommend he be off work.     Thanks.

## 2022-07-13 ENCOUNTER — TELEPHONE (OUTPATIENT)
Dept: CARDIOLOGY | Facility: CLINIC | Age: 62
End: 2022-07-13

## 2022-07-13 DIAGNOSIS — I25.118 CORONARY ARTERY DISEASE OF NATIVE ARTERY OF NATIVE HEART WITH STABLE ANGINA PECTORIS (H): Primary | ICD-10-CM

## 2022-07-13 DIAGNOSIS — I20.89 STABLE ANGINA (H): ICD-10-CM

## 2022-07-13 NOTE — TELEPHONE ENCOUNTER
Patient called for symptom update and Leila GRIFFIN MORGAN messaged.     Letter drafted for no work a until next cardiology OV which is scheduled with Leila MORA 9-13-22.     Reviewed with Leila MORA who agrees with no work at this time and will review letter and sign.     Cardiac rehab recommended. For angina.     Spoke with Patient who will  letter in clinic on 7-14-22. Letter at check in clinic desk.     Patient agrees with Sept MORGAN OV and will call earlier with any questions or concerns.    Patient agrees with Cardiac rehab. Order will have to be placed by Cardiologist Team, for Dr. Sarah. Team messaged.

## 2022-07-13 NOTE — TELEPHONE ENCOUNTER
Letter reviewed by Leila MORA. Patient to  letter tomorrow.  Next MORGAN OV 9/2022. Patient to call with any questions or concerns.

## 2022-07-13 NOTE — LETTER
July 13, 2022    RE:  Abhishek Gomez 1960  [address] [facility] [address]      To whom it may concern:     This letter is to certify that Abhishek Gomez is unable able to return to work until re evaluated at next Cardiology OV in mid September 2022.       Should you have any questions, please call [phone].    Sincerely,      Leila MORA.   [facility]

## 2022-07-13 NOTE — TELEPHONE ENCOUNTER
Last Dr. Sarah OV 6-30-22 - known coronary artery disease and prior LAD and diagonal stenting as well as moderate residual RCA stenosis.  He continues to have stable symptoms which have progressively improved over the past several months.  We will increase his Imdur to 90 mg daily to give him additional antianginal relief.  Overall, his symptoms are quite infrequent and not interfering with his quality of life.  No unstable chest symptoms.    Spoke with Patient who states he is taking the increased Imdur but still is aware of intermittent chest discomfort of pressure and some shortness of breath daily. Typically at work. Patient has 2 part time jobs.  Patient agrees with next available [employer] MORGAN OV to discuss continued symptoms with activities and reguests off work letter until then.     F/Up OV order entered scheduling notified.

## 2022-07-19 ENCOUNTER — HOSPITAL ENCOUNTER (OUTPATIENT)
Dept: CARDIAC REHAB | Facility: CLINIC | Age: 62
Discharge: HOME | End: 2022-07-19
Attending: INTERNAL MEDICINE
Payer: COMMERCIAL

## 2022-07-19 DIAGNOSIS — I20.89 STABLE ANGINA (H): ICD-10-CM

## 2022-07-19 DIAGNOSIS — I25.118 CORONARY ARTERY DISEASE OF NATIVE ARTERY OF NATIVE HEART WITH STABLE ANGINA PECTORIS (H): ICD-10-CM

## 2022-07-19 PROCEDURE — 93797 PHYS/QHP OP CAR RHAB WO ECG: CPT | Performed by: REHABILITATION PRACTITIONER

## 2022-07-19 PROCEDURE — 999N000108 HC STATISTIC OP CARDIAC VISIT #2

## 2022-07-19 PROCEDURE — 93798 PHYS/QHP OP CAR RHAB W/ECG: CPT

## 2022-07-20 ENCOUNTER — HOSPITAL ENCOUNTER (OUTPATIENT)
Dept: CARDIAC REHAB | Facility: CLINIC | Age: 62
Discharge: HOME | End: 2022-07-20
Attending: INTERNAL MEDICINE
Payer: COMMERCIAL

## 2022-07-20 PROCEDURE — 93798 PHYS/QHP OP CAR RHAB W/ECG: CPT | Performed by: REHABILITATION PRACTITIONER

## 2022-07-22 ENCOUNTER — HOSPITAL ENCOUNTER (OUTPATIENT)
Dept: CARDIAC REHAB | Facility: CLINIC | Age: 62
Discharge: HOME | End: 2022-07-22
Attending: INTERNAL MEDICINE
Payer: COMMERCIAL

## 2022-07-22 PROCEDURE — 93798 PHYS/QHP OP CAR RHAB W/ECG: CPT

## 2022-07-25 ENCOUNTER — HOSPITAL ENCOUNTER (OUTPATIENT)
Dept: CARDIAC REHAB | Facility: CLINIC | Age: 62
Discharge: HOME | End: 2022-07-25
Attending: INTERNAL MEDICINE
Payer: COMMERCIAL

## 2022-07-25 PROCEDURE — 93798 PHYS/QHP OP CAR RHAB W/ECG: CPT

## 2022-07-27 ENCOUNTER — DOCUMENTATION ONLY (OUTPATIENT)
Dept: CARDIOLOGY | Facility: CLINIC | Age: 62
End: 2022-07-27
Payer: COMMERCIAL

## 2022-07-27 ENCOUNTER — HOSPITAL ENCOUNTER (OUTPATIENT)
Dept: CARDIAC REHAB | Facility: CLINIC | Age: 62
Discharge: HOME | End: 2022-07-27
Attending: INTERNAL MEDICINE
Payer: COMMERCIAL

## 2022-07-27 DIAGNOSIS — E11.65 TYPE 2 DIABETES MELLITUS WITH HYPERGLYCEMIA, UNSPECIFIED WHETHER LONG TERM INSULIN USE (H): ICD-10-CM

## 2022-07-27 DIAGNOSIS — I20.89 STABLE ANGINA (H): Primary | ICD-10-CM

## 2022-07-27 DIAGNOSIS — Z00.6 EXAMINATION OF PARTICIPANT OR CONTROL IN CLINICAL RESEARCH: ICD-10-CM

## 2022-07-27 PROCEDURE — 99207 PR NO CHARGE-RESEARCH SERVICE: CPT | Performed by: INTERNAL MEDICINE

## 2022-07-27 PROCEDURE — 93798 PHYS/QHP OP CAR RHAB W/ECG: CPT | Performed by: CLINICAL EXERCISE PHYSIOLOGIST

## 2022-07-27 NOTE — PROGRESS NOTES
Patient is off study IP but remains in the study    New information was provided to the patient from the study sponsor regarding Pancreatic Cancer     In completed clinical studies with Tirzepatide, two participants out of 5415 receiving Tirzepatide and one participant out of 2360 receiving placebo or other diabetic medication, have reported pancreatic cancer. Overall, the risk pancreatic cancer was not increased when using tirzepatide when compared to placebo/comparator. You are being asked to sign an updated informed consent form to ensure that you are aware that the study Sponsor is monitoring cases of pancreatic cancer as a potential risk.      Patient was informed and he agrees to continue in the study off IP. ICF Version [id number] was read and signed by the subject. A copy was given to him.     Stephenie Daiz RN

## 2022-07-29 ENCOUNTER — HOSPITAL ENCOUNTER (OUTPATIENT)
Dept: CARDIAC REHAB | Facility: CLINIC | Age: 62
Discharge: HOME | End: 2022-07-29
Attending: INTERNAL MEDICINE
Payer: COMMERCIAL

## 2022-07-29 PROCEDURE — 93798 PHYS/QHP OP CAR RHAB W/ECG: CPT | Performed by: CLINICAL EXERCISE PHYSIOLOGIST

## 2022-08-03 ENCOUNTER — HOSPITAL ENCOUNTER (OUTPATIENT)
Dept: CARDIAC REHAB | Facility: CLINIC | Age: 62
Discharge: HOME | End: 2022-08-03
Attending: INTERNAL MEDICINE
Payer: COMMERCIAL

## 2022-08-03 PROCEDURE — 93798 PHYS/QHP OP CAR RHAB W/ECG: CPT | Performed by: CLINICAL EXERCISE PHYSIOLOGIST

## 2022-08-05 ENCOUNTER — HOSPITAL ENCOUNTER (OUTPATIENT)
Dept: CARDIAC REHAB | Facility: CLINIC | Age: 62
Discharge: HOME | End: 2022-08-05
Attending: INTERNAL MEDICINE
Payer: COMMERCIAL

## 2022-08-05 PROCEDURE — 93798 PHYS/QHP OP CAR RHAB W/ECG: CPT

## 2022-08-08 ENCOUNTER — HOSPITAL ENCOUNTER (OUTPATIENT)
Dept: CARDIAC REHAB | Facility: CLINIC | Age: 62
Discharge: HOME | End: 2022-08-08
Attending: INTERNAL MEDICINE
Payer: COMMERCIAL

## 2022-08-08 PROCEDURE — 93798 PHYS/QHP OP CAR RHAB W/ECG: CPT | Performed by: CLINICAL EXERCISE PHYSIOLOGIST

## 2022-08-10 ENCOUNTER — HOSPITAL ENCOUNTER (OUTPATIENT)
Dept: CARDIAC REHAB | Facility: CLINIC | Age: 62
Discharge: HOME | End: 2022-08-10
Attending: INTERNAL MEDICINE
Payer: COMMERCIAL

## 2022-08-10 PROCEDURE — 93798 PHYS/QHP OP CAR RHAB W/ECG: CPT

## 2022-08-12 ENCOUNTER — HOSPITAL ENCOUNTER (OUTPATIENT)
Dept: CARDIAC REHAB | Facility: CLINIC | Age: 62
Discharge: HOME | End: 2022-08-12
Attending: INTERNAL MEDICINE
Payer: COMMERCIAL

## 2022-08-12 PROCEDURE — 93798 PHYS/QHP OP CAR RHAB W/ECG: CPT

## 2022-08-15 ENCOUNTER — HOSPITAL ENCOUNTER (OUTPATIENT)
Dept: CARDIAC REHAB | Facility: CLINIC | Age: 62
Discharge: HOME | End: 2022-08-15
Attending: INTERNAL MEDICINE
Payer: COMMERCIAL

## 2022-08-15 PROCEDURE — 93798 PHYS/QHP OP CAR RHAB W/ECG: CPT

## 2022-08-17 ENCOUNTER — HOSPITAL ENCOUNTER (OUTPATIENT)
Dept: CARDIAC REHAB | Facility: CLINIC | Age: 62
Discharge: HOME | End: 2022-08-17
Attending: INTERNAL MEDICINE
Payer: COMMERCIAL

## 2022-08-17 PROCEDURE — 93798 PHYS/QHP OP CAR RHAB W/ECG: CPT

## 2022-08-19 ENCOUNTER — HOSPITAL ENCOUNTER (OUTPATIENT)
Dept: CARDIAC REHAB | Facility: CLINIC | Age: 62
Discharge: HOME | End: 2022-08-19
Attending: INTERNAL MEDICINE
Payer: COMMERCIAL

## 2022-08-19 PROCEDURE — 93798 PHYS/QHP OP CAR RHAB W/ECG: CPT | Performed by: CLINICAL EXERCISE PHYSIOLOGIST

## 2022-08-22 ENCOUNTER — HOSPITAL ENCOUNTER (OUTPATIENT)
Dept: CARDIAC REHAB | Facility: CLINIC | Age: 62
Discharge: HOME | End: 2022-08-22
Attending: INTERNAL MEDICINE
Payer: COMMERCIAL

## 2022-08-22 PROCEDURE — 93797 PHYS/QHP OP CAR RHAB WO ECG: CPT

## 2022-08-22 PROCEDURE — 93798 PHYS/QHP OP CAR RHAB W/ECG: CPT

## 2022-08-24 ENCOUNTER — HOSPITAL ENCOUNTER (OUTPATIENT)
Dept: CARDIAC REHAB | Facility: CLINIC | Age: 62
Discharge: HOME | End: 2022-08-24
Attending: INTERNAL MEDICINE
Payer: COMMERCIAL

## 2022-08-24 PROCEDURE — 93798 PHYS/QHP OP CAR RHAB W/ECG: CPT | Performed by: REHABILITATION PRACTITIONER

## 2022-08-29 ENCOUNTER — HOSPITAL ENCOUNTER (OUTPATIENT)
Dept: CARDIAC REHAB | Facility: CLINIC | Age: 62
Discharge: HOME | End: 2022-08-29
Attending: INTERNAL MEDICINE
Payer: COMMERCIAL

## 2022-08-29 PROCEDURE — 93798 PHYS/QHP OP CAR RHAB W/ECG: CPT | Performed by: OCCUPATIONAL THERAPIST

## 2022-08-31 ENCOUNTER — HOSPITAL ENCOUNTER (OUTPATIENT)
Dept: CARDIAC REHAB | Facility: CLINIC | Age: 62
Discharge: HOME | End: 2022-08-31
Attending: INTERNAL MEDICINE
Payer: COMMERCIAL

## 2022-08-31 PROCEDURE — 93798 PHYS/QHP OP CAR RHAB W/ECG: CPT

## 2022-09-02 ENCOUNTER — HOSPITAL ENCOUNTER (OUTPATIENT)
Dept: CARDIAC REHAB | Facility: CLINIC | Age: 62
Discharge: HOME | End: 2022-09-02
Attending: INTERNAL MEDICINE
Payer: COMMERCIAL

## 2022-09-02 PROCEDURE — 93798 PHYS/QHP OP CAR RHAB W/ECG: CPT | Performed by: REHABILITATION PRACTITIONER

## 2022-09-07 ENCOUNTER — HOSPITAL ENCOUNTER (OUTPATIENT)
Dept: CARDIAC REHAB | Facility: CLINIC | Age: 62
Discharge: HOME | End: 2022-09-07
Attending: INTERNAL MEDICINE
Payer: COMMERCIAL

## 2022-09-07 PROCEDURE — 93798 PHYS/QHP OP CAR RHAB W/ECG: CPT | Performed by: REHABILITATION PRACTITIONER

## 2022-09-11 ENCOUNTER — HEALTH MAINTENANCE LETTER (OUTPATIENT)
Age: 62
End: 2022-09-11

## 2022-09-12 ENCOUNTER — HOSPITAL ENCOUNTER (OUTPATIENT)
Dept: CARDIAC REHAB | Facility: CLINIC | Age: 62
Discharge: HOME | End: 2022-09-12
Attending: INTERNAL MEDICINE
Payer: COMMERCIAL

## 2022-09-12 PROCEDURE — 93798 PHYS/QHP OP CAR RHAB W/ECG: CPT

## 2022-09-14 ENCOUNTER — HOSPITAL ENCOUNTER (OUTPATIENT)
Dept: CARDIAC REHAB | Facility: CLINIC | Age: 62
Discharge: HOME | End: 2022-09-14
Attending: INTERNAL MEDICINE
Payer: COMMERCIAL

## 2022-09-14 ENCOUNTER — OFFICE VISIT (OUTPATIENT)
Dept: CARDIOLOGY | Facility: CLINIC | Age: 62
End: 2022-09-14
Attending: PHYSICIAN ASSISTANT
Payer: COMMERCIAL

## 2022-09-14 VITALS
BODY MASS INDEX: 26.35 KG/M2 | HEART RATE: 74 BPM | WEIGHT: 188.2 LBS | SYSTOLIC BLOOD PRESSURE: 126 MMHG | HEIGHT: 71 IN | DIASTOLIC BLOOD PRESSURE: 73 MMHG

## 2022-09-14 DIAGNOSIS — I25.118 CORONARY ARTERY DISEASE OF NATIVE ARTERY OF NATIVE HEART WITH STABLE ANGINA PECTORIS (H): ICD-10-CM

## 2022-09-14 PROCEDURE — 99215 OFFICE O/P EST HI 40 MIN: CPT | Performed by: PHYSICIAN ASSISTANT

## 2022-09-14 PROCEDURE — 93798 PHYS/QHP OP CAR RHAB W/ECG: CPT | Performed by: REHABILITATION PRACTITIONER

## 2022-09-14 NOTE — LETTER
9/14/2022    Michael Méndez MD  [address] [facility] [address]    RE: Abhishek Gomez       Dear Colleague,     I had the pleasure of seeing Abhishek Gomez in the [facility].    Cardiology Clinic Progress Note    Abhishek Gomez MRN# [id number]   YOB: 1960 Age: 62 year old   Primary cardiologist: Dr. Sarah         Assessment and Plan:     In summary, Abhishek Gomez presents today for a routine 3 month follow up visit.    1. Severe CAD, with stable exertional angina.    -- s/p PCI to  of ISR prior prox/mid LAD and D1 lesion with 4 stents on 8/17/21.     -- Angiogram in 3/2022 showed a subtotal occlusion of the previously stented ostial D1.  Medical management is recommended unless he develops limiting angina with therapy. There was otherwise stable, residual mid RCA lesion which is hemodynamically insignificant, and a 70% ostial moderately sized ramus lesion.      -- Symptoms improving with cardiac rehab, on Lopressor 25 mg twice daily, Imdur 90 mg daily. Can develop angina if he eats before exerting.    -- On DAPT with Brilinta.   2. Hypertension.  Well-controlled.  3. Hyperlipidemia. Well-controlled.   4. Diabetes mellitus.  Well-controlled.  5. CKD-III, with a stable creatinine of 1.2-1.3.  6. Probable GERD.  Now on PPI.  7. Mild chronic anemia.     Plan:  - Will ask pharmacist to price out Ranexa.   - Will fill out disability paperwork if needed.   - I will plan to have him see me in 3 months. He will contact me if his symptoms worsen in the meantime.  - He is overdue for his screening colonoscopy. Advised it would be OK for temporary Brilinta hold if needed for this (>6 months out from last angiogram).        History of Presenting Illness:      Abhishek Gomez is a pleasant 62 year old patient who presents today for a routine 3 month follow up visit.    He has known coronary artery disease, diabetes,  hypertension and hyperlipidemia.  He was evaluated here in 2019 with unstable angina.  A stress test prior to that evaluation was very abnormal.  He then proceeded to have a coronary angiogram, which revealed high-grade proximal LAD stenosis.  The LAD was stented with a 2.5 mm stent.  The first diagonal was also ballooned at that time.  He developed atypical chest discomfort in late 2019, and a stress test performed at that time showed no inducible ischemia.     In the summer of 2021, he re-developed typical exertional angina.  Stress test showed anteroseptal and apical wall ischemia. Coronary angiogram was subsequently performed on 07/26/2021. This showed an occluded proximal LAD at the site of the prior stent with left-to-left and right-to-left collaterals.  The circumflex and rami were small with moderate disease.  The mid RCA had a focal 50% stenosis. He was referred to CV surgery, however declined bypass, strongly preferring complex PCI. He returned to the cath lab with Dr. Sarah in August, where he underwent successful IVUS-guided  PCI of the ostial, proximal-mid LAD and D1 with MICHELLE x4. There was a hemodynamically insignificant mid RCA lesion (IFR 0.95), and a 70% ostial moderately sized ramus lesion. He was placed on DAPT with Brilinta. TTE from 8/3/21 showed normal biventricular function.    After that, he had done quite well from a cardiac standpoint.    He was recently enrolled in the surpass trial through our clinic, however was then disenrolled due to development of GI symptoms which is a known side effect of the study drug.  When our nurse called to follow-up with him on that symptom, he reported development of exertional chest pain which ultimately prompted coronary angiogram. This took place on March 1, showing subtotal occlusion of previously stented D1.  As this was a bifurcation lesion with 2 layers of stent already, medical management was recommended, with consideration for PCI down the road if  "he continued to have limiting angina despite medical therapy.  The proximal to mid LAD stents were patent. The remainder of his CAD was stable from July 2021. Imdur was re-initiated.     When I saw him after that, Adrien felt that his angina had perhaps slightly improved, but would still come on predictably with heavy exertion at work and also sometimes after eating.  We increased his Imdur to 90 mg daily, and also initiated omeprazole.  With this, his symptoms got much better.      Today, Adrien returns to clinic stating he's feeling better overall. He has been participating in cardiac rehab, completing 4 METS, and it appears that he can at times develop angina but typically when he eats just prior to his therapy. His LLE claudication symptom has also improved. 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 I discussed applying for disability, and he'll consider it. He denies shortness of breath, PND, orthopnea, edema, palpitations. BP is well-controlled.  He denies any medication side effects. LDL 30's. HgbA1c 6.7%.         Review of Systems:     12-pt ROS is negative except for as noted in the HPI.          Physical Exam:     Vitals: /73 (BP Location: Right arm, Patient Position: Sitting, Cuff Size: Adult Regular)   Pulse 74   Ht 1.803 m (5' 11\")   Wt 85.4 kg (188 lb 3.2 oz)   BMI 26.25 kg/m    Wt Readings from Last 10 Encounters:   09/14/22 85.4 kg (188 lb 3.2 oz)   06/30/22 84.9 kg (187 lb 1.6 oz)   04/20/22 82.6 kg (182 lb)   04/12/22 82.6 kg (182 lb 1.6 oz)   03/15/22 85.3 kg (188 lb)   03/01/22 79.4 kg (175 lb)   02/23/22 81.2 kg (179 lb)   01/26/22 82.8 kg (182 lb 9.6 oz)   12/29/21 87.1 kg (192 lb)   12/10/21 89 kg (196 lb 3.4 oz)       Constitutional:  Patient is pleasant, alert, cooperative, and in NAD.  HEENT:  NCAT. " PERRLA. EOM's intact.   Neck:  CVP appears normal. No carotid bruits.   Pulmonary: Normal respiratory effort. CTAB.   Cardiac: RRR, normal S1/S2, no S3/S4, no murmur or rub.   Abdomen:  Non-tender abdomen, no hepatosplenomegaly appreciated.   Vascular: Pulses in the upper and lower extremities are 2+ and equal bilaterally.  Extremities: No edema, erythema, cyanosis or tenderness appreciated.  Skin:  No rashes or lesions appreciated.   Neurological:  No gross motor or sensory deficits.   Psych: Appropriate affect.        Data:     Labs reviewed:  Recent Labs   Lab Test 04/07/22  0934 10/25/21  0851 08/17/21  1155 03/02/21  1107 05/13/20  0803 12/26/19  0732 05/09/19  1240 10/31/18  0825   LDL 39 49 27 46   < >  --    < > 38   HDL 38* 34* 28* 33*   < >  --    < > 37*   NHDL 62 74 62 75   < >  --    < > 58   CHOL 100 108 90 108   < >  --    < > 95   TRIG 115 125 174* 145   < >  --    < > 101   TSH  --   --   --  6.06*  --  6.39*  --  4.83*    < > = values in this interval not displayed.       Lab Results   Component Value Date    WBC 8.7 03/01/2022    WBC 7.2 05/13/2019    RBC 4.46 03/01/2022    RBC 4.91 05/13/2019    HGB 11.6 (L) 03/01/2022    HGB 13.4 05/13/2019    HCT 35.5 (L) 03/01/2022    HCT 38.6 (L) 05/13/2019    MCV 80 03/01/2022    MCV 79 05/13/2019    MCH 26.0 (L) 03/01/2022    MCH 27.3 05/13/2019    MCHC 32.7 03/01/2022    MCHC 34.7 05/13/2019    RDW 13.2 03/01/2022    RDW 12.7 05/13/2019     03/01/2022     05/13/2019       Lab Results   Component Value Date     03/03/2022     03/02/2021    POTASSIUM 4.2 03/03/2022    POTASSIUM 4.1 03/02/2021    CHLORIDE 108 03/03/2022    CHLORIDE 107 03/02/2021    CO2 28 03/03/2022    CO2 26 03/02/2021    ANIONGAP 1 (L) 03/03/2022    ANIONGAP 6 03/02/2021     (H) 03/03/2022    GLC 90 03/02/2021    BUN 13 03/03/2022    BUN 10 03/02/2021    CR 1.20 03/03/2022    CR 1.09 03/02/2021    GFRESTIMATED 69 03/03/2022    GFRESTIMATED 73 03/02/2021     GFRESTBLACK 85 03/02/2021    KRIS 8.4 (L) 03/03/2022    KRIS 9.3 03/02/2021      Lab Results   Component Value Date    AST 18 04/07/2022    AST 26 03/02/2021    ALT 29 04/07/2022    ALT 35 03/02/2021       Lab Results   Component Value Date    A1C 6.7 (H) 04/07/2022    A1C 7.6 (H) 03/02/2021       Lab Results   Component Value Date    INR 1.06 03/01/2022    INR 1.13 08/17/2021    INR 1.01 05/09/2019           Problem List:     Patient Active Problem List   Diagnosis     Hyperlipidemia LDL goal <70     Essential hypertension, benign     Type 2 diabetes mellitus without complication, without long-term current use of insulin (H)     Unstable angina (H)     NSTEMI (non-ST elevated myocardial infarction) (H)     Coronary artery disease of native artery of native heart with stable angina pectoris (H)     Abnormal stress test     Stable angina (H)     Status post coronary angiogram           Medications:     Current Outpatient Medications   Medication Sig Dispense Refill     aspirin 81 MG EC tablet Take 1 tablet (81 mg) by mouth daily 90 tablet 3     atorvastatin (LIPITOR) 20 MG tablet Take 1 tablet (20 mg) by mouth daily 30 tablet 5     glimepiride (AMARYL) 2 MG tablet Take 1 tablet (2 mg) by mouth every morning (before breakfast) 30 tablet 5     isosorbide mononitrate (IMDUR) 30 MG 24 hr tablet Take 3 tablets (90 mg) by mouth daily 180 tablet 3     lisinopril (ZESTRIL) 20 MG tablet Take 1 tablet (20 mg) by mouth daily 30 tablet 5     metFORMIN (GLUCOPHAGE) 1000 MG tablet Take 1 tablet (1,000 mg) by mouth 2 times daily (with meals) 60 tablet 5     metoprolol tartrate (LOPRESSOR) 25 MG tablet Take 1 tablet (25 mg) by mouth 2 times daily 180 tablet 2     nitroGLYcerin (NITROSTAT) 0.4 MG sublingual tablet For chest pain place 1 tablet under the tongue every 5 minutes for up to 3 doses. If symptoms persist 5 minutes after 2nd dose call 911. 30 tablet 0     omeprazole (PRILOSEC) 20 MG DR capsule Take 1 capsule (20 mg) by mouth  daily 90 capsule 3     ticagrelor (BRILINTA) 90 MG tablet Take 1 tablet (90 mg) by mouth every 12 hours 180 tablet 3           Past Medical History:     Past Medical History:   Diagnosis Date     Abnormal stress test 07/14/2021     Coronary artery disease involving native coronary artery of native heart without angina pectoris 07/14/2021     Essential hypertension, benign 05/18/2016     Hyperlipidemia LDL goal <70 03/16/2015     NSTEMI (non-ST elevated myocardial infarction) (H) 05/09/2019    s/p MICHELLE to pLAD     Status post coronary angiogram 07/26/2021    Complex Multivessel CAD. CABG cosult     Type 2 diabetes mellitus without complication, without long-term current use of insulin (H) 07/06/2017     Unstable angina (H) 05/09/2019     Past Surgical History:   Procedure Laterality Date     CV CORONARY ANGIOGRAM N/A 7/26/2021    Procedure: Coronary Angiogram;  Surgeon: Sylvester Westbrook MD;  Location: [facility] CARDIAC CATH LAB     CV CORONARY ANGIOGRAM N/A 3/1/2022    Procedure: Coronary Angiogram;  Surgeon: Sanchez Sarah MD;  Location: [facility] CARDIAC CATH LAB     CV HEART CATHETERIZATION WITH POSSIBLE INTERVENTION N/A 5/9/2019    Procedure: Coronary Angiogram;  Surgeon: Jose Enrique Stanley MD;  Location: [facility] CARDIAC CATH LAB     CV HEART CATHETERIZATION WITH POSSIBLE INTERVENTION N/A 8/17/2021    Procedure: Coronary Angiogram;  Surgeon: Sanchez Sarah MD;  Location: [facility] CARDIAC CATH LAB     CV INSTANTANEOUS WAVE-FREE RATIO N/A 8/17/2021    Procedure: Instantaneous Wave-Free Ratio;  Surgeon: Sanchez Sarah MD;  Location: [facility] CARDIAC CATH LAB     CV INTRAVASULAR ULTRASOUND N/A 8/17/2021    Procedure: Intravascular Ultrasound;  Surgeon: Sanchez Sarah MD;  Location: [facility] CARDIAC CATH LAB     CV LEFT HEART CATH N/A 7/26/2021    Procedure: Left Heart Cath;  Surgeon: Sylvester Westbrook MD;  Location: [facility] CARDIAC CATH LAB     CV PCI ANGIOPLASTY N/A 5/9/2019    Procedure:  PCI Angioplasty;  Surgeon: Jose Enrique Stanley MD;  Location: [facility] CARDIAC CATH LAB     CV PCI CHRONIC TOTAL OCCLUSION N/A 8/17/2021    Procedure: Percutaneous Coronary Intervention of Chronic Total Occlusion;  Surgeon: Sanchez Sarah MD;  Location:  HEART CARDIAC CATH LAB     Family History   Problem Relation Age of Onset     Diabetes Paternal Grandmother      Diabetes Nephew      Social History     Socioeconomic History     Marital status:      Spouse name: Not on file     Number of children: Not on file     Years of education: Not on file     Highest education level: Not on file   Occupational History     Not on file   Tobacco Use     Smoking status: Never Smoker     Smokeless tobacco: Never Used   Substance and Sexual Activity     Alcohol use: No     Drug use: No     Sexual activity: Yes     Partners: Female   Other Topics Concern     Parent/sibling w/ CABG, MI or angioplasty before 65F 55M? Not Asked   Social History Narrative     Not on file     Social Determinants of Health     Financial Resource Strain: Not on file   Food Insecurity: Not on file   Transportation Needs: Not on file   Physical Activity: Not on file   Stress: Not on file   Social Connections: Not on file   Intimate Partner Violence: Not on file   Housing Stability: Not on file           Allergies:   Patient has no known allergies.      ARMOND Galarza [facility]  Pager: [phone]    Today's clinic visit entailed:  Prescription drug management  I spent a total of 40 minutes on the day of the visit.   Time spent doing chart review, history and exam, documentation and further activities per the note  Provider  Link to [facility] Help Grid     The level of medical decision making during this visit was of moderate complexity.        Thank you for allowing me to participate in the care of your patient.      Sincerely,     ARMOND Galarza [facility]  cc:   Leila Curran PA-C  [address]  [place]  [place] [address]

## 2022-09-14 NOTE — PROGRESS NOTES
Cardiology Clinic Progress Note    Abhishek Gomez MRN# [id number]   YOB: 1960 Age: 62 year old   Primary cardiologist: Dr. Sarah         Assessment and Plan:     In summary, Abhishek Gomez presents today for a routine 3 month follow up visit.    1. Severe CAD, with stable exertional angina.    -- s/p PCI to  of ISR prior prox/mid LAD and D1 lesion with 4 stents on 8/17/21.     -- Angiogram in 3/2022 showed a subtotal occlusion of the previously stented ostial D1.  Medical management is recommended unless he develops limiting angina with therapy. There was otherwise stable, residual mid RCA lesion which is hemodynamically insignificant, and a 70% ostial moderately sized ramus lesion.      -- Symptoms improving with cardiac rehab, on Lopressor 25 mg twice daily, Imdur 90 mg daily. Can develop angina if he eats before exerting.    -- On DAPT with Brilinta.   2. Hypertension.  Well-controlled.  3. Hyperlipidemia. Well-controlled.   4. Diabetes mellitus.  Well-controlled.  5. CKD-III, with a stable creatinine of 1.2-1.3.  6. Probable GERD.  Now on PPI.  7. Mild chronic anemia.     Plan:  - Will ask pharmacist to price out Ranexa.   - Will fill out disability paperwork if needed.   - I will plan to have him see me in 3 months. He will contact me if his symptoms worsen in the meantime.  - He is overdue for his screening colonoscopy. Advised it would be OK for temporary Brilinta hold if needed for this (>6 months out from last angiogram).        History of Presenting Illness:      Abhishek Gomez is a pleasant 62 year old patient who presents today for a routine 3 month follow up visit.    He has known coronary artery disease, diabetes, hypertension and hyperlipidemia.  He was evaluated here in 2019 with unstable angina.  A stress test prior to that evaluation was very abnormal.  He then proceeded to have a coronary angiogram, which revealed high-grade proximal LAD  stenosis.  The LAD was stented with a 2.5 mm stent.  The first diagonal was also ballooned at that time.  He developed atypical chest discomfort in late 2019, and a stress test performed at that time showed no inducible ischemia.     In the summer of 2021, he re-developed typical exertional angina.  Stress test showed anteroseptal and apical wall ischemia. Coronary angiogram was subsequently performed on 07/26/2021. This showed an occluded proximal LAD at the site of the prior stent with left-to-left and right-to-left collaterals.  The circumflex and rami were small with moderate disease.  The mid RCA had a focal 50% stenosis. He was referred to CV surgery, however declined bypass, strongly preferring complex PCI. He returned to the cath lab with Dr. Sarah in August, where he underwent successful IVUS-guided  PCI of the ostial, proximal-mid LAD and D1 with MICHELLE x4. There was a hemodynamically insignificant mid RCA lesion (IFR 0.95), and a 70% ostial moderately sized ramus lesion. He was placed on DAPT with Brilinta. TTE from 8/3/21 showed normal biventricular function.    After that, he had done quite well from a cardiac standpoint.    He was recently enrolled in the surpass trial through our clinic, however was then disenrolled due to development of GI symptoms which is a known side effect of the study drug.  When our nurse called to follow-up with him on that symptom, he reported development of exertional chest pain which ultimately prompted coronary angiogram. This took place on March 1, showing subtotal occlusion of previously stented D1.  As this was a bifurcation lesion with 2 layers of stent already, medical management was recommended, with consideration for PCI down the road if he continued to have limiting angina despite medical therapy.  The proximal to mid LAD stents were patent. The remainder of his CAD was stable from July 2021. Imdur was re-initiated.     When I saw him after that, Adrien felt that his  "angina had perhaps slightly improved, but would still come on predictably with heavy exertion at work and also sometimes after eating.  We increased his Imdur to 90 mg daily, and also initiated omeprazole.  With this, his symptoms got much better.      Today, Adrien returns to clinic stating he's feeling better overall. He has been participating in cardiac rehab, completing 4 METS, and it appears that he can at times develop angina but typically when he eats just prior to his therapy. His LLE claudication symptom has also improved. 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 I discussed applying for disability, and he'll consider it. He denies shortness of breath, PND, orthopnea, edema, palpitations. BP is well-controlled.  He denies any medication side effects. LDL 30's. HgbA1c 6.7%.         Review of Systems:     12-pt ROS is negative except for as noted in the HPI.          Physical Exam:     Vitals: /73 (BP Location: Right arm, Patient Position: Sitting, Cuff Size: Adult Regular)   Pulse 74   Ht 1.803 m (5' 11\")   Wt 85.4 kg (188 lb 3.2 oz)   BMI 26.25 kg/m    Wt Readings from Last 10 Encounters:   09/14/22 85.4 kg (188 lb 3.2 oz)   06/30/22 84.9 kg (187 lb 1.6 oz)   04/20/22 82.6 kg (182 lb)   04/12/22 82.6 kg (182 lb 1.6 oz)   03/15/22 85.3 kg (188 lb)   03/01/22 79.4 kg (175 lb)   02/23/22 81.2 kg (179 lb)   01/26/22 82.8 kg (182 lb 9.6 oz)   12/29/21 87.1 kg (192 lb)   12/10/21 89 kg (196 lb 3.4 oz)       Constitutional:  Patient is pleasant, alert, cooperative, and in NAD.  HEENT:  NCAT. PERRLA. EOM's intact.   Neck:  CVP appears normal. No carotid bruits.   Pulmonary: Normal respiratory effort. CTAB.   Cardiac: RRR, normal S1/S2, no S3/S4, no murmur or rub.   Abdomen:  Non-tender abdomen, no hepatosplenomegaly " appreciated.   Vascular: Pulses in the upper and lower extremities are 2+ and equal bilaterally.  Extremities: No edema, erythema, cyanosis or tenderness appreciated.  Skin:  No rashes or lesions appreciated.   Neurological:  No gross motor or sensory deficits.   Psych: Appropriate affect.        Data:     Labs reviewed:  Recent Labs   Lab Test 04/07/22  0934 10/25/21  0851 08/17/21  1155 03/02/21  1107 05/13/20  0803 12/26/19  0732 05/09/19  1240 10/31/18  0825   LDL 39 49 27 46   < >  --    < > 38   HDL 38* 34* 28* 33*   < >  --    < > 37*   NHDL 62 74 62 75   < >  --    < > 58   CHOL 100 108 90 108   < >  --    < > 95   TRIG 115 125 174* 145   < >  --    < > 101   TSH  --   --   --  6.06*  --  6.39*  --  4.83*    < > = values in this interval not displayed.       Lab Results   Component Value Date    WBC 8.7 03/01/2022    WBC 7.2 05/13/2019    RBC 4.46 03/01/2022    RBC 4.91 05/13/2019    HGB 11.6 (L) 03/01/2022    HGB 13.4 05/13/2019    HCT 35.5 (L) 03/01/2022    HCT 38.6 (L) 05/13/2019    MCV 80 03/01/2022    MCV 79 05/13/2019    MCH 26.0 (L) 03/01/2022    MCH 27.3 05/13/2019    MCHC 32.7 03/01/2022    MCHC 34.7 05/13/2019    RDW 13.2 03/01/2022    RDW 12.7 05/13/2019     03/01/2022     05/13/2019       Lab Results   Component Value Date     03/03/2022     03/02/2021    POTASSIUM 4.2 03/03/2022    POTASSIUM 4.1 03/02/2021    CHLORIDE 108 03/03/2022    CHLORIDE 107 03/02/2021    CO2 28 03/03/2022    CO2 26 03/02/2021    ANIONGAP 1 (L) 03/03/2022    ANIONGAP 6 03/02/2021     (H) 03/03/2022    GLC 90 03/02/2021    BUN 13 03/03/2022    BUN 10 03/02/2021    CR 1.20 03/03/2022    CR 1.09 03/02/2021    GFRESTIMATED 69 03/03/2022    GFRESTIMATED 73 03/02/2021    GFRESTBLACK 85 03/02/2021    KRIS 8.4 (L) 03/03/2022    KRIS 9.3 03/02/2021      Lab Results   Component Value Date    AST 18 04/07/2022    AST 26 03/02/2021    ALT 29 04/07/2022    ALT 35 03/02/2021       Lab Results   Component  Value Date    A1C 6.7 (H) 04/07/2022    A1C 7.6 (H) 03/02/2021       Lab Results   Component Value Date    INR 1.06 03/01/2022    INR 1.13 08/17/2021    INR 1.01 05/09/2019           Problem List:     Patient Active Problem List   Diagnosis     Hyperlipidemia LDL goal <70     Essential hypertension, benign     Type 2 diabetes mellitus without complication, without long-term current use of insulin (H)     Unstable angina (H)     NSTEMI (non-ST elevated myocardial infarction) (H)     Coronary artery disease of native artery of native heart with stable angina pectoris (H)     Abnormal stress test     Stable angina (H)     Status post coronary angiogram           Medications:     Current Outpatient Medications   Medication Sig Dispense Refill     aspirin 81 MG EC tablet Take 1 tablet (81 mg) by mouth daily 90 tablet 3     atorvastatin (LIPITOR) 20 MG tablet Take 1 tablet (20 mg) by mouth daily 30 tablet 5     glimepiride (AMARYL) 2 MG tablet Take 1 tablet (2 mg) by mouth every morning (before breakfast) 30 tablet 5     isosorbide mononitrate (IMDUR) 30 MG 24 hr tablet Take 3 tablets (90 mg) by mouth daily 180 tablet 3     lisinopril (ZESTRIL) 20 MG tablet Take 1 tablet (20 mg) by mouth daily 30 tablet 5     metFORMIN (GLUCOPHAGE) 1000 MG tablet Take 1 tablet (1,000 mg) by mouth 2 times daily (with meals) 60 tablet 5     metoprolol tartrate (LOPRESSOR) 25 MG tablet Take 1 tablet (25 mg) by mouth 2 times daily 180 tablet 2     nitroGLYcerin (NITROSTAT) 0.4 MG sublingual tablet For chest pain place 1 tablet under the tongue every 5 minutes for up to 3 doses. If symptoms persist 5 minutes after 2nd dose call 911. 30 tablet 0     omeprazole (PRILOSEC) 20 MG DR capsule Take 1 capsule (20 mg) by mouth daily 90 capsule 3     ticagrelor (BRILINTA) 90 MG tablet Take 1 tablet (90 mg) by mouth every 12 hours 180 tablet 3           Past Medical History:     Past Medical History:   Diagnosis Date     Abnormal stress test 07/14/2021      Coronary artery disease involving native coronary artery of native heart without angina pectoris 07/14/2021     Essential hypertension, benign 05/18/2016     Hyperlipidemia LDL goal <70 03/16/2015     NSTEMI (non-ST elevated myocardial infarction) (H) 05/09/2019    s/p MICHELLE to pLAD     Status post coronary angiogram 07/26/2021    Complex Multivessel CAD. CABG cosult     Type 2 diabetes mellitus without complication, without long-term current use of insulin (H) 07/06/2017     Unstable angina (H) 05/09/2019     Past Surgical History:   Procedure Laterality Date     CV CORONARY ANGIOGRAM N/A 7/26/2021    Procedure: Coronary Angiogram;  Surgeon: Sylvester Westbrook MD;  Location: [facility] CARDIAC CATH LAB     CV CORONARY ANGIOGRAM N/A 3/1/2022    Procedure: Coronary Angiogram;  Surgeon: Sanchez Sarah MD;  Location: [facility] CARDIAC CATH LAB     CV HEART CATHETERIZATION WITH POSSIBLE INTERVENTION N/A 5/9/2019    Procedure: Coronary Angiogram;  Surgeon: Jose Enrique Stanley MD;  Location: [facility] CARDIAC CATH LAB     CV HEART CATHETERIZATION WITH POSSIBLE INTERVENTION N/A 8/17/2021    Procedure: Coronary Angiogram;  Surgeon: Sanchez Sarah MD;  Location: [facility] CARDIAC CATH LAB     CV INSTANTANEOUS WAVE-FREE RATIO N/A 8/17/2021    Procedure: Instantaneous Wave-Free Ratio;  Surgeon: Sanchez Sarah MD;  Location: [facility] CARDIAC CATH LAB     CV INTRAVASULAR ULTRASOUND N/A 8/17/2021    Procedure: Intravascular Ultrasound;  Surgeon: Sanchez Sarah MD;  Location: [facility] CARDIAC CATH LAB     CV LEFT HEART CATH N/A 7/26/2021    Procedure: Left Heart Cath;  Surgeon: Sylvester Westbrook MD;  Location: [facility] CARDIAC CATH LAB     CV PCI ANGIOPLASTY N/A 5/9/2019    Procedure: PCI Angioplasty;  Surgeon: Jose Enrique Stanley MD;  Location: [facility] CARDIAC CATH LAB     CV PCI CHRONIC TOTAL OCCLUSION N/A 8/17/2021    Procedure: Percutaneous Coronary Intervention of Chronic Total Occlusion;  Surgeon:  Sanchez Sarah MD;  Location:  HEART CARDIAC CATH LAB     Family History   Problem Relation Age of Onset     Diabetes Paternal Grandmother      Diabetes Nephew      Social History     Socioeconomic History     Marital status:      Spouse name: Not on file     Number of children: Not on file     Years of education: Not on file     Highest education level: Not on file   Occupational History     Not on file   Tobacco Use     Smoking status: Never Smoker     Smokeless tobacco: Never Used   Substance and Sexual Activity     Alcohol use: No     Drug use: No     Sexual activity: Yes     Partners: Female   Other Topics Concern     Parent/sibling w/ CABG, MI or angioplasty before 65F 55M? Not Asked   Social History Narrative     Not on file     Social Determinants of Health     Financial Resource Strain: Not on file   Food Insecurity: Not on file   Transportation Needs: Not on file   Physical Activity: Not on file   Stress: Not on file   Social Connections: Not on file   Intimate Partner Violence: Not on file   Housing Stability: Not on file           Allergies:   Patient has no known allergies.      Leila Curran PA-C  [facility] - Heart Clinic  Pager: [phone]    Today's clinic visit entailed:  Prescription drug management  I spent a total of 40 minutes on the day of the visit.   Time spent doing chart review, history and exam, documentation and further activities per the note  Provider  Link to MDM Help Grid     The level of medical decision making during this visit was of moderate complexity.

## 2022-09-14 NOTE — PATIENT INSTRUCTIONS
Today's Plan:   - I'll ask the pharmacist to price out Ranexa for you, and let you know. This would be to get more improvement in  your angina.  - Return to see me in 3 months.   - Send us disability paperwork if you need.  - Please go ahead with your colonoscopy end of this year or beginning of next year, as you are overdue, and >6 months out from your angiogram, it would be OK to hold the Brilinta for the test temporarily.     If you have questions or concerns please call my nurse team at [phone].   Scheduling phone number: [phone]  For after hours urgent concerns call [phone] option 2.   Reminder: Please bring in all current medications, over the counter supplements and vitamin bottles to your next appointment.    It was a pleasure seeing you today!     Leila Curran PA-C

## 2022-09-27 ENCOUNTER — TELEPHONE (OUTPATIENT)
Dept: CARDIOLOGY | Facility: CLINIC | Age: 62
End: 2022-09-27

## 2022-09-27 NOTE — TELEPHONE ENCOUNTER
"Left message for patient to call back to review WILMAR results/message from Leila.     ----- Message from Leila Curran PA-C sent at 9/26/2022  5:08 PM CDT -----  Please let Adrien know that his WILMAR's are normal. Thank you!  ----- Message -----  From: Sanchez Sarah MD  Sent: 9/26/2022  10:13 AM CDT  To: Leila Curran PA-C    Hi Leila,    ABIs are normal. Not sure why it wasn't read. I will read it today. Thanks   ----- Message -----  From: Leila Curran PA-C  Sent: 9/14/2022   4:12 PM CDT  To: Sanchez Sarah MD    Hi Dr. Sarah. Can you take a look at this patient's WILMAR's from July? The study was done, but it still reads as \"in process\" without a formal result note. Thank you!          "

## 2022-09-29 ENCOUNTER — TELEPHONE (OUTPATIENT)
Dept: CARDIOLOGY | Facility: CLINIC | Age: 62
End: 2022-09-29

## 2022-09-29 DIAGNOSIS — I25.118 CORONARY ARTERY DISEASE OF NATIVE ARTERY OF NATIVE HEART WITH STABLE ANGINA PECTORIS (H): Primary | ICD-10-CM

## 2022-09-29 DIAGNOSIS — I20.89 STABLE ANGINA (H): ICD-10-CM

## 2022-09-29 RX ORDER — RANOLAZINE 500 MG/1
500 TABLET, EXTENDED RELEASE ORAL 2 TIMES DAILY
Qty: 60 TABLET | Refills: 11 | Status: SHIPPED | OUTPATIENT
Start: 2022-09-29 | End: 2023-01-20

## 2022-09-29 NOTE — TELEPHONE ENCOUNTER
"Spoke to patient, reviewed recommendation to start ranexa 500mg BID and pt was agreeable to plan. 30 day supply with 11 refills sent to [facility] per pharmacy recommendation due to reduced pricing. Patient is aware.       ----- Message from Leila Curran PA-C sent at 9/28/2022  4:44 PM CDT -----  Please let patient know that I would recommend we start ranexa 500 mg BID (30 day fill for $29/mo). Thanks!  ----- Message -----  From: Anne Lynn  Sent: 9/27/2022   9:14 AM CDT  To: Leila Curran PA-C    Patient has pharmacy benefits through [facility].    Ranolazine 1000mg, #60: $83.  Ranolazine 500mg, #120: $57. (Note--cheaper to double up on the 500mg tabs if you want a dose of 1000mg)    Ranolazine 500mg, #60: $29.    I also see in the patient's benefit summary that patient is to pay a 20% copay on drugs, but \"prescription drugs must be obtained through [facility] or they are not covered. 90-day supply for generic and brand name drugs (retail and mail order).\"  This leads me to think that if patient were to obtain his meds through [facility], he would pay significantly less (prices above represent a 100% copay).    Anne Lynn  Pharmacy Technician/Liaison, Discharge Pharmacy   [phone]  [email or web address]      ----- Message -----  From: Leila Curran PA-C  Sent: 9/26/2022   5:08 PM CDT  To: Anne Rodríguez. Can you please price out Ranexa for this patient for me? Thanks!          "

## 2022-10-15 DIAGNOSIS — E11.9 TYPE 2 DIABETES MELLITUS WITHOUT COMPLICATION, WITHOUT LONG-TERM CURRENT USE OF INSULIN (H): ICD-10-CM

## 2022-10-15 DIAGNOSIS — I10 ESSENTIAL HYPERTENSION, BENIGN: ICD-10-CM

## 2022-10-18 ENCOUNTER — TELEPHONE (OUTPATIENT)
Dept: CARDIOLOGY | Facility: CLINIC | Age: 62
End: 2022-10-18

## 2022-10-18 ENCOUNTER — LAB (OUTPATIENT)
Dept: LAB | Facility: CLINIC | Age: 62
End: 2022-10-18
Payer: COMMERCIAL

## 2022-10-18 DIAGNOSIS — E11.9 TYPE 2 DIABETES MELLITUS WITHOUT COMPLICATION, WITHOUT LONG-TERM CURRENT USE OF INSULIN (H): ICD-10-CM

## 2022-10-18 DIAGNOSIS — I25.118 CORONARY ARTERY DISEASE OF NATIVE ARTERY OF NATIVE HEART WITH STABLE ANGINA PECTORIS (H): Primary | ICD-10-CM

## 2022-10-18 LAB — HBA1C MFR BLD: 6.6 % (ref 0–5.6)

## 2022-10-18 PROCEDURE — 83036 HEMOGLOBIN GLYCOSYLATED A1C: CPT

## 2022-10-18 PROCEDURE — 36415 COLL VENOUS BLD VENIPUNCTURE: CPT

## 2022-10-18 RX ORDER — ATORVASTATIN CALCIUM 20 MG/1
20 TABLET, FILM COATED ORAL DAILY
Qty: 90 TABLET | Refills: 1 | Status: SHIPPED | OUTPATIENT
Start: 2022-10-18 | End: 2023-02-16

## 2022-10-18 RX ORDER — GLIMEPIRIDE 2 MG/1
2 TABLET ORAL
Qty: 30 TABLET | Refills: 0 | Status: SHIPPED | OUTPATIENT
Start: 2022-10-18 | End: 2022-11-15

## 2022-10-18 RX ORDER — LISINOPRIL 20 MG/1
20 TABLET ORAL DAILY
Qty: 90 TABLET | Refills: 1 | Status: SHIPPED | OUTPATIENT
Start: 2022-10-18 | End: 2023-02-16

## 2022-10-18 NOTE — TELEPHONE ENCOUNTER
Call from patient with daughter translating. 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 He will drop off for MORGAN Leila Curran this week.  Patient stopped working in mid-July.      1430 [facility] form to MORGAN Leila Curran for review and signature

## 2022-10-18 NOTE — TELEPHONE ENCOUNTER
Called and spoke to patient. Relayed message from Dr. Méndez. Patient scheduled for a lab only appointment.     Appointments in Next Year    Oct 18, 2022 12:15 PM  [facility]  [facility] ([facility] ) [phone]        Nancy Dimas RN

## 2022-10-18 NOTE — TELEPHONE ENCOUNTER
Prescription approved per [facility] Refill Protocol.  Justice L. Phoenix, RN

## 2022-10-19 NOTE — TELEPHONE ENCOUNTER
Signed paperwork.    I'd like him to see Dr. Sarah when able for reassessment of his angina. He hasn't returned to cardiac rehab it appears since I saw him last.     Thanks,  Leila

## 2022-10-19 NOTE — TELEPHONE ENCOUNTER
Order placed for OV with Dr. Sarah.    Called patient with update that his form is ready. He will pick it up tomorrow.    My chart message to patient  Jb, Mr. Gomez,    MORGAN Leila Curran would like to have you set up a visit with Dr. Sarah again. Please call Central Scheduling at [phone] to find a date.    Did you use up all of your cardiac rehab days? If not, Leila wanted to remind you to finish all the program.    Thank you  Team 2 RNs  [phone]

## 2022-11-14 DIAGNOSIS — E11.9 TYPE 2 DIABETES MELLITUS WITHOUT COMPLICATION, WITHOUT LONG-TERM CURRENT USE OF INSULIN (H): ICD-10-CM

## 2022-11-14 NOTE — TELEPHONE ENCOUNTER
Routing refill request to provider for review/approval because:  Labs out of range:    Hemoglobin A1C   Date Value Ref Range Status   10/18/2022 6.6 (H) 0.0 - 5.6 % Final     Comment:     Normal <5.7%   Prediabetes 5.7-6.4%    Diabetes 6.5% or higher     Note: Adopted from ADA consensus guidelines.   03/02/2021 7.6 (H) 0 - 5.6 % Final     Comment:     Normal <5.7% Prediabetes 5.7-6.4%  Diabetes 6.5% or higher - adopted from ADA   consensus guidelines.       Justice L. Phoenix, RN

## 2022-11-15 RX ORDER — GLIMEPIRIDE 2 MG/1
2 TABLET ORAL
Qty: 30 TABLET | Refills: 0 | Status: SHIPPED | OUTPATIENT
Start: 2022-11-15 | End: 2023-03-08

## 2022-11-21 ENCOUNTER — TELEPHONE (OUTPATIENT)
Dept: CARDIOLOGY | Facility: CLINIC | Age: 62
End: 2022-11-21

## 2022-11-21 NOTE — TELEPHONE ENCOUNTER
Called back to pt's daughter. Noted pt also sent a MyChart to Leila Curran today, discussed with Team 2 and final report for pt's WILMAR done on 7/12/22 is still in process. Per notes pt's test was normal. Team 2 has requested Dr. Sarah finalize report. Per pt's daughter pt is applying for social security disability and they are requesting WILMAR results.  Discussed they will try submitting pt's results from 7/12/22 first once report is available, if a repeat test is needed they will let us know.

## 2022-11-21 NOTE — TELEPHONE ENCOUNTER
[facility] Call Center    Phone Message    May a detailed message be left on voicemail: yes     Reason for Call: Order(s): Other:     Reason for requested: WILMAR: Ankle Brachial Index = requested by Social Security per daughter    Date needed: asap needs results soon    Provider name: Dr Sarah    Please call daughter to discuss and place order.    Action Taken: Other: Cardiology    Travel Screening: Not Applicable

## 2022-11-23 NOTE — TELEPHONE ENCOUNTER
WILMAR report now available for review. Called patient's daughter to verify how she would like to get the report, left message to call back. Placely also sent to patient.     Impression:   Right leg: Resting WILMAR is 1.18, which is normal. Triphasic waveforms  at the ankle  Left leg: Resting WILMAR is 1.16, which is normal. Triphasic waveforms at  the ankle  GIO BECKMAN MD

## 2022-11-30 NOTE — TELEPHONE ENCOUNTER
No return call from patient/daughter, three attempts made and a [employer] message was also sent. Copy of WILMAR report mailed to patient.

## 2022-12-27 ENCOUNTER — VIRTUAL VISIT (OUTPATIENT)
Dept: CARDIOLOGY | Facility: CLINIC | Age: 62
End: 2022-12-27

## 2022-12-27 DIAGNOSIS — Z00.6 EXAMINATION OF PARTICIPANT OR CONTROL IN CLINICAL RESEARCH: ICD-10-CM

## 2022-12-27 DIAGNOSIS — I25.118 CORONARY ARTERY DISEASE OF NATIVE ARTERY OF NATIVE HEART WITH STABLE ANGINA PECTORIS (H): Primary | ICD-10-CM

## 2022-12-27 DIAGNOSIS — E11.9 DIABETES MELLITUS (H): ICD-10-CM

## 2022-12-27 PROCEDURE — 99207 PR NO CHARGE-RESEARCH SERVICE: CPT

## 2022-12-27 NOTE — PROGRESS NOTES
SURPASS-CVOT Study [Effect of tirzepatide versus Dulaglutide on Major Cardiovascular Events in Patients with Type 2Diabetes]    Subject contacted by telephone to review any changes in health. He is on 6 mo phone FUs through end of study, Stopped study IP 3/12/22 due to side effects.     Sujbect queried for, endpoints SAEs and none reported.   Medication changes include Isosorbide 90mg every day, decreased per request to 60mg every day. Ranolazine 500mg BID ordered 9/29/22-one fill and no others. Patient has discontinued this.   Completed cardiac rehab in September due to ongoing issues with CP.    SURPASS-CVOT Study [Effect of tirzepatide versus Dulaglutide on Major Cardiovascular Events in Patients with Type 2 Diabetes]    Diagnosis/event description (diagnosis preferred):  Burning L thigh with activity  Start date: 06/30/2022   Date resolved: 01/20/2023     Intensity: ([x] mild /[]  moderate / [] severe)     Study Medication adjustment:  [] Yes   [x] No NA -off study IP for months  Outcome: ([x] resolved [with or without sequelae] /[] recovering / [] not recovered / [] death / [] unknown)      Date study team aware of event: 07/27/2022    Was treatment given for this event:  [] Yes   [x] No   If yes, provide details  Serious adverse event?    Yes   [x] No    Special interest event?  [] Yes   [x] No    Endpoint? [] Yes   [x] No     Fatal event?  [] Yes   [x] No      Dr. Rodriguez: Reasonable possibility that AE is related to study treatment    [] Yes   [x] No    Stephenie Diaz RN    SURPASS-CVOT Study [Effect of tirzepatide versus Dulaglutide on Major Cardiovascular Events in Patients with Type 2 Diabetes]    Diagnosis/event description (diagnosis preferred):  Shortness of Breath  Start date: 07/13/2022   Date resolved: 01/20/2023     Intensity: ([x] mild /[]  moderate / [] severe)     Study Medication adjustment:  [] Yes   [x] No    Outcome: ([x] resolved [with or without sequelae] /[] recovering / [] not recovered /  [] death / [] unknown)      Date study team aware of event: 07/27/2022    Was treatment given for this event:  [] Yes   [x] No   If yes, provide details  Serious adverse event?    Yes   [x] No    Special interest event?  [] Yes   [x] No    Endpoint? [] Yes   [x] No     Fatal event?  [] Yes   [x] No      Dr. Rodriguez: Reasonable possibility that AE is related to study treatment    [] Yes   [x] No    Stephenie Diaz RN      Will contact him again in 6 mos.    Stephenie Diaz RN

## 2022-12-27 NOTE — LETTER
12/27/2022    Michael Méndez MD  [address] [facility] [address]    RE: Abihshek Gomez       Dear Colleague,     I had the [facility]WE[facility]  SURPASS-CVOT Study [Effect of tirzepatide versus Dulaglutide on Major Cardiovascular Events in Patients with Type 2Diabetes]    Subject contacted by telephone to review any changes in health. He is on 6 mo phone FUs through end of study, Stopped study IP 3/12/22 due to side effects.     Sujbect queried for, endpoints SAEs and medication changes. None reported.     Completed cardiac rehab in September due to ongoing issues with CP.    Will contact him again in 6 mos.    Thank you for allowing me to participate in the care of your patient.      Sincerely,     Stephenie Diaz RN     [facility]  cc:   No referring provider defined for this encounter.

## 2023-01-20 ENCOUNTER — OFFICE VISIT (OUTPATIENT)
Dept: CARDIOLOGY | Facility: CLINIC | Age: 63
End: 2023-01-20
Payer: COMMERCIAL

## 2023-01-20 VITALS
DIASTOLIC BLOOD PRESSURE: 63 MMHG | HEIGHT: 71 IN | BODY MASS INDEX: 25.76 KG/M2 | OXYGEN SATURATION: 99 % | SYSTOLIC BLOOD PRESSURE: 110 MMHG | HEART RATE: 70 BPM | WEIGHT: 184 LBS

## 2023-01-20 DIAGNOSIS — E11.9 TYPE 2 DIABETES MELLITUS WITHOUT COMPLICATION, WITHOUT LONG-TERM CURRENT USE OF INSULIN (H): ICD-10-CM

## 2023-01-20 DIAGNOSIS — I25.10 CORONARY ARTERY DISEASE INVOLVING NATIVE CORONARY ARTERY OF NATIVE HEART WITHOUT ANGINA PECTORIS: ICD-10-CM

## 2023-01-20 DIAGNOSIS — I25.118 CORONARY ARTERY DISEASE OF NATIVE ARTERY OF NATIVE HEART WITH STABLE ANGINA PECTORIS (H): ICD-10-CM

## 2023-01-20 DIAGNOSIS — R94.39 ABNORMAL STRESS TEST: ICD-10-CM

## 2023-01-20 PROCEDURE — 99213 OFFICE O/P EST LOW 20 MIN: CPT | Performed by: PHYSICIAN ASSISTANT

## 2023-01-20 RX ORDER — METOPROLOL TARTRATE 25 MG/1
25 TABLET, FILM COATED ORAL 2 TIMES DAILY
Qty: 180 TABLET | Refills: 3 | Status: SHIPPED | OUTPATIENT
Start: 2023-01-20 | End: 2023-11-09

## 2023-01-20 NOTE — PROGRESS NOTES
Cardiology Clinic Progress Note    Abhishek Gomez MRN# [id number]   YOB: 1960 Age: 62 year old   Primary cardiologist: Dr. Sarah         Assessment and Plan:     In summary, Abhishek Gomez presents today for a routine 3 month follow up visit.    1. Severe CAD, with stable angina.   -- s/p PCI to  of ISR prior prox/mid LAD and D1 lesion with 4 stents on 8/17/21.     -- Angiogram in 3/2022 showed a subtotal occlusion of the previously stented ostial D1.  Medical management is recommended unless he develops limiting angina with therapy. There was otherwise stable, residual mid RCA lesion which is hemodynamically insignificant, and a 70% ostial moderately sized ramus lesion.      -- Symptoms well-controlled on Lopressor 25 mg twice daily, Imdur 90 mg daily.   -- On DAPT with Brilinta.   2. Hypertension.  Well-controlled.  3. Hyperlipidemia. Well-controlled.   4. Diabetes mellitus.  Well-controlled.  5. CKD-III, with a stable creatinine of 1.2-1.3.  6. Probable GERD.  Now on PPI.  7. Mild chronic anemia. Overdue for screening colonoscopy; encouraged him to have this done.     Plan:  - He would like to try backing down on Imdur, from 90 to 60 mg daily. I have told him he can try to do so cautiously, but if he were to re-develop angina, he should go back up on the dose.  - He wonders if he can get his COVID-19 booster prior to travel to [facility], which I've encouraged.  - He will follow up with Dr. Sarah as scheduled this Spring, with a fasting lipid profile prior.        History of Presenting Illness:      Abhishek Gomez is a pleasant 62 year old patient who presents today for a routine 3 month follow up visit.    He has known coronary artery disease, diabetes, hypertension and hyperlipidemia.  He was evaluated here in 2019 with unstable angina.  A stress test prior to that evaluation was very abnormal.  He then proceeded to have a coronary angiogram, which revealed  high-grade proximal LAD stenosis.  The LAD was stented with a 2.5 mm stent.  The first diagonal was also ballooned at that time.  He developed atypical chest discomfort in late 2019, and a stress test performed at that time showed no inducible ischemia.     In the summer of 2021, he re-developed typical exertional angina.  Stress test showed anteroseptal and apical wall ischemia. Coronary angiogram was subsequently performed on 07/26/2021. This showed an occluded proximal LAD at the site of the prior stent with left-to-left and right-to-left collaterals.  The circumflex and rami were small with moderate disease.  The mid RCA had a focal 50% stenosis. He was referred to CV surgery, however declined bypass, strongly preferring complex PCI. He returned to the cath lab with Dr. Sarah in August, where he underwent successful IVUS-guided  PCI of the ostial, proximal-mid LAD and D1 with MICHELLE x4. There was a hemodynamically insignificant mid RCA lesion (IFR 0.95), and a 70% ostial moderately sized ramus lesion. He was placed on DAPT with Brilinta. TTE from 8/3/21 showed normal biventricular function.    After that, he had done quite well from a cardiac standpoint.    He was recently enrolled in the surpass trial through our clinic, however was then disenrolled due to development of GI symptoms which is a known side effect of the study drug.  When our nurse called to follow-up with him on that symptom, he reported development of exertional chest pain which ultimately prompted coronary angiogram. This took place on March 1, 2022, showing subtotal occlusion of previously stented D1.  As this was a bifurcation lesion with 2 layers of stent already, medical management was recommended, with consideration for PCI down the road if he continued to have limiting angina despite medical therapy.  The proximal to mid LAD stents were patent. The remainder of his CAD was stable from July 2021. Imdur was re-initiated.     When I saw him  "after that, Adrien felt that his angina had perhaps slightly improved, but would still come on predictably with heavy exertion, especially after eating.  We increased his Imdur to 90 mg daily, and also initiated omeprazole.  With this, and cardiac rehab, his symptoms got much better.     Today, Adrien returns to clinic stating he's feeling much better overall. His chest pain has essentially resolved. He has stopped working at the grocery store, and feels OK with that. He now doesn't feel at all limited in what he's able to do on a daily basis. He denies undue shortness of breath, PND, orthopnea, edema, palpitations. BP is well-controlled.  He denies any medication side effects. LDL 30's. HgbA1c 6.7%. Travels to [facility] for one month next week.          Review of Systems:     12-pt ROS is negative except for as noted in the HPI.          Physical Exam:     Vitals: /63   Pulse 70   Ht 1.803 m (5' 11\")   Wt 83.5 kg (184 lb)   SpO2 99%   BMI 25.66 kg/m    Wt Readings from Last 10 Encounters:   01/20/23 83.5 kg (184 lb)   09/14/22 85.4 kg (188 lb 3.2 oz)   06/30/22 84.9 kg (187 lb 1.6 oz)   04/20/22 82.6 kg (182 lb)   04/12/22 82.6 kg (182 lb 1.6 oz)   03/15/22 85.3 kg (188 lb)   03/01/22 79.4 kg (175 lb)   02/23/22 81.2 kg (179 lb)   01/26/22 82.8 kg (182 lb 9.6 oz)   12/29/21 87.1 kg (192 lb)       Constitutional:  Patient is pleasant, alert, cooperative, and in NAD.  HEENT:  NCAT. PERRLA. EOM's intact.   Neck:  CVP appears normal. No carotid bruits.   Pulmonary: Normal respiratory effort. CTAB.   Cardiac: RRR, normal S1/S2, no S3/S4, no murmur or rub.   Abdomen:  Non-tender abdomen, no hepatosplenomegaly appreciated.   Vascular: Pulses in the upper and lower extremities are 2+ and equal bilaterally.  Extremities: No edema, erythema, cyanosis or tenderness appreciated.  Skin:  No rashes or lesions appreciated.   Neurological:  No gross motor or sensory deficits.   Psych: Appropriate affect.        Data: "     Labs reviewed:  Recent Labs   Lab Test 04/07/22  0934 10/25/21  0851 08/17/21  1155 03/02/21  1107 05/13/20  0803 12/26/19  0732 05/09/19  1240 10/31/18  0825   LDL 39 49 27 46   < >  --    < > 38   HDL 38* 34* 28* 33*   < >  --    < > 37*   NHDL 62 74 62 75   < >  --    < > 58   CHOL 100 108 90 108   < >  --    < > 95   TRIG 115 125 174* 145   < >  --    < > 101   TSH  --   --   --  6.06*  --  6.39*  --  4.83*    < > = values in this interval not displayed.       Lab Results   Component Value Date    WBC 8.7 03/01/2022    WBC 7.2 05/13/2019    RBC 4.46 03/01/2022    RBC 4.91 05/13/2019    HGB 11.6 (L) 03/01/2022    HGB 13.4 05/13/2019    HCT 35.5 (L) 03/01/2022    HCT 38.6 (L) 05/13/2019    MCV 80 03/01/2022    MCV 79 05/13/2019    MCH 26.0 (L) 03/01/2022    MCH 27.3 05/13/2019    MCHC 32.7 03/01/2022    MCHC 34.7 05/13/2019    RDW 13.2 03/01/2022    RDW 12.7 05/13/2019     03/01/2022     05/13/2019       Lab Results   Component Value Date     03/03/2022     03/02/2021    POTASSIUM 4.2 03/03/2022    POTASSIUM 4.1 03/02/2021    CHLORIDE 108 03/03/2022    CHLORIDE 107 03/02/2021    CO2 28 03/03/2022    CO2 26 03/02/2021    ANIONGAP 1 (L) 03/03/2022    ANIONGAP 6 03/02/2021     (H) 03/03/2022    GLC 90 03/02/2021    BUN 13 03/03/2022    BUN 10 03/02/2021    CR 1.20 03/03/2022    CR 1.09 03/02/2021    GFRESTIMATED 69 03/03/2022    GFRESTIMATED 73 03/02/2021    GFRESTBLACK 85 03/02/2021    KRIS 8.4 (L) 03/03/2022    KRIS 9.3 03/02/2021      Lab Results   Component Value Date    AST 18 04/07/2022    AST 26 03/02/2021    ALT 29 04/07/2022    ALT 35 03/02/2021       Lab Results   Component Value Date    A1C 6.6 (H) 10/18/2022    A1C 7.6 (H) 03/02/2021       Lab Results   Component Value Date    INR 1.06 03/01/2022    INR 1.13 08/17/2021    INR 1.01 05/09/2019           Problem List:     Patient Active Problem List   Diagnosis     Hyperlipidemia LDL goal <70     Essential hypertension,  benign     Type 2 diabetes mellitus without complication, without long-term current use of insulin (H)     Unstable angina (H)     NSTEMI (non-ST elevated myocardial infarction) (H)     Coronary artery disease of native artery of native heart with stable angina pectoris (H)     Abnormal stress test     Stable angina (H)     Status post coronary angiogram           Medications:     Current Outpatient Medications   Medication Sig Dispense Refill     aspirin 81 MG EC tablet Take 1 tablet (81 mg) by mouth daily 90 tablet 3     atorvastatin (LIPITOR) 20 MG tablet Take 1 tablet (20 mg) by mouth daily 90 tablet 1     glimepiride (AMARYL) 2 MG tablet Take 1 tablet (2 mg) by mouth every morning (before breakfast) 30 tablet 0     isosorbide mononitrate (IMDUR) 30 MG 24 hr tablet Take 3 tablets (90 mg) by mouth daily 180 tablet 3     lisinopril (ZESTRIL) 20 MG tablet Take 1 tablet (20 mg) by mouth daily 90 tablet 1     metFORMIN (GLUCOPHAGE) 1000 MG tablet Take 1 tablet (1,000 mg) by mouth 2 times daily (with meals) 60 tablet 0     metoprolol tartrate (LOPRESSOR) 25 MG tablet Take 1 tablet (25 mg) by mouth 2 times daily 180 tablet 2     nitroGLYcerin (NITROSTAT) 0.4 MG sublingual tablet For chest pain place 1 tablet under the tongue every 5 minutes for up to 3 doses. If symptoms persist 5 minutes after 2nd dose call 911. 30 tablet 0     omeprazole (PRILOSEC) 20 MG DR capsule Take 1 capsule (20 mg) by mouth daily 90 capsule 3     ticagrelor (BRILINTA) 90 MG tablet Take 1 tablet (90 mg) by mouth every 12 hours 180 tablet 3     ranolazine (RANEXA) 500 MG 12 hr tablet Take 1 tablet (500 mg) by mouth 2 times daily (Patient not taking: Reported on 1/20/2023) 60 tablet 11           Past Medical History:     Past Medical History:   Diagnosis Date     Abnormal stress test 07/14/2021     Coronary artery disease involving native coronary artery of native heart without angina pectoris 07/14/2021     Essential hypertension, benign  05/18/2016     Hyperlipidemia LDL goal <70 03/16/2015     NSTEMI (non-ST elevated myocardial infarction) (H) 05/09/2019    s/p MICHELLE to pLAD     Status post coronary angiogram 07/26/2021    Complex Multivessel CAD. CABG cosult     Type 2 diabetes mellitus without complication, without long-term current use of insulin (H) 07/06/2017     Unstable angina (H) 05/09/2019     Past Surgical History:   Procedure Laterality Date     CV CORONARY ANGIOGRAM N/A 7/26/2021    Procedure: Coronary Angiogram;  Surgeon: Sylvester Westbrook MD;  Location: [facility] CARDIAC CATH LAB     CV CORONARY ANGIOGRAM N/A 3/1/2022    Procedure: Coronary Angiogram;  Surgeon: Sanchez Sarah MD;  Location: [facility] CARDIAC CATH LAB     CV HEART CATHETERIZATION WITH POSSIBLE INTERVENTION N/A 5/9/2019    Procedure: Coronary Angiogram;  Surgeon: Jose Enrique Stanley MD;  Location: [facility] CARDIAC CATH LAB     CV HEART CATHETERIZATION WITH POSSIBLE INTERVENTION N/A 8/17/2021    Procedure: Coronary Angiogram;  Surgeon: Sanchez Sarah MD;  Location: [facility] CARDIAC CATH LAB     CV INSTANTANEOUS WAVE-FREE RATIO N/A 8/17/2021    Procedure: Instantaneous Wave-Free Ratio;  Surgeon: Sanchez Sarah MD;  Location: [facility] CARDIAC CATH LAB     CV INTRAVASULAR ULTRASOUND N/A 8/17/2021    Procedure: Intravascular Ultrasound;  Surgeon: Sanchez Sarah MD;  Location: [facility] CARDIAC CATH LAB     CV LEFT HEART CATH N/A 7/26/2021    Procedure: Left Heart Cath;  Surgeon: Sylvester Westbrook MD;  Location: [facility] CARDIAC CATH LAB     CV PCI ANGIOPLASTY N/A 5/9/2019    Procedure: PCI Angioplasty;  Surgeon: Jose Enrique Stanley MD;  Location: [facility] CARDIAC CATH LAB     CV PCI CHRONIC TOTAL OCCLUSION N/A 8/17/2021    Procedure: Percutaneous Coronary Intervention of Chronic Total Occlusion;  Surgeon: Sanchez Sarah MD;  Location: [facility] CARDIAC CATH LAB     Family History   Problem Relation Age of Onset     Diabetes Paternal Grandmother       Diabetes Nephew      Social History     Socioeconomic History     Marital status:      Spouse name: Not on file     Number of children: Not on file     Years of education: Not on file     Highest education level: Not on file   Occupational History     Not on file   Tobacco Use     Smoking status: Never     Smokeless tobacco: Never   Substance and Sexual Activity     Alcohol use: No     Drug use: No     Sexual activity: Yes     Partners: Female   Other Topics Concern     Parent/sibling w/ CABG, MI or angioplasty before 65F 55M? Not Asked   Social History Narrative     Not on file     Social Determinants of Health     Financial Resource Strain: Not on file   Food Insecurity: Not on file   Transportation Needs: Not on file   Physical Activity: Not on file   Stress: Not on file   Social Connections: Not on file   Intimate Partner Violence: Not on file   Housing Stability: Not on file           Allergies:   Patient has no known allergies.      Leila Curran PA-C  [facility]  Pager: [phone]    Today's clinic visit entailed:  Ordering of each unique test  Prescription drug management  I spent a total of 20 minutes on the day of the visit.   Time spent doing chart review, history and exam, documentation and further activities per the note  Provider  Link to MDM Help Grid     The level of medical decision making during this visit was of moderate complexity.

## 2023-01-20 NOTE — PATIENT INSTRUCTIONS
Today's Plan:   - No changes today. You can try cutting back to 2 tablets of Imdur if you'd like. If you have chest pain symptoms with that, go back to the 3 tablets.   - Return to see Dr. Sarah as scheduled in March.    If you have questions or concerns please call my nurse team at [phone].   Scheduling phone number: [phone]  For after hours urgent concerns call [phone] option 2.   Reminder: Please bring in all current medications, over the counter supplements and vitamin bottles to your next appointment.    It was a pleasure seeing you today!     Leila Curran PA-C

## 2023-01-20 NOTE — LETTER
1/20/2023    Michael Méndez MD  [address] [facility] [address]    RE: Abhishek Gomez       Dear Colleague,     I had the pleasure of seeing Abhishek Gomez in the [facility].    Cardiology Clinic Progress Note    Abhishek Gomez MRN# [id number]   YOB: 1960 Age: 62 year old   Primary cardiologist: Dr. Sarah         Assessment and Plan:     In summary, Abhishek Gomez presents today for a routine 3 month follow up visit.    1. Severe CAD, with stable angina.   -- s/p PCI to  of ISR prior prox/mid LAD and D1 lesion with 4 stents on 8/17/21.     -- Angiogram in 3/2022 showed a subtotal occlusion of the previously stented ostial D1.  Medical management is recommended unless he develops limiting angina with therapy. There was otherwise stable, residual mid RCA lesion which is hemodynamically insignificant, and a 70% ostial moderately sized ramus lesion.      -- Symptoms well-controlled on Lopressor 25 mg twice daily, Imdur 90 mg daily.   -- On DAPT with Brilinta.   2. Hypertension.  Well-controlled.  3. Hyperlipidemia. Well-controlled.   4. Diabetes mellitus.  Well-controlled.  5. CKD-III, with a stable creatinine of 1.2-1.3.  6. Probable GERD.  Now on PPI.  7. Mild chronic anemia. Overdue for screening colonoscopy; encouraged him to have this done.     Plan:  - He would like to try backing down on Imdur, from 90 to 60 mg daily. I have told him he can try to do so cautiously, but if he were to re-develop angina, he should go back up on the dose.  - He wonders if he can get his COVID-19 booster prior to travel to [facility], which I've encouraged.  - He will follow up with Dr. Sarah as scheduled this Spring, with a fasting lipid profile prior.        History of Presenting Illness:      Abhishek Gomez is a pleasant 62 year old patient who presents today for a routine 3 month follow up visit.    He has known coronary artery  disease, diabetes, hypertension and hyperlipidemia.  He was evaluated here in 2019 with unstable angina.  A stress test prior to that evaluation was very abnormal.  He then proceeded to have a coronary angiogram, which revealed high-grade proximal LAD stenosis.  The LAD was stented with a 2.5 mm stent.  The first diagonal was also ballooned at that time.  He developed atypical chest discomfort in late 2019, and a stress test performed at that time showed no inducible ischemia.     In the summer of 2021, he re-developed typical exertional angina.  Stress test showed anteroseptal and apical wall ischemia. Coronary angiogram was subsequently performed on 07/26/2021. This showed an occluded proximal LAD at the site of the prior stent with left-to-left and right-to-left collaterals.  The circumflex and rami were small with moderate disease.  The mid RCA had a focal 50% stenosis. He was referred to CV surgery, however declined bypass, strongly preferring complex PCI. He returned to the cath lab with Dr. Sarah in August, where he underwent successful IVUS-guided  PCI of the ostial, proximal-mid LAD and D1 with MICHELLE x4. There was a hemodynamically insignificant mid RCA lesion (IFR 0.95), and a 70% ostial moderately sized ramus lesion. He was placed on DAPT with Brilinta. TTE from 8/3/21 showed normal biventricular function.    After that, he had done quite well from a cardiac standpoint.    He was recently enrolled in the surpass trial through our clinic, however was then disenrolled due to development of GI symptoms which is a known side effect of the study drug.  When our nurse called to follow-up with him on that symptom, he reported development of exertional chest pain which ultimately prompted coronary angiogram. This took place on March 1, 2022, showing subtotal occlusion of previously stented D1.  As this was a bifurcation lesion with 2 layers of stent already, medical management was recommended, with consideration  "for PCI down the road if he continued to have limiting angina despite medical therapy.  The proximal to mid LAD stents were patent. The remainder of his CAD was stable from July 2021. Imdur was re-initiated.     When I saw him after that, Adrien felt that his angina had perhaps slightly improved, but would still come on predictably with heavy exertion, especially after eating.  We increased his Imdur to 90 mg daily, and also initiated omeprazole.  With this, and cardiac rehab, his symptoms got much better.     Today, Adrien returns to clinic stating he's feeling much better overall. His chest pain has essentially resolved. He has stopped working at the grocery store, and feels OK with that. He now doesn't feel at all limited in what he's able to do on a daily basis. He denies undue shortness of breath, PND, orthopnea, edema, palpitations. BP is well-controlled.  He denies any medication side effects. LDL 30's. HgbA1c 6.7%. Travels to [facility] for one month next week.          Review of Systems:     12-pt ROS is negative except for as noted in the HPI.          Physical Exam:     Vitals: /63   Pulse 70   Ht 1.803 m (5' 11\")   Wt 83.5 kg (184 lb)   SpO2 99%   BMI 25.66 kg/m    Wt Readings from Last 10 Encounters:   01/20/23 83.5 kg (184 lb)   09/14/22 85.4 kg (188 lb 3.2 oz)   06/30/22 84.9 kg (187 lb 1.6 oz)   04/20/22 82.6 kg (182 lb)   04/12/22 82.6 kg (182 lb 1.6 oz)   03/15/22 85.3 kg (188 lb)   03/01/22 79.4 kg (175 lb)   02/23/22 81.2 kg (179 lb)   01/26/22 82.8 kg (182 lb 9.6 oz)   12/29/21 87.1 kg (192 lb)       Constitutional:  Patient is pleasant, alert, cooperative, and in NAD.  HEENT:  NCAT. PERRLA. EOM's intact.   Neck:  CVP appears normal. No carotid bruits.   Pulmonary: Normal respiratory effort. CTAB.   Cardiac: RRR, normal S1/S2, no S3/S4, no murmur or rub.   Abdomen:  Non-tender abdomen, no hepatosplenomegaly appreciated.   Vascular: Pulses in the upper and lower extremities are 2+ and " equal bilaterally.  Extremities: No edema, erythema, cyanosis or tenderness appreciated.  Skin:  No rashes or lesions appreciated.   Neurological:  No gross motor or sensory deficits.   Psych: Appropriate affect.        Data:     Labs reviewed:  Recent Labs   Lab Test 04/07/22  0934 10/25/21  0851 08/17/21  1155 03/02/21  1107 05/13/20  0803 12/26/19  0732 05/09/19  1240 10/31/18  0825   LDL 39 49 27 46   < >  --    < > 38   HDL 38* 34* 28* 33*   < >  --    < > 37*   NHDL 62 74 62 75   < >  --    < > 58   CHOL 100 108 90 108   < >  --    < > 95   TRIG 115 125 174* 145   < >  --    < > 101   TSH  --   --   --  6.06*  --  6.39*  --  4.83*    < > = values in this interval not displayed.       Lab Results   Component Value Date    WBC 8.7 03/01/2022    WBC 7.2 05/13/2019    RBC 4.46 03/01/2022    RBC 4.91 05/13/2019    HGB 11.6 (L) 03/01/2022    HGB 13.4 05/13/2019    HCT 35.5 (L) 03/01/2022    HCT 38.6 (L) 05/13/2019    MCV 80 03/01/2022    MCV 79 05/13/2019    MCH 26.0 (L) 03/01/2022    MCH 27.3 05/13/2019    MCHC 32.7 03/01/2022    MCHC 34.7 05/13/2019    RDW 13.2 03/01/2022    RDW 12.7 05/13/2019     03/01/2022     05/13/2019       Lab Results   Component Value Date     03/03/2022     03/02/2021    POTASSIUM 4.2 03/03/2022    POTASSIUM 4.1 03/02/2021    CHLORIDE 108 03/03/2022    CHLORIDE 107 03/02/2021    CO2 28 03/03/2022    CO2 26 03/02/2021    ANIONGAP 1 (L) 03/03/2022    ANIONGAP 6 03/02/2021     (H) 03/03/2022    GLC 90 03/02/2021    BUN 13 03/03/2022    BUN 10 03/02/2021    CR 1.20 03/03/2022    CR 1.09 03/02/2021    GFRESTIMATED 69 03/03/2022    GFRESTIMATED 73 03/02/2021    GFRESTBLACK 85 03/02/2021    KRIS 8.4 (L) 03/03/2022    KRIS 9.3 03/02/2021      Lab Results   Component Value Date    AST 18 04/07/2022    AST 26 03/02/2021    ALT 29 04/07/2022    ALT 35 03/02/2021       Lab Results   Component Value Date    A1C 6.6 (H) 10/18/2022    A1C 7.6 (H) 03/02/2021       Lab  Results   Component Value Date    INR 1.06 03/01/2022    INR 1.13 08/17/2021    INR 1.01 05/09/2019           Problem List:     Patient Active Problem List   Diagnosis     Hyperlipidemia LDL goal <70     Essential hypertension, benign     Type 2 diabetes mellitus without complication, without long-term current use of insulin (H)     Unstable angina (H)     NSTEMI (non-ST elevated myocardial infarction) (H)     Coronary artery disease of native artery of native heart with stable angina pectoris (H)     Abnormal stress test     Stable angina (H)     Status post coronary angiogram           Medications:     Current Outpatient Medications   Medication Sig Dispense Refill     aspirin 81 MG EC tablet Take 1 tablet (81 mg) by mouth daily 90 tablet 3     atorvastatin (LIPITOR) 20 MG tablet Take 1 tablet (20 mg) by mouth daily 90 tablet 1     glimepiride (AMARYL) 2 MG tablet Take 1 tablet (2 mg) by mouth every morning (before breakfast) 30 tablet 0     isosorbide mononitrate (IMDUR) 30 MG 24 hr tablet Take 3 tablets (90 mg) by mouth daily 180 tablet 3     lisinopril (ZESTRIL) 20 MG tablet Take 1 tablet (20 mg) by mouth daily 90 tablet 1     metFORMIN (GLUCOPHAGE) 1000 MG tablet Take 1 tablet (1,000 mg) by mouth 2 times daily (with meals) 60 tablet 0     metoprolol tartrate (LOPRESSOR) 25 MG tablet Take 1 tablet (25 mg) by mouth 2 times daily 180 tablet 2     nitroGLYcerin (NITROSTAT) 0.4 MG sublingual tablet For chest pain place 1 tablet under the tongue every 5 minutes for up to 3 doses. If symptoms persist 5 minutes after 2nd dose call 911. 30 tablet 0     omeprazole (PRILOSEC) 20 MG DR capsule Take 1 capsule (20 mg) by mouth daily 90 capsule 3     ticagrelor (BRILINTA) 90 MG tablet Take 1 tablet (90 mg) by mouth every 12 hours 180 tablet 3     ranolazine (RANEXA) 500 MG 12 hr tablet Take 1 tablet (500 mg) by mouth 2 times daily (Patient not taking: Reported on 1/20/2023) 60 tablet 11           Past Medical History:      Past Medical History:   Diagnosis Date     Abnormal stress test 07/14/2021     Coronary artery disease involving native coronary artery of native heart without angina pectoris 07/14/2021     Essential hypertension, benign 05/18/2016     Hyperlipidemia LDL goal <70 03/16/2015     NSTEMI (non-ST elevated myocardial infarction) (H) 05/09/2019    s/p MICHELLE to pLAD     Status post coronary angiogram 07/26/2021    Complex Multivessel CAD. CABG cosult     Type 2 diabetes mellitus without complication, without long-term current use of insulin (H) 07/06/2017     Unstable angina (H) 05/09/2019     Past Surgical History:   Procedure Laterality Date     CV CORONARY ANGIOGRAM N/A 7/26/2021    Procedure: Coronary Angiogram;  Surgeon: Sylvester Westbrook MD;  Location: [facility] CARDIAC CATH LAB     CV CORONARY ANGIOGRAM N/A 3/1/2022    Procedure: Coronary Angiogram;  Surgeon: Sanchez Sarah MD;  Location: [facility] CARDIAC CATH LAB     CV HEART CATHETERIZATION WITH POSSIBLE INTERVENTION N/A 5/9/2019    Procedure: Coronary Angiogram;  Surgeon: Jose Enrique Stanley MD;  Location: [facility] CARDIAC CATH LAB     CV HEART CATHETERIZATION WITH POSSIBLE INTERVENTION N/A 8/17/2021    Procedure: Coronary Angiogram;  Surgeon: Sanchez Sarah MD;  Location: [facility] CARDIAC CATH LAB     CV INSTANTANEOUS WAVE-FREE RATIO N/A 8/17/2021    Procedure: Instantaneous Wave-Free Ratio;  Surgeon: Sanchez Sarah MD;  Location: [facility] CARDIAC CATH LAB     CV INTRAVASULAR ULTRASOUND N/A 8/17/2021    Procedure: Intravascular Ultrasound;  Surgeon: Sanchez Sarah MD;  Location: [facility] CARDIAC CATH LAB     CV LEFT HEART CATH N/A 7/26/2021    Procedure: Left Heart Cath;  Surgeon: Sylvester Westbrook MD;  Location: [facility] CARDIAC CATH LAB     CV PCI ANGIOPLASTY N/A 5/9/2019    Procedure: PCI Angioplasty;  Surgeon: Jose Enrique Stanley MD;  Location: [facility] CARDIAC CATH LAB     CV PCI CHRONIC TOTAL OCCLUSION N/A 8/17/2021     Procedure: Percutaneous Coronary Intervention of Chronic Total Occlusion;  Surgeon: Sanchez Sarah MD;  Location:  HEART CARDIAC CATH LAB     Family History   Problem Relation Age of Onset     Diabetes Paternal Grandmother      Diabetes Nephew      Social History     Socioeconomic History     Marital status:      Spouse name: Not on file     Number of children: Not on file     Years of education: Not on file     Highest education level: Not on file   Occupational History     Not on file   Tobacco Use     Smoking status: Never     Smokeless tobacco: Never   Substance and Sexual Activity     Alcohol use: No     Drug use: No     Sexual activity: Yes     Partners: Female   Other Topics Concern     Parent/sibling w/ CABG, MI or angioplasty before 65F 55M? Not Asked   Social History Narrative     Not on file     Social Determinants of Health     Financial Resource Strain: Not on file   Food Insecurity: Not on file   Transportation Needs: Not on file   Physical Activity: Not on file   Stress: Not on file   Social Connections: Not on file   Intimate Partner Violence: Not on file   Housing Stability: Not on file           Allergies:   Patient has no known allergies.      ARMOND Galarza [facility]  Pager: [phone]    Today's clinic visit entailed:  Ordering of each unique test  Prescription drug management  I spent a total of 20 minutes on the day of the visit.   Time spent doing chart review, history and exam, documentation and further activities per the note  Provider  Link to [facility] Help Grid     The level of medical decision making during this visit was of moderate complexity.        Thank you for allowing me to participate in the care of your patient.    Sincerely,     ARMOND Galarza [facility]

## 2023-01-23 ENCOUNTER — TRANSFERRED RECORDS (OUTPATIENT)
Dept: MULTI SPECIALTY CLINIC | Facility: CLINIC | Age: 63
End: 2023-01-23

## 2023-01-23 LAB — RETINOPATHY: NORMAL

## 2023-01-23 RX ORDER — GLIMEPIRIDE 2 MG/1
2 TABLET ORAL
Qty: 30 TABLET | Refills: 0 | OUTPATIENT
Start: 2023-01-23

## 2023-01-24 ENCOUNTER — MYC MEDICAL ADVICE (OUTPATIENT)
Dept: INTERNAL MEDICINE | Facility: CLINIC | Age: 63
End: 2023-01-24
Payer: COMMERCIAL

## 2023-02-15 DIAGNOSIS — I25.10 CORONARY ARTERY DISEASE INVOLVING NATIVE CORONARY ARTERY OF NATIVE HEART WITHOUT ANGINA PECTORIS: ICD-10-CM

## 2023-02-15 DIAGNOSIS — E11.9 TYPE 2 DIABETES MELLITUS WITHOUT COMPLICATION, WITHOUT LONG-TERM CURRENT USE OF INSULIN (H): ICD-10-CM

## 2023-02-15 DIAGNOSIS — I10 ESSENTIAL HYPERTENSION, BENIGN: ICD-10-CM

## 2023-02-16 RX ORDER — ATORVASTATIN CALCIUM 20 MG/1
20 TABLET, FILM COATED ORAL DAILY
Qty: 90 TABLET | Refills: 0 | Status: SHIPPED | OUTPATIENT
Start: 2023-02-16 | End: 2023-03-08

## 2023-02-16 RX ORDER — LISINOPRIL 20 MG/1
20 TABLET ORAL DAILY
Qty: 90 TABLET | Refills: 0 | Status: SHIPPED | OUTPATIENT
Start: 2023-02-16 | End: 2023-03-08

## 2023-02-16 RX ORDER — METOPROLOL TARTRATE 25 MG/1
25 TABLET, FILM COATED ORAL 2 TIMES DAILY
Qty: 180 TABLET | Refills: 0 | OUTPATIENT
Start: 2023-02-16

## 2023-03-02 ENCOUNTER — LAB (OUTPATIENT)
Dept: LAB | Facility: CLINIC | Age: 63
End: 2023-03-02
Payer: COMMERCIAL

## 2023-03-02 DIAGNOSIS — I25.118 CORONARY ARTERY DISEASE OF NATIVE ARTERY OF NATIVE HEART WITH STABLE ANGINA PECTORIS (H): ICD-10-CM

## 2023-03-02 LAB
ANION GAP SERPL CALCULATED.3IONS-SCNC: 8 MMOL/L (ref 7–15)
BUN SERPL-MCNC: 13 MG/DL (ref 8–23)
CALCIUM SERPL-MCNC: 8.9 MG/DL (ref 8.8–10.2)
CHLORIDE SERPL-SCNC: 104 MMOL/L (ref 98–107)
CHOLEST SERPL-MCNC: 139 MG/DL
CREAT SERPL-MCNC: 1.21 MG/DL (ref 0.67–1.17)
DEPRECATED HCO3 PLAS-SCNC: 29 MMOL/L (ref 22–29)
ERYTHROCYTE [DISTWIDTH] IN BLOOD BY AUTOMATED COUNT: 13.2 % (ref 10–15)
GFR SERPL CREATININE-BSD FRML MDRD: 68 ML/MIN/1.73M2
GLUCOSE SERPL-MCNC: 104 MG/DL (ref 70–99)
HCT VFR BLD AUTO: 33.8 % (ref 40–53)
HDLC SERPL-MCNC: 42 MG/DL
HGB BLD-MCNC: 11.1 G/DL (ref 13.3–17.7)
LDLC SERPL CALC-MCNC: 76 MG/DL
MCH RBC QN AUTO: 26.4 PG (ref 26.5–33)
MCHC RBC AUTO-ENTMCNC: 32.8 G/DL (ref 31.5–36.5)
MCV RBC AUTO: 80 FL (ref 78–100)
NONHDLC SERPL-MCNC: 97 MG/DL
PLATELET # BLD AUTO: 286 10E3/UL (ref 150–450)
POTASSIUM SERPL-SCNC: 3.9 MMOL/L (ref 3.4–5.3)
RBC # BLD AUTO: 4.21 10E6/UL (ref 4.4–5.9)
SODIUM SERPL-SCNC: 141 MMOL/L (ref 136–145)
TRIGL SERPL-MCNC: 105 MG/DL
WBC # BLD AUTO: 9.7 10E3/UL (ref 4–11)

## 2023-03-02 PROCEDURE — 80061 LIPID PANEL: CPT | Performed by: PHYSICIAN ASSISTANT

## 2023-03-02 PROCEDURE — 85027 COMPLETE CBC AUTOMATED: CPT | Performed by: PHYSICIAN ASSISTANT

## 2023-03-02 PROCEDURE — 36415 COLL VENOUS BLD VENIPUNCTURE: CPT | Performed by: PHYSICIAN ASSISTANT

## 2023-03-02 PROCEDURE — 80048 BASIC METABOLIC PNL TOTAL CA: CPT | Performed by: PHYSICIAN ASSISTANT

## 2023-03-03 ENCOUNTER — OFFICE VISIT (OUTPATIENT)
Dept: CARDIOLOGY | Facility: CLINIC | Age: 63
End: 2023-03-03
Attending: PHYSICIAN ASSISTANT
Payer: COMMERCIAL

## 2023-03-03 VITALS
HEART RATE: 72 BPM | RESPIRATION RATE: 17 BRPM | WEIGHT: 193.2 LBS | SYSTOLIC BLOOD PRESSURE: 150 MMHG | OXYGEN SATURATION: 99 % | DIASTOLIC BLOOD PRESSURE: 60 MMHG | BODY MASS INDEX: 26.95 KG/M2

## 2023-03-03 DIAGNOSIS — I25.118 CORONARY ARTERY DISEASE OF NATIVE ARTERY OF NATIVE HEART WITH STABLE ANGINA PECTORIS (H): ICD-10-CM

## 2023-03-03 DIAGNOSIS — I73.9 INTERMITTENT CLAUDICATION (H): ICD-10-CM

## 2023-03-03 DIAGNOSIS — I20.89 STABLE ANGINA (H): ICD-10-CM

## 2023-03-03 PROCEDURE — 99215 OFFICE O/P EST HI 40 MIN: CPT | Performed by: INTERNAL MEDICINE

## 2023-03-03 RX ORDER — ISOSORBIDE MONONITRATE 120 MG/1
120 TABLET, EXTENDED RELEASE ORAL DAILY
Qty: 90 TABLET | Refills: 3 | Status: SHIPPED | OUTPATIENT
Start: 2023-03-03

## 2023-03-03 NOTE — LETTER
3/3/2023    Michael Méndez MD  [address] [facility] [address]    RE: Abhishek Gomez       Dear Colleague,     I had the pleasure of seeing Abhishek Gomez in the [facility].    REASON FOR CONSULTATION:  Followup for coronary artery disease and stable angina.    HISTORY OF PRESENT ILLNESS:  I had the pleasure of seeing Mr. Gomez at the [facility] in [place] this morning.  He is a very pleasant 62-year-old gentleman who is well known to me.  Please see my note from 08/2021 for details regarding his coronary artery disease history.    Briefly, I saw him the summer of 2021 for unstable angina.  We referred him for coronary angiography which revealed an occluded LAD which was previously stented.  He also had moderate RCA stenosis.  He was initially referred to surgery but declined open heart surgery.  He later came back for complex  revascularization of the LAD and first diagonal. His RCA had moderate disease which was managed medically.  His angina resolved after that intervention.    He was evaluated in the spring of 2022 with recurrent symptoms.  He had a repeat coronary angiogram which revealed patent LAD but occluded first diagonal with left-to-left collaterals.  His RCA disease remained stable.  Since the patient had bifurcation stenting and had 2 layers of stents across the diagonal, we recommended initial medical therapy.    He reports exertional chest tightness usually with flights of stairs and other vigorous activities.  The symptoms resolve promptly with rest.  He denies rest symptoms.  No palpitations, presyncope or syncope.    ASSESSMENT AND PLAN:  A very pleasant 62-year-old gentleman with known coronary artery disease and prior LAD and diagonal stenting as well as moderate residual RCA stenosis.  He continues to have stable symptoms which have perhaps worsened over the past several months.  We will increase Imdur to 120 mg daily.   Additionally, will obtain stress test to rule out significant ischemia.  We will touch base with him once we get the results of the stress test.  If the stress test shows no significant ischemia, then we will see him in 6 months for follow-up    I appreciate the opportunity to participate in his care.    Sanchez Sarah MD    Today's clinic visit entailed:  40 minutes spent on the date of the encounter doing chart review, history and exam, documentation and further activities per the note  Provider  Link to [facility] Help Grid       Orders Placed This Encounter   Procedures     Follow-Up with Cardiology MORGAN       Orders Placed This Encounter   Medications     isosorbide mononitrate (IMDUR) 120 MG 24 HR ER tablet     Sig: Take 1 tablet (120 mg) by mouth daily     Dispense:  90 tablet     Refill:  3       Medications Discontinued During This Encounter   Medication Reason     isosorbide mononitrate (IMDUR) 30 MG 24 hr tablet          Encounter Diagnoses   Name Primary?     Stable angina (H)      Intermittent claudication (H)      Coronary artery disease of native artery of native heart with stable angina pectoris (H)        CURRENT MEDICATIONS:  Current Outpatient Medications   Medication Sig Dispense Refill     aspirin 81 MG EC tablet Take 1 tablet (81 mg) by mouth daily 90 tablet 3     atorvastatin (LIPITOR) 20 MG tablet Take 1 tablet (20 mg) by mouth daily 90 tablet 0     glimepiride (AMARYL) 2 MG tablet Take 1 tablet (2 mg) by mouth every morning (before breakfast) 30 tablet 0     isosorbide mononitrate (IMDUR) 120 MG 24 HR ER tablet Take 1 tablet (120 mg) by mouth daily 90 tablet 3     lisinopril (ZESTRIL) 20 MG tablet Take 1 tablet (20 mg) by mouth daily 90 tablet 0     metFORMIN (GLUCOPHAGE) 1000 MG tablet Take 1 tablet (1,000 mg) by mouth 2 times daily (with meals) 60 tablet 0     metoprolol tartrate (LOPRESSOR) 25 MG tablet Take 1 tablet (25 mg) by mouth 2 times daily 180 tablet 3     nitroGLYcerin (NITROSTAT) 0.4 MG  sublingual tablet For chest pain place 1 tablet under the tongue every 5 minutes for up to 3 doses. If symptoms persist 5 minutes after 2nd dose call 911. 30 tablet 0     omeprazole (PRILOSEC) 20 MG DR capsule Take 1 capsule (20 mg) by mouth daily 90 capsule 3     ticagrelor (BRILINTA) 90 MG tablet Take 1 tablet (90 mg) by mouth every 12 hours 180 tablet 3       ALLERGIES   No Known Allergies    PAST MEDICAL HISTORY:  Past Medical History:   Diagnosis Date     Abnormal stress test 07/14/2021     Coronary artery disease involving native coronary artery of native heart without angina pectoris 07/14/2021     Essential hypertension, benign 05/18/2016     Hyperlipidemia LDL goal <70 03/16/2015     NSTEMI (non-ST elevated myocardial infarction) (H) 05/09/2019    s/p MICHELLE to pLAD     Status post coronary angiogram 07/26/2021    Complex Multivessel CAD. CABG cosult     Type 2 diabetes mellitus without complication, without long-term current use of insulin (H) 07/06/2017     Unstable angina (H) 05/09/2019       PAST SURGICAL HISTORY:  Past Surgical History:   Procedure Laterality Date     CV CORONARY ANGIOGRAM N/A 7/26/2021    Procedure: Coronary Angiogram;  Surgeon: Sylvester Westbrook MD;  Location: [facility] CARDIAC CATH LAB     CV CORONARY ANGIOGRAM N/A 3/1/2022    Procedure: Coronary Angiogram;  Surgeon: Sanchez Sarah MD;  Location: [facility] CARDIAC CATH LAB     CV HEART CATHETERIZATION WITH POSSIBLE INTERVENTION N/A 5/9/2019    Procedure: Coronary Angiogram;  Surgeon: Jose Enrique Stanley MD;  Location: [facility] CARDIAC CATH LAB     CV HEART CATHETERIZATION WITH POSSIBLE INTERVENTION N/A 8/17/2021    Procedure: Coronary Angiogram;  Surgeon: Sanchez Sarah MD;  Location: [facility] CARDIAC CATH LAB     CV INSTANTANEOUS WAVE-FREE RATIO N/A 8/17/2021    Procedure: Instantaneous Wave-Free Ratio;  Surgeon: Sanchez Sarah MD;  Location: [facility] CARDIAC CATH LAB     CV INTRAVASULAR ULTRASOUND N/A 8/17/2021     Procedure: Intravascular Ultrasound;  Surgeon: Sanchez Sarah MD;  Location:  HEART CARDIAC CATH LAB     CV LEFT HEART CATH N/A 7/26/2021    Procedure: Left Heart Cath;  Surgeon: Sylvester Westbrook MD;  Location:  HEART CARDIAC CATH LAB     CV PCI ANGIOPLASTY N/A 5/9/2019    Procedure: PCI Angioplasty;  Surgeon: Jose Enrique Stanley MD;  Location:  HEART CARDIAC CATH LAB     CV PCI CHRONIC TOTAL OCCLUSION N/A 8/17/2021    Procedure: Percutaneous Coronary Intervention of Chronic Total Occlusion;  Surgeon: Sanchez Sarah MD;  Location:  HEART CARDIAC CATH LAB       FAMILY HISTORY:  Family History   Problem Relation Age of Onset     Diabetes Paternal Grandmother      Diabetes Nephew        SOCIAL HISTORY:  Social History     Socioeconomic History     Marital status:      Spouse name: None     Number of children: None     Years of education: None     Highest education level: None   Tobacco Use     Smoking status: Never     Smokeless tobacco: Never   Substance and Sexual Activity     Alcohol use: No     Drug use: No     Sexual activity: Yes     Partners: Female       Review of Systems:  Skin:  not assessed       Eyes:  not assessed      ENT:  not assessed      Respiratory:  Positive for shortness of breath;dyspnea on exertion     Cardiovascular:    Positive for;chest pain;palpitations infrequent, more prominent under exertion  Gastroenterology: not assessed      Genitourinary:  not assessed      Musculoskeletal:  not assessed      Neurologic:  not assessed      Psychiatric:  not assessed      Heme/Lymph/Imm:  not assessed      Endocrine:  Positive for diabetes      Physical Exam:  Vitals: BP (!) 150/60   Pulse 72   Resp 17   Wt 87.6 kg (193 lb 3.2 oz)   SpO2 99%   BMI 26.95 kg/m      Constitutional:  cooperative;in no acute distress        Skin:  warm and dry to the touch          Head:  normocephalic        Eyes:  conjunctivae and lids unremarkable        Lymph:      ENT:  no pallor or  cyanosis        Neck:  JVP normal        Respiratory:  clear to auscultation         Cardiac: regular rhythm;no murmurs, gallops or rubs detected                    1+         1+   1+         1+            GI:  abdomen soft;non-tender        Extremities and Muscular Skeletal:  no edema              Neurological:  no gross motor deficits        Psych:  Alert and Oriented x 3        CC  CAROL SrivastavaC  [address]  [place] [address]    Thank you for allowing me to participate in the care of your patient.      Sincerely,     Sanchez Sarah MD, MD     [facility]

## 2023-03-03 NOTE — PROGRESS NOTES
REASON FOR CONSULTATION:  Followup for coronary artery disease and stable angina.    HISTORY OF PRESENT ILLNESS:  I had the pleasure of seeing Mr. Gomez at the [facility] in [place] this morning.  He is a very pleasant 62-year-old gentleman who is well known to me.  Please see my note from 08/2021 for details regarding his coronary artery disease history.    Briefly, I saw him the summer of 2021 for unstable angina.  We referred him for coronary angiography which revealed an occluded LAD which was previously stented.  He also had moderate RCA stenosis.  He was initially referred to surgery but declined open heart surgery.  He later came back for complex  revascularization of the LAD and first diagonal. His RCA had moderate disease which was managed medically.  His angina resolved after that intervention.    He was evaluated in the spring of 2022 with recurrent symptoms.  He had a repeat coronary angiogram which revealed patent LAD but occluded first diagonal with left-to-left collaterals.  His RCA disease remained stable.  Since the patient had bifurcation stenting and had 2 layers of stents across the diagonal, we recommended initial medical therapy.    He reports exertional chest tightness usually with flights of stairs and other vigorous activities.  The symptoms resolve promptly with rest.  He denies rest symptoms.  No palpitations, presyncope or syncope.    ASSESSMENT AND PLAN:  A very pleasant 62-year-old gentleman with known coronary artery disease and prior LAD and diagonal stenting as well as moderate residual RCA stenosis.  He continues to have stable symptoms which have perhaps worsened over the past several months.  We will increase Imdur to 120 mg daily.  Additionally, will obtain stress test to rule out significant ischemia.  We will touch base with him once we get the results of the stress test.  If the stress test shows no significant ischemia, then we will see him in 6 months  for follow-up    I appreciate the opportunity to participate in his care.    Sanchez Sarah MD    Today's clinic visit entailed:  40 minutes spent on the date of the encounter doing chart review, history and exam, documentation and further activities per the note  Provider  Link to MDM Help Grid       Orders Placed This Encounter   Procedures     Follow-Up with Cardiology MORGAN       Orders Placed This Encounter   Medications     isosorbide mononitrate (IMDUR) 120 MG 24 HR ER tablet     Sig: Take 1 tablet (120 mg) by mouth daily     Dispense:  90 tablet     Refill:  3       Medications Discontinued During This Encounter   Medication Reason     isosorbide mononitrate (IMDUR) 30 MG 24 hr tablet          Encounter Diagnoses   Name Primary?     Stable angina (H)      Intermittent claudication (H)      Coronary artery disease of native artery of native heart with stable angina pectoris (H)        CURRENT MEDICATIONS:  Current Outpatient Medications   Medication Sig Dispense Refill     aspirin 81 MG EC tablet Take 1 tablet (81 mg) by mouth daily 90 tablet 3     atorvastatin (LIPITOR) 20 MG tablet Take 1 tablet (20 mg) by mouth daily 90 tablet 0     glimepiride (AMARYL) 2 MG tablet Take 1 tablet (2 mg) by mouth every morning (before breakfast) 30 tablet 0     isosorbide mononitrate (IMDUR) 120 MG 24 HR ER tablet Take 1 tablet (120 mg) by mouth daily 90 tablet 3     lisinopril (ZESTRIL) 20 MG tablet Take 1 tablet (20 mg) by mouth daily 90 tablet 0     metFORMIN (GLUCOPHAGE) 1000 MG tablet Take 1 tablet (1,000 mg) by mouth 2 times daily (with meals) 60 tablet 0     metoprolol tartrate (LOPRESSOR) 25 MG tablet Take 1 tablet (25 mg) by mouth 2 times daily 180 tablet 3     nitroGLYcerin (NITROSTAT) 0.4 MG sublingual tablet For chest pain place 1 tablet under the tongue every 5 minutes for up to 3 doses. If symptoms persist 5 minutes after 2nd dose call 911. 30 tablet 0     omeprazole (PRILOSEC) 20 MG DR capsule Take 1 capsule (20  mg) by mouth daily 90 capsule 3     ticagrelor (BRILINTA) 90 MG tablet Take 1 tablet (90 mg) by mouth every 12 hours 180 tablet 3       ALLERGIES   No Known Allergies    PAST MEDICAL HISTORY:  Past Medical History:   Diagnosis Date     Abnormal stress test 07/14/2021     Coronary artery disease involving native coronary artery of native heart without angina pectoris 07/14/2021     Essential hypertension, benign 05/18/2016     Hyperlipidemia LDL goal <70 03/16/2015     NSTEMI (non-ST elevated myocardial infarction) (H) 05/09/2019    s/p MICHELLE to pLAD     Status post coronary angiogram 07/26/2021    Complex Multivessel CAD. CABG cosult     Type 2 diabetes mellitus without complication, without long-term current use of insulin (H) 07/06/2017     Unstable angina (H) 05/09/2019       PAST SURGICAL HISTORY:  Past Surgical History:   Procedure Laterality Date     CV CORONARY ANGIOGRAM N/A 7/26/2021    Procedure: Coronary Angiogram;  Surgeon: Sylvester Westbrook MD;  Location: [facility] CARDIAC CATH LAB     CV CORONARY ANGIOGRAM N/A 3/1/2022    Procedure: Coronary Angiogram;  Surgeon: Sanchez Sarah MD;  Location: [facility] CARDIAC CATH LAB     CV HEART CATHETERIZATION WITH POSSIBLE INTERVENTION N/A 5/9/2019    Procedure: Coronary Angiogram;  Surgeon: Jose Enrique Stanley MD;  Location: [facility] CARDIAC CATH LAB     CV HEART CATHETERIZATION WITH POSSIBLE INTERVENTION N/A 8/17/2021    Procedure: Coronary Angiogram;  Surgeon: Sanchez Sarah MD;  Location: [facility] CARDIAC CATH LAB     CV INSTANTANEOUS WAVE-FREE RATIO N/A 8/17/2021    Procedure: Instantaneous Wave-Free Ratio;  Surgeon: Sanchez Sarah MD;  Location: [facility] CARDIAC CATH LAB     CV INTRAVASULAR ULTRASOUND N/A 8/17/2021    Procedure: Intravascular Ultrasound;  Surgeon: Sanchez Sarah MD;  Location: [facility] CARDIAC CATH LAB     CV LEFT HEART CATH N/A 7/26/2021    Procedure: Left Heart Cath;  Surgeon: Sylvester Westbrook MD;  Location: [facility]  CARDIAC CATH LAB     CV PCI ANGIOPLASTY N/A 5/9/2019    Procedure: PCI Angioplasty;  Surgeon: Jose Enrique Stanley MD;  Location:  HEART CARDIAC CATH LAB     CV PCI CHRONIC TOTAL OCCLUSION N/A 8/17/2021    Procedure: Percutaneous Coronary Intervention of Chronic Total Occlusion;  Surgeon: Sanchez Sarah MD;  Location:  HEART CARDIAC CATH LAB       FAMILY HISTORY:  Family History   Problem Relation Age of Onset     Diabetes Paternal Grandmother      Diabetes Nephew        SOCIAL HISTORY:  Social History     Socioeconomic History     Marital status:      Spouse name: None     Number of children: None     Years of education: None     Highest education level: None   Tobacco Use     Smoking status: Never     Smokeless tobacco: Never   Substance and Sexual Activity     Alcohol use: No     Drug use: No     Sexual activity: Yes     Partners: Female       Review of Systems:  Skin:  not assessed       Eyes:  not assessed      ENT:  not assessed      Respiratory:  Positive for shortness of breath;dyspnea on exertion     Cardiovascular:    Positive for;chest pain;palpitations infrequent, more prominent under exertion  Gastroenterology: not assessed      Genitourinary:  not assessed      Musculoskeletal:  not assessed      Neurologic:  not assessed      Psychiatric:  not assessed      Heme/Lymph/Imm:  not assessed      Endocrine:  Positive for diabetes      Physical Exam:  Vitals: BP (!) 150/60   Pulse 72   Resp 17   Wt 87.6 kg (193 lb 3.2 oz)   SpO2 99%   BMI 26.95 kg/m      Constitutional:  cooperative;in no acute distress        Skin:  warm and dry to the touch          Head:  normocephalic        Eyes:  conjunctivae and lids unremarkable        Lymph:      ENT:  no pallor or cyanosis        Neck:  JVP normal        Respiratory:  clear to auscultation         Cardiac: regular rhythm;no murmurs, gallops or rubs detected                    1+         1+   1+         1+            GI:  abdomen  soft;non-tender        Extremities and Muscular Skeletal:  no edema              Neurological:  no gross motor deficits        Psych:  Alert and Oriented x 3        CC  Leila Curran PA-C  [address]  [place] [id number]

## 2023-03-06 ENCOUNTER — HOSPITAL ENCOUNTER (OUTPATIENT)
Dept: CARDIOLOGY | Facility: CLINIC | Age: 63
Discharge: HOME | End: 2023-03-06
Attending: INTERNAL MEDICINE
Payer: COMMERCIAL

## 2023-03-06 VITALS
SYSTOLIC BLOOD PRESSURE: 142 MMHG | HEART RATE: 84 BPM | BODY MASS INDEX: 26.57 KG/M2 | DIASTOLIC BLOOD PRESSURE: 68 MMHG | WEIGHT: 189.8 LBS | HEIGHT: 71 IN | OXYGEN SATURATION: 98 %

## 2023-03-06 DIAGNOSIS — I20.89 STABLE ANGINA (H): ICD-10-CM

## 2023-03-06 LAB
CV STRESS MAX HR HE: 97
NUC STRESS EJECTION FRACTION: 72 %
RATE PRESSURE PRODUCT: NORMAL
STRESS ECHO BASELINE DIASTOLIC HE: 68
STRESS ECHO BASELINE HR: 84 BPM
STRESS ECHO BASELINE SYSTOLIC BP: 142
STRESS ECHO CALCULATED PERCENT HR: 61 %
STRESS ECHO LAST STRESS DIASTOLIC BP: 64
STRESS ECHO LAST STRESS SYSTOLIC BP: 158
STRESS ECHO TARGET HR: 158

## 2023-03-06 PROCEDURE — 250N000011 HC RX IP 250 OP 636: Performed by: INTERNAL MEDICINE

## 2023-03-06 PROCEDURE — 93018 CV STRESS TEST I&R ONLY: CPT | Performed by: INTERNAL MEDICINE

## 2023-03-06 PROCEDURE — 343N000001 HC RX 343: Performed by: INTERNAL MEDICINE

## 2023-03-06 PROCEDURE — 78452 HT MUSCLE IMAGE SPECT MULT: CPT | Mod: 26 | Performed by: INTERNAL MEDICINE

## 2023-03-06 PROCEDURE — 93016 CV STRESS TEST SUPVJ ONLY: CPT | Performed by: INTERNAL MEDICINE

## 2023-03-06 PROCEDURE — 93017 CV STRESS TEST TRACING ONLY: CPT

## 2023-03-06 PROCEDURE — A9502 TC99M TETROFOSMIN: HCPCS | Performed by: INTERNAL MEDICINE

## 2023-03-06 PROCEDURE — 78452 HT MUSCLE IMAGE SPECT MULT: CPT

## 2023-03-06 RX ORDER — CAFFEINE CITRATE 20 MG/ML
60 SOLUTION INTRAVENOUS
Status: DISCONTINUED | OUTPATIENT
Start: 2023-03-06 | End: 2023-03-07 | Stop reason: HOSPADM

## 2023-03-06 RX ORDER — ACYCLOVIR 200 MG/1
0-1 CAPSULE ORAL
Status: DISCONTINUED | OUTPATIENT
Start: 2023-03-06 | End: 2023-03-07 | Stop reason: HOSPADM

## 2023-03-06 RX ORDER — ALBUTEROL SULFATE 90 UG/1
2 AEROSOL, METERED RESPIRATORY (INHALATION) EVERY 5 MIN PRN
Status: DISCONTINUED | OUTPATIENT
Start: 2023-03-06 | End: 2023-03-07 | Stop reason: HOSPADM

## 2023-03-06 RX ORDER — AMINOPHYLLINE 25 MG/ML
50-100 INJECTION, SOLUTION INTRAVENOUS
Status: DISCONTINUED | OUTPATIENT
Start: 2023-03-06 | End: 2023-03-07 | Stop reason: HOSPADM

## 2023-03-06 RX ORDER — REGADENOSON 0.08 MG/ML
0.4 INJECTION, SOLUTION INTRAVENOUS ONCE
Status: COMPLETED | OUTPATIENT
Start: 2023-03-06 | End: 2023-03-06

## 2023-03-06 RX ADMIN — TETROFOSMIN 3.47 MILLICURIE: 1.38 INJECTION, POWDER, LYOPHILIZED, FOR SOLUTION INTRAVENOUS at 12:00

## 2023-03-06 RX ADMIN — TETROFOSMIN 8.58 MILLICURIE: 1.38 INJECTION, POWDER, LYOPHILIZED, FOR SOLUTION INTRAVENOUS at 13:55

## 2023-03-06 RX ADMIN — REGADENOSON 0.4 MG: 0.08 INJECTION, SOLUTION INTRAVENOUS at 13:52

## 2023-03-08 ENCOUNTER — OFFICE VISIT (OUTPATIENT)
Dept: INTERNAL MEDICINE | Facility: CLINIC | Age: 63
End: 2023-03-08
Payer: COMMERCIAL

## 2023-03-08 VITALS
OXYGEN SATURATION: 99 % | TEMPERATURE: 98.3 F | DIASTOLIC BLOOD PRESSURE: 73 MMHG | BODY MASS INDEX: 26.92 KG/M2 | WEIGHT: 193 LBS | RESPIRATION RATE: 14 BRPM | HEART RATE: 75 BPM | SYSTOLIC BLOOD PRESSURE: 125 MMHG

## 2023-03-08 DIAGNOSIS — Z12.11 COLON CANCER SCREENING: ICD-10-CM

## 2023-03-08 DIAGNOSIS — I10 ESSENTIAL HYPERTENSION, BENIGN: ICD-10-CM

## 2023-03-08 DIAGNOSIS — E11.9 TYPE 2 DIABETES MELLITUS WITHOUT COMPLICATION, WITHOUT LONG-TERM CURRENT USE OF INSULIN (H): Primary | ICD-10-CM

## 2023-03-08 DIAGNOSIS — K21.9 GASTROESOPHAGEAL REFLUX DISEASE WITHOUT ESOPHAGITIS: ICD-10-CM

## 2023-03-08 DIAGNOSIS — I25.10 CORONARY ARTERY DISEASE INVOLVING NATIVE CORONARY ARTERY OF NATIVE HEART WITHOUT ANGINA PECTORIS: ICD-10-CM

## 2023-03-08 DIAGNOSIS — Z12.5 SCREENING PSA (PROSTATE SPECIFIC ANTIGEN): ICD-10-CM

## 2023-03-08 LAB
ALBUMIN SERPL BCG-MCNC: 4.1 G/DL (ref 3.5–5.2)
ALP SERPL-CCNC: 73 U/L (ref 40–129)
ALT SERPL W P-5'-P-CCNC: 22 U/L (ref 10–50)
AST SERPL W P-5'-P-CCNC: 26 U/L (ref 10–50)
BILIRUB DIRECT SERPL-MCNC: <0.2 MG/DL (ref 0–0.3)
BILIRUB SERPL-MCNC: 0.4 MG/DL
CREAT UR-MCNC: 104 MG/DL
HBA1C MFR BLD: 8.3 % (ref 0–5.6)
MICROALBUMIN UR-MCNC: <12 MG/L
MICROALBUMIN/CREAT UR: NORMAL MG/G{CREAT}
PROT SERPL-MCNC: 7.5 G/DL (ref 6.4–8.3)
PSA SERPL-MCNC: 0.55 NG/ML (ref 0–4.5)

## 2023-03-08 PROCEDURE — 83036 HEMOGLOBIN GLYCOSYLATED A1C: CPT | Performed by: INTERNAL MEDICINE

## 2023-03-08 PROCEDURE — 80076 HEPATIC FUNCTION PANEL: CPT | Performed by: INTERNAL MEDICINE

## 2023-03-08 PROCEDURE — G0103 PSA SCREENING: HCPCS | Performed by: INTERNAL MEDICINE

## 2023-03-08 PROCEDURE — 99214 OFFICE O/P EST MOD 30 MIN: CPT | Performed by: INTERNAL MEDICINE

## 2023-03-08 PROCEDURE — 82043 UR ALBUMIN QUANTITATIVE: CPT | Performed by: INTERNAL MEDICINE

## 2023-03-08 PROCEDURE — 82570 ASSAY OF URINE CREATININE: CPT | Performed by: INTERNAL MEDICINE

## 2023-03-08 PROCEDURE — 36415 COLL VENOUS BLD VENIPUNCTURE: CPT | Performed by: INTERNAL MEDICINE

## 2023-03-08 RX ORDER — LISINOPRIL 20 MG/1
20 TABLET ORAL DAILY
Qty: 90 TABLET | Refills: 3 | Status: SHIPPED | OUTPATIENT
Start: 2023-03-08 | End: 2024-05-29

## 2023-03-08 RX ORDER — ATORVASTATIN CALCIUM 20 MG/1
20 TABLET, FILM COATED ORAL DAILY
Qty: 90 TABLET | Refills: 3 | Status: SHIPPED | OUTPATIENT
Start: 2023-03-08 | End: 2024-05-29

## 2023-03-08 RX ORDER — GLIMEPIRIDE 2 MG/1
2 TABLET ORAL
Qty: 90 TABLET | Refills: 1 | Status: SHIPPED | OUTPATIENT
Start: 2023-03-08 | End: 2023-10-03

## 2023-03-08 ASSESSMENT — PATIENT HEALTH QUESTIONNAIRE - PHQ9
SUM OF ALL RESPONSES TO PHQ QUESTIONS 1-9: 0
SUM OF ALL RESPONSES TO PHQ QUESTIONS 1-9: 0
10. IF YOU CHECKED OFF ANY PROBLEMS, HOW DIFFICULT HAVE THESE PROBLEMS MADE IT FOR YOU TO DO YOUR WORK, TAKE CARE OF THINGS AT HOME, OR GET ALONG WITH OTHER PEOPLE: NOT DIFFICULT AT ALL

## 2023-03-08 NOTE — PROGRESS NOTES
"  Assessment & Plan     Type 2 diabetes mellitus without complication, without long-term current use of insulin (H)  Check lab work and continue with current medications as ordered  - Albumin Random Urine Quantitative with Creat Ratio; Future  - Hemoglobin A1c; Future  - atorvastatin (LIPITOR) 20 MG tablet; Take 1 tablet (20 mg) by mouth daily  - glimepiride (AMARYL) 2 MG tablet; Take 1 tablet (2 mg) by mouth every morning (before breakfast)  - lisinopril (ZESTRIL) 20 MG tablet; Take 1 tablet (20 mg) by mouth daily  - metFORMIN (GLUCOPHAGE) 1000 MG tablet; Take 1 tablet (1,000 mg) by mouth 2 times daily (with meals)  - Hepatic function panel; Future    Coronary artery disease involving native coronary artery of native heart without angina pectoris  Stable status post recent stress test.  Continue with cardiology follow    Essential hypertension, benign  Currently at goal continue with medical management as previously ordered  - atorvastatin (LIPITOR) 20 MG tablet; Take 1 tablet (20 mg) by mouth daily  - lisinopril (ZESTRIL) 20 MG tablet; Take 1 tablet (20 mg) by mouth daily    Screening PSA (prostate specific antigen)  Advise rechecking PSA.  - PSA, screen; Future    Gastroesophageal reflux disease without esophagitis  Refilled patient per request  - omeprazole (PRILOSEC) 20 MG DR capsule; Take 1 capsule (20 mg) by mouth daily    Colon cancer screening  ADVISED COLONOSCOPY FOR ROUTINE PREVENTATIVE CARE.  - Colonoscopy Screening  Referral; Future      Patient was advised to consider updating shingles shot pneumococcal vaccine, influenza and COVID-vaccine but is not interested     BMI:   Estimated body mass index is 26.92 kg/m  as calculated from the following:    Height as of 3/6/23: 1.803 m (5' 11\").    Weight as of this encounter: 87.5 kg (193 lb).       See Patient Instructions    No follow-ups on file.    Michael Méndez MD  [facility]    Glen Jurado is a 62 year " old accompanied by his self, presenting for the following health issues:  Recheck Medication      History of Present Illness       Diabetes:   He presents for follow up of diabetes.  He is checking home blood glucose a few times a month. He checks blood glucose after meals.  Blood glucose is never over 200 and sometimes under 70. He is aware of hypoglycemia symptoms including shakiness and other. He is concerned about other.  He is having numbness in feet and excessive thirst. The patient has had a diabetic eye exam in the last 12 months. Eye exam performed on 1/23/2023. Location of last eye exam [facility] eye [facility].        He eats 2-3 servings of fruits and vegetables daily.He consumes 2 sweetened beverage(s) daily.He exercises with enough effort to increase his heart rate 9 or less minutes per day.  He exercises with enough effort to increase his heart rate 3 or less days per week. He is missing 1 dose(s) of medications per week.  He is not taking prescribed medications regularly due to remembering to take.    Today's PHQ-9         PHQ-9 Total Score: 0    PHQ-9 Q9 Thoughts of better off dead/self-harm past 2 weeks :   Not at all    How difficult have these problems made it for you to do your work, take care of things at home, or get along with other people: Not difficult at all      Patient has primarily been seen in the cardiology clinic by Dr. Sarah.  He recently underwent a stress test, Lexiscan which was reported as being negative for inducible myocardial ischemia with a noted left ventricular ejection fraction at stress of 72%.  He had his Imdur increased for presumed stable angina.    Review of Systems   CONSTITUTIONAL: NEGATIVE for fever, chills, change in weight  EYES: NEGATIVE for vision changes or irritation  ENT/MOUTH: NEGATIVE for ear, mouth and throat problems  RESP: NEGATIVE for significant cough or SOB  CV: NEGATIVE for palpitations or peripheral edema  GI: NEGATIVE for nausea, abdominal pain,  heartburn, or change in bowel habits  : NEGATIVE for frequency, dysuria, or hematuria  MUSCULOSKELETAL: NEGATIVE for significant arthralgias or myalgia  NEURO: NEGATIVE for weakness, dizziness or paresthesias  HEME: NEGATIVE for bleeding problems  PSYCHIATRIC: NEGATIVE for changes in mood or affect      Objective    /73   Pulse 75   Temp 98.3  F (36.8  C) (Temporal)   Resp 14   Wt 87.5 kg (193 lb)   SpO2 99%   BMI 26.92 kg/m    Body mass index is 26.92 kg/m .  Physical Exam   GENERAL:  alert and no distress  EYES: Eyes grossly normal to inspection, PERRL and conjunctivae and sclerae normal  HENT: ear canals and TM's normal, nose and mouth without ulcers or lesions  RESP: lungs clear to auscultation - no rales, rhonchi or wheezes  CV: regular rate and rhythm, normal S1 S2, no S3 or S4, no murmur, click or rub  NEURO: No focal changes  PSYCH: mentation appears normal, affect normal/bright

## 2023-06-03 ENCOUNTER — HEALTH MAINTENANCE LETTER (OUTPATIENT)
Age: 63
End: 2023-06-03

## 2023-06-27 ENCOUNTER — DOCUMENTATION ONLY (OUTPATIENT)
Dept: CARDIOLOGY | Facility: CLINIC | Age: 63
End: 2023-06-27
Payer: COMMERCIAL

## 2023-06-27 DIAGNOSIS — Z00.6 EXAMINATION OF PARTICIPANT OR CONTROL IN CLINICAL RESEARCH: ICD-10-CM

## 2023-06-27 DIAGNOSIS — E11.9 DIABETES MELLITUS (H): Primary | ICD-10-CM

## 2023-06-27 PROCEDURE — 99207 PR NO CHARGE-RESEARCH SERVICE: CPT

## 2023-07-05 DIAGNOSIS — I25.10 CORONARY ARTERY DISEASE INVOLVING NATIVE CORONARY ARTERY OF NATIVE HEART WITHOUT ANGINA PECTORIS: ICD-10-CM

## 2023-07-05 RX ORDER — METOPROLOL TARTRATE 25 MG/1
25 TABLET, FILM COATED ORAL 2 TIMES DAILY
Qty: 180 TABLET | Refills: 0 | OUTPATIENT
Start: 2023-07-05

## 2023-07-05 RX ORDER — METOPROLOL TARTRATE 25 MG/1
25 TABLET, FILM COATED ORAL 2 TIMES DAILY
Qty: 180 TABLET | Refills: 1 | OUTPATIENT
Start: 2023-07-05

## 2023-07-31 ENCOUNTER — OFFICE VISIT (OUTPATIENT)
Dept: CARDIOLOGY | Facility: CLINIC | Age: 63
End: 2023-07-31
Payer: COMMERCIAL

## 2023-07-31 VITALS
OXYGEN SATURATION: 97 % | DIASTOLIC BLOOD PRESSURE: 62 MMHG | HEIGHT: 71 IN | WEIGHT: 190.1 LBS | HEART RATE: 75 BPM | BODY MASS INDEX: 26.61 KG/M2 | SYSTOLIC BLOOD PRESSURE: 138 MMHG

## 2023-07-31 DIAGNOSIS — I25.118 CORONARY ARTERY DISEASE OF NATIVE ARTERY OF NATIVE HEART WITH STABLE ANGINA PECTORIS (H): ICD-10-CM

## 2023-07-31 PROCEDURE — 99214 OFFICE O/P EST MOD 30 MIN: CPT | Performed by: PHYSICIAN ASSISTANT

## 2023-07-31 NOTE — LETTER
2023    Michael Méndez MD  [address] [facility] [address]    RE: Abhishek Vernonalvino       Dear Colleague,     I had the pleasure of seeing Abhishek Gomez in the [facility].    Cardiology Clinic Progress Note    Service Date: 2023    Primary Cardiologist: Dr. Sarah      Reason for Visit: CAD, cp     HPI:   Abhishek Gomez is a delightful 64 yo gentleman with PMH significant for the followin. Coronary artery disease with drug-eluting stent to the LAD in  with some blunting of the angioplasty at that time.  Unfortunately the LAD became occluded in  at the site of the prior stent and had both left to left and right to left collaterals.  At that point he was referred for CABG but declined.  He underwent  of the proximal LAD drug-eluting stent with drug-eluting stent to the ostial LAD, drug-eluting stent to the mid LAD mid LAD as well and drug-eluting stent to the D1 at the bifurcation.  He had a 50% mid RCA lesion that had a negative IFR, small circumflex without significant disease and normal left main.   angiogram showed occluded diagonal that was managed medically.    2.  Hyperlipidemia  3.  Hypertension  4.  Type 2 diabetes    Adrien was last seen by Dr. Sarah in March.  At that time he had possible increasing chest pain.  Imdur was increased to 120 mg and he went for stress test.  This test test was negative for ischemia or infarct and he is here today for follow-up.    Today he states that with the changes in Imdur he feels much better.  He rarely gets chest pain at all where previously he got it walking up a hill anytime he went fast he now has not had it for about 1 month until he walked quickly through the [place] today until he was running late.  This is about a 2 out of 10 and resolved completely within minutes.  Other than that he has been able to do all his normal activities.  He has not yet taken his medications  today.  Denies other cardiac symptoms.    Physical Exam:  There were no vitals taken for this visit.   Wt Readings from Last 5 Encounters:   03/08/23 87.5 kg (193 lb)   03/06/23 86.1 kg (189 lb 12.8 oz)   03/03/23 87.6 kg (193 lb 3.2 oz)   01/20/23 83.5 kg (184 lb)   09/14/22 85.4 kg (188 lb 3.2 oz)      Well-developed well-nourished gentleman in no acute distress.  Normocephalic atraumatic.  Heart is regular rate and rhythm without murmur rub or gallop.  Lungs are clear without wheezes rales or rhonchi.  Carotids are quiet.  Skin is warm and dry.    Labs and Imaging reviewed:  3/2/2023 LDL 76 HDL 42 total cholesterol 139  3/2/2023 creatinine 1.2 BUN 13 potassium 3.9 sodium 141.    Assessment and Plan:  1.  Coronary artery disease with complex history of intervention with known occluded diagonal stenting to the LAD.  He had stable angina and this is now essentially resolved on the 120 mg dose of Imdur.  In addition he had a Lexiscan stress test which were reviewed today that did not show ischemia or infarct with normal EF that is quite reassuring.  He will remain on Brilinta.    2.  Hypertension slightly elevated today but patient has not yet taken his medications    3.  Well-controlled with patient considering being in the trial for a LP(a).  I asked him to consider that.    4.  Type 2 diabetes with last A1c 8.3 would like that closer to 7 we discussed today that diabetes health is important to his heart health.  He has not been taking his glimepiride and metformin regularly and I asked him to revisit this with his primary care provider.    This patient is overall doing very well.  I will have him follow-up with Dr. Colvin in 7 months with labs before that visit.  He will call sooner with any change in his anginal pattern.    Angela Handley PA-C  [facility] Cardiology     This note was written using Dragon voice recognition system, please excuse any misspelled names, or nonsensical words and call with any  questions.        Orders this visit:  No orders of the defined types were placed in this encounter.    No orders of the defined types were placed in this encounter.    There are no discontinued medications.  Encounter Diagnosis   Name Primary?    Coronary artery disease of native artery of native heart with stable angina pectoris (H)        Current Medications:  Current Outpatient Medications   Medication Sig Dispense Refill    aspirin 81 MG EC tablet Take 1 tablet (81 mg) by mouth daily 90 tablet 3    atorvastatin (LIPITOR) 20 MG tablet Take 1 tablet (20 mg) by mouth daily 90 tablet 3    glimepiride (AMARYL) 2 MG tablet Take 1 tablet (2 mg) by mouth every morning (before breakfast) 90 tablet 1    isosorbide mononitrate (IMDUR) 120 MG 24 HR ER tablet Take 1 tablet (120 mg) by mouth daily 90 tablet 3    lisinopril (ZESTRIL) 20 MG tablet Take 1 tablet (20 mg) by mouth daily 90 tablet 3    metFORMIN (GLUCOPHAGE) 1000 MG tablet Take 1 tablet (1,000 mg) by mouth 2 times daily (with meals) 180 tablet 1    metoprolol tartrate (LOPRESSOR) 25 MG tablet Take 1 tablet (25 mg) by mouth 2 times daily 180 tablet 3    nitroGLYcerin (NITROSTAT) 0.4 MG sublingual tablet For chest pain place 1 tablet under the tongue every 5 minutes for up to 3 doses. If symptoms persist 5 minutes after 2nd dose call 911. 30 tablet 0    omeprazole (PRILOSEC) 20 MG DR capsule Take 1 capsule (20 mg) by mouth daily 90 capsule 3    ticagrelor (BRILINTA) 90 MG tablet Take 1 tablet (90 mg) by mouth every 12 hours 180 tablet 2       Allergies:  No Known Allergies    Past Medical, Surgical and Family History:  Past Medical History:   Diagnosis Date    Abnormal stress test 07/14/2021    Coronary artery disease involving native coronary artery of native heart without angina pectoris 07/14/2021    Essential hypertension, benign 05/18/2016    Hyperlipidemia LDL goal <70 03/16/2015    NSTEMI (non-ST elevated myocardial infarction) (H) 05/09/2019    s/p MICHELLE to  pLAD    Status post coronary angiogram 07/26/2021    Complex Multivessel CAD. CABG cosult    Type 2 diabetes mellitus without complication, without long-term current use of insulin (H) 07/06/2017    Unstable angina (H) 05/09/2019     Past Surgical History:   Procedure Laterality Date    CV CORONARY ANGIOGRAM N/A 7/26/2021    Procedure: Coronary Angiogram;  Surgeon: Sylvester Westbrook MD;  Location: [facility] CARDIAC CATH LAB    CV CORONARY ANGIOGRAM N/A 3/1/2022    Procedure: Coronary Angiogram;  Surgeon: Sanchez Sarah MD;  Location: [facility] CARDIAC CATH LAB    CV HEART CATHETERIZATION WITH POSSIBLE INTERVENTION N/A 5/9/2019    Procedure: Coronary Angiogram;  Surgeon: Jose Enrique Stanley MD;  Location: [facility] CARDIAC CATH LAB    CV HEART CATHETERIZATION WITH POSSIBLE INTERVENTION N/A 8/17/2021    Procedure: Coronary Angiogram;  Surgeon: Sanchez Sarah MD;  Location: [facility] CARDIAC CATH LAB    CV INSTANTANEOUS WAVE-FREE RATIO N/A 8/17/2021    Procedure: Instantaneous Wave-Free Ratio;  Surgeon: Sanchez Sarah MD;  Location: [facility] CARDIAC CATH LAB    CV INTRAVASULAR ULTRASOUND N/A 8/17/2021    Procedure: Intravascular Ultrasound;  Surgeon: Sanchez Sarah MD;  Location: [facility] CARDIAC CATH LAB    CV LEFT HEART CATH N/A 7/26/2021    Procedure: Left Heart Cath;  Surgeon: Sylvester Westbrook MD;  Location: [facility] CARDIAC CATH LAB    CV PCI ANGIOPLASTY N/A 5/9/2019    Procedure: PCI Angioplasty;  Surgeon: Jose Enrique Stanley MD;  Location: [facility] CARDIAC CATH LAB    CV PCI CHRONIC TOTAL OCCLUSION N/A 8/17/2021    Procedure: Percutaneous Coronary Intervention of Chronic Total Occlusion;  Surgeon: Sanchez Sarah MD;  Location: [facility] CARDIAC CATH LAB     Family History   Problem Relation Age of Onset    Diabetes Paternal Grandmother     Diabetes Nephew         Review of Systems:  Skin:        Eyes:       ENT:       Respiratory:       Cardiovascular:       Gastroenterology:       Genitourinary:       Musculoskeletal:       Neurologic:       Psychiatric:       Heme/Lymph/Imm:       Endocrine:            CC  Sanchez Sarah MD  [address]  [place] [id number]      Thank you for allowing me to participate in the care of your patient.      Sincerely,     ARMOND Russ [facility]

## 2023-07-31 NOTE — PROGRESS NOTES
Cardiology Clinic Progress Note    Service Date: 2023    Primary Cardiologist: Dr. Sarah      Reason for Visit: CAD, cp     HPI:   Abhishek Gomez is a delightful 62 yo gentleman with PMH significant for the followin. Coronary artery disease with drug-eluting stent to the LAD in  with some blunting of the angioplasty at that time.  Unfortunately the LAD became occluded in  at the site of the prior stent and had both left to left and right to left collaterals.  At that point he was referred for CABG but declined.  He underwent  of the proximal LAD drug-eluting stent with drug-eluting stent to the ostial LAD, drug-eluting stent to the mid LAD mid LAD as well and drug-eluting stent to the D1 at the bifurcation.  He had a 50% mid RCA lesion that had a negative IFR, small circumflex without significant disease and normal left main.   angiogram showed occluded diagonal that was managed medically.    2.  Hyperlipidemia  3.  Hypertension  4.  Type 2 diabetes    Adrien was last seen by Dr. Sarah in March.  At that time he had possible increasing chest pain.  Imdur was increased to 120 mg and he went for stress test.  This test test was negative for ischemia or infarct and he is here today for follow-up.    Today he states that with the changes in Imdur he feels much better.  He rarely gets chest pain at all where previously he got it walking up a hill anytime he went fast he now has not had it for about 1 month until he walked quickly through the [place] today until he was running late.  This is about a 2 out of 10 and resolved completely within minutes.  Other than that he has been able to do all his normal activities.  He has not yet taken his medications today.  Denies other cardiac symptoms.    Physical Exam:  There were no vitals taken for this visit.   Wt Readings from Last 5 Encounters:   23 87.5 kg (193 lb)   23 86.1 kg (189 lb 12.8 oz)   23 87.6 kg (193 lb 3.2  oz)   01/20/23 83.5 kg (184 lb)   09/14/22 85.4 kg (188 lb 3.2 oz)      Well-developed well-nourished gentleman in no acute distress.  Normocephalic atraumatic.  Heart is regular rate and rhythm without murmur rub or gallop.  Lungs are clear without wheezes rales or rhonchi.  Carotids are quiet.  Skin is warm and dry.    Labs and Imaging reviewed:  3/2/2023 LDL 76 HDL 42 total cholesterol 139  3/2/2023 creatinine 1.2 BUN 13 potassium 3.9 sodium 141.    Assessment and Plan:  1.  Coronary artery disease with complex history of intervention with known occluded diagonal stenting to the LAD.  He had stable angina and this is now essentially resolved on the 120 mg dose of Imdur.  In addition he had a Lexiscan stress test which were reviewed today that did not show ischemia or infarct with normal EF that is quite reassuring.  He will remain on Brilinta.    2.  Hypertension slightly elevated today but patient has not yet taken his medications    3.  Well-controlled with patient considering being in the trial for a LP(a).  I asked him to consider that.    4.  Type 2 diabetes with last A1c 8.3 would like that closer to 7 we discussed today that diabetes health is important to his heart health.  He has not been taking his glimepiride and metformin regularly and I asked him to revisit this with his primary care provider.    This patient is overall doing very well.  I will have him follow-up with Dr. Colvin in 7 months with labs before that visit.  He will call sooner with any change in his anginal pattern.    Angela Handley PA-C  [facility] Cardiology     This note was written using Dragon voice recognition system, please excuse any misspelled names, or nonsensical words and call with any questions.        Orders this visit:  No orders of the defined types were placed in this encounter.    No orders of the defined types were placed in this encounter.    There are no discontinued medications.  Encounter Diagnosis   Name  Primary?    Coronary artery disease of native artery of native heart with stable angina pectoris (H)        Current Medications:  Current Outpatient Medications   Medication Sig Dispense Refill    aspirin 81 MG EC tablet Take 1 tablet (81 mg) by mouth daily 90 tablet 3    atorvastatin (LIPITOR) 20 MG tablet Take 1 tablet (20 mg) by mouth daily 90 tablet 3    glimepiride (AMARYL) 2 MG tablet Take 1 tablet (2 mg) by mouth every morning (before breakfast) 90 tablet 1    isosorbide mononitrate (IMDUR) 120 MG 24 HR ER tablet Take 1 tablet (120 mg) by mouth daily 90 tablet 3    lisinopril (ZESTRIL) 20 MG tablet Take 1 tablet (20 mg) by mouth daily 90 tablet 3    metFORMIN (GLUCOPHAGE) 1000 MG tablet Take 1 tablet (1,000 mg) by mouth 2 times daily (with meals) 180 tablet 1    metoprolol tartrate (LOPRESSOR) 25 MG tablet Take 1 tablet (25 mg) by mouth 2 times daily 180 tablet 3    nitroGLYcerin (NITROSTAT) 0.4 MG sublingual tablet For chest pain place 1 tablet under the tongue every 5 minutes for up to 3 doses. If symptoms persist 5 minutes after 2nd dose call 911. 30 tablet 0    omeprazole (PRILOSEC) 20 MG DR capsule Take 1 capsule (20 mg) by mouth daily 90 capsule 3    ticagrelor (BRILINTA) 90 MG tablet Take 1 tablet (90 mg) by mouth every 12 hours 180 tablet 2       Allergies:  No Known Allergies    Past Medical, Surgical and Family History:  Past Medical History:   Diagnosis Date    Abnormal stress test 07/14/2021    Coronary artery disease involving native coronary artery of native heart without angina pectoris 07/14/2021    Essential hypertension, benign 05/18/2016    Hyperlipidemia LDL goal <70 03/16/2015    NSTEMI (non-ST elevated myocardial infarction) (H) 05/09/2019    s/p MICHELLE to pLAD    Status post coronary angiogram 07/26/2021    Complex Multivessel CAD. CABG cosult    Type 2 diabetes mellitus without complication, without long-term current use of insulin (H) 07/06/2017    Unstable angina (H) 05/09/2019     Past  Surgical History:   Procedure Laterality Date    CV CORONARY ANGIOGRAM N/A 7/26/2021    Procedure: Coronary Angiogram;  Surgeon: Sylvester Westbrook MD;  Location:  HEART CARDIAC CATH LAB    CV CORONARY ANGIOGRAM N/A 3/1/2022    Procedure: Coronary Angiogram;  Surgeon: Sanchez Sarah MD;  Location:  HEART CARDIAC CATH LAB    CV HEART CATHETERIZATION WITH POSSIBLE INTERVENTION N/A 5/9/2019    Procedure: Coronary Angiogram;  Surgeon: Jose Enrique Stanley MD;  Location:  HEART CARDIAC CATH LAB    CV HEART CATHETERIZATION WITH POSSIBLE INTERVENTION N/A 8/17/2021    Procedure: Coronary Angiogram;  Surgeon: Sanchez Sarah MD;  Location:  HEART CARDIAC CATH LAB    CV INSTANTANEOUS WAVE-FREE RATIO N/A 8/17/2021    Procedure: Instantaneous Wave-Free Ratio;  Surgeon: Sanchez Sarah MD;  Location: [facility] CARDIAC CATH LAB    CV INTRAVASULAR ULTRASOUND N/A 8/17/2021    Procedure: Intravascular Ultrasound;  Surgeon: Sanchez Sarah MD;  Location:  HEART CARDIAC CATH LAB    CV LEFT HEART CATH N/A 7/26/2021    Procedure: Left Heart Cath;  Surgeon: Sylvester Westbrook MD;  Location:  HEART CARDIAC CATH LAB    CV PCI ANGIOPLASTY N/A 5/9/2019    Procedure: PCI Angioplasty;  Surgeon: Jose Enrique Stanley MD;  Location:  HEART CARDIAC CATH LAB    CV PCI CHRONIC TOTAL OCCLUSION N/A 8/17/2021    Procedure: Percutaneous Coronary Intervention of Chronic Total Occlusion;  Surgeon: Sanchez Sarah MD;  Location:  HEART CARDIAC CATH LAB     Family History   Problem Relation Age of Onset    Diabetes Paternal Grandmother     Diabetes Nephew         Review of Systems:  Skin:        Eyes:       ENT:       Respiratory:       Cardiovascular:       Gastroenterology:      Genitourinary:       Musculoskeletal:       Neurologic:       Psychiatric:       Heme/Lymph/Imm:       Endocrine:            CC  Sanchez Sarah MD  [address]  [place] [id number]

## 2023-07-31 NOTE — PATIENT INSTRUCTIONS
Thank you for visiting with me today.    We discussed: I'm glad you are feeling well.  Please let us know if your chest pain gets worse or happens more frequently.      Medication changes:  continue current medications.      Follow up: with Dr. Sarah with labs before that visit in about 7 months.        Please call my nurse, Jessica, at [phone] with any questions or concerns.    Scheduling phone number: [phone]  Reminder: Please bring in all current medications, over the counter supplements and vitamin bottles to your next appointment.

## 2023-08-16 NOTE — PROGRESS NOTES
Patient was contacted 6 mo off study Follow-up. Doing well. No NAOMY's AE's or Study Endpoints.Stephenie Diaz RN

## 2023-10-03 DIAGNOSIS — E11.9 TYPE 2 DIABETES MELLITUS WITHOUT COMPLICATION, WITHOUT LONG-TERM CURRENT USE OF INSULIN (H): ICD-10-CM

## 2023-10-03 RX ORDER — GLIMEPIRIDE 2 MG/1
2 TABLET ORAL
Qty: 90 TABLET | Refills: 0 | Status: SHIPPED | OUTPATIENT
Start: 2023-10-03 | End: 2024-05-20

## 2023-10-07 ENCOUNTER — HEALTH MAINTENANCE LETTER (OUTPATIENT)
Age: 63
End: 2023-10-07

## 2023-11-08 DIAGNOSIS — I25.10 CORONARY ARTERY DISEASE INVOLVING NATIVE CORONARY ARTERY OF NATIVE HEART WITHOUT ANGINA PECTORIS: ICD-10-CM

## 2023-11-09 RX ORDER — METOPROLOL TARTRATE 25 MG/1
25 TABLET, FILM COATED ORAL 2 TIMES DAILY
Qty: 180 TABLET | Refills: 0 | Status: SHIPPED | OUTPATIENT
Start: 2023-11-09 | End: 2024-05-20

## 2023-12-12 ENCOUNTER — DOCUMENTATION ONLY (OUTPATIENT)
Dept: CARDIOLOGY | Facility: CLINIC | Age: 63
End: 2023-12-12
Payer: COMMERCIAL

## 2023-12-12 DIAGNOSIS — E11.9 DIABETES MELLITUS (H): ICD-10-CM

## 2023-12-12 DIAGNOSIS — I25.118 CORONARY ARTERY DISEASE OF NATIVE ARTERY OF NATIVE HEART WITH STABLE ANGINA PECTORIS (H): Primary | ICD-10-CM

## 2023-12-12 DIAGNOSIS — Z00.6 EXAMINATION OF PARTICIPANT OR CONTROL IN CLINICAL RESEARCH: ICD-10-CM

## 2023-12-12 PROCEDURE — 99207 PR NO CHARGE-RESEARCH SERVICE: CPT | Performed by: INTERNAL MEDICINE

## 2024-02-06 ENCOUNTER — OFFICE VISIT (OUTPATIENT)
Dept: CARDIOLOGY | Facility: CLINIC | Age: 64
End: 2024-02-06
Attending: PHYSICIAN ASSISTANT
Payer: COMMERCIAL

## 2024-02-06 ENCOUNTER — LAB (OUTPATIENT)
Dept: LAB | Facility: CLINIC | Age: 64
End: 2024-02-06
Payer: COMMERCIAL

## 2024-02-06 VITALS
HEIGHT: 71 IN | HEART RATE: 58 BPM | WEIGHT: 178.5 LBS | SYSTOLIC BLOOD PRESSURE: 96 MMHG | BODY MASS INDEX: 24.99 KG/M2 | DIASTOLIC BLOOD PRESSURE: 57 MMHG | OXYGEN SATURATION: 97 %

## 2024-02-06 DIAGNOSIS — I25.118 CORONARY ARTERY DISEASE OF NATIVE ARTERY OF NATIVE HEART WITH STABLE ANGINA PECTORIS (H): ICD-10-CM

## 2024-02-06 LAB
ALT SERPL W P-5'-P-CCNC: 13 U/L (ref 0–70)
ANION GAP SERPL CALCULATED.3IONS-SCNC: 10 MMOL/L (ref 7–15)
BUN SERPL-MCNC: 12.3 MG/DL (ref 8–23)
CALCIUM SERPL-MCNC: 8.8 MG/DL (ref 8.8–10.2)
CHLORIDE SERPL-SCNC: 103 MMOL/L (ref 98–107)
CHOLEST SERPL-MCNC: 137 MG/DL
CREAT SERPL-MCNC: 1.3 MG/DL (ref 0.67–1.17)
DEPRECATED HCO3 PLAS-SCNC: 28 MMOL/L (ref 22–29)
EGFRCR SERPLBLD CKD-EPI 2021: 62 ML/MIN/1.73M2
FASTING STATUS PATIENT QL REPORTED: YES
GLUCOSE SERPL-MCNC: 171 MG/DL (ref 70–99)
HDLC SERPL-MCNC: 34 MG/DL
LDLC SERPL CALC-MCNC: 76 MG/DL
NONHDLC SERPL-MCNC: 103 MG/DL
POTASSIUM SERPL-SCNC: 4 MMOL/L (ref 3.4–5.3)
SODIUM SERPL-SCNC: 141 MMOL/L (ref 135–145)
TRIGL SERPL-MCNC: 134 MG/DL

## 2024-02-06 PROCEDURE — 84460 ALANINE AMINO (ALT) (SGPT): CPT | Performed by: PHYSICIAN ASSISTANT

## 2024-02-06 PROCEDURE — 99214 OFFICE O/P EST MOD 30 MIN: CPT | Performed by: INTERNAL MEDICINE

## 2024-02-06 PROCEDURE — 80048 BASIC METABOLIC PNL TOTAL CA: CPT | Performed by: PHYSICIAN ASSISTANT

## 2024-02-06 PROCEDURE — 36415 COLL VENOUS BLD VENIPUNCTURE: CPT | Performed by: PHYSICIAN ASSISTANT

## 2024-02-06 PROCEDURE — 80061 LIPID PANEL: CPT | Performed by: PHYSICIAN ASSISTANT

## 2024-02-06 NOTE — LETTER
2/6/2024    Michael Méndez MD  [address] [facility] [address]    RE: Abhishek Gomez       Dear Colleague,     I had the pleasure of seeing Abhishek Gomez in the [facility].  CARDIOLOGY CLINIC CONSULTATION    PRIMARY CARE PHYSICIAN:  Michael Méndez    HISTORY OF PRESENT ILLNESS:  I had the pleasure of seeing Mr. Gomez at the [facility] in [place] this morning.  He is a very pleasant 63-year-old gentleman who is well known to me.  Please see my note from 08/2021 for details regarding his coronary artery disease history.     Briefly, I saw him the summer of 2021 for unstable angina.  We referred him for coronary angiography which revealed an occluded LAD which was previously stented.  He also had moderate RCA stenosis. He was initially referred to surgery but declined open heart surgery.  He later came back for complex  revascularization of the LAD and first diagonal. His RCA had moderate disease which was managed medically.  His angina resolved after that intervention.     He was re-evaluated in the spring of 2022 with recurrent symptoms.  He had a repeat coronary angiogram which revealed patent LAD but occluded first diagonal with left-to-left collaterals.  His RCA disease remained stable.  Since the patient had bifurcation stenting and had 2 layers of stents across the diagonal, we recommended initial medical therapy.     I last saw him approximately a year ago for routine visit.  At that time he complained of recurrent angina.  We had him undergo nuclear stress test which was negative for inducible ischemia.  Will also increase his Imdur to 120 mg daily.  With increased Imdur, his angina resolved.    Over the past year, he was able to lose weight.  He does not exercise regularly but is active around the house.  He denies chest pain.  Denies exertional shortness of breath, palpitations, presyncope or syncope.     PAST MEDICAL HISTORY:  Past  Medical History:   Diagnosis Date    Abnormal stress test 07/14/2021    Coronary artery disease involving native coronary artery of native heart without angina pectoris 07/14/2021    Essential hypertension, benign 05/18/2016    Hyperlipidemia LDL goal <70 03/16/2015    NSTEMI (non-ST elevated myocardial infarction) (H) 05/09/2019    s/p MICHELLE to pLAD    Status post coronary angiogram 07/26/2021    Complex Multivessel CAD. CABG cosult    Type 2 diabetes mellitus without complication, without long-term current use of insulin (H) 07/06/2017    Unstable angina (H) 05/09/2019       MEDICATIONS:  Current Outpatient Medications   Medication    aspirin 81 MG EC tablet    atorvastatin (LIPITOR) 20 MG tablet    glimepiride (AMARYL) 2 MG tablet    isosorbide mononitrate (IMDUR) 120 MG 24 HR ER tablet    lisinopril (ZESTRIL) 20 MG tablet    metFORMIN (GLUCOPHAGE) 1000 MG tablet    metoprolol tartrate (LOPRESSOR) 25 MG tablet    nitroGLYcerin (NITROSTAT) 0.4 MG sublingual tablet    omeprazole (PRILOSEC) 20 MG DR capsule    ticagrelor (BRILINTA) 90 MG tablet     No current facility-administered medications for this visit.       SOCIAL HISTORY:  I have reviewed this patient's social history and updated it with pertinent information if needed. Abhishek Gomez  reports that he has never smoked. He has never used smokeless tobacco. He reports that he does not drink alcohol and does not use drugs.    PHYSICAL EXAM:  Pulse:  [58] 58  BP: (96)/(57) 96/57  SpO2:  [97 %] 97 %  178 lbs 8 oz    Constitutional: alert, no distress  Respiratory: Good bilateral air entry  Cardiovascular: Regular rate and rhythm, no murmurs  GI: nondistended  Neuropsychiatric: appropriate affact    ASSESSMENT: Pertinent issues addressed/ reviewed during this cardiology visit  Coronary disease status post prior complex proximal LAD/1st diagonal stenting  Known chronically occluded first diagonal and moderate nonflow limiting mid RCA disease  Type 2  diabetes  Hypertension  Hyperlipidemia    RECOMMENDATIONS:  He is doing well from coronary artery disease point of view.  He no longer endorses angina.  I recommended that he continue current medical program including aspirin, beta-blocker, statin and long-acting nitroglycerin.  His last lipid profile from a year ago showed an LDL of 76 which is not at goal.  He had lipid profile check this morning and the results are currently pending.  Will increase atorvastatin to 40 mg daily if his LDL is greater than 70 mg/dL.  Follow-up with an MORGAN in 6 months and with me in 1 to 2 years.    It was a pleasure seeing this patient in clinic today. Please do not hesitate to contact me with any future questions.     Sanchez Sarah MD, [facility]  Cardiology - [facility]  February 6, 2024    Review of the result(s) of each unique test - Last BMP, ALT, ECG, stress test     The level of medical decision making during this visit was of moderate complexity.    This note was completed in part using dictation via the Dragon voice recognition software. Some word and grammatical errors may occur and must be interpreted in the appropriate clinical context.  If there are any questions pertaining to this issue, please contact me for further clarification.      Thank you for allowing me to participate in the care of your patient.      Sincerely,     Sanchez Sarah MD, MD     [facility]  cc:   Angela Handley PA-C  [address]  [place] [address]

## 2024-02-06 NOTE — PROGRESS NOTES
CARDIOLOGY CLINIC CONSULTATION    PRIMARY CARE PHYSICIAN:  Michael Méndez    HISTORY OF PRESENT ILLNESS:  I had the pleasure of seeing Mr. Gomez at the [facility] in [place] this morning.  He is a very pleasant 63-year-old gentleman who is well known to me.  Please see my note from 08/2021 for details regarding his coronary artery disease history.     Briefly, I saw him the summer of 2021 for unstable angina.  We referred him for coronary angiography which revealed an occluded LAD which was previously stented.  He also had moderate RCA stenosis. He was initially referred to surgery but declined open heart surgery.  He later came back for complex  revascularization of the LAD and first diagonal. His RCA had moderate disease which was managed medically.  His angina resolved after that intervention.     He was re-evaluated in the spring of 2022 with recurrent symptoms.  He had a repeat coronary angiogram which revealed patent LAD but occluded first diagonal with left-to-left collaterals.  His RCA disease remained stable.  Since the patient had bifurcation stenting and had 2 layers of stents across the diagonal, we recommended initial medical therapy.     I last saw him approximately a year ago for routine visit.  At that time he complained of recurrent angina.  We had him undergo nuclear stress test which was negative for inducible ischemia.  Will also increase his Imdur to 120 mg daily.  With increased Imdur, his angina resolved.    Over the past year, he was able to lose weight.  He does not exercise regularly but is active around the house.  He denies chest pain.  Denies exertional shortness of breath, palpitations, presyncope or syncope.     PAST MEDICAL HISTORY:  Past Medical History:   Diagnosis Date    Abnormal stress test 07/14/2021    Coronary artery disease involving native coronary artery of native heart without angina pectoris 07/14/2021    Essential hypertension, benign 05/18/2016     Hyperlipidemia LDL goal <70 03/16/2015    NSTEMI (non-ST elevated myocardial infarction) (H) 05/09/2019    s/p MICHELLE to pLAD    Status post coronary angiogram 07/26/2021    Complex Multivessel CAD. CABG cosult    Type 2 diabetes mellitus without complication, without long-term current use of insulin (H) 07/06/2017    Unstable angina (H) 05/09/2019       MEDICATIONS:  Current Outpatient Medications   Medication    aspirin 81 MG EC tablet    atorvastatin (LIPITOR) 20 MG tablet    glimepiride (AMARYL) 2 MG tablet    isosorbide mononitrate (IMDUR) 120 MG 24 HR ER tablet    lisinopril (ZESTRIL) 20 MG tablet    metFORMIN (GLUCOPHAGE) 1000 MG tablet    metoprolol tartrate (LOPRESSOR) 25 MG tablet    nitroGLYcerin (NITROSTAT) 0.4 MG sublingual tablet    omeprazole (PRILOSEC) 20 MG DR capsule    ticagrelor (BRILINTA) 90 MG tablet     No current facility-administered medications for this visit.       SOCIAL HISTORY:  I have reviewed this patient's social history and updated it with pertinent information if needed. Abhishek Gomez  reports that he has never smoked. He has never used smokeless tobacco. He reports that he does not drink alcohol and does not use drugs.    PHYSICAL EXAM:  Pulse:  [58] 58  BP: (96)/(57) 96/57  SpO2:  [97 %] 97 %  178 lbs 8 oz    Constitutional: alert, no distress  Respiratory: Good bilateral air entry  Cardiovascular: Regular rate and rhythm, no murmurs  GI: nondistended  Neuropsychiatric: appropriate affact    ASSESSMENT: Pertinent issues addressed/ reviewed during this cardiology visit  Coronary disease status post prior complex proximal LAD/1st diagonal stenting  Known chronically occluded first diagonal and moderate nonflow limiting mid RCA disease  Type 2 diabetes  Hypertension  Hyperlipidemia    RECOMMENDATIONS:  He is doing well from coronary artery disease point of view.  He no longer endorses angina.  I recommended that he continue current medical program including aspirin,  beta-blocker, statin and long-acting nitroglycerin.  His last lipid profile from a year ago showed an LDL of 76 which is not at goal.  He had lipid profile check this morning and the results are currently pending.  Will increase atorvastatin to 40 mg daily if his LDL is greater than 70 mg/dL.  Follow-up with an MORGAN in 6 months and with me in 1 to 2 years.    It was a pleasure seeing this patient in clinic today. Please do not hesitate to contact me with any future questions.     Sanchez Sarah MD, [facility]  Cardiology - [facility]  February 6, 2024    Review of the result(s) of each unique test - Last BMP, ALT, ECG, stress test     The level of medical decision making during this visit was of moderate complexity.    This note was completed in part using dictation via the Dragon voice recognition software. Some word and grammatical errors may occur and must be interpreted in the appropriate clinical context.  If there are any questions pertaining to this issue, please contact me for further clarification.

## 2024-02-14 ENCOUNTER — HOSPITAL ENCOUNTER (OUTPATIENT)
Dept: CARDIOLOGY | Facility: CLINIC | Age: 64
Discharge: HOME | End: 2024-02-14
Attending: INTERNAL MEDICINE | Admitting: INTERNAL MEDICINE
Payer: COMMERCIAL

## 2024-02-14 DIAGNOSIS — I25.118 CORONARY ARTERY DISEASE OF NATIVE ARTERY OF NATIVE HEART WITH STABLE ANGINA PECTORIS (H): ICD-10-CM

## 2024-02-14 LAB — LVEF ECHO: NORMAL

## 2024-02-14 PROCEDURE — 93306 TTE W/DOPPLER COMPLETE: CPT

## 2024-02-14 PROCEDURE — 93306 TTE W/DOPPLER COMPLETE: CPT | Mod: 26 | Performed by: INTERNAL MEDICINE

## 2024-02-26 NOTE — PROGRESS NOTES
Patient has not responded to phone calls for off study Follow-up. Medical record review completed on 12/12/2023. Patient was last seen by cardiology on 7/31/2023. No new issues and angina resolved. No AE's, NAOMY's or Endpoints. There has been no documentation of his diabetes or eye appts.     Will review again I 6 months,     Stephenie Diaz RN

## 2024-04-28 ENCOUNTER — HEALTH MAINTENANCE LETTER (OUTPATIENT)
Age: 64
End: 2024-04-28

## 2024-05-06 ENCOUNTER — TRANSFERRED RECORDS (OUTPATIENT)
Dept: MULTI SPECIALTY CLINIC | Facility: CLINIC | Age: 64
End: 2024-05-06
Payer: COMMERCIAL

## 2024-05-06 LAB — RETINOPATHY: NORMAL

## 2024-05-09 ENCOUNTER — TELEPHONE (OUTPATIENT)
Dept: INTERNAL MEDICINE | Facility: CLINIC | Age: 64
End: 2024-05-09
Payer: COMMERCIAL

## 2024-05-09 NOTE — TELEPHONE ENCOUNTER
Received a call back from the patient. Assisted in getting the patient re-scheduled for a sooner appointment.     Appointments in Next Year      May 20, 2024 11:00 AM  (Arrive by 10:40 AM)  Provider Visit with Michael Méndez MD  [facility] ([facility] ) [phone]     Deepthi Hernandes RN

## 2024-05-09 NOTE — TELEPHONE ENCOUNTER
Reason for Call:  Appointment Request    Patient requesting this type of appt: Chronic Diease Management/Medication/Follow-Up    Requested provider: Michael Méndez    Reason patient unable to be scheduled: Not within requested timeframe    When does patient want to be seen/preferred time:  before the end of May    Comments: He's going to be gone on vacation in June and is hoping to get worked in for his Diabetic check and labs before the end of May.     Could we send this information to you in [employer] or would you prefer to receive a phone call?:   No preference   Okay to leave a detailed message?: Yes at Home number on file [phone] (home)    Call taken on 5/9/2024 at 7:25 AM by Korin Gould

## 2024-05-20 ENCOUNTER — OFFICE VISIT (OUTPATIENT)
Dept: INTERNAL MEDICINE | Facility: CLINIC | Age: 64
End: 2024-05-20
Payer: COMMERCIAL

## 2024-05-20 VITALS
HEART RATE: 71 BPM | HEIGHT: 71 IN | DIASTOLIC BLOOD PRESSURE: 68 MMHG | WEIGHT: 175 LBS | OXYGEN SATURATION: 100 % | BODY MASS INDEX: 24.5 KG/M2 | SYSTOLIC BLOOD PRESSURE: 120 MMHG | RESPIRATION RATE: 20 BRPM | TEMPERATURE: 97.1 F

## 2024-05-20 DIAGNOSIS — Z12.11 COLON CANCER SCREENING: ICD-10-CM

## 2024-05-20 DIAGNOSIS — E11.9 TYPE 2 DIABETES MELLITUS WITHOUT COMPLICATION, WITHOUT LONG-TERM CURRENT USE OF INSULIN (H): Primary | ICD-10-CM

## 2024-05-20 DIAGNOSIS — Z12.5 SCREENING PSA (PROSTATE SPECIFIC ANTIGEN): ICD-10-CM

## 2024-05-20 DIAGNOSIS — I25.118 CORONARY ARTERY DISEASE OF NATIVE ARTERY OF NATIVE HEART WITH STABLE ANGINA PECTORIS (H): ICD-10-CM

## 2024-05-20 DIAGNOSIS — N18.30 STAGE 3 CHRONIC KIDNEY DISEASE, UNSPECIFIED WHETHER STAGE 3A OR 3B CKD (H): ICD-10-CM

## 2024-05-20 DIAGNOSIS — I10 ESSENTIAL HYPERTENSION, BENIGN: ICD-10-CM

## 2024-05-20 LAB
CREAT UR-MCNC: 264 MG/DL
ERYTHROCYTE [DISTWIDTH] IN BLOOD BY AUTOMATED COUNT: 13 % (ref 10–15)
HBA1C MFR BLD: 9.6 % (ref 0–5.6)
HCT VFR BLD AUTO: 33.5 % (ref 40–53)
HGB BLD-MCNC: 10.9 G/DL (ref 13.3–17.7)
MCH RBC QN AUTO: 25.5 PG (ref 26.5–33)
MCHC RBC AUTO-ENTMCNC: 32.5 G/DL (ref 31.5–36.5)
MCV RBC AUTO: 79 FL (ref 78–100)
MICROALBUMIN UR-MCNC: 31.7 MG/L
MICROALBUMIN/CREAT UR: 12.01 MG/G CR (ref 0–17)
PLATELET # BLD AUTO: 263 10E3/UL (ref 150–450)
PSA SERPL DL<=0.01 NG/ML-MCNC: 0.78 NG/ML (ref 0–4.5)
RBC # BLD AUTO: 4.27 10E6/UL (ref 4.4–5.9)
WBC # BLD AUTO: 9.2 10E3/UL (ref 4–11)

## 2024-05-20 PROCEDURE — G0103 PSA SCREENING: HCPCS | Performed by: INTERNAL MEDICINE

## 2024-05-20 PROCEDURE — G2211 COMPLEX E/M VISIT ADD ON: HCPCS | Performed by: INTERNAL MEDICINE

## 2024-05-20 PROCEDURE — 82043 UR ALBUMIN QUANTITATIVE: CPT | Performed by: INTERNAL MEDICINE

## 2024-05-20 PROCEDURE — 36415 COLL VENOUS BLD VENIPUNCTURE: CPT | Performed by: INTERNAL MEDICINE

## 2024-05-20 PROCEDURE — 85027 COMPLETE CBC AUTOMATED: CPT | Performed by: INTERNAL MEDICINE

## 2024-05-20 PROCEDURE — 82570 ASSAY OF URINE CREATININE: CPT | Performed by: INTERNAL MEDICINE

## 2024-05-20 PROCEDURE — 99214 OFFICE O/P EST MOD 30 MIN: CPT | Performed by: INTERNAL MEDICINE

## 2024-05-20 PROCEDURE — 83036 HEMOGLOBIN GLYCOSYLATED A1C: CPT | Performed by: INTERNAL MEDICINE

## 2024-05-20 RX ORDER — METOPROLOL TARTRATE 25 MG/1
25 TABLET, FILM COATED ORAL 2 TIMES DAILY
Qty: 180 TABLET | Refills: 0 | Status: SHIPPED | OUTPATIENT
Start: 2024-05-20 | End: 2024-09-23

## 2024-05-20 RX ORDER — LANCETS
EACH MISCELLANEOUS
Qty: 100 EACH | Refills: 6 | Status: SHIPPED | OUTPATIENT
Start: 2024-05-20

## 2024-05-20 RX ORDER — GLIMEPIRIDE 2 MG/1
2 TABLET ORAL
Qty: 90 TABLET | Refills: 0 | Status: SHIPPED | OUTPATIENT
Start: 2024-05-20 | End: 2024-09-23

## 2024-05-20 NOTE — PROGRESS NOTES
Assessment & Plan     Type 2 diabetes mellitus without complication, without long-term current use of insulin (H)  Discussed dosing of meds, meds refilled with the recommended recheck of A1c level  - HEMOGLOBIN A1C; Future  - Albumin Random Urine Quantitative with Creat Ratio; Future  - glimepiride (AMARYL) 2 MG tablet; Take 1 tablet (2 mg) by mouth every morning (before breakfast)  - metFORMIN (GLUCOPHAGE) 1000 MG tablet; Take 1 tablet (1,000 mg) by mouth daily (with breakfast)  - blood glucose monitoring (NO BRAND SPECIFIED) meter device kit; Use to test blood sugar 1 times daily or as directed. Preferred blood glucose meter OR supplies to accompany: Blood Glucose Monitor Brands: per insurance.  - blood glucose (NO BRAND SPECIFIED) test strip; Use to test blood sugar 1 times daily or as directed. To accompany: Blood Glucose Monitor Brands: per insurance.  - thin (NO BRAND SPECIFIED) lancets; Use with lanceting device. To accompany: Blood Glucose Monitor Brands: per insurance.    Essential hypertension, benign  Stable on therapy continuing with current medical management  - metoprolol tartrate (LOPRESSOR) 25 MG tablet; Take 1 tablet (25 mg) by mouth 2 times daily    Screening PSA (prostate specific antigen)  Labs ordered as fasting for routine  - PSA, screen; Future    Stage 3 chronic kidney disease, unspecified whether stage 3a or 3b CKD (H)  Suggest ongoing creatinine monitoring with hydration included.  Discussed SGLT inhibitor therapy but patient is not interested.  - CBC with platelets; Future    Coronary artery disease of native artery of native heart with stable angina pectoris (H24)  Following up with cardiology as directed.  - metoprolol tartrate (LOPRESSOR) 25 MG tablet; Take 1 tablet (25 mg) by mouth 2 times daily    Colon cancer screening  Colonoscopy ordered as routine screening.  - Colonoscopy Screening  Referral; Future    BMI  Estimated body mass index is 25.13 kg/m  as calculated from  "the following:    Height as of this encounter: 1.803 m (5' 11\").    Weight as of this encounter: 81.7 kg (180 lb 3.2 oz).         Work on weight loss  Regular exercise    Glen Jurado is a 64 year old, presenting for the following health issues:  Diabetes      Via the Health Maintenance questionnaire, the patient has reported the following services have been completed -Eye Exam: [facility] 2024-05-06, this information has been sent to the abstraction team.    History of Present Illness       Diabetes:   He presents for follow up of diabetes.  He is checking home blood glucose a few times a week.   He checks blood glucose before meals.  Blood glucose is sometimes over 200 and sometimes under 70. He is aware of hypoglycemia symptoms including shakiness.   He is concerned about low blood sugar, several less than 70 in the past few weeks.    He is not experiencing numbness or burning in feet, excessive thirst, blurry vision, weight changes or redness, sores or blisters on feet. The patient has had a diabetic eye exam in the last 12 months. Eye exam performed on may 6. Location of last eye exam [facility] eye [facility].        He eats 0-1 servings of fruits and vegetables daily.He consumes 1 sweetened beverage(s) daily.He exercises with enough effort to increase his heart rate 10 to 19 minutes per day.  He exercises with enough effort to increase his heart rate 3 or less days per week. He is missing 1 dose(s) of medications per week.     Other concerns:  1. Decreased metformin to 1 tablet nightly due to low blood sugars.   He does not check his blood sugars very frequently but notes at times he can have some low blood sugars because of this he oftentimes fluctuates as to when he takes his diabetic medications, sometimes in the a.m. and sometimes in the p.m.  We discussed the risks of this.    Current Outpatient Medications   Medication Sig Dispense Refill    aspirin 81 MG EC tablet Take 1 tablet (81 mg) by mouth daily " (Patient taking differently: Take 81 mg by mouth daily OTC) 90 tablet 3    atorvastatin (LIPITOR) 20 MG tablet Take 1 tablet (20 mg) by mouth daily 90 tablet 3    blood glucose (NO BRAND SPECIFIED) test strip Use to test blood sugar 1 times daily or as directed. To accompany: Blood Glucose Monitor Brands: per insurance. 100 strip 6    blood glucose monitoring (NO BRAND SPECIFIED) meter device kit Use to test blood sugar 1 times daily or as directed. Preferred blood glucose meter OR supplies to accompany: Blood Glucose Monitor Brands: per insurance. 1 kit 0    glimepiride (AMARYL) 2 MG tablet Take 1 tablet (2 mg) by mouth every morning (before breakfast) 90 tablet 0    isosorbide mononitrate (IMDUR) 120 MG 24 HR ER tablet Take 1 tablet (120 mg) by mouth daily 90 tablet 3    lisinopril (ZESTRIL) 20 MG tablet Take 1 tablet (20 mg) by mouth daily 90 tablet 3    metFORMIN (GLUCOPHAGE) 1000 MG tablet Take 1 tablet (1,000 mg) by mouth daily (with breakfast) 90 tablet 0    metoprolol tartrate (LOPRESSOR) 25 MG tablet Take 1 tablet (25 mg) by mouth 2 times daily 180 tablet 0    nitroGLYcerin (NITROSTAT) 0.4 MG sublingual tablet For chest pain place 1 tablet under the tongue every 5 minutes for up to 3 doses. If symptoms persist 5 minutes after 2nd dose call 911. 30 tablet 0    thin (NO BRAND SPECIFIED) lancets Use with lanceting device. To accompany: Blood Glucose Monitor Brands: per insurance. 100 each 6    ticagrelor (BRILINTA) 90 MG tablet Take 1 tablet (90 mg) by mouth every 12 hours 180 tablet 2     No current facility-administered medications for this visit.       Review of Systems  CONSTITUTIONAL: NEGATIVE for fever, chills, change in weight  EYES: NEGATIVE for vision changes or irritation  ENT/MOUTH: NEGATIVE for ear, mouth and throat problems  RESP: NEGATIVE for significant cough or SOB  CV: NEGATIVE for chest pain, palpitations or peripheral edema  GI: NEGATIVE for nausea, abdominal pain, heartburn, or change in  "bowel habits  : NEGATIVE for frequency, dysuria, or hematuria  MUSCULOSKELETAL: NEGATIVE for significant arthralgias or myalgia  NEURO: NEGATIVE for weakness, dizziness or paresthesias  HEME: NEGATIVE for bleeding problems  PSYCHIATRIC: NEGATIVE for changes in mood or affect      Objective    /68   Pulse 71   Temp 97.1  F (36.2  C) (Temporal)   Resp 20   Ht 1.803 m (5' 11\")   Wt 81.7 kg (180 lb 3.2 oz)   SpO2 100%   BMI 25.13 kg/m    Body mass index is 25.13 kg/m .  Physical Exam   GENERAL: alert and no distress  EYES: Eyes grossly normal to inspection, PERRL and conjunctivae and sclerae normal  HENT: ear canals and TM's normal, nose and mouth without ulcers or lesions  Very small, sized,  superficial soft tissue uniformly symmetric smooth and freely mobile, nontender subcutaneous skin lesion noted to the posterior scalp slightly to the right of the crown.  RESP: lungs clear to auscultation - no rales, rhonchi or wheezes  CV: regular rate and rhythm, normal S1 S2, no S3 or S4, no murmur, click or rub, no peripheral edema  ABDOMEN: soft, nontender, no hepatosplenomegaly, no masses and bowel sounds normal  NEURO: No focal changes  PSYCH: mentation appears normal, affect normal/bright    Lab Results   Component Value Date    A1C 8.3 03/08/2023    A1C 6.6 10/18/2022    A1C 6.7 04/07/2022    A1C 7.1 07/26/2021    A1C 7.6 03/02/2021    A1C 7.7 12/26/2019    A1C 7.4 10/31/2018    A1C 8.7 06/29/2017    A1C 9.1 05/18/2016     Component      Latest Ref Rng 2/6/2024  7:45 AM   Sodium      135 - 145 mmol/L 141    Potassium      3.4 - 5.3 mmol/L 4.0    Chloride      98 - 107 mmol/L 103    Carbon Dioxide (CO2)      22 - 29 mmol/L 28    Anion Gap      7 - 15 mmol/L 10    Urea Nitrogen      8.0 - 23.0 mg/dL 12.3    Creatinine      0.67 - 1.17 mg/dL 1.30 (H)    GFR Estimate      >60 mL/min/1.73m2 62    Calcium      8.8 - 10.2 mg/dL 8.8    Glucose      70 - 99 mg/dL 171 (H)    Cholesterol      <200 mg/dL 137  "   Triglycerides      <150 mg/dL 134    HDL Cholesterol      >=40 mg/dL 34 (L)    LDL Cholesterol Calculated      <=100 mg/dL 76    Non HDL Cholesterol      <130 mg/dL 103    Patient Fasting? Yes    ALT      0 - 70 U/L 13 The longitudinal plan of care for the diagnosis(es)/condition(s) as documented were addressed during this visit. Due to the added complexity in care, The longitudinal plan of care for the diagnosis(es)/condition(s) as documented were addressed during this visit. Due to the added complexity in care, I will continue to support Adrien in the subsequent management and with ongoing continuity of care.I will continue to support Kamal in the subsequent management and with ongoing continuity of care.              Signed Electronically by: Michael Méndez MD

## 2024-05-28 DIAGNOSIS — E11.9 TYPE 2 DIABETES MELLITUS WITHOUT COMPLICATION, WITHOUT LONG-TERM CURRENT USE OF INSULIN (H): ICD-10-CM

## 2024-05-28 DIAGNOSIS — I10 ESSENTIAL HYPERTENSION, BENIGN: ICD-10-CM

## 2024-05-29 RX ORDER — LISINOPRIL 20 MG/1
20 TABLET ORAL DAILY
Qty: 90 TABLET | Refills: 1 | Status: SHIPPED | OUTPATIENT
Start: 2024-05-29 | End: 2024-10-07

## 2024-05-29 RX ORDER — ATORVASTATIN CALCIUM 20 MG/1
20 TABLET, FILM COATED ORAL DAILY
Qty: 90 TABLET | Refills: 1 | Status: SHIPPED | OUTPATIENT
Start: 2024-05-29 | End: 2024-10-07 | Stop reason: ALTCHOICE

## 2024-05-29 NOTE — TELEPHONE ENCOUNTER
Prescription approved per [facility] Refill Protocol.  Deepthi Hernandes, RN  [facility] Triage Nurse

## 2024-06-25 ENCOUNTER — DOCUMENTATION ONLY (OUTPATIENT)
Dept: CARDIOLOGY | Facility: CLINIC | Age: 64
End: 2024-06-25
Payer: COMMERCIAL

## 2024-06-25 DIAGNOSIS — I25.118 CORONARY ARTERY DISEASE OF NATIVE ARTERY OF NATIVE HEART WITH STABLE ANGINA PECTORIS (H): Primary | ICD-10-CM

## 2024-06-25 DIAGNOSIS — E11.65 TYPE 2 DIABETES MELLITUS WITH HYPERGLYCEMIA, UNSPECIFIED WHETHER LONG TERM INSULIN USE (H): ICD-10-CM

## 2024-06-25 DIAGNOSIS — Z00.6 EXAMINATION OF PARTICIPANT OR CONTROL IN CLINICAL RESEARCH: ICD-10-CM

## 2024-06-25 PROCEDURE — 99207 PR NO CHARGE-RESEARCH SERVICE: CPT | Performed by: INTERNAL MEDICINE

## 2024-06-25 NOTE — PROGRESS NOTES
Medical record review for 6 mo off study Follow-up.Patient has not had any NAOMY's, AE;'s or endpoint since last review. Did report some low numbers to MD below 70 but not documented how long, usually runs high. Had an eye 5/6/2024. Ongoing Mild NPDR continued with no treatment. Will review again in 6 months.    Stephenie Diaz RN

## 2024-07-04 ENCOUNTER — OFFICE VISIT (OUTPATIENT)
Dept: URGENT CARE | Facility: URGENT CARE | Age: 64
End: 2024-07-04
Payer: COMMERCIAL

## 2024-07-04 VITALS
HEART RATE: 66 BPM | BODY MASS INDEX: 25.47 KG/M2 | TEMPERATURE: 98.2 F | DIASTOLIC BLOOD PRESSURE: 66 MMHG | SYSTOLIC BLOOD PRESSURE: 108 MMHG | RESPIRATION RATE: 16 BRPM | OXYGEN SATURATION: 98 % | WEIGHT: 182.6 LBS

## 2024-07-04 DIAGNOSIS — H93.8X2 EAR PRESSURE, LEFT: Primary | ICD-10-CM

## 2024-07-04 DIAGNOSIS — H61.22 IMPACTED CERUMEN OF LEFT EAR: ICD-10-CM

## 2024-07-04 PROCEDURE — 99213 OFFICE O/P EST LOW 20 MIN: CPT | Performed by: FAMILY MEDICINE

## 2024-07-04 NOTE — PROGRESS NOTES
Assessment & Plan     Ear pressure, left  Impacted cerumen of left ear  Differentials discussed in detail.  Physical examination markable for left ear impacted cerumen, MA tried to clean with saline however unsuccessful.  Recommended to Debrox eardrops and follow-up in 2 to 3 days.  Instructed to avoid using Q-tips.  All questions answered.      Subjective   Adrien is a 64 year old, presenting for the following health issues:  Urgent Care and Otalgia (Left ear pain x 10 days /Pt reports recent travel (on plane) pressure, itching, severe pain on L ear /Took Advil 400 mg at 10 AM )    HPI     Concern -   Onset: 10 days   Description: left ear pressure and some pain, had a flight to [place] on June 19  Intensity: moderate  Progression of Symptoms:  same  Accompanying Signs & Symptoms: No fever, chills, sore throat or other relevant systemic symptoms  Therapies tried and outcome: None      Review of Systems  Constitutional, HEENT, cardiovascular, pulmonary, gi and gu systems are negative, except as otherwise noted.      Objective    /66 (BP Location: Left arm, Patient Position: Sitting, Cuff Size: Adult Large)   Pulse 66   Temp 98.2  F (36.8  C) (Tympanic)   Resp 16   Wt 82.8 kg (182 lb 9.6 oz)   SpO2 98%   BMI 25.47 kg/m    Body mass index is 25.47 kg/m .  Physical Exam   GENERAL: alert and no distress  EYES: Eyes grossly normal to inspection, PERRL and conjunctivae and sclerae normal  HENT: normal cephalic/atraumatic, right ear: normal: no effusions, no erythema, normal landmarks, left ear: occluded with wax, nose and mouth without ulcers or lesions, oropharynx clear, and oral mucous membranes moist  NECK: no adenopathy, no asymmetry, masses, or scars  RESP: lungs clear to auscultation - no rales, rhonchi or wheezes  CV: regular rates and rhythm, normal S1 S2, no S3 or S4, and no murmur, click or rub  MS: no gross musculoskeletal defects noted, no edema  NEURO: Normal strength and tone, mentation intact  and speech normal  PSYCH: mentation appears normal, affect normal/bright      Signed Electronically by: Lul Zuniga MD

## 2024-07-07 ENCOUNTER — OFFICE VISIT (OUTPATIENT)
Dept: URGENT CARE | Facility: URGENT CARE | Age: 64
End: 2024-07-07
Payer: COMMERCIAL

## 2024-07-07 ENCOUNTER — HEALTH MAINTENANCE LETTER (OUTPATIENT)
Age: 64
End: 2024-07-07

## 2024-07-07 VITALS
WEIGHT: 183.4 LBS | RESPIRATION RATE: 20 BRPM | OXYGEN SATURATION: 98 % | TEMPERATURE: 97.9 F | BODY MASS INDEX: 25.58 KG/M2 | HEART RATE: 62 BPM | DIASTOLIC BLOOD PRESSURE: 62 MMHG | SYSTOLIC BLOOD PRESSURE: 124 MMHG

## 2024-07-07 DIAGNOSIS — H61.22 IMPACTED CERUMEN OF LEFT EAR: Primary | ICD-10-CM

## 2024-07-07 PROCEDURE — 69209 REMOVE IMPACTED EAR WAX UNI: CPT | Mod: LT | Performed by: PHYSICIAN ASSISTANT

## 2024-07-07 NOTE — PATIENT INSTRUCTIONS
(H61.22) Impacted cerumen of left ear  (primary encounter diagnosis)  Comment:   Plan: AK REMOVAL IMPACTED CERUMEN IRRIGATION/LVG         UNILAT

## 2024-07-07 NOTE — PROGRESS NOTES
Patient presents with:  Ear Problem: Was seen in  on 7/4/24 for impacted ear wax left ear.  Instructed to use Debrox for a few days and return for follow up.        (H61.22) Impacted cerumen of left ear  (primary encounter diagnosis)  Comment:   Plan: MT REMOVAL IMPACTED CERUMEN IRRIGATION/LVG         UNILAT  At the end of the encounter, I discussed results, diagnosis, medications. Discussed red flags for immediate return to clinic/ER, as well as indications for follow up if no improvement. Patient understood and agreed to plan. Patient was stable for discharge     If not improving or if condition worsens, follow up with your Primary Care Provider      SUBJECTIVE:   Abhishek Gomez is a 64 year old male who presents today cerumen in his left ear.  He has been using the Debrox as prescribed.  Denies any pain.      Patient Active Problem List   Diagnosis    Hyperlipidemia LDL goal <70    Essential hypertension, benign    Type 2 diabetes mellitus without complication, without long-term current use of insulin (H)    Unstable angina (H)    NSTEMI (non-ST elevated myocardial infarction) (H)    Coronary artery disease of native artery of native heart with stable angina pectoris (H24)    Abnormal stress test    Stable angina (H24)    Status post coronary angiogram    Stage 3 chronic kidney disease, unspecified whether stage 3a or 3b CKD (H)         Past Medical History:   Diagnosis Date    Abnormal stress test 07/14/2021    Coronary artery disease involving native coronary artery of native heart without angina pectoris 07/14/2021    Essential hypertension, benign 05/18/2016    Hyperlipidemia LDL goal <70 03/16/2015    NSTEMI (non-ST elevated myocardial infarction) (H) 05/09/2019    s/p MICHELLE to pLAD    Status post coronary angiogram 07/26/2021    Complex Multivessel CAD. CABG cosult    Type 2 diabetes mellitus without complication, without long-term current use of insulin (H) 07/06/2017    Unstable angina (H)  05/09/2019         Current Outpatient Medications   Medication Sig Dispense Refill    Multiple Vitamins-Iron (DAILY-OSWALDO/IRON/BETA-CAROTENE) TABS TAKE 1 TABLET BY MOUTH DAILY. (Patient not taking: Reported on 10/19/2020) 30 tablet 7     Social History     Tobacco Use    Smoking status: Never Smoker    Smokeless tobacco: Never Used   Substance Use Topics    Alcohol use: Not on file     Family History   Problem Relation Age of Onset    Diabetes Mother     Diabetes Father          ROS:    10 point ROS of systems including Constitutional, Eyes, Respiratory, Cardiovascular, Gastroenterology, Genitourinary, Integumentary, Muscularskeletal, Psychiatric ,neurological were all negative except for pertinent positives noted in my HPI       OBJECTIVE:  /62   Pulse 62   Temp 97.9  F (36.6  C) (Tympanic)   Resp 20   Wt 83.2 kg (183 lb 6.4 oz)   SpO2 98%   BMI 25.58 kg/m    Physical Exam:  GENERAL APPEARANCE: healthy, alert and no distress  Left ear: Left ear canal is filled with soft brown cerumen.  After lavage the exam is as follows: Ear canal and TM are clear without swelling or erythema.  Right TM and canal are clear.  SKIN: no suspicious lesions or rashes

## 2024-07-18 ENCOUNTER — MYC REFILL (OUTPATIENT)
Dept: CARDIOLOGY | Facility: CLINIC | Age: 64
End: 2024-07-18
Payer: COMMERCIAL

## 2024-07-18 DIAGNOSIS — I25.10 CORONARY ARTERY DISEASE INVOLVING NATIVE CORONARY ARTERY OF NATIVE HEART WITHOUT ANGINA PECTORIS: ICD-10-CM

## 2024-07-18 DIAGNOSIS — R94.39 ABNORMAL STRESS TEST: ICD-10-CM

## 2024-09-16 ENCOUNTER — DOCUMENTATION ONLY (OUTPATIENT)
Dept: CARDIOLOGY | Facility: CLINIC | Age: 64
End: 2024-09-16

## 2024-09-16 DIAGNOSIS — I25.10 CORONARY ARTERY DISEASE INVOLVING NATIVE CORONARY ARTERY OF NATIVE HEART WITHOUT ANGINA PECTORIS: Primary | ICD-10-CM

## 2024-09-16 DIAGNOSIS — Z00.6 EXAMINATION OF PARTICIPANT OR CONTROL IN CLINICAL RESEARCH: ICD-10-CM

## 2024-09-16 DIAGNOSIS — E11.9 TYPE 2 DIABETES MELLITUS WITHOUT COMPLICATION, WITHOUT LONG-TERM CURRENT USE OF INSULIN (H): ICD-10-CM

## 2024-09-16 PROCEDURE — 99207 PR NO CHARGE-RESEARCH SERVICE: CPT | Performed by: INTERNAL MEDICINE

## 2024-09-16 NOTE — PROGRESS NOTES
SURPASS-CVOT Study [Effect of tirzepatide versus Dulaglutide on Major Cardiovascular Events in Patients with Type 2Diabetes]    Visit 23  Medical Record Review for Off Study IP Follow up.     Subject chart reviewed for adverse events, endpoints and medication changes. If present, noted below.  Patient was seen on 07/04/2024 for c/o Pressure in L ear, dx with ceruminosis, rx Debrox gtts and RTC in 2-3 days.   07/07/2024 patient returned for ceruminosis and ear was irrigated/flushed. Ceruminosis resolved.     SURPASS-CVOT Study [Effect of tirzepatide versus Dulaglutide on Major Cardiovascular Events in Patients with Type 2 Diabetes]    Diagnosis/event description (diagnosis preferred):  Ceruminosis L Ear    Start date: 07/04/2024   Date resolved: 07/07/2024     Date site aware of event: 09/16/2024     Intensity: ([] mild /[x]  moderate / [] severe)     Study Medication adjustment:  [] Yes   [] No NA    Outcome: ([x] resolved [with or without sequelae] /[] recovering / [] not recovered / [] death / [] unknown)      Is AE related to study device?   [] Yes   [x] No    Was treatment given for this event:  [x] Yes   [] No   If yes, provide details Debrox gtts X 3 days and irrigation/flush of L ear    Serious adverse event?    Yes   [x] No    Special interest event?  [] Yes   [x] No    Endpoint? [] Yes   [x] No     Fatal event?  [] Yes   [x] No      Dr. Rodriguez: Reasonable possibility that AE is related to study treatment    [] Yes   [x] No    Stephenie Diaz RN       Adherence/lifestyle reinforcement done     Patient has been off study IP since 02/16/2022 with last dose on 02/09/2022. He has no plans to restart study IP due to side effects.     Will review medical records in 3-6 months for off study Follow-up.    Stephenie Diaz RN

## 2024-09-22 DIAGNOSIS — E11.9 TYPE 2 DIABETES MELLITUS WITHOUT COMPLICATION, WITHOUT LONG-TERM CURRENT USE OF INSULIN (H): ICD-10-CM

## 2024-09-22 DIAGNOSIS — I10 ESSENTIAL HYPERTENSION, BENIGN: ICD-10-CM

## 2024-09-22 DIAGNOSIS — I25.118 CORONARY ARTERY DISEASE OF NATIVE ARTERY OF NATIVE HEART WITH STABLE ANGINA PECTORIS (H): ICD-10-CM

## 2024-09-23 RX ORDER — GLIMEPIRIDE 2 MG/1
TABLET ORAL
Qty: 90 TABLET | Refills: 0 | Status: SHIPPED | OUTPATIENT
Start: 2024-09-23

## 2024-09-23 RX ORDER — METOPROLOL TARTRATE 25 MG/1
25 TABLET, FILM COATED ORAL 2 TIMES DAILY
Qty: 180 TABLET | Refills: 2 | Status: SHIPPED | OUTPATIENT
Start: 2024-09-23 | End: 2024-10-07

## 2024-10-02 ENCOUNTER — OFFICE VISIT (OUTPATIENT)
Dept: INTERNAL MEDICINE | Facility: CLINIC | Age: 64
End: 2024-10-02
Payer: COMMERCIAL

## 2024-10-02 VITALS
TEMPERATURE: 97.5 F | BODY MASS INDEX: 25.98 KG/M2 | HEART RATE: 72 BPM | WEIGHT: 185.6 LBS | DIASTOLIC BLOOD PRESSURE: 68 MMHG | HEIGHT: 71 IN | OXYGEN SATURATION: 99 % | SYSTOLIC BLOOD PRESSURE: 130 MMHG

## 2024-10-02 DIAGNOSIS — N18.30 STAGE 3 CHRONIC KIDNEY DISEASE, UNSPECIFIED WHETHER STAGE 3A OR 3B CKD (H): Primary | ICD-10-CM

## 2024-10-02 DIAGNOSIS — Z12.11 COLON CANCER SCREENING: ICD-10-CM

## 2024-10-02 DIAGNOSIS — I25.118 CORONARY ARTERY DISEASE OF NATIVE ARTERY OF NATIVE HEART WITH STABLE ANGINA PECTORIS (H): ICD-10-CM

## 2024-10-02 DIAGNOSIS — E11.9 TYPE 2 DIABETES MELLITUS WITHOUT COMPLICATION, WITHOUT LONG-TERM CURRENT USE OF INSULIN (H): ICD-10-CM

## 2024-10-02 DIAGNOSIS — I10 ESSENTIAL HYPERTENSION, BENIGN: ICD-10-CM

## 2024-10-02 LAB
ANION GAP SERPL CALCULATED.3IONS-SCNC: 11 MMOL/L (ref 7–15)
BUN SERPL-MCNC: 20.3 MG/DL (ref 8–23)
CALCIUM SERPL-MCNC: 9 MG/DL (ref 8.8–10.4)
CHLORIDE SERPL-SCNC: 102 MMOL/L (ref 98–107)
CREAT SERPL-MCNC: 1.72 MG/DL (ref 0.67–1.17)
EGFRCR SERPLBLD CKD-EPI 2021: 44 ML/MIN/1.73M2
EST. AVERAGE GLUCOSE BLD GHB EST-MCNC: 212 MG/DL
GLUCOSE SERPL-MCNC: 158 MG/DL (ref 70–99)
HBA1C MFR BLD: 9 % (ref 0–5.6)
HCO3 SERPL-SCNC: 22 MMOL/L (ref 22–29)
POTASSIUM SERPL-SCNC: 4.9 MMOL/L (ref 3.4–5.3)
SODIUM SERPL-SCNC: 135 MMOL/L (ref 135–145)

## 2024-10-02 PROCEDURE — 80048 BASIC METABOLIC PNL TOTAL CA: CPT | Performed by: INTERNAL MEDICINE

## 2024-10-02 PROCEDURE — 83036 HEMOGLOBIN GLYCOSYLATED A1C: CPT | Performed by: INTERNAL MEDICINE

## 2024-10-02 PROCEDURE — 36415 COLL VENOUS BLD VENIPUNCTURE: CPT | Performed by: INTERNAL MEDICINE

## 2024-10-02 PROCEDURE — 90471 IMMUNIZATION ADMIN: CPT | Performed by: INTERNAL MEDICINE

## 2024-10-02 PROCEDURE — 99214 OFFICE O/P EST MOD 30 MIN: CPT | Mod: 25 | Performed by: INTERNAL MEDICINE

## 2024-10-02 PROCEDURE — 90673 RIV3 VACCINE NO PRESERV IM: CPT | Performed by: INTERNAL MEDICINE

## 2024-10-02 NOTE — PROGRESS NOTES
Assessment & Plan     Stage 3 chronic kidney disease, unspecified whether stage 3a or 3b CKD (H)  Creatinine   Date Value Ref Range Status   02/06/2024 1.30 (H) 0.67 - 1.17 mg/dL Final   03/02/2021 1.09 0.66 - 1.25 mg/dL Final     Discussed with patient.  Encouraged hydration.  Consider addition of Jardiance pending A1c level.    Type 2 diabetes mellitus without complication, without long-term current use of insulin (H)  Patient reports blood sugars much better.  Recheck A1c and continue with meds as ordered  - Hemoglobin A1c; Future    Essential hypertension, benign  Stable on therapy continue with current medical management    Coronary artery disease of native artery of native heart with stable angina pectoris (H)  Continue with current risk reduction therapy and follow-up with cardiology as directed.    Colon cancer screening  Placed referral  - Colonoscopy Screening  Referral; Future      See Patient Instructions    Glen Jurado is a 64 year old, presenting for the following health issues:  Diabetes    History of Present Illness       Diabetes:   He presents for follow up of diabetes.  He is checking home blood glucose one time daily.   He checks blood glucose before meals.  Blood glucose is sometimes over 200 and sometimes under 70. He is aware of hypoglycemia symptoms including shakiness and weakness.   He is concerned about low blood sugar, several less than 70 in the past few weeks.   He is having numbness in feet.            He eats 0-1 servings of fruits and vegetables daily.He consumes 2 sweetened beverage(s) daily.He exercises with enough effort to increase his heart rate 9 or less minutes per day.  He exercises with enough effort to increase his heart rate 3 or less days per week. He is missing 1 dose(s) of medications per week.     Other concerns:  1. Patient states [facility] did not receive colonoscopy referral   2. Patient states he is only using glimepiride as needed after eating large  "meals due to feeling like his blood sugar was too low with use     Review of Systems  CONSTITUTIONAL: NEGATIVE for fever, chills, change in weight  EYES: NEGATIVE for vision changes or irritation  ENT/MOUTH: NEGATIVE for ear, mouth and throat problems  RESP: NEGATIVE for significant cough or SOB  CV: NEGATIVE for chest pain, palpitations or peripheral edema  GI: NEGATIVE for nausea, abdominal pain, heartburn, or change in bowel habits  : NEGATIVE for frequency, dysuria, or hematuria  MUSCULOSKELETAL: NEGATIVE for significant arthralgias or myalgia  NEURO: NEGATIVE for weakness, dizziness or paresthesias  ENDOCRINE: NEGATIVE for temperature intolerance, skin/hair changes  HEME: NEGATIVE for bleeding problems  PSYCHIATRIC: NEGATIVE for changes in mood or affect      Objective    /68   Pulse 72   Temp 97.5  F (36.4  C) (Temporal)   Ht 1.803 m (5' 11\")   Wt 84.2 kg (185 lb 9.6 oz)   SpO2 99%   BMI 25.89 kg/m    There is no height or weight on file to calculate BMI.  Physical Exam   GENERAL: alert and no distress  EYES: Eyes grossly normal to inspection, PERRL and conjunctivae and sclerae normal  HENT: ear canals and TM's normal, nose and mouth without ulcers or lesions  RESP: lungs clear to auscultation - no rales, rhonchi or wheezes  CV: regular rate and rhythm, normal S1 S2, no S3 or S4, no murmur, click or rub, no peripheral edema  ABDOMEN: soft, nontender, no hepatosplenomegaly, no masses and bowel sounds normal  NEURO: No focal changes  PSYCH: mentation appears normal, affect normal/bright    Lab Results   Component Value Date    A1C 9.6 05/20/2024    A1C 8.3 03/08/2023    A1C 6.6 10/18/2022    A1C 6.7 04/07/2022    A1C 7.1 07/26/2021    A1C 7.6 03/02/2021    A1C 7.7 12/26/2019    A1C 7.4 10/31/2018    A1C 8.7 06/29/2017    A1C 9.1 05/18/2016              Signed Electronically by: Michael Méndez MD    "

## 2024-10-07 ENCOUNTER — OFFICE VISIT (OUTPATIENT)
Dept: CARDIOLOGY | Facility: CLINIC | Age: 64
End: 2024-10-07
Payer: COMMERCIAL

## 2024-10-07 VITALS
SYSTOLIC BLOOD PRESSURE: 114 MMHG | HEART RATE: 60 BPM | WEIGHT: 182 LBS | HEIGHT: 71 IN | DIASTOLIC BLOOD PRESSURE: 65 MMHG | BODY MASS INDEX: 25.48 KG/M2

## 2024-10-07 DIAGNOSIS — I25.118 CORONARY ARTERY DISEASE OF NATIVE ARTERY OF NATIVE HEART WITH STABLE ANGINA PECTORIS (H): ICD-10-CM

## 2024-10-07 DIAGNOSIS — I10 ESSENTIAL HYPERTENSION, BENIGN: ICD-10-CM

## 2024-10-07 DIAGNOSIS — E11.9 TYPE 2 DIABETES MELLITUS WITHOUT COMPLICATION, WITHOUT LONG-TERM CURRENT USE OF INSULIN (H): ICD-10-CM

## 2024-10-07 DIAGNOSIS — I25.10 CORONARY ARTERY DISEASE INVOLVING NATIVE CORONARY ARTERY OF NATIVE HEART WITHOUT ANGINA PECTORIS: ICD-10-CM

## 2024-10-07 DIAGNOSIS — I20.89 STABLE ANGINA (H): ICD-10-CM

## 2024-10-07 PROCEDURE — 99214 OFFICE O/P EST MOD 30 MIN: CPT | Performed by: PHYSICIAN ASSISTANT

## 2024-10-07 RX ORDER — ISOSORBIDE MONONITRATE 120 MG/1
120 TABLET, EXTENDED RELEASE ORAL DAILY
Qty: 90 TABLET | Refills: 3 | Status: SHIPPED | OUTPATIENT
Start: 2024-10-07

## 2024-10-07 RX ORDER — LISINOPRIL 20 MG/1
20 TABLET ORAL DAILY
Qty: 90 TABLET | Refills: 3 | Status: SHIPPED | OUTPATIENT
Start: 2024-10-07

## 2024-10-07 RX ORDER — ROSUVASTATIN CALCIUM 40 MG/1
40 TABLET, COATED ORAL DAILY
Qty: 90 TABLET | Refills: 3 | Status: SHIPPED | OUTPATIENT
Start: 2024-10-07

## 2024-10-07 RX ORDER — METOPROLOL TARTRATE 25 MG/1
25 TABLET, FILM COATED ORAL 2 TIMES DAILY
Qty: 180 TABLET | Refills: 3 | Status: SHIPPED | OUTPATIENT
Start: 2024-10-07

## 2024-10-07 RX ORDER — NITROGLYCERIN 0.4 MG/1
TABLET SUBLINGUAL
Qty: 25 TABLET | Refills: 1 | Status: SHIPPED | OUTPATIENT
Start: 2024-10-07

## 2024-10-07 NOTE — Clinical Note
Your patient is doing well. A1c not well controlled, LDL not at goal, so will work on that. Otherwise no concerns.

## 2024-10-07 NOTE — LETTER
10/7/2024    Michael Méndez MD  [address] [facility] [address]    RE: Abhishek Gomez       Dear Colleague,     I had the pleasure of seeing Abhishek Gomez in the [facility].          HEART CARE FOLLOW UP    Primary Care: Michael Méndez MD  Primary Cardiologist: Dr Sarah      Assessment/Recommendations   Coronary Artery Disease, post prior complex proximal LAD/1st diagonal stenting. Known chronically occluded first diagonal and moderate nonflow limiting mid RCA disease. doing well without symptoms concerning for angina or heart failure. Secondary prevention is of the utmost importance. He is doing well but minimally active, encouraged to increase his activity. Most recent A1C was 9.   Continue aspirin 81 mg daily  Continue ticagrelor 90 mg every 12 hours  Continue atorvastatin 20 mg daily   Continue Imdur 120 mg daily  Continue metoprolol tartrate 25 mg twice daily  Diabetes control is of the utmost importance. Encourage follow up with endocrinology   Patient has nitroglycerin and undersatnds appropraite use and when to seek emergent care  Reviewed healthy diet and exercise; aim for 150 minutes of moderate intensity exercise weekly   Alcohol only in moderation   Hypertension, blood pressure well controlled on current regimen. Reports medication compliance.   Continue lisinopril 20 mg daily  Continue Imdur 120 mg daily  Continue metoprolol tartrate 25 mg twice daily  Dyslipidemia, Most recent  and LDL 76. Patient is maintained on therapy.  Discontinue atorvastatin 20 mg daily; start rosuvastatin 40 mg daily   We discussed the role diet, exercise and weight management can play in managing dyslipidemia.     Return to care in 6 months with me.      History of Present Illness/Subjective    Abhishek Gomez is a 64 year old male with past medical history significant for:  Coronary artery disease  Complex multivessel disease, originally offered surgery  but declined  Status post MICHELLE to the LAD and later complex  revascularization of the LAD and first diagonal  Moderate RCA stenosis  2022 recurrent symptoms; patent LAD but occluded first diagonal with left to left collaterals, RCA with stable disease.  Medical therapy recommended  Angina managed medically  Type 2 diabetes  Hypertension  Hyperlipidemia  CKD, Stage 3     The patient was last seen in in clinic in February 2024.  At that time the patient had lost weight, was not exercising regularly but was active around his house and not having any symptoms of chest pain or shortness of breath.  As he was not having angina at the time his medical therapy was continued.    Today the patient tells me he's doing well. He is retired for 2 years. He doesn't do intentional exercise but can walk 10-15 minutes a few times per week and light yard work. Patient denies chest pain, pressure and tightness. No shortness of breath or dyspnea on exertion. No dizziness, lightheadedness, pre-syncope or syncope. No racing heart. Has felt his heart go irregular for a few beats. Sleeping well without orthopnea or PND. No leg swelling or cramping. No claudication symptoms. No blood in stool or urine. No fevers, chills or cough.          Data Review Today:     TTE 2/2024:  1. Normal biventricular size and function. Left ventricular ejection fraction  of 60-65%.  2. No segmental wall motion abnormalities noted.  3. No hemodynamically significant valvular disease.  Compared to the previous echocardiogram, there are no significant changes.     Nuclear medicine Lexiscan stress test March 2023:     The nuclear stress test is negative for inducible myocardial ischemia or infarction.     Left ventricular function is normal.     The left ventricular ejection fraction at stress is 72%.     A prior study was conducted on 10/22/2019.  This study has no change when compared with the prior study.      I have reviewed and updated the patient's past  "medical history, allergy list and medication list.          Physical Examination   Vitals: /65   Pulse 60   Ht 1.803 m (5' 11\")   Wt 82.6 kg (182 lb)   BMI 25.38 kg/m      BMI= Body mass index is 25.38 kg/m .    Wt Readings from Last 3 Encounters:   10/07/24 82.6 kg (182 lb)   10/02/24 84.2 kg (185 lb 9.6 oz)   07/07/24 83.2 kg (183 lb 6.4 oz)       General :   Alert and oriented, in no acute distress.    HEENT:  Normocephalic and atraumatic. .    Neck: No JVP, carotid bruit or obvious thyromegaly.   Lungs:   Respirations unlabored. Clear bilaterally with no rales, rhonchi, or wheezes.     Cardiovascular:   Rhythm is regular. S1 and S2 are normal. No significant murmur is present. Lower extremities demonstrate no significant edema.        Skin: Skin is warm, dry, and otherwise intact.   Neurologic: Gait not assessed. Mood and affect appropriate.           Medical History  Surgical History Family History Social History   Past Medical History:   Diagnosis Date     Abnormal stress test 07/14/2021     Coronary artery disease involving native coronary artery of native heart without angina pectoris 07/14/2021     Essential hypertension, benign 05/18/2016     Hyperlipidemia LDL goal <70 03/16/2015     NSTEMI (non-ST elevated myocardial infarction) (H) 05/09/2019    s/p MICHELLE to pLAD     Status post coronary angiogram 07/26/2021    Complex Multivessel CAD. CABG cosult     Type 2 diabetes mellitus without complication, without long-term current use of insulin (H) 07/06/2017     Unstable angina (H) 05/09/2019    Past Surgical History:   Procedure Laterality Date     CV CORONARY ANGIOGRAM N/A 7/26/2021    Procedure: Coronary Angiogram;  Surgeon: Sylvester Westbrook MD;  Location: [facility] CARDIAC CATH LAB     CV CORONARY ANGIOGRAM N/A 3/1/2022    Procedure: Coronary Angiogram;  Surgeon: Sanchez Sarah MD;  Location: [facility] CARDIAC CATH LAB     CV HEART CATHETERIZATION WITH POSSIBLE INTERVENTION N/A 5/9/2019    " Procedure: Coronary Angiogram;  Surgeon: Jose Enrique Stanley MD;  Location:  HEART CARDIAC CATH LAB     CV HEART CATHETERIZATION WITH POSSIBLE INTERVENTION N/A 8/17/2021    Procedure: Coronary Angiogram;  Surgeon: Sanchez Sarah MD;  Location:  HEART CARDIAC CATH LAB     CV INSTANTANEOUS WAVE-FREE RATIO N/A 8/17/2021    Procedure: Instantaneous Wave-Free Ratio;  Surgeon: Sanchez Sarah MD;  Location: [facility] CARDIAC CATH LAB     CV INTRAVASULAR ULTRASOUND N/A 8/17/2021    Procedure: Intravascular Ultrasound;  Surgeon: Sanchez Sarah MD;  Location:  HEART CARDIAC CATH LAB     CV LEFT HEART CATH N/A 7/26/2021    Procedure: Left Heart Cath;  Surgeon: Sylvester Westbrook MD;  Location:  HEART CARDIAC CATH LAB     CV PCI ANGIOPLASTY N/A 5/9/2019    Procedure: PCI Angioplasty;  Surgeon: Jose Enrique Stanley MD;  Location:  HEART CARDIAC CATH LAB     CV PCI CHRONIC TOTAL OCCLUSION N/A 8/17/2021    Procedure: Percutaneous Coronary Intervention of Chronic Total Occlusion;  Surgeon: Sanchez Sarah MD;  Location:  HEART CARDIAC CATH LAB    Family History   Problem Relation Age of Onset     Diabetes Paternal Grandmother      Diabetes Nephew     Social History     Socioeconomic History     Marital status:      Spouse name: Not on file     Number of children: Not on file     Years of education: Not on file     Highest education level: Not on file   Occupational History     Not on file   Tobacco Use     Smoking status: Never     Smokeless tobacco: Never   Vaping Use     Vaping status: Never Used   Substance and Sexual Activity     Alcohol use: No     Drug use: No     Sexual activity: Yes     Partners: Female   Other Topics Concern     Parent/sibling w/ CABG, MI or angioplasty before 65F 55M? Not Asked   Social History Narrative     Not on file     Social Determinants of Health     Financial Resource Strain: Not on file   Food Insecurity: Not on file   Transportation Needs: Not on file    Physical Activity: Not on file   Stress: Not on file   Social Connections: Not on file   Interpersonal Safety: Not on file   Housing Stability: Not on file          Medications  Allergies   Scheduled Meds:  Current Outpatient Medications   Medication Sig Dispense Refill     aspirin 81 MG EC tablet Take 1 tablet (81 mg) by mouth daily (Patient taking differently: Take 81 mg by mouth daily. OTC) 90 tablet 3     blood glucose (NO BRAND SPECIFIED) test strip Use to test blood sugar 1 times daily or as directed. To accompany: Blood Glucose Monitor Brands: per insurance. 100 strip 6     blood glucose monitoring (NO BRAND SPECIFIED) meter device kit Use to test blood sugar 1 times daily or as directed. Preferred blood glucose meter OR supplies to accompany: Blood Glucose Monitor Brands: per insurance. 1 kit 0     glimepiride (AMARYL) 2 MG tablet Take 1 tablet (2 mg) by mouth every morning before breakfast (Patient taking differently: Take 2 mg by mouth as needed.) 90 tablet 0     isosorbide mononitrate CR (IMDUR) 120 MG 24 HR ER tablet Take 1 tablet (120 mg) by mouth daily. 90 tablet 3     lisinopril (ZESTRIL) 20 MG tablet Take 1 tablet (20 mg) by mouth daily. 90 tablet 3     metFORMIN (GLUCOPHAGE) 1000 MG tablet Take 1 tablet (1,000 mg) by mouth daily with breakfast 90 tablet 0     metoprolol tartrate (LOPRESSOR) 25 MG tablet Take 1 tablet (25 mg) by mouth 2 times daily. 180 tablet 3     nitroGLYcerin (NITROSTAT) 0.4 MG sublingual tablet For chest pain place 1 tablet under the tongue every 5 minutes for up to 3 doses. If symptoms persist 5 minutes after 2nd dose call 911. 25 tablet 1     rosuvastatin (CRESTOR) 40 MG tablet Take 1 tablet (40 mg) by mouth daily. 90 tablet 3     thin (NO BRAND SPECIFIED) lancets Use with lanceting device. To accompany: Blood Glucose Monitor Brands: per insurance. 100 each 6     ticagrelor (BRILINTA) 90 MG tablet Take 1 tablet (90 mg) by mouth every 12 hours 180 tablet 2    No Known  Allergies      Lab Results    Chemistry/lipid CBC Cardiac Enzymes/BNP/TSH/INR   Lab Results   Component Value Date    CHOL 137 02/06/2024    HDL 34 (L) 02/06/2024    TRIG 134 02/06/2024    BUN 20.3 10/02/2024     10/02/2024    CO2 22 10/02/2024    Lab Results   Component Value Date    WBC 9.2 05/20/2024    HGB 10.9 (L) 05/20/2024    HCT 33.5 (L) 05/20/2024    MCV 79 05/20/2024     05/20/2024    @RESUFAST(BMP,CBC,BNP,TSH,  INR)@        This note has been dictated using voice recognition software. Any grammatical, typographical, or context distortions are unintentional and inherent to the software.    Cheryl Wiseman PA-C, RD  General Cardiology           Thank you for allowing me to participate in the care of your patient.      Sincerely,     Cheryl Wiseman PA-C     [facility]  cc:   Michael Méndez MD  [address] [place] [id number]

## 2024-10-07 NOTE — PATIENT INSTRUCTIONS
It was great to see you today! Your Cardiology Team includes myself and Dr. Sarah.     Recommendations from today:  Stop atorvastatin  Start rosuvastatin 40 mg daily  Continue other medications the same   Follow up in 6 months with me.   Consider seeing a diabetes specialist/endocrinologist given your trouble with low blood pressures on glimepiride     For scheduling questions, our 24 hours scheduling line is available at [phone].    To reach our nursing team, please call [phone]. Our nursing team is available 8-4:30 Monday-Friday. If you have a medical emergency, please call [phone].

## 2024-10-07 NOTE — Clinical Note
Hi, Dr. Méndez, I saw Adrien in cardiology clinic today. He's doing mostly well.I noticed his A1C is trending up. He said he only takes glimepiride if BS is high, otherwise it goes too low.  Wondering if he would benefit from seeing endo? Want to optimize his ASCVD risk!

## 2024-10-07 NOTE — PROGRESS NOTES
HEART CARE FOLLOW UP    Primary Care: Michael Méndez MD  Primary Cardiologist: Dr Sarah      Assessment/Recommendations   Coronary Artery Disease, post prior complex proximal LAD/1st diagonal stenting. Known chronically occluded first diagonal and moderate nonflow limiting mid RCA disease. doing well without symptoms concerning for angina or heart failure. Secondary prevention is of the utmost importance. He is doing well but minimally active, encouraged to increase his activity. Most recent A1C was 9.   Continue aspirin 81 mg daily  Continue ticagrelor 90 mg every 12 hours  Continue atorvastatin 20 mg daily   Continue Imdur 120 mg daily  Continue metoprolol tartrate 25 mg twice daily  Diabetes control is of the utmost importance. Encourage follow up with endocrinology   Patient has nitroglycerin and undersatnds appropraite use and when to seek emergent care  Reviewed healthy diet and exercise; aim for 150 minutes of moderate intensity exercise weekly   Alcohol only in moderation   Hypertension, blood pressure well controlled on current regimen. Reports medication compliance.   Continue lisinopril 20 mg daily  Continue Imdur 120 mg daily  Continue metoprolol tartrate 25 mg twice daily  Dyslipidemia, Most recent  and LDL 76. Patient is maintained on therapy.  Discontinue atorvastatin 20 mg daily; start rosuvastatin 40 mg daily   We discussed the role diet, exercise and weight management can play in managing dyslipidemia.     Return to care in 6 months with me.      History of Present Illness/Subjective    Abhishek Gomez is a 64 year old male with past medical history significant for:  Coronary artery disease  Complex multivessel disease, originally offered surgery but declined  Status post MICHELLE to the LAD and later complex  revascularization of the LAD and first diagonal  Moderate RCA stenosis  2022 recurrent symptoms; patent LAD but occluded first diagonal with left to left collaterals,  "RCA with stable disease.  Medical therapy recommended  Angina managed medically  Type 2 diabetes  Hypertension  Hyperlipidemia  CKD, Stage 3     The patient was last seen in in clinic in February 2024.  At that time the patient had lost weight, was not exercising regularly but was active around his house and not having any symptoms of chest pain or shortness of breath.  As he was not having angina at the time his medical therapy was continued.    Today the patient tells me he's doing well. He is retired for 2 years. He doesn't do intentional exercise but can walk 10-15 minutes a few times per week and light yard work. Patient denies chest pain, pressure and tightness. No shortness of breath or dyspnea on exertion. No dizziness, lightheadedness, pre-syncope or syncope. No racing heart. Has felt his heart go irregular for a few beats. Sleeping well without orthopnea or PND. No leg swelling or cramping. No claudication symptoms. No blood in stool or urine. No fevers, chills or cough.          Data Review Today:     TTE 2/2024:  1. Normal biventricular size and function. Left ventricular ejection fraction  of 60-65%.  2. No segmental wall motion abnormalities noted.  3. No hemodynamically significant valvular disease.  Compared to the previous echocardiogram, there are no significant changes.     Nuclear medicine Lexiscan stress test March 2023:    The nuclear stress test is negative for inducible myocardial ischemia or infarction.    Left ventricular function is normal.    The left ventricular ejection fraction at stress is 72%.    A prior study was conducted on 10/22/2019.  This study has no change when compared with the prior study.      I have reviewed and updated the patient's past medical history, allergy list and medication list.          Physical Examination   Vitals: /65   Pulse 60   Ht 1.803 m (5' 11\")   Wt 82.6 kg (182 lb)   BMI 25.38 kg/m      BMI= Body mass index is 25.38 kg/m .    Wt Readings " from Last 3 Encounters:   10/07/24 82.6 kg (182 lb)   10/02/24 84.2 kg (185 lb 9.6 oz)   07/07/24 83.2 kg (183 lb 6.4 oz)       General :   Alert and oriented, in no acute distress.    HEENT:  Normocephalic and atraumatic. .    Neck: No JVP, carotid bruit or obvious thyromegaly.   Lungs:   Respirations unlabored. Clear bilaterally with no rales, rhonchi, or wheezes.     Cardiovascular:   Rhythm is regular. S1 and S2 are normal. No significant murmur is present. Lower extremities demonstrate no significant edema.        Skin: Skin is warm, dry, and otherwise intact.   Neurologic: Gait not assessed. Mood and affect appropriate.           Medical History  Surgical History Family History Social History   Past Medical History:   Diagnosis Date    Abnormal stress test 07/14/2021    Coronary artery disease involving native coronary artery of native heart without angina pectoris 07/14/2021    Essential hypertension, benign 05/18/2016    Hyperlipidemia LDL goal <70 03/16/2015    NSTEMI (non-ST elevated myocardial infarction) (H) 05/09/2019    s/p MICHELLE to pLAD    Status post coronary angiogram 07/26/2021    Complex Multivessel CAD. CABG cosult    Type 2 diabetes mellitus without complication, without long-term current use of insulin (H) 07/06/2017    Unstable angina (H) 05/09/2019    Past Surgical History:   Procedure Laterality Date    CV CORONARY ANGIOGRAM N/A 7/26/2021    Procedure: Coronary Angiogram;  Surgeon: Sylvester Westbrook MD;  Location: [facility] CARDIAC CATH LAB    CV CORONARY ANGIOGRAM N/A 3/1/2022    Procedure: Coronary Angiogram;  Surgeon: Sanchez Sarah MD;  Location: [facility] CARDIAC CATH LAB    CV HEART CATHETERIZATION WITH POSSIBLE INTERVENTION N/A 5/9/2019    Procedure: Coronary Angiogram;  Surgeon: Jose Enrique Stanley MD;  Location: [facility] CARDIAC CATH LAB    CV HEART CATHETERIZATION WITH POSSIBLE INTERVENTION N/A 8/17/2021    Procedure: Coronary Angiogram;  Surgeon: Sanchez Sarah MD;   Location:  HEART CARDIAC CATH LAB    CV INSTANTANEOUS WAVE-FREE RATIO N/A 8/17/2021    Procedure: Instantaneous Wave-Free Ratio;  Surgeon: Sanchez Sarah MD;  Location:  HEART CARDIAC CATH LAB    CV INTRAVASULAR ULTRASOUND N/A 8/17/2021    Procedure: Intravascular Ultrasound;  Surgeon: Sanchez Sarah MD;  Location:  HEART CARDIAC CATH LAB    CV LEFT HEART CATH N/A 7/26/2021    Procedure: Left Heart Cath;  Surgeon: Sylvester Westbrook MD;  Location:  HEART CARDIAC CATH LAB    CV PCI ANGIOPLASTY N/A 5/9/2019    Procedure: PCI Angioplasty;  Surgeon: Jose Enrique Stanley MD;  Location:  HEART CARDIAC CATH LAB    CV PCI CHRONIC TOTAL OCCLUSION N/A 8/17/2021    Procedure: Percutaneous Coronary Intervention of Chronic Total Occlusion;  Surgeon: Sanchez Sarah MD;  Location:  HEART CARDIAC CATH LAB    Family History   Problem Relation Age of Onset    Diabetes Paternal Grandmother     Diabetes Nephew     Social History     Socioeconomic History    Marital status:      Spouse name: Not on file    Number of children: Not on file    Years of education: Not on file    Highest education level: Not on file   Occupational History    Not on file   Tobacco Use    Smoking status: Never    Smokeless tobacco: Never   Vaping Use    Vaping status: Never Used   Substance and Sexual Activity    Alcohol use: No    Drug use: No    Sexual activity: Yes     Partners: Female   Other Topics Concern    Parent/sibling w/ CABG, MI or angioplasty before 65F 55M? Not Asked   Social History Narrative    Not on file     Social Determinants of Health     Financial Resource Strain: Not on file   Food Insecurity: Not on file   Transportation Needs: Not on file   Physical Activity: Not on file   Stress: Not on file   Social Connections: Not on file   Interpersonal Safety: Not on file   Housing Stability: Not on file          Medications  Allergies   Scheduled Meds:  Current Outpatient Medications   Medication Sig Dispense  Refill    aspirin 81 MG EC tablet Take 1 tablet (81 mg) by mouth daily (Patient taking differently: Take 81 mg by mouth daily. OTC) 90 tablet 3    blood glucose (NO BRAND SPECIFIED) test strip Use to test blood sugar 1 times daily or as directed. To accompany: Blood Glucose Monitor Brands: per insurance. 100 strip 6    blood glucose monitoring (NO BRAND SPECIFIED) meter device kit Use to test blood sugar 1 times daily or as directed. Preferred blood glucose meter OR supplies to accompany: Blood Glucose Monitor Brands: per insurance. 1 kit 0    glimepiride (AMARYL) 2 MG tablet Take 1 tablet (2 mg) by mouth every morning before breakfast (Patient taking differently: Take 2 mg by mouth as needed.) 90 tablet 0    isosorbide mononitrate CR (IMDUR) 120 MG 24 HR ER tablet Take 1 tablet (120 mg) by mouth daily. 90 tablet 3    lisinopril (ZESTRIL) 20 MG tablet Take 1 tablet (20 mg) by mouth daily. 90 tablet 3    metFORMIN (GLUCOPHAGE) 1000 MG tablet Take 1 tablet (1,000 mg) by mouth daily with breakfast 90 tablet 0    metoprolol tartrate (LOPRESSOR) 25 MG tablet Take 1 tablet (25 mg) by mouth 2 times daily. 180 tablet 3    nitroGLYcerin (NITROSTAT) 0.4 MG sublingual tablet For chest pain place 1 tablet under the tongue every 5 minutes for up to 3 doses. If symptoms persist 5 minutes after 2nd dose call 911. 25 tablet 1    rosuvastatin (CRESTOR) 40 MG tablet Take 1 tablet (40 mg) by mouth daily. 90 tablet 3    thin (NO BRAND SPECIFIED) lancets Use with lanceting device. To accompany: Blood Glucose Monitor Brands: per insurance. 100 each 6    ticagrelor (BRILINTA) 90 MG tablet Take 1 tablet (90 mg) by mouth every 12 hours 180 tablet 2    No Known Allergies      Lab Results    Chemistry/lipid CBC Cardiac Enzymes/BNP/TSH/INR   Lab Results   Component Value Date    CHOL 137 02/06/2024    HDL 34 (L) 02/06/2024    TRIG 134 02/06/2024    BUN 20.3 10/02/2024     10/02/2024    CO2 22 10/02/2024    Lab Results   Component Value  Date    WBC 9.2 05/20/2024    HGB 10.9 (L) 05/20/2024    HCT 33.5 (L) 05/20/2024    MCV 79 05/20/2024     05/20/2024    @RESUFAST(BMP,CBC,BNP,TSH,  INR)@        This note has been dictated using voice recognition software. Any grammatical, typographical, or context distortions are unintentional and inherent to the software.    Cheryl Wiseman PA-C, RD  General Cardiology

## 2024-10-14 ENCOUNTER — LAB (OUTPATIENT)
Dept: LAB | Facility: CLINIC | Age: 64
End: 2024-10-14
Payer: COMMERCIAL

## 2024-10-14 DIAGNOSIS — I25.10 CORONARY ARTERY DISEASE INVOLVING NATIVE CORONARY ARTERY OF NATIVE HEART WITHOUT ANGINA PECTORIS: ICD-10-CM

## 2024-10-14 DIAGNOSIS — I25.118 CORONARY ARTERY DISEASE OF NATIVE ARTERY OF NATIVE HEART WITH STABLE ANGINA PECTORIS (H): ICD-10-CM

## 2024-10-14 DIAGNOSIS — I10 ESSENTIAL HYPERTENSION, BENIGN: ICD-10-CM

## 2024-10-14 LAB
ANION GAP SERPL CALCULATED.3IONS-SCNC: 8 MMOL/L (ref 7–15)
BUN SERPL-MCNC: 14.9 MG/DL (ref 8–23)
CALCIUM SERPL-MCNC: 8.5 MG/DL (ref 8.8–10.4)
CHLORIDE SERPL-SCNC: 102 MMOL/L (ref 98–107)
CREAT SERPL-MCNC: 1.5 MG/DL (ref 0.67–1.17)
EGFRCR SERPLBLD CKD-EPI 2021: 52 ML/MIN/1.73M2
GLUCOSE SERPL-MCNC: 228 MG/DL (ref 70–99)
HCO3 SERPL-SCNC: 27 MMOL/L (ref 22–29)
POTASSIUM SERPL-SCNC: 4.8 MMOL/L (ref 3.4–5.3)
SODIUM SERPL-SCNC: 137 MMOL/L (ref 135–145)

## 2024-10-14 PROCEDURE — 36415 COLL VENOUS BLD VENIPUNCTURE: CPT | Performed by: PHYSICIAN ASSISTANT

## 2024-10-14 PROCEDURE — 80048 BASIC METABOLIC PNL TOTAL CA: CPT | Performed by: PHYSICIAN ASSISTANT

## 2024-12-12 ENCOUNTER — TRANSFERRED RECORDS (OUTPATIENT)
Dept: HEALTH INFORMATION MANAGEMENT | Facility: CLINIC | Age: 64
End: 2024-12-12
Payer: COMMERCIAL

## 2024-12-12 ENCOUNTER — MEDICAL CORRESPONDENCE (OUTPATIENT)
Dept: HEALTH INFORMATION MANAGEMENT | Facility: CLINIC | Age: 64
End: 2024-12-12
Payer: COMMERCIAL

## 2024-12-13 ENCOUNTER — MEDICAL CORRESPONDENCE (OUTPATIENT)
Dept: HEALTH INFORMATION MANAGEMENT | Facility: CLINIC | Age: 64
End: 2024-12-13
Payer: COMMERCIAL

## 2024-12-16 ENCOUNTER — TRANSFERRED RECORDS (OUTPATIENT)
Dept: HEALTH INFORMATION MANAGEMENT | Facility: CLINIC | Age: 64
End: 2024-12-16
Payer: COMMERCIAL

## 2024-12-16 ENCOUNTER — TRANSCRIBE ORDERS (OUTPATIENT)
Dept: OTHER | Age: 64
End: 2024-12-16

## 2024-12-16 DIAGNOSIS — A15.9 TUBERCULOSIS: Primary | ICD-10-CM

## 2024-12-17 ENCOUNTER — TELEPHONE (OUTPATIENT)
Dept: INTERNAL MEDICINE | Facility: CLINIC | Age: 64
End: 2024-12-17
Payer: COMMERCIAL

## 2024-12-17 NOTE — TELEPHONE ENCOUNTER
Through right fax that was not for [facility]. Called the office to which the Report was to be faxed, got the fax number and faxed the Reports and results to the Provider's (Dr Michael Méndez at [facility]), Fax number: [phone]   [Initial Visit ___] : [unfilled] is here today for an initial visit  for [unfilled]

## 2024-12-18 ENCOUNTER — DOCUMENTATION ONLY (OUTPATIENT)
Dept: CARDIOLOGY | Facility: CLINIC | Age: 64
End: 2024-12-18
Payer: COMMERCIAL

## 2024-12-18 DIAGNOSIS — I25.10 CORONARY ARTERY DISEASE INVOLVING NATIVE CORONARY ARTERY OF NATIVE HEART WITHOUT ANGINA PECTORIS: ICD-10-CM

## 2024-12-18 DIAGNOSIS — Z00.6 EXAMINATION OF PARTICIPANT OR CONTROL IN CLINICAL RESEARCH: ICD-10-CM

## 2024-12-18 DIAGNOSIS — E11.9 TYPE 2 DIABETES MELLITUS WITHOUT COMPLICATION, WITHOUT LONG-TERM CURRENT USE OF INSULIN (H): Primary | ICD-10-CM

## 2024-12-18 NOTE — PROGRESS NOTES
SURPASS-CVOT Study [Effect of tirzepatide versus Dulaglutide on Major Cardiovascular Events in Patients with Type 2Diabetes    Medical Record Review for Visit 24 for off study FU    Review for adverse events, endpoints and medication changes. Change of statin noted from Atorvastatin to Rosuvastatin 40 mg every day on 10/07/2024, per cardiology note. No AE's, endpoints or NAOMY'S.    Will review medical records again in 3 months.     Stephenie Diaz RN

## 2024-12-19 ENCOUNTER — TRANSFERRED RECORDS (OUTPATIENT)
Dept: HEALTH INFORMATION MANAGEMENT | Facility: CLINIC | Age: 64
End: 2024-12-19
Payer: COMMERCIAL

## 2024-12-19 NOTE — CONFIDENTIAL NOTE
DIAGNOSIS: Tuberculosis    DATE RECEIVED:  01.16.2025   NOTES (Gather within 2 years) STATUS DETAILS   OFFICE NOTE from referring provider   Received 12.18.2024  Kori Mccormick MD [facility]   LABS (any labs) Internal    MEDICATION LIST Internal

## 2024-12-23 DIAGNOSIS — I10 ESSENTIAL HYPERTENSION, BENIGN: ICD-10-CM

## 2024-12-23 DIAGNOSIS — E11.9 TYPE 2 DIABETES MELLITUS WITHOUT COMPLICATION, WITHOUT LONG-TERM CURRENT USE OF INSULIN (H): ICD-10-CM

## 2024-12-23 RX ORDER — LISINOPRIL 20 MG/1
20 TABLET ORAL DAILY
Qty: 90 TABLET | Refills: 1 | Status: SHIPPED | OUTPATIENT
Start: 2024-12-23

## 2025-01-16 ENCOUNTER — PRE VISIT (OUTPATIENT)
Dept: INFECTIOUS DISEASES | Facility: CLINIC | Age: 65
End: 2025-01-16
Payer: COMMERCIAL

## 2025-01-27 ENCOUNTER — TELEPHONE (OUTPATIENT)
Dept: CARDIOLOGY | Facility: CLINIC | Age: 65
End: 2025-01-27
Payer: COMMERCIAL

## 2025-01-27 DIAGNOSIS — R94.39 ABNORMAL STRESS TEST: ICD-10-CM

## 2025-01-27 DIAGNOSIS — Z98.890 STATUS POST CORONARY ANGIOGRAM: ICD-10-CM

## 2025-01-27 DIAGNOSIS — I25.10 CORONARY ARTERY DISEASE INVOLVING NATIVE CORONARY ARTERY OF NATIVE HEART WITHOUT ANGINA PECTORIS: Primary | ICD-10-CM

## 2025-01-27 DIAGNOSIS — I20.89 STABLE ANGINA: ICD-10-CM

## 2025-01-27 DIAGNOSIS — I21.4 NSTEMI (NON-ST ELEVATED MYOCARDIAL INFARCTION) (H): ICD-10-CM

## 2025-01-27 RX ORDER — CLOPIDOGREL BISULFATE 75 MG/1
75 TABLET ORAL DAILY
Qty: 90 TABLET | Refills: 3 | Status: SHIPPED | OUTPATIENT
Start: 2025-01-27

## 2025-01-27 RX ORDER — CLOPIDOGREL BISULFATE 75 MG/1
TABLET ORAL
Qty: 8 TABLET | Refills: 0 | Status: SHIPPED | OUTPATIENT
Start: 2025-01-27

## 2025-01-27 NOTE — TELEPHONE ENCOUNTER
"Team received instructions from Cheryl RYAN.  Patient does need to continue the dual antiplatelet therapy because of \"complicated proximal LAD/D1 stenting and known  of the first diagonal and moderate nonflow limiting lesion in the mid RCA. \"   Therefore she has prescribed  \" change from Ticagrelor to clopidogrel, administer 600 mg loading dose of prasugrel 24 hours after last dose of ticagrelor, then start 75 mg daily 24 hours after loading dose. \"    Cheryl states she will contact Dr. Jc Dupont.    Writer called to patient.  He does speak English, but not well.    Instructed patient to stop the Brilinta and tomorrow to start taking the Plavix.  Instructed about loading dose.  Writer went through the instructions twice, and kept asking if he has questions.   He said he did not, but then asked writer about a bill, which turns out to be from [facility], nothing related to the medication.  Instructed him to ask the pharmacist to go over the new medication when he goes in to pick it up.   I asked him this two different times, and he said he will.    Rx sent to [facility] pharmacy, per patient request.    Sent team a reminder for next week, to call patient and see how he is doing.    Aleshia Harrison RN on 1/27/2025 at 11:46 AM            "

## 2025-01-27 NOTE — TELEPHONE ENCOUNTER
Team received a call from Dr. Jc Dupont at [phone].    He states that he is now planning out the treatment course for Adrien, who has active TB.   Dr. Dupont would like to know if patient can d\c the Brilinta, or is there another choice of drug, because the treatment for the TB does not go with Brilinta.    Writer has reviewed documentation.  The MICHELLE coronary angiogram was   3\11\2022.  Unless patient had stent completed since then at an outside location, it would appear patient no longer needs the Brilinta.    Writer has sent request to Cheryl RYAN, who last saw the patient on 10/7/24.      Aleshia Harrison RN on 1/27/2025 at 9:12 AM

## 2025-02-12 ENCOUNTER — TELEPHONE (OUTPATIENT)
Dept: CARDIOLOGY | Facility: CLINIC | Age: 65
End: 2025-02-12
Payer: COMMERCIAL

## 2025-02-12 NOTE — TELEPHONE ENCOUNTER
Writer called and spoke with Adrien.  Inquired if he has now made the switch from Brilinta to Plavix, and how is he tolerating.    He states he did get the new medication without any trouble.  He is taking it, and he has not experienced any bleeding.  Denies nose bleeds, bruising, blood in urine.   Has not noticed anything he is concerned about.    Writer thanked him for providing the feedback.    Aleshia Harrison RN on 2/12/2025 at 2:45 PM

## 2025-03-27 ENCOUNTER — DOCUMENTATION ONLY (OUTPATIENT)
Dept: CARDIOLOGY | Facility: CLINIC | Age: 65
End: 2025-03-27
Payer: COMMERCIAL

## 2025-03-27 DIAGNOSIS — E11.9 TYPE 2 DIABETES MELLITUS WITHOUT COMPLICATION, WITHOUT LONG-TERM CURRENT USE OF INSULIN (H): ICD-10-CM

## 2025-03-27 DIAGNOSIS — I25.10 CORONARY ARTERY DISEASE INVOLVING NATIVE CORONARY ARTERY OF NATIVE HEART WITHOUT ANGINA PECTORIS: Primary | ICD-10-CM

## 2025-03-27 DIAGNOSIS — Z00.6 EXAMINATION OF PARTICIPANT IN CLINICAL TRIAL: ICD-10-CM

## 2025-03-27 NOTE — PROGRESS NOTES
Patient was in the Surpass starting 12/29/2021, He stopped his study drug temporarily on on 02/09/2022 with last injection taken 02/2/2022. After being off for 4 months patient chose to not restart his study med on 06/15/2022. He was followed by phone calls for awhile and then did not respond to calls so medical chart review completed to the end of the study.  Study is now ending and a review of notes for AE's and NAOMY's were done, see below. There were no endpoints for the study.    12/19/2024 patient had a coloscopy at [facility], he had 4 polyps removed and granulomatosis was noted in the colon. Bx's were done and he was dx with Colonic Tuberculosis. He was referred to Infectious Disease.     12/23/20244 He was seen at [facility]. He is asymptomatic. CXR done and was negative.     01/03/2025 Follow-up [facility] for treatment discussion. Needed to have cardiac input as Brilinta and Empiric treatment are not compatible.  Hemoglobin 11, patient has a medical hx of Anemia, since 08/17/2021, lowest level was 10.9 on 05/20/2024. All other Hgb's were 11 to 11.7.    01/10/2025 CT of abdomen and Pelvis.  Bowel: There is marked concentric wall thickening involving the terminal ileum, measuring up to 1.3 cm, with luminal narrowing. No significant upstream distention. Wall thickening extends to involve the cecum, as well as the base of the appendix. Lymph nodes: Small ileocecal lymph nodes, likely reactive.    01/27/2025 Cardiology has requested that patient discontinue Brilinta and start Clopidogrel with a 600 mg loading dose, followed by 75 mg every day. Patient reports he has switched medications on 02/12/2025.     SURPASS-CVOT Study [Effect of tirzepatide versus Dulaglutide on Major Cardiovascular Events in Patients with Type 2 Diabetes]    Diagnosis/event description (diagnosis preferred):  Colonic Tuberculosis    Start date: 12/19/2024   Date resolved:      Date site aware of event:  03/27/2025     Intensity: ([] mild /[x]  moderate / [] severe)     Study Medication adjustment:  [] Yes   [] No  Off IP since 02/02/2022    Outcome: ([] resolved [with or without sequelae] /[] recovering / [x] not recovered / [] death / [] unknown)      Is Event related to study device?    [] Yes   [] No  NA    Was treatment given for this event:  [] Yes   [] No   If yes, provide details Treatment currently not documented, but is to start medication.    Serious adverse event?    Yes   [x] No    Special interest event?  [] Yes   [x] No    Endpoint? [] Yes   [x] No     Fatal event?  [] Yes   [x] No      Dr. Rodriguez: Reasonable possibility that AE is related to study treatment    [] Yes   [x] No    Dr. Rodriguez: Is AE related to study device?   [] Yes   [x] No      SURPASS-CVOT Study [Effect of tirzepatide versus Dulaglutide on Major Cardiovascular Events in Patients with Type 2 Diabetes]    Diagnosis/event description (diagnosis preferred):  Colon Polyps    Start date: 12/19/2024   Date resolved: 12/19/2024     Date site aware of event: 03/27/2025     Intensity: ([] mild /[x]  moderate / [] severe)     Study Medication adjustment:  [] Yes   [] No  Off IP since 02/02/2022    Outcome: ([x] resolved [with or without sequelae] /[] recovering / [] not recovered / [] death / [] unknown)      Is Event related to study device?    [] Yes   [] No  NA    Was treatment given for this event:  [x] Yes   [] No   If yes, provide details Polypectomy    Serious adverse event?    Yes   [x] No    Special interest event?  [] Yes   [x] No    Endpoint? [] Yes   [x] No     Fatal event?  [] Yes   [x] No      Dr. Rodriguez: Reasonable possibility that AE is related to study treatment    [] Yes   [x] No    Dr. Rodriguez: Is AE related to study device?   [] Yes   [x] No      SURPASS-CVOT Study [Effect of tirzepatide versus Dulaglutide on Major Cardiovascular Events in Patients with Type 2 Diabetes]    Diagnosis/event description (diagnosis  preferred):  Small Ileocecal Lymph Nodes, (likely reactive).     Start date: 01/10/2025   Date resolved:      Date site aware of event: 03/27/2025     Intensity: ([x] mild /[]  moderate / [] severe)     Study Medication adjustment:  [] Yes   [] No  Off IP since 02/02/2022    Outcome: ([] resolved [with or without sequelae] /[] recovering / [x] not recovered / [] death / [] unknown)      Is Event related to study device?    [] Yes   [] No  NA    Was treatment given for this event:  [] Yes   [x] No   If yes, provide details  Serious adverse event?    Yes   [x] No    Special interest event?  [] Yes   [x] No    Endpoint? [] Yes   [x] No     Fatal event?  [] Yes   [x] No      Dr. Rodriguez: Reasonable possibility that AE is related to study treatment    [] Yes   [x] No    Dr. Rodriguez: Is AE related to study device?   [] Yes   [x] No    Patient will be informed of which medication he was randomized to when site receives those results     Stephenie Diaz RN

## 2025-04-05 ENCOUNTER — HEALTH MAINTENANCE LETTER (OUTPATIENT)
Age: 65
End: 2025-04-05

## 2025-05-15 ENCOUNTER — DOCUMENTATION ONLY (OUTPATIENT)
Dept: CARDIOLOGY | Facility: CLINIC | Age: 65
End: 2025-05-15
Payer: COMMERCIAL

## 2025-05-15 DIAGNOSIS — Z00.6 EXAMINATION OF PARTICIPANT IN CLINICAL TRIAL: ICD-10-CM

## 2025-05-15 DIAGNOSIS — E11.9 TYPE 2 DIABETES MELLITUS WITHOUT COMPLICATION, WITHOUT LONG-TERM CURRENT USE OF INSULIN (H): ICD-10-CM

## 2025-05-15 DIAGNOSIS — I25.10 CORONARY ARTERY DISEASE INVOLVING NATIVE CORONARY ARTERY OF NATIVE HEART WITHOUT ANGINA PECTORIS: Primary | ICD-10-CM

## 2025-05-15 NOTE — PROGRESS NOTES
"Phone contact from patient today for End of Study Visit for the Surpass Study. He reports \"No cardiac events, more than 1 month I did not take Metformin, more than 6 months I did not take Glimepiride but yesterday I checked reading 138 before breakfast.\"    He has been off study drug with last dose being 02/09/2022.     Patient thanked for being in the study.    Stephenie Diaz RN   "

## 2025-05-17 ENCOUNTER — HEALTH MAINTENANCE LETTER (OUTPATIENT)
Age: 65
End: 2025-05-17

## 2025-05-27 ENCOUNTER — OFFICE VISIT (OUTPATIENT)
Dept: CARDIOLOGY | Facility: CLINIC | Age: 65
End: 2025-05-27

## 2025-05-27 DIAGNOSIS — Z00.6 EXAMINATION OF PARTICIPANT IN CLINICAL TRIAL: Primary | ICD-10-CM

## 2025-05-27 DIAGNOSIS — I21.4 NSTEMI (NON-ST ELEVATED MYOCARDIAL INFARCTION) (H): ICD-10-CM

## 2025-05-27 DIAGNOSIS — E78.5 HYPERLIPIDEMIA LDL GOAL <70: ICD-10-CM

## 2025-05-27 NOTE — LETTER
5/27/2025    Michael Méndez MD  [address] [facility] [id number]    RE: Abhishek Gomez       Dear Colleague,     I had the pleasure of seeing Aubreyavi SWEETIE Gomez in the [facility].    Study Name: A Phase 3 Randomized, Placebo-Controlled Clinical Study to Evaluate the Efficacy and Safety of MK-0616 in Reducing Major Adverse Cardiovascular Events in   Participants at High Cardiovascular Risk    : Apolinar Rodriguez MD  Protocol Version: 015-05, [id number]    OPTIONAL SCREENING VISIT  Patient seen for an education visit to continue discussion regarding her participation in the MK-0616 trial.    The participant was interviewed on the phone to determine his  level of interest in participating in a clinical research trial.   The study was introduced as well as the length of study participation, frequency of study visits, delivery method of study drug, risks of study drug, and voluntary nature of participation.    Participant presents today to the clinic to continue the consent process.   The MK-0616 trial was reviewed with the participant.   This included:  Study purpose   Placebo control  Double-blind  Qualifiers for study participation   Study drug   Study procedures  Length of study participation   Risks   Benefits (if any)   Confidentiality   Payment (if any)   Alternatives to participation  Voluntary participation   Patient s questions were answered to his satisfaction.      The consent form ([facility] IRB approved version 7 Feb 2024) was given to the participant for his review.     After review of the optional screening visit consent and the main study consent, he agreed to participate in the optional screening of the [employer] 0616 study.    Participant Identification Card provided to participant    Participant registered via IRT. Assigned unique identification number: [id number]    LDL-C drawn @ 1028, results pending     Adverse events solicited [x] Yes  []  No   If AE reported, see below for AE details    Abhishek Gomez will be updated with results when available, and he will decide if he would like to continue to participate in the main study, or if he would prefer to decline the invitation.     Plan: pending results of LDL-C, will schedule next clinic visit at [facility].    Malachi Rangel RN  Clinical Trials Nurse     Thank you for allowing me to participate in the care of your patient.      Sincerely,     Malachi Rangel RN     [facility]  cc:   Michael Méndez MD  [address] [place] [id number]

## 2025-05-27 NOTE — PROGRESS NOTES
Study Name: A Phase 3 Randomized, Placebo-Controlled Clinical Study to Evaluate the Efficacy and Safety of MK-0616 in Reducing Major Adverse Cardiovascular Events in   Participants at High Cardiovascular Risk    : Apolinar Rodriguez MD  Protocol Version: 015-05, [id number]    OPTIONAL SCREENING VISIT  Patient seen for an education visit to continue discussion regarding her participation in the MK-0616 trial.    The participant was interviewed on the phone to determine his  level of interest in participating in a clinical research trial.   The study was introduced as well as the length of study participation, frequency of study visits, delivery method of study drug, risks of study drug, and voluntary nature of participation.    Participant presents today to the clinic to continue the consent process.   The MK-0616 trial was reviewed with the participant.   This included:  Study purpose   Placebo control  Double-blind  Qualifiers for study participation   Study drug   Study procedures  Length of study participation   Risks   Benefits (if any)   Confidentiality   Payment (if any)   Alternatives to participation  Voluntary participation   Patient s questions were answered to his satisfaction.      The consent form ([facility] IRB approved version 7 Feb 2024) was given to the participant for his review.     After review of the optional screening visit consent and the main study consent, he agreed to participate in the optional screening of the [employer] 0616 study.    Participant Identification Card provided to participant    Participant registered via IRT. Assigned unique identification number: [id number]    LDL-C drawn @ 1028, results pending     Adverse events solicited [x] Yes  [] No   If AE reported, see below for AE details    Aubreyavi SWEETIE Gomez will be updated with results when available, and he will decide if he would like to continue to participate in the main study, or if he would prefer to  decline the invitation.     Plan: pending results of LDL-C, will schedule next clinic visit at [facility].    Malachi Rangel RN  Clinical Trials Nurse

## 2025-06-03 ENCOUNTER — TELEPHONE (OUTPATIENT)
Dept: INTERNAL MEDICINE | Facility: CLINIC | Age: 65
End: 2025-06-03

## 2025-06-03 ENCOUNTER — TELEPHONE (OUTPATIENT)
Dept: CARDIOLOGY | Facility: CLINIC | Age: 65
End: 2025-06-03

## 2025-06-03 DIAGNOSIS — E11.9 TYPE 2 DIABETES MELLITUS WITHOUT COMPLICATION, WITHOUT LONG-TERM CURRENT USE OF INSULIN (H): Primary | ICD-10-CM

## 2025-06-03 PROCEDURE — 99207 PR NO CHARGE-RESEARCH SERVICE: CPT

## 2025-06-03 NOTE — TELEPHONE ENCOUNTER
Study Name: A Phase 3 Randomized, Placebo-Controlled Clinical Study to Evaluate the Efficacy and Safety of MK-0616 in Reducing Major Adverse Cardiovascular Events in   Participants at High Cardiovascular Risk    : Apolinar Rodriguez MD  Protocol Version: 015-05, [id number]    OPTIONAL SCREEN FAILURE    Patient was contacted today as LDL results from OLS visit is 75 mg/dL.  Therefore a screen failure.    Per study protocol, subject is not eligible to rescreen on this criteria.   Subject verbalized understanding.  Subject thanked for their time     Malachi Rangel RN  Clinical Trials Nurse

## 2025-06-03 NOTE — TELEPHONE ENCOUNTER
Fax received from [facility] pharmacy. Patients insurance covers [employer] test strips. Please send updated RX.

## 2025-06-04 ENCOUNTER — TRANSFERRED RECORDS (OUTPATIENT)
Dept: CARDIOLOGY | Facility: CLINIC | Age: 65
End: 2025-06-04

## 2025-06-16 ENCOUNTER — PATIENT OUTREACH (OUTPATIENT)
Dept: CARE COORDINATION | Facility: CLINIC | Age: 65
End: 2025-06-16

## (undated) DEVICE — GUIDEWIRE VASC 0.014INX180CM RUNTHROUGH 25-1011

## (undated) DEVICE — DEFIB PRO-PADZ LVP LQD GEL ADULT 8900-2105-01

## (undated) DEVICE — SLEEVE TR BAND RADIAL COMPRESSION DEVICE 24CM TRB24-REG

## (undated) DEVICE — CATH BALLOON EMERGE 2.0X20MM H7493918920200

## (undated) DEVICE — CATH BALLOON EMERGE 2.5X12MM H7493918912250

## (undated) DEVICE — CATH TURNPIKE LP 135CM

## (undated) DEVICE — CATH BALLOON NC EMERGE 3.00X12MM H7493926712300

## (undated) DEVICE — CATH DIAGNOSTIC RADIAL 5FR TIG 4.0

## (undated) DEVICE — CATH ANGIO INFINITI 3DRC 4FRX100CM 538476

## (undated) DEVICE — CATH LAUNCHER 6FR JR 4.0 LA6JR40

## (undated) DEVICE — CATH DIAG 4FR JL 4.5 538417

## (undated) DEVICE — SYR ANGIOGRAPHY MULTIUSE KIT ACIST 014612

## (undated) DEVICE — CATH BALLOON EMERGE 2.5X15MM H7493918915250

## (undated) DEVICE — INTRO SHEATH 4FRX10CM PINNACLE RSS402

## (undated) DEVICE — CATH BALLOON NC EMERGE 2.50X12MM H7493926712250

## (undated) DEVICE — MANIFOLD KIT ANGIO AUTOMATED 014613

## (undated) DEVICE — KIT HAND CONTROL ANGIOTOUCH ACIST 65CM AT-P65

## (undated) DEVICE — SMART CAPNOLINE H PLUS, ADULT/INTERMEDIATE O2, LONG

## (undated) DEVICE — GW 0.014IN X 180CM ASAHI CONFI

## (undated) DEVICE — CLOSURE ANGIOSEAL 6FR 610130

## (undated) DEVICE — GUIDEWIRE ASAHI FIELDER XT 190

## (undated) DEVICE — INFL DVC KIT W/10CC NITRO IN4530

## (undated) DEVICE — CATH BALLOON APEX PUSH 12X1.5MM H7493896112150

## (undated) DEVICE — TOTE ANGIO CORP PC15AT SAN32CC83O

## (undated) DEVICE — CATH GUIDING BLUE YELLOW PTFE XB3.5 6FRX100CM 67005400

## (undated) DEVICE — CATH GUIDING BLUE YELLOW PTFE XB3 6FRX100CM 67005200

## (undated) DEVICE — CATH BALLOON NC EMERGE 2.75X30MM H7493926730270

## (undated) DEVICE — LINE MONITOR NASAL SMART CAPNOLINE ADULT LONG 12463

## (undated) DEVICE — INTRODUCER CATH VASC 5FRX10CM  MPIS-501-NT-U-SST

## (undated) DEVICE — VALVE HEMOSTASIS .096" COPILOT MECH 1003331

## (undated) DEVICE — CATH ANGIO JUDKINS JL3.5 6FRX100CM INFINITI 534618T

## (undated) DEVICE — GUIDEWIRE VASC 0.014"X300CM PLATINUM TIP 25-1013

## (undated) DEVICE — GW SURG OMNIWIRE STRAIGHT TIP L185CM PRESSURE GU 89185

## (undated) DEVICE — CATH US OD 5FR OD SEC 2.9FR EAGLE EYE PLATINUM 0.014 85900P

## (undated) DEVICE — INTRO GLIDESHEATH SLENDER 6FR 10X45CM 60-1060

## (undated) DEVICE — WIRE GUIDE 0.035"X260CM SAFE-T-J EXCHANGE G00517

## (undated) DEVICE — CATH BALLOON NC EUPHORA 2.75X8MM NCEUP27508X

## (undated) DEVICE — NDL PERC ENTRY THINWALL 18GA 7.0" G00166

## (undated) DEVICE — CATH RX TAKERU PTCA BALLOON 1.5X12MM DC-RY1512UA1

## (undated) DEVICE — INTRO SHEATH 6FRX10CM PINNACLE RSS602

## (undated) DEVICE — CATH BALLOON NC EMERGE 3.50X8MM H7493926708350

## (undated) DEVICE — WIRE GUIDE EXT 0.014-.018"X145CM 22260

## (undated) DEVICE — GW .014 X 190CM STR HI-TORQUE

## (undated) DEVICE — CATH BALLOON EUPHORA RX 2.5X12MM EUP2512X

## (undated) DEVICE — CATH BALLOON EMERGE 2.0X8MM H7493918908200

## (undated) DEVICE — CATH BALLOON NC EMERGE 2.75X8MM H7493926708270

## (undated) DEVICE — CATH LAUNCHER 6FR LA6EBU375

## (undated) DEVICE — 6FR X 85CM R2P DESTINATION SLENDER GUIDING SHEATH

## (undated) DEVICE — RAD INFLATOR BASIC COMPAK  IN4130

## (undated) DEVICE — INTRODUCER SHEATH GREEN 6.5FRX11CM .038IN PSI-6F-11-038ACT

## (undated) RX ORDER — FENTANYL CITRATE 50 UG/ML
INJECTION, SOLUTION INTRAMUSCULAR; INTRAVENOUS
Status: DISPENSED
Start: 2019-05-09

## (undated) RX ORDER — NITROGLYCERIN 5 MG/ML
VIAL (ML) INTRAVENOUS
Status: DISPENSED
Start: 2021-08-17

## (undated) RX ORDER — ASPIRIN 325 MG
TABLET ORAL
Status: DISPENSED
Start: 2019-05-09

## (undated) RX ORDER — ADENOSINE 3 MG/ML
INJECTION, SOLUTION INTRAVENOUS
Status: DISPENSED
Start: 2021-08-17

## (undated) RX ORDER — HEPARIN SODIUM 200 [USP'U]/100ML
INJECTION, SOLUTION INTRAVENOUS
Status: DISPENSED
Start: 2022-03-01

## (undated) RX ORDER — FENTANYL CITRATE 50 UG/ML
INJECTION, SOLUTION INTRAMUSCULAR; INTRAVENOUS
Status: DISPENSED
Start: 2021-08-17

## (undated) RX ORDER — HEPARIN SODIUM 1000 [USP'U]/ML
INJECTION, SOLUTION INTRAVENOUS; SUBCUTANEOUS
Status: DISPENSED
Start: 2019-05-09

## (undated) RX ORDER — NITROGLYCERIN 5 MG/ML
VIAL (ML) INTRAVENOUS
Status: DISPENSED
Start: 2019-05-09

## (undated) RX ORDER — LIDOCAINE HYDROCHLORIDE 10 MG/ML
INJECTION, SOLUTION EPIDURAL; INFILTRATION; INTRACAUDAL; PERINEURAL
Status: DISPENSED
Start: 2019-05-09

## (undated) RX ORDER — HEPARIN SODIUM 1000 [USP'U]/ML
INJECTION, SOLUTION INTRAVENOUS; SUBCUTANEOUS
Status: DISPENSED
Start: 2021-07-26

## (undated) RX ORDER — NITROGLYCERIN 5 MG/ML
VIAL (ML) INTRAVENOUS
Status: DISPENSED
Start: 2021-07-26

## (undated) RX ORDER — HEPARIN SODIUM 1000 [USP'U]/ML
INJECTION, SOLUTION INTRAVENOUS; SUBCUTANEOUS
Status: DISPENSED
Start: 2022-03-01

## (undated) RX ORDER — LIDOCAINE HYDROCHLORIDE 10 MG/ML
INJECTION, SOLUTION EPIDURAL; INFILTRATION; INTRACAUDAL; PERINEURAL
Status: DISPENSED
Start: 2021-08-17

## (undated) RX ORDER — VERAPAMIL HYDROCHLORIDE 2.5 MG/ML
INJECTION, SOLUTION INTRAVENOUS
Status: DISPENSED
Start: 2022-03-01

## (undated) RX ORDER — VERAPAMIL HYDROCHLORIDE 2.5 MG/ML
INJECTION, SOLUTION INTRAVENOUS
Status: DISPENSED
Start: 2021-07-26

## (undated) RX ORDER — LIDOCAINE HYDROCHLORIDE 10 MG/ML
INJECTION, SOLUTION EPIDURAL; INFILTRATION; INTRACAUDAL; PERINEURAL
Status: DISPENSED
Start: 2021-07-26

## (undated) RX ORDER — REGADENOSON 0.08 MG/ML
INJECTION, SOLUTION INTRAVENOUS
Status: DISPENSED
Start: 2023-03-06

## (undated) RX ORDER — HEPARIN SODIUM 200 [USP'U]/100ML
INJECTION, SOLUTION INTRAVENOUS
Status: DISPENSED
Start: 2021-08-17

## (undated) RX ORDER — NITROGLYCERIN 5 MG/ML
VIAL (ML) INTRAVENOUS
Status: DISPENSED
Start: 2022-03-01

## (undated) RX ORDER — LIDOCAINE HYDROCHLORIDE 10 MG/ML
INJECTION, SOLUTION EPIDURAL; INFILTRATION; INTRACAUDAL; PERINEURAL
Status: DISPENSED
Start: 2022-03-01

## (undated) RX ORDER — FENTANYL CITRATE 50 UG/ML
INJECTION, SOLUTION INTRAMUSCULAR; INTRAVENOUS
Status: DISPENSED
Start: 2022-03-01

## (undated) RX ORDER — HEPARIN SODIUM 200 [USP'U]/100ML
INJECTION, SOLUTION INTRAVENOUS
Status: DISPENSED
Start: 2019-05-09

## (undated) RX ORDER — FENTANYL CITRATE 50 UG/ML
INJECTION, SOLUTION INTRAMUSCULAR; INTRAVENOUS
Status: DISPENSED
Start: 2021-07-26

## (undated) RX ORDER — HEPARIN SODIUM 1000 [USP'U]/ML
INJECTION, SOLUTION INTRAVENOUS; SUBCUTANEOUS
Status: DISPENSED
Start: 2021-08-17